# Patient Record
Sex: MALE | Race: WHITE | NOT HISPANIC OR LATINO | Employment: OTHER | URBAN - METROPOLITAN AREA
[De-identification: names, ages, dates, MRNs, and addresses within clinical notes are randomized per-mention and may not be internally consistent; named-entity substitution may affect disease eponyms.]

---

## 2019-06-21 ENCOUNTER — APPOINTMENT (OUTPATIENT)
Dept: RADIOLOGY | Facility: CLINIC | Age: 69
End: 2019-06-21
Payer: MEDICARE

## 2019-06-21 ENCOUNTER — OFFICE VISIT (OUTPATIENT)
Dept: URGENT CARE | Facility: CLINIC | Age: 69
End: 2019-06-21
Payer: MEDICARE

## 2019-06-21 ENCOUNTER — TELEPHONE (OUTPATIENT)
Dept: URGENT CARE | Facility: CLINIC | Age: 69
End: 2019-06-21

## 2019-06-21 VITALS
HEIGHT: 74 IN | TEMPERATURE: 97.8 F | BODY MASS INDEX: 21.17 KG/M2 | RESPIRATION RATE: 18 BRPM | SYSTOLIC BLOOD PRESSURE: 145 MMHG | DIASTOLIC BLOOD PRESSURE: 84 MMHG | WEIGHT: 165 LBS | OXYGEN SATURATION: 95 % | HEART RATE: 103 BPM

## 2019-06-21 DIAGNOSIS — M54.6 ACUTE RIGHT-SIDED THORACIC BACK PAIN: ICD-10-CM

## 2019-06-21 DIAGNOSIS — M54.6 ACUTE RIGHT-SIDED THORACIC BACK PAIN: Primary | ICD-10-CM

## 2019-06-21 PROCEDURE — 99203 OFFICE O/P NEW LOW 30 MIN: CPT | Performed by: PREVENTIVE MEDICINE

## 2019-06-21 PROCEDURE — G0463 HOSPITAL OUTPT CLINIC VISIT: HCPCS | Performed by: PREVENTIVE MEDICINE

## 2019-06-21 PROCEDURE — 71101 X-RAY EXAM UNILAT RIBS/CHEST: CPT

## 2019-09-04 ENCOUNTER — APPOINTMENT (EMERGENCY)
Dept: CT IMAGING | Facility: HOSPITAL | Age: 69
End: 2019-09-04
Payer: MEDICARE

## 2019-09-04 ENCOUNTER — HOSPITAL ENCOUNTER (EMERGENCY)
Facility: HOSPITAL | Age: 69
End: 2019-09-04
Attending: EMERGENCY MEDICINE | Admitting: EMERGENCY MEDICINE
Payer: MEDICARE

## 2019-09-04 ENCOUNTER — APPOINTMENT (EMERGENCY)
Dept: RADIOLOGY | Facility: HOSPITAL | Age: 69
End: 2019-09-04
Payer: MEDICARE

## 2019-09-04 ENCOUNTER — OFFICE VISIT (OUTPATIENT)
Dept: URGENT CARE | Facility: CLINIC | Age: 69
End: 2019-09-04
Payer: MEDICARE

## 2019-09-04 ENCOUNTER — HOSPITAL ENCOUNTER (INPATIENT)
Facility: HOSPITAL | Age: 69
LOS: 9 days | DRG: 025 | End: 2019-09-13
Attending: INTERNAL MEDICINE | Admitting: ANESTHESIOLOGY
Payer: MEDICARE

## 2019-09-04 VITALS
HEIGHT: 75 IN | SYSTOLIC BLOOD PRESSURE: 144 MMHG | BODY MASS INDEX: 20.39 KG/M2 | DIASTOLIC BLOOD PRESSURE: 88 MMHG | RESPIRATION RATE: 20 BRPM | HEART RATE: 95 BPM | WEIGHT: 164 LBS | TEMPERATURE: 98.6 F | OXYGEN SATURATION: 96 %

## 2019-09-04 VITALS
DIASTOLIC BLOOD PRESSURE: 98 MMHG | HEIGHT: 75 IN | TEMPERATURE: 98.5 F | HEART RATE: 81 BPM | OXYGEN SATURATION: 94 % | WEIGHT: 140 LBS | BODY MASS INDEX: 17.41 KG/M2 | RESPIRATION RATE: 22 BRPM | SYSTOLIC BLOOD PRESSURE: 189 MMHG

## 2019-09-04 DIAGNOSIS — R20.0 NUMBNESS: ICD-10-CM

## 2019-09-04 DIAGNOSIS — K59.00 CONSTIPATION, UNSPECIFIED CONSTIPATION TYPE: ICD-10-CM

## 2019-09-04 DIAGNOSIS — F10.10 ALCOHOL ABUSE: ICD-10-CM

## 2019-09-04 DIAGNOSIS — Z29.8 SEIZURE PROPHYLAXIS: ICD-10-CM

## 2019-09-04 DIAGNOSIS — I62.9 INTRACRANIAL HEMORRHAGE (HCC): Primary | ICD-10-CM

## 2019-09-04 DIAGNOSIS — D72.829 LEUKOCYTOSIS, UNSPECIFIED TYPE: ICD-10-CM

## 2019-09-04 DIAGNOSIS — C22.0 HEPATOCELLULAR CARCINOMA (HCC): ICD-10-CM

## 2019-09-04 DIAGNOSIS — R53.1 WEAKNESS: ICD-10-CM

## 2019-09-04 DIAGNOSIS — Z72.0 TOBACCO ABUSE: ICD-10-CM

## 2019-09-04 DIAGNOSIS — K21.9 GASTROESOPHAGEAL REFLUX DISEASE WITHOUT ESOPHAGITIS: ICD-10-CM

## 2019-09-04 DIAGNOSIS — G93.89 BRAIN MASS: ICD-10-CM

## 2019-09-04 DIAGNOSIS — J44.9 COPD (CHRONIC OBSTRUCTIVE PULMONARY DISEASE) (HCC): ICD-10-CM

## 2019-09-04 DIAGNOSIS — E87.1 HYPONATREMIA: ICD-10-CM

## 2019-09-04 DIAGNOSIS — F41.9 ANXIETY DISORDER: ICD-10-CM

## 2019-09-04 DIAGNOSIS — T38.0X5A STEROID-INDUCED HYPERGLYCEMIA: ICD-10-CM

## 2019-09-04 DIAGNOSIS — I61.9 CEREBRAL BRAIN HEMORRHAGE (HCC): Primary | ICD-10-CM

## 2019-09-04 DIAGNOSIS — K08.89 TOOTH PAIN: ICD-10-CM

## 2019-09-04 DIAGNOSIS — R73.9 STEROID-INDUCED HYPERGLYCEMIA: ICD-10-CM

## 2019-09-04 DIAGNOSIS — G93.6 CEREBRAL EDEMA (HCC): ICD-10-CM

## 2019-09-04 DIAGNOSIS — R29.898 RIGHT HAND WEAKNESS: Primary | ICD-10-CM

## 2019-09-04 DIAGNOSIS — I10 ESSENTIAL HYPERTENSION: ICD-10-CM

## 2019-09-04 PROBLEM — F17.210 DEPENDENCE ON NICOTINE FROM CIGARETTES: Status: ACTIVE | Noted: 2019-09-04

## 2019-09-04 LAB
ALBUMIN SERPL BCP-MCNC: 3.3 G/DL (ref 3.5–5)
ALP SERPL-CCNC: 262 U/L (ref 46–116)
ALT SERPL W P-5'-P-CCNC: 81 U/L (ref 12–78)
ANION GAP SERPL CALCULATED.3IONS-SCNC: 6 MMOL/L (ref 4–13)
APTT PPP: 28 SECONDS (ref 23–37)
AST SERPL W P-5'-P-CCNC: 70 U/L (ref 5–45)
BILIRUB SERPL-MCNC: 1.2 MG/DL (ref 0.2–1)
BUN SERPL-MCNC: 15 MG/DL (ref 5–25)
CALCIUM SERPL-MCNC: 9.3 MG/DL (ref 8.3–10.1)
CHLORIDE SERPL-SCNC: 99 MMOL/L (ref 100–108)
CO2 SERPL-SCNC: 31 MMOL/L (ref 21–32)
CREAT SERPL-MCNC: 1.29 MG/DL (ref 0.6–1.3)
ERYTHROCYTE [DISTWIDTH] IN BLOOD BY AUTOMATED COUNT: 14.9 % (ref 11.6–15.1)
GFR SERPL CREATININE-BSD FRML MDRD: 57 ML/MIN/1.73SQ M
GLUCOSE SERPL-MCNC: 114 MG/DL (ref 65–140)
HCT VFR BLD AUTO: 44.9 % (ref 36.5–49.3)
HGB BLD-MCNC: 15.2 G/DL (ref 12–17)
INR PPP: 1 (ref 0.84–1.19)
MCH RBC QN AUTO: 32.1 PG (ref 26.8–34.3)
MCHC RBC AUTO-ENTMCNC: 33.9 G/DL (ref 31.4–37.4)
MCV RBC AUTO: 95 FL (ref 82–98)
PLATELET # BLD AUTO: 285 THOUSANDS/UL (ref 149–390)
PMV BLD AUTO: 11 FL (ref 8.9–12.7)
POTASSIUM SERPL-SCNC: 4.4 MMOL/L (ref 3.5–5.3)
PROT SERPL-MCNC: 7.9 G/DL (ref 6.4–8.2)
PROTHROMBIN TIME: 12.9 SECONDS (ref 11.6–14.5)
RBC # BLD AUTO: 4.73 MILLION/UL (ref 3.88–5.62)
SODIUM SERPL-SCNC: 136 MMOL/L (ref 136–145)
WBC # BLD AUTO: 8.26 THOUSAND/UL (ref 4.31–10.16)

## 2019-09-04 PROCEDURE — 85610 PROTHROMBIN TIME: CPT | Performed by: EMERGENCY MEDICINE

## 2019-09-04 PROCEDURE — 80053 COMPREHEN METABOLIC PANEL: CPT | Performed by: EMERGENCY MEDICINE

## 2019-09-04 PROCEDURE — 85027 COMPLETE CBC AUTOMATED: CPT | Performed by: EMERGENCY MEDICINE

## 2019-09-04 PROCEDURE — 96374 THER/PROPH/DIAG INJ IV PUSH: CPT

## 2019-09-04 PROCEDURE — 71045 X-RAY EXAM CHEST 1 VIEW: CPT

## 2019-09-04 PROCEDURE — 1124F ACP DISCUSS-NO DSCNMKR DOCD: CPT | Performed by: INTERNAL MEDICINE

## 2019-09-04 PROCEDURE — 99285 EMERGENCY DEPT VISIT HI MDM: CPT | Performed by: EMERGENCY MEDICINE

## 2019-09-04 PROCEDURE — 71260 CT THORAX DX C+: CPT

## 2019-09-04 PROCEDURE — 96361 HYDRATE IV INFUSION ADD-ON: CPT

## 2019-09-04 PROCEDURE — 94640 AIRWAY INHALATION TREATMENT: CPT

## 2019-09-04 PROCEDURE — 36415 COLL VENOUS BLD VENIPUNCTURE: CPT | Performed by: EMERGENCY MEDICINE

## 2019-09-04 PROCEDURE — NC001 PR NO CHARGE: Performed by: INTERNAL MEDICINE

## 2019-09-04 PROCEDURE — 74177 CT ABD & PELVIS W/CONTRAST: CPT

## 2019-09-04 PROCEDURE — 85730 THROMBOPLASTIN TIME PARTIAL: CPT | Performed by: EMERGENCY MEDICINE

## 2019-09-04 PROCEDURE — 96375 TX/PRO/DX INJ NEW DRUG ADDON: CPT

## 2019-09-04 PROCEDURE — 70450 CT HEAD/BRAIN W/O DYE: CPT

## 2019-09-04 PROCEDURE — 94762 N-INVAS EAR/PLS OXIMTRY CONT: CPT

## 2019-09-04 PROCEDURE — 99285 EMERGENCY DEPT VISIT HI MDM: CPT

## 2019-09-04 PROCEDURE — 99223 1ST HOSP IP/OBS HIGH 75: CPT | Performed by: INTERNAL MEDICINE

## 2019-09-04 PROCEDURE — 93005 ELECTROCARDIOGRAM TRACING: CPT

## 2019-09-04 RX ORDER — NICOTINE 21 MG/24HR
21 PATCH, TRANSDERMAL 24 HOURS TRANSDERMAL DAILY
Status: DISCONTINUED | OUTPATIENT
Start: 2019-09-05 | End: 2019-09-13 | Stop reason: HOSPADM

## 2019-09-04 RX ORDER — DEXAMETHASONE SODIUM PHOSPHATE 10 MG/ML
10 INJECTION, SOLUTION INTRAMUSCULAR; INTRAVENOUS ONCE
Status: COMPLETED | OUTPATIENT
Start: 2019-09-04 | End: 2019-09-04

## 2019-09-04 RX ORDER — OXYCODONE HYDROCHLORIDE 5 MG/1
5 TABLET ORAL EVERY 6 HOURS PRN
Status: DISCONTINUED | OUTPATIENT
Start: 2019-09-04 | End: 2019-09-05

## 2019-09-04 RX ORDER — SODIUM CHLORIDE 9 MG/ML
75 INJECTION, SOLUTION INTRAVENOUS CONTINUOUS
Status: DISCONTINUED | OUTPATIENT
Start: 2019-09-04 | End: 2019-09-07

## 2019-09-04 RX ORDER — MORPHINE SULFATE 4 MG/ML
4 INJECTION, SOLUTION INTRAMUSCULAR; INTRAVENOUS ONCE
Status: COMPLETED | OUTPATIENT
Start: 2019-09-04 | End: 2019-09-04

## 2019-09-04 RX ORDER — NICOTINE 21 MG/24HR
21 PATCH, TRANSDERMAL 24 HOURS TRANSDERMAL ONCE
Status: DISCONTINUED | OUTPATIENT
Start: 2019-09-04 | End: 2019-09-04 | Stop reason: HOSPADM

## 2019-09-04 RX ORDER — ACETAMINOPHEN 325 MG/1
650 TABLET ORAL EVERY 4 HOURS PRN
Status: DISCONTINUED | OUTPATIENT
Start: 2019-09-04 | End: 2019-09-13

## 2019-09-04 RX ORDER — LORAZEPAM 2 MG/ML
1 INJECTION INTRAMUSCULAR ONCE
Status: COMPLETED | OUTPATIENT
Start: 2019-09-04 | End: 2019-09-04

## 2019-09-04 RX ORDER — LABETALOL 20 MG/4 ML (5 MG/ML) INTRAVENOUS SYRINGE
10 EVERY 4 HOURS PRN
Status: DISCONTINUED | OUTPATIENT
Start: 2019-09-04 | End: 2019-09-13 | Stop reason: HOSPADM

## 2019-09-04 RX ORDER — DEXAMETHASONE SODIUM PHOSPHATE 4 MG/ML
4 INJECTION, SOLUTION INTRA-ARTICULAR; INTRALESIONAL; INTRAMUSCULAR; INTRAVENOUS; SOFT TISSUE EVERY 12 HOURS SCHEDULED
Status: DISCONTINUED | OUTPATIENT
Start: 2019-09-04 | End: 2019-09-05

## 2019-09-04 RX ORDER — THIAMINE MONONITRATE (VIT B1) 100 MG
100 TABLET ORAL DAILY
Status: DISCONTINUED | OUTPATIENT
Start: 2019-09-05 | End: 2019-09-13 | Stop reason: HOSPADM

## 2019-09-04 RX ORDER — FOLIC ACID 1 MG/1
1 TABLET ORAL DAILY
Status: DISCONTINUED | OUTPATIENT
Start: 2019-09-05 | End: 2019-09-13 | Stop reason: HOSPADM

## 2019-09-04 RX ORDER — IPRATROPIUM BROMIDE AND ALBUTEROL SULFATE 2.5; .5 MG/3ML; MG/3ML
3 SOLUTION RESPIRATORY (INHALATION) ONCE
Status: COMPLETED | OUTPATIENT
Start: 2019-09-04 | End: 2019-09-04

## 2019-09-04 RX ADMIN — SODIUM CHLORIDE 1000 ML: 0.9 INJECTION, SOLUTION INTRAVENOUS at 12:32

## 2019-09-04 RX ADMIN — NICOTINE 21 MG: 21 PATCH, EXTENDED RELEASE TRANSDERMAL at 13:22

## 2019-09-04 RX ADMIN — DEXAMETHASONE SODIUM PHOSPHATE 10 MG: 10 INJECTION, SOLUTION INTRAMUSCULAR; INTRAVENOUS at 13:22

## 2019-09-04 RX ADMIN — IOHEXOL 100 ML: 350 INJECTION, SOLUTION INTRAVENOUS at 12:24

## 2019-09-04 RX ADMIN — LORAZEPAM 1 MG: 2 INJECTION INTRAMUSCULAR; INTRAVENOUS at 13:22

## 2019-09-04 RX ADMIN — IPRATROPIUM BROMIDE AND ALBUTEROL SULFATE 3 ML: 2.5; .5 SOLUTION RESPIRATORY (INHALATION) at 10:56

## 2019-09-04 RX ADMIN — DEXAMETHASONE SODIUM PHOSPHATE 4 MG: 4 INJECTION, SOLUTION INTRAMUSCULAR; INTRAVENOUS at 21:07

## 2019-09-04 RX ADMIN — OXYCODONE HYDROCHLORIDE 5 MG: 5 TABLET ORAL at 19:21

## 2019-09-04 RX ADMIN — SODIUM CHLORIDE 75 ML/HR: 0.9 INJECTION, SOLUTION INTRAVENOUS at 19:43

## 2019-09-04 RX ADMIN — MORPHINE SULFATE 4 MG: 4 INJECTION INTRAVENOUS at 12:33

## 2019-09-04 NOTE — ED NOTES
Patient provided with menu and portable phone to order himself lunch       Giselle Neal  09/04/19 6696

## 2019-09-04 NOTE — H&P
INTERNAL MEDICINE RESIDENCY ADMISSION H&P     Name: Prashanth Tripp  Age & Sex: 81 y o  male   MRN: [id number]  Unit/Bed#: [facility] 631-01   Encounter: [id number]  Primary Care Provider: No primary care provider on file  Code Status: Level 1 - Full Code  Admission Status: INPATIENT   Disposition: Patient requires Med/Surg with Telemetry    ASSESSMENT/PLAN     Principal Problem:    Cerebral brain hemorrhage (HCC)  Active Problems:    Weakness    Essential hypertension    Alcohol abuse    Dependence on nicotine from cigarettes      * Cerebral brain hemorrhage [facility])  Assessment & Plan  CT showed small focus of hemorrhage with associated mass  Main concern for metastasis secondary to untreated [address]  Unlikely to be primary tumor  Now presenting with focal weakness of his hand  - neuro checks q 4 hours  - Decadron 4 mg q 12 hours  - NPO at midnight  - PT/OT/speech consult  - consult to Neurosurgery  - Consult neurology  - consult PMR    Dependence on nicotine from cigarettes  Assessment & Plan  Patient endorsed smoking 2 packs a day for last 30-40 years  - nicotine patch 21mg    Alcohol abuse  Assessment & Plan  Patient endorsed drinking one 5th daily     - CIWA protocol  - thiamine  - folate    Essential hypertension  Assessment & Plan  Reports he is taking nifedipine as an outpatient but has not been on this for 3 months due to financial reasons  Was initially hypertensive on admission  BP now stable  - monitor  - labetalol p r n  Weakness  Assessment & Plan  Likely secondary to mass and intracranial bleed  See work up as above        VTE Pharmacologic Prophylaxis: Reason for no pharmacologic prophylaxis brain hemorrhage  VTE Mechanical Prophylaxis: sequential compression device    CHIEF COMPLAINT   R hand pain and weakness     HISTORY OF PRESENT ILLNESS     Patient is 81year old male with past medical history of liver cancer, COPD, and hypertension presented to the urgent care this morning with complaints of right hand weakness, numbness, and tremor for 3 months  Patient also has complains of loss of balance in last 2 months  No associated visual changes or LOC  He was transferred to [facility] evaluation  CT head showed a large area of vasogenic edema in the left parietal area with a small focus of hemorrhage and an associated mass like area 4 x 2 cm with mild mass effect  CT chest abdomen pelvis showed heterogeneous liver with hepatomegaly and multifocal heterogeneous liver lesions and L lower lung lobe nodule concerning for metastasis, bony metastatic disease in the ribs and manubrium  He was transferred to [place] for further work up and evaluation        Patient was diagnosed with [address]  in mid 2017 and was treated with chemotherapy for ~3-4 months and treatment was stopped because patient did not believe that the chemotherapy was providing any benefit  Patient could not recall chemotherapeutic drugs used in his treatment  Patient has also been diagnosed with hypertension that was treated with Nifedipine  Patient has been without his medications for 3 months due to financial restrictions  Patient was last seen by family doctor 4-5 months ago  Patient endorsed drinking a fifth of EtOH per day and smoking 2 packs of cigarettes per day for the last 30-40 years  Denied other drug use  In the ED he was hypertensive to 196/99  Labs were significant for elevated AST, ALT, Alk Phos, T Bili, and low albumin  REVIEW OF SYSTEMS     Review of Systems   Constitutional: Positive for unexpected weight change  Respiratory: Positive for cough  Negative for choking, shortness of breath, wheezing and stridor  Cardiovascular: Negative for chest pain and leg swelling  Gastrointestinal: Positive for abdominal pain and diarrhea  Negative for constipation, nausea and vomiting  Musculoskeletal: Positive for back pain and gait problem  Neurological: Positive for tremors and weakness  Negative for light-headedness, numbness and headaches  OBJECTIVE     Vitals:    19 1703 19 1758   BP: 128/91 120/72   BP Location: Left arm    Pulse: 86    Resp: 20    Temp: 98 7 °F (37 1 °C)    TempSrc: Oral    SpO2: 91%       Temperature:   Temp (24hrs), Av 6 °F (37 °C), Min:98 5 °F (36 9 °C), Max:98 7 °F (37 1 °C)    Temperature: 98 7 °F (37 1 °C)  Intake & Output:  I/O     None        Weights: There is no height or weight on file to calculate BMI  Weight (last 2 days)     None        Physical Exam   Constitutional: He is oriented to person, place, and time  No distress  HENT:   Head: Normocephalic and atraumatic  Eyes: Pupils are equal, round, and reactive to light  Scleral icterus is present  Cardiovascular: Normal rate, regular rhythm and normal heart sounds  Exam reveals no friction rub  No murmur heard  Pulmonary/Chest: Effort normal  No stridor  No respiratory distress  He has wheezes  He has no rales  Inspiratory wheezes throughout lung fields   Abdominal: Soft  Bowel sounds are normal  He exhibits no distension and no mass  There is no tenderness  There is no guarding  Musculoskeletal: Normal range of motion  He exhibits no edema or deformity  Neurological: He is alert and oriented to person, place, and time  4/5  strength R hand, 5/5 L,   Remainder of strength testing was all 5/5 bilaterally   Skin: Skin is warm and dry  He is not diaphoretic  Psychiatric: He has a normal mood and affect  His behavior is normal    Vitals reviewed      PAST MEDICAL HISTORY     Past Medical History:   Diagnosis Date    Cancer [facility])     liver    Hypertension      PAST SURGICAL HISTORY     Past Surgical History:   Procedure Laterality Date    APPENDECTOMY       SOCIAL & FAMILY HISTORY     Social History     Substance and Sexual Activity   Alcohol Use Yes    Comment: 1/5 bottle weekly      Substance and Sexual Activity   Alcohol Use Yes    Comment: 1/5 bottle weekly Substance and Sexual Activity   Drug Use Never     Social History     Tobacco Use   Smoking Status Current Every Day Smoker    Packs/day: 2 00   Smokeless Tobacco Current User    Types: Chew     Family History   Problem Relation Age of Onset    Cancer Mother     Cancer Father      LABORATORY DATA     Labs: I have personally reviewed pertinent reports  Results from last 7 days   Lab Units 09/04/19  1103   WBC Thousand/uL 8 26   HEMOGLOBIN g/dL 15 2   HEMATOCRIT % 44 9   PLATELETS Thousands/uL 285      Results from last 7 days   Lab Units 09/04/19  1103   POTASSIUM mmol/L 4 4   CHLORIDE mmol/L 99*   CO2 mmol/L 31   BUN mg/dL 15   CREATININE mg/dL 1 29   CALCIUM mg/dL 9 3   ALK PHOS U/L 262*   ALT U/L 81*   AST U/L 70*              Results from last 7 days   Lab Units 09/04/19  1103   INR  1 00   PTT seconds 28             Micro:  No results found for: BLOODCX, URINECX, WOUNDCULT, SPUTUMCULTUR  IMAGING & DIAGNOSTIC TESTS     Imaging: I have personally reviewed pertinent reports  X-ray Chest 1 View Portable    Result Date: 9/4/2019  Impression: Suggestion of osteolytic lesion in the posterior left 5th rib  Otherwise, no evidence of acute abnormality in the chest  Workstation performed: [id number]     Ct Head Without Contrast    Result Date: 9/4/2019  Impression: Large area of subcortical white matter hypodensity consistent with vasogenic edema in the left parietal region  Small focus of hemorrhage superiorly as seen on image 2/30 with an associated more hypodense masslike area measuring 3 9 x 2 2 cm  Mild mass  effect on the adjacent cortical sulci  No significant midline shift  MRI with contrast recommended to exclude underlying mass    I personally discussed this study with Daiana Holstein on 9/4/2019 at 11:36 AM  Workstation performed: [id number]     Ct Chest Abdomen Pelvis W Contrast    Result Date: 9/4/2019  Impression: Heterogenous liver with hepatomegaly and multifocal heterogenous liver lesions, may be due to multifocal [address]  or metastatic disease  Left lower lobe lung nodule measuring 7 6 mm, suspicious for metastatic disease 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 Mildly enlarged fidel hepatis lymphadenopathy The study was marked in EPIC for immediate notification  Workstation performed: [id number]     EKG, Pathology, and Other Studies: I have personally reviewed pertinent reports  ALLERGIES   No Known Allergies  MEDICATIONS PRIOR TO ARRIVAL     Prior to Admission medications    Not on File     MEDICATIONS ADMINISTERED IN LAST 24 HOURS     Medication Administration - last 24 hours from 09/03/2019 2015 to 09/04/2019 2015       Date/Time Order Dose Route Action Action by     09/04/2019 1943 sodium chloride 0 9 % infusion 75 mL/hr Intravenous [facility] PERLA Red     09/04/2019 1921 oxyCODONE (ROXICODONE) IR tablet 5 mg 5 mg Oral Given Nadiya Fraser RN        CURRENT MEDICATIONS       Current Facility-Administered Medications:  acetaminophen 650 mg Oral Q4H PRN Micheline Singh DO    dexamethasone 4 mg Intravenous Q12H [facility] Shekhar Miguel DO    [START ON 6/3/2369] folic acid 1 mg Oral Daily Cindy Felder MD    Labetalol HCl 10 mg Intravenous Q4H PRN Micheline Singh DO    [START ON 9/5/2019] multivitamin-minerals 1 tablet Oral Daily Cindy Felder MD    [START ON 9/5/2019] nicotine 21 mg Transdermal Daily Cindy Felder MD    oxyCODONE 5 mg Oral Q6H PRN Cindy Felder MD    sodium chloride 75 mL/hr Intravenous Continuous Micheline Singh DO Last Rate: 75 mL/hr (09/04/19 1943)   [START ON 9/5/2019] thiamine 100 mg Oral Daily Cindy Felder MD        sodium chloride 75 mL/hr Last Rate: 75 mL/hr (09/04/19 1943)       acetaminophen 650 mg Q4H PRN   Labetalol HCl 10 mg Q4H PRN   oxyCODONE 5 mg Q6H PRN       Admission Time  I spent 30 minutes admitting the patient    This involved direct patient contact where I performed a full history and physical, reviewing previous records, and reviewing laboratory and other diagnostic studies  Portions of the record may have been created with voice recognition software  Occasional wrong word or "sound a like" substitutions may have occurred due to the inherent limitations of voice recognition software    Read the chart carefully and recognize, using context, where substitutions have occurred     ==  Arlen Alvarez MD  [address]  Internal Medicine Residency PGY-1

## 2019-09-04 NOTE — ED PROVIDER NOTES
History  Chief Complaint   Patient presents with    Extremity Weakness     Patient states has been having problems for a couple months, and the pain/numbess keeps getting worse, sometimes drops a fork  States his "equalibrium is messed up" for a while too  Patient with hx of liver cancer unsure of stage and copd ran out of inhalers referred over from urgent care with concern for stroke  Patient has couple months of sharp right wrist and foot pains intermittent 20 minute duration with associated uncontrollable shaking and numbness in fingers and toes, not relieved with aleve  He has fallen couple times in last few months and has left more than right rib pain  He has chronic SOB, no new changes with that  Patient is a heavy smoker, tried quitting but not able to  None       Past Medical History:   Diagnosis Date    Cancer [facility])     liver    Hypertension        Past Surgical History:   Procedure Laterality Date    APPENDECTOMY         Family History   Problem Relation Age of Onset    Cancer Mother     Cancer Father      I have reviewed and agree with the history as documented  Social History     Tobacco Use    Smoking status: Current Every Day Smoker     Packs/day: 2 00    Smokeless tobacco: Current User     Types: Chew   Substance Use Topics    Alcohol use: Yes     Comment: 1/5 bottle weekly     Drug use: Never        Review of Systems   All other systems reviewed and are negative  Physical Exam  Physical Exam   Constitutional: He is oriented to person, place, and time  He appears well-developed and well-nourished  He appears distressed  HENT:   Mouth/Throat: Oropharynx is clear and moist    Eyes: Pupils are equal, round, and reactive to light  Conjunctivae and EOM are normal    Neck: Normal range of motion  Neck supple  No spinous process tenderness present     Right upper back soft tissue mobile mass   Cardiovascular: Normal rate, regular rhythm, normal heart sounds and intact distal pulses  Pulmonary/Chest: Effort normal  No respiratory distress  He has wheezes  Abdominal: Soft  Bowel sounds are normal  He exhibits no distension  There is no tenderness  Musculoskeletal:        Right wrist: He exhibits decreased range of motion and swelling  He exhibits no tenderness and no bony tenderness  Left foot: There is normal range of motion and no tenderness  Neurological: He is alert and oriented to person, place, and time  He displays atrophy and tremor  No cranial nerve deficit or sensory deficit  He exhibits abnormal muscle tone  Coordination abnormal  GCS eye subscore is 4  GCS verbal subscore is 5  GCS motor subscore is 6  Right upper extremity poor coordination, spastic tremor, weak  and weak shoulder extension   Skin: Skin is warm and dry  No rash noted  Psychiatric: He has a normal mood and affect  Nursing note and vitals reviewed        Vital Signs  ED Triage Vitals   Temperature Pulse Respirations Blood Pressure SpO2   09/04/19 1028 09/04/19 1028 09/04/19 1028 09/04/19 1028 09/04/19 1028   98 5 °F (36 9 °C) 89 22 162/98 95 %      Temp Source Heart Rate Source Patient Position - Orthostatic VS BP Location FiO2 (%)   09/04/19 1028 09/04/19 1315 09/04/19 1028 09/04/19 1028 --   Temporal Monitor Sitting Left arm       Pain Score       09/04/19 1028       7           Vitals:    09/04/19 1028 09/04/19 1030 09/04/19 1315 09/04/19 1500   BP: 162/98 162/98 (!) 196/99 (!) 189/98   Pulse: 89 89 76 81   Patient Position - Orthostatic VS: Sitting  Lying          Visual Acuity  Visual Acuity      Most Recent Value   L Pupil Size (mm)  3   R Pupil Size (mm)  3          ED Medications  Medications   ipratropium-albuterol (DUO-NEB) 0 5-2 5 mg/3 mL inhalation solution 3 mL (3 mL Nebulization Given 9/4/19 1056)   sodium chloride 0 9 % bolus 1,000 mL (0 mL Intravenous Stopped 9/4/19 1402)   morphine (PF) 4 mg/mL injection 4 mg (4 mg Intravenous Given 9/4/19 1233)   iohexol (OMNIPAQUE) 350 MG/ML injection (MULTI-DOSE) 100 mL (100 mL Intravenous Given 9/4/19 1224)   LORazepam (ATIVAN) 2 mg/mL injection 1 mg (1 mg Intravenous Given 9/4/19 1322)   dexamethasone (PF) (DECADRON) injection 10 mg (10 mg Intravenous Given 9/4/19 1322)       Diagnostic Studies  Results Reviewed     Procedure Component Value Units Date/Time    Comprehensive metabolic panel [[id number]]  (Abnormal) Collected:  09/04/19 1103    Lab Status:  Final result Specimen:  Blood from Arm, Left Updated:  09/04/19 1137     Sodium 136 mmol/L      Potassium 4 4 mmol/L      Chloride 99 mmol/L      CO2 31 mmol/L      ANION GAP 6 mmol/L      BUN 15 mg/dL      Creatinine 1 29 mg/dL      Glucose 114 mg/dL      Calcium 9 3 mg/dL      AST 70 U/L      ALT 81 U/L      Alkaline Phosphatase 262 U/L      Total Protein 7 9 g/dL      Albumin 3 3 g/dL      Total Bilirubin 1 20 mg/dL      eGFR 57 ml/min/1 73sq m     Narrative:       [place] guidelines for Chronic Kidney Disease (CKD):     Stage 1 with normal or high GFR (GFR > 90 mL/min/1 73 square meters)    Stage 2 Mild CKD (GFR = 60-89 mL/min/1 73 square meters)    Stage 3A Moderate CKD (GFR = 45-59 mL/min/1 73 square meters)    Stage 3B Moderate CKD (GFR = 30-44 mL/min/1 73 square meters)    Stage 4 Severe CKD (GFR = 15-29 mL/min/1 73 square meters)    Stage 5 End Stage CKD (GFR <15 mL/min/1 73 square meters)  Note: GFR calculation is accurate only with a steady state creatinine    Protime-INR [[id number]]  (Normal) Collected:  09/04/19 1103    Lab Status:  Final result Specimen:  Blood from Arm, Left Updated:  09/04/19 1128     Protime 12 9 seconds      INR 1 00    APTT [[id number]]  (Normal) Collected:  09/04/19 1103    Lab Status:  Final result Specimen:  Blood from Arm, Left Updated:  09/04/19 1128     PTT 28 seconds     CBC and Platelet [[id number]]  (Normal) Collected:  09/04/19 1103    Lab Status:  Final result Specimen:  Blood from Arm, Left Updated: 09/04/19 1117     WBC 8 26 Thousand/uL      RBC 4 73 Million/uL      Hemoglobin 15 2 g/dL      Hematocrit 44 9 %      MCV 95 fL      MCH 32 1 pg      MCHC 33 9 g/dL      RDW 14 9 %      Platelets 301 Thousands/uL      MPV 11 0 fL                  CT chest abdomen pelvis w contrast   Final Result by Renard Smith MD (09/04 1252)   Heterogenous liver with hepatomegaly and multifocal heterogenous liver lesions, may be due to multifocal [address]  or metastatic disease  Left lower lobe lung nodule measuring 7 6 mm, suspicious for metastatic disease   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   Mildly enlarged fidel hepatis lymphadenopathy      The study was marked in EPIC for immediate notification  Workstation performed: [id number]         CT head without contrast   Final Result by Alfonso Rodriguez MD (09/04 1145)      Large area of subcortical white matter hypodensity consistent with vasogenic edema in the left parietal region  Small focus of hemorrhage superiorly as seen on image 2/30 with an associated more hypodense masslike area measuring 3 9 x 2 2 cm  Mild mass    effect on the adjacent cortical sulci  No significant midline shift  MRI with contrast recommended to exclude underlying mass  I personally discussed this study with Sharla José Miguel on 9/4/2019 at 11:36 AM                         Workstation performed: [id number]         X-ray chest 1 view portable   Final Result by Reyna Valle MD (09/04 1622)      Suggestion of osteolytic lesion in the posterior left 5th rib        Otherwise, no evidence of acute abnormality in the chest             Workstation performed: [id number]                    Procedures  Procedures       ED Course  ED Course as of Sep 04 2022   Wed Sep 04, 2019   1305 Spoke with critical care Nicholas Macias - recommends decadron 10 then 4 mg and SLIM admission                                  MDM  Number of Diagnoses or Management Options  Intracranial hemorrhage [facility]): new and requires workup     Amount and/or Complexity of Data Reviewed  Clinical lab tests: ordered and reviewed  Tests in the radiology section of CPT®: ordered and reviewed  Discuss the patient with other providers: yes    Patient Progress  Patient progress: improved      Disposition  Final diagnoses:   Intracranial hemorrhage ([facility] ) - secondary to tumor     Time reflects when diagnosis was documented in both MDM as applicable and the Disposition within this note     Time User Action Codes Description Comment    9/4/2019  1:37 PM Jerricaconnie Estradamartín Add [I62 9] Intracranial hemorrhage ([facility] [address] [address] )     9/4/2019  1:37 PM Jerrica Mariner Modify [I62 9] Intracranial hemorrhage [facility]) secondary to tumor      ED Disposition     ED Disposition Condition Date/Time Comment    Transfer to Another Facility-In Network  Wed Sep 4, 2019  1:37 PM Naun Crowe  should be transferred out to **[facility]*          MD Documentation      Most Recent Value   Patient Condition  The patient has been stabilized such that within reasonable medical probability, no material deterioration of the patient condition or the condition of the unborn child(misty) is likely to result from the transfer   Benefits of Transfer  Specialized equipment and/or services available at the receiving facility (Include comment)________________________ [neurosurgery if needed]   Risks of Transfer  Potential for delay in receiving treatment, Potential deterioration of medical condition, Loss of IV, Increased discomfort during transfer, Possible worsening of condition or death during transfer   Accepting Physician  [address]  Name, [place], [place]    (Name & Tel number)  Mickey Chiu [phone]   Sending MD Medardo Plummer DO   Provider Certification  General risk, such as traffic hazards, adverse weather conditions, rough terrain or turbulence, possible failure of equipment (including vehicle or aircraft), or consequences of actions of persons outside the control of the transport personnel, Unanticipated needs of medical equipment and personnel during transport, Risk of worsening condition, The possibility of a transport vehicle being unavailable      RN Documentation      [facility] [address] [facility] Name, [place], [place]    (Name & Tel number)  Nadia Mendoza [phone] [facility]   Level of Care  Advanced life support      Follow-up Information    None         There are no discharge medications for this patient  No discharge procedures on file      ED Provider  Electronically Signed by           John Saavedra DO  09/04/19 2023

## 2019-09-04 NOTE — EMTALA/ACUTE CARE TRANSFER
[address]  [facility] 3  [address]  Dept: [address] TRANSFER CONSENT    NAME Nohemi Bates   1950                              MRN [id number]    I have been informed of my rights regarding examination, treatment, and transfer   by Dr Laurie Lundberg DO    Benefits: Specialized equipment and/or services available at the receiving facility (Include comment)________________________(neurosurgery if needed)    Risks: Potential for delay in receiving treatment, Potential deterioration of medical condition, Loss of IV, Increased discomfort during transfer, Possible worsening of condition or death during transfer      Consent for Transfer:  I acknowledge that my medical condition has been evaluated and explained to me by the emergency department physician or other qualified medical person and/or my attending physician, who has recommended that I be transferred to the service of  Accepting Physician: Eugenia Mar at [address], [address] : [place], [place]  The above potential benefits of such transfer, the potential risks associated with such transfer, and the probable risks of not being transferred have been explained to me, and I fully understand them  The doctor has explained that, in my case, the benefits of transfer outweigh the risks  I agree to be transferred  I authorize the performance of emergency medical procedures and treatments upon me in both transit and upon arrival at the receiving facility  Additionally, I authorize the release of any and all medical records to the receiving facility and request they be transported with me, if possible  I understand that the safest mode of transportation during a medical emergency is an ambulance and that the Hospital advocates the use of this mode of transport   Risks of traveling to the receiving facility by car, including absence of medical control, life sustaining equipment, such as oxygen, and medical personnel has been explained to me and I fully understand them  (STEFANY CORRECT BOX BELOW)  [  ]  I consent to the stated transfer and to be transported by ambulance/helicopter  [  ]  I consent to the stated transfer, but refuse transportation by ambulance and accept full responsibility for my transportation by car  I understand the risks of non-ambulance transfers and I exonerate the Hospital and its staff from any deterioration in my condition that results from this refusal     X___________________________________________    DATE  19  TIME________  Signature of patient or legally responsible individual signing on patient behalf           RELATIONSHIP TO PATIENT_________________________          Provider Certification    NAME Erlinda Cooper  [facility] 1950                              MRN [id number]    A medical screening exam was performed on the above named patient  Based on the examination:    Condition Necessitating Transfer The encounter diagnosis was Intracranial hemorrhage ([address] )      Patient Condition: The patient has been stabilized such that within reasonable medical probability, no material deterioration of the patient condition or the condition of the unborn child(misty) is likely to result from the transfer    Reason for Transfer:      Transfer Requirements: [address] [place] [address]   · Space available and qualified personnel available for treatment as acknowledged by Nadia Mendoza [phone]  · Agreed to accept transfer and to provide appropriate medical treatment as acknowledged by       [facility]  · Appropriate medical records of the examination and treatment of the patient are provided at the time of transfer   [address], [address] _______  · Transfer will be performed by qualified personnel from    and appropriate transfer equipment as required, including the use of necessary and appropriate life support measures  Provider Certification: I have examined the patient and explained the following risks and benefits of being transferred/refusing transfer to the patient/family:  General risk, such as traffic hazards, adverse weather conditions, rough terrain or turbulence, possible failure of equipment (including vehicle or aircraft), or consequences of actions of persons outside the control of the transport personnel, Unanticipated needs of medical equipment and personnel during transport, Risk of worsening condition, The possibility of a transport vehicle being unavailable      Based on these reasonable risks and benefits to the patient and/or the unborn child(misty), and based upon the information available at the time of the patients examination, I certify that the medical benefits reasonably to be expected from the provision of appropriate medical treatments at another medical facility outweigh the increasing risks, if any, to the individuals medical condition, and in the case of labor to the unborn child, from effecting the transfer      X____________________________________________ DATE 09/04/19        TIME_______      ORIGINAL - SEND TO MEDICAL RECORDS   COPY - SEND WITH PATIENT DURING TRANSFER

## 2019-09-04 NOTE — PROGRESS NOTES
Progress Note - Estefani Gallegos  81 y o  male MRN: [id number]    [facility] Encounter: [id number]      Assessment:  81year old male with past medical history of liver cancer and hypertension presenting with vasogenic brain edema, hemorrhage, and possible mass  Plan:  Decadron 4mg [id number]    Continuous sodium chloride 0 9% infusion, 75 ml/hr    Acetaminophen and labetalol PRN    NPO after midnight    Daily BMP and CBC, Check Hgb A1c, Lipid panel, magnesium and phosphorus tmw  Incentive spirometry  Neuro checks    OT/PT/Speech evals    Consult Neuro, Neurosurgery, PMR    Subjective:   Patient is 81year old male with past medical history of liver cancer and hypertension presented to the emergency department with complaints of right hand weakness, numbness, and tremor for 3 months  Patient is experiencing symptoms up to approximately 2 inches above wrist  Patient has not found any activity or treatment that alleviates or aggravates symptoms  Patient also states that he has always been somewhat fidgety  Patient also has complaints of loss of balance in last 2 months  No associated visual changes or LOC  Patient was diagnosed with liver cancer in mid 2017 and was treated with chemotherapy for ~3-4 months and treatment was stopped because patient did not believe that the chemotherapy was providing any benefit  Patient could not recall chemotherapeutic drugs used in his treatment  Patient has also been diagnosed with hypertension that was treated with Nifedipine  Patient also has a long history of back pain and muscle/joint pain due to his trade as a  for 18 years  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  Patient was treating MSK pains with hydrocodone for 8-9 years prescribed by family doctor: Dr Derick Cuevas  Patient was also prescribed Xanax for 10-15 years for wiryness and sleep help   Patient has been without hydrocodone, xanax, and nifedipine for 3 months due to financial restrictions  Patient was last seen by family doctor 4-5 months ago  Patient had appendectomy ~20-24 yo    Family history includes extensive cancer: Mother: cholangiocarcinoma  Father: Alzheimer  2 uncles: colon cancer  Sister: Kidney cancer    Patient has 3 children that were last spoken to over 30 years ago and are healthy from his understanding  Patient currently lives alone, last lived with girlfriend of 21 years until July of 2018 when she passed  Currently works at [employer] helping with [employer] and ends  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 take care of his girlfriend who's health turned for the worse  Patient smokes 2 packs/day for over 30 years  Began smoking at age 17  Patient drinks on average a 1/5th of whiskey or rum per week  Patient is currently not sexually active and has a normal diet of [employer] food and patient used to lift weights to stay healthy  In 7 months he dropped from 235 pounds to 160 pounds due to loss of appetite  Patient today continues to have complaints of SOB, cough, right hand numbness, weakness, and tremor, and gait instability  Objective:     Vitals: Blood pressure 120/72, pulse 86, temperature 98 7 °F (37 1 °C), temperature source Oral, resp  rate 20, SpO2 91 %  ,There is no height or weight on file to calculate BMI  No intake or output data in the 24 hours ending 09/04/19 1849    Physical Exam   Constitutional: He is oriented to person, place, and time  He appears well-developed  No distress  HENT:   Head: Normocephalic and atraumatic  Right Ear: External ear normal    Left Ear: External ear normal    Nose: Nose normal    Mouth/Throat: Oropharynx is clear and moist    Eyes: Pupils are equal, round, and reactive to light  Conjunctivae, EOM and lids are normal  Right eye exhibits no discharge  Left eye exhibits no discharge  No scleral icterus  Neck: Normal range of motion  No JVD present  No tracheal deviation present  Cardiovascular: Normal rate, regular rhythm, normal heart sounds and intact distal pulses  Exam reveals no gallop and no friction rub  No murmur heard  Pulmonary/Chest: No stridor  He has decreased breath sounds  He has no wheezes  He has no rales  He exhibits no tenderness  Abdominal: Soft  Bowel sounds are normal  He exhibits no distension  There is no tenderness  There is no guarding  Lymphadenopathy:     He has no cervical adenopathy  Neurological: He is alert and oriented to person, place, and time  He has normal reflexes  No sensory deficit  He exhibits normal muscle tone  Coordination abnormal    Patient finger to nose exam was abnormal, struggled to reach finger tip to finger tip  Right upper extremity decreased strength 3/5 @ shoulder abduction/adduction, elbow flexion/extension, finger abduction,  strength, and thumb abduction  Patient also appeared fidgety but he stated it was normal    Skin: Skin is warm and dry  Capillary refill takes less than 2 seconds  He is not diaphoretic  Psychiatric: Judgment and thought content normal  His mood appears anxious  He is agitated  He exhibits abnormal recent memory  Review of Systems   Constitutional: Positive for activity change, appetite change, fever and unexpected weight change  HENT: Positive for congestion, dental problem, hearing loss, nosebleeds and sore throat  Negative for ear pain and trouble swallowing  Eyes: Negative for pain and visual disturbance  Respiratory: Positive for apnea, cough, shortness of breath and wheezing  Negative for choking and chest tightness  Cardiovascular: Positive for palpitations and leg swelling  Negative for chest pain  Gastrointestinal: Positive for constipation, diarrhea and nausea  Negative for abdominal distention, abdominal pain, anal bleeding, blood in stool, rectal pain and vomiting     Endocrine: Positive for cold intolerance, heat intolerance and polyuria  Negative for polyphagia  Genitourinary: Positive for frequency, testicular pain and urgency  Negative for difficulty urinating, dysuria, flank pain, hematuria and penile pain  Musculoskeletal: Positive for arthralgias, back pain, myalgias, neck pain and neck stiffness  Skin: Negative for color change, pallor, rash and wound  Allergic/Immunologic: Negative for environmental allergies and food allergies  Neurological: Positive for dizziness, tremors, weakness and numbness  Negative for seizures, speech difficulty, light-headedness and headaches  Hematological: Bruises/bleeds easily  Psychiatric/Behavioral: Positive for agitation, decreased concentration and sleep disturbance  Negative for suicidal ideas  The patient is nervous/anxious  Invasive Devices     Peripheral Intravenous Line            Peripheral IV 09/04/19 Left Antecubital less than 1 day                Lab, Imaging and other studies: I have personally reviewed pertinent reports  Procedure: X-ray Chest 1 View Portable    Result Date: 9/4/2019  Narrative: CHEST INDICATION:   Stroke  COMPARISON:  June 21, 2019 EXAM PERFORMED/VIEWS:  XR CHEST PORTABLE FINDINGS: Cardiomediastinal silhouette appears unremarkable  The lungs are clear  No pneumothorax or pleural effusion  Deformity, possibly osteolytic lesion, in the posterior left 5th rib  Imaged bones otherwise unremarkable  Impression: Suggestion of osteolytic lesion in the posterior left 5th rib  Otherwise, no evidence of acute abnormality in the chest  Workstation performed: [id number]     Procedure: Ct Head Without Contrast    Result Date: 9/4/2019  Narrative: CT BRAIN - WITHOUT CONTRAST INDICATION:   Stroke  COMPARISON:  None  TECHNIQUE:  CT examination of the brain was performed  In addition to axial images, coronal 2D reformatted images were created and submitted for interpretation    Radiation dose length product (DLP) for this visit:  905 95 mGy-cm   This examination, like all CT scans performed in the [facility], was performed utilizing techniques to minimize radiation dose exposure, including the use of iterative  reconstruction and automated exposure control  IMAGE QUALITY:  Diagnostic  FINDINGS: PARENCHYMA:  Large area of subcortical white matter hypodensity consistent with vasogenic edema in the left parietal region  Small focus of hemorrhage superiorly as seen on image 2/30 with an associated more hypodense masslike area measuring 3 9 x 2 2 cm  Mild mass effect on the adjacent cortical sulci  No significant midline shift  VENTRICLES AND EXTRA-AXIAL SPACES:  Normal for the patient's age  VISUALIZED ORBITS AND PARANASAL SINUSES:  Unremarkable  CALVARIUM AND EXTRACRANIAL SOFT TISSUES:  Normal      Impression: Large area of subcortical white matter hypodensity consistent with vasogenic edema in the left parietal region  Small focus of hemorrhage superiorly as seen on image 2/30 with an associated more hypodense masslike area measuring 3 9 x 2 2 cm  Mild mass  effect on the adjacent cortical sulci  No significant midline shift  MRI with contrast recommended to exclude underlying mass  I personally discussed this study with Kwasi Burks on 9/4/2019 at 11:36 AM  Workstation performed: [id number]     Procedure: Ct Chest Abdomen Pelvis W Contrast    Result Date: 9/4/2019  Narrative: CT CHEST, ABDOMEN AND PELVIS WITH IV CONTRAST INDICATION:   Neoplasm: abdomen, metastatic, recurrence, suspected/known  COMPARISON:  None  TECHNIQUE: CT examination of the chest, abdomen and pelvis was performed  Axial, sagittal, and coronal 2D reformatted images were created from the source data and submitted for interpretation  Radiation dose length product (DLP) for this visit:  460 91 mGy-cm     This examination, like all CT scans performed in the [facility], was performed utilizing techniques to minimize radiation dose exposure, including the use of iterative  reconstruction and automated exposure control  IV Contrast:  100 mL of iohexol (OMNIPAQUE) Enteric Contrast: Enteric contrast was administered  FINDINGS: CHEST LUNGS:  There is a left lower lobe lung nodule, which measures 7 6 cm, suggest metastatic disease PLEURA:  Unremarkable  HEART/GREAT VESSELS:  The ascending aorta measures 4 3 cm Atherosclerotic changes are seen within the vasculature  MEDIASTINUM AND NINO:  Lower right paratracheal, left paratracheal and subcarinal lymph nodes do not meet the criteria for pathologic enlargement on the basis size CHEST WALL AND LOWER NECK:   A lucent lesion seen within the manubrium measuring about 15 mm An expansile lesion seen in the left 5th rib, measuring 1 2 x 2 3 cm Patient is known to have fractures of the right 10th and 11th ribs ABDOMEN LIVER/BILIARY TREE:  The liver is enlarged  There are multiple liver lesions  There is a right hepatic lesion which measures about 8 8 x 11 5 cm  There are additional large lesion with central cystic/necrotic area seen involving the segment 8 and segment 4  This lesion measures 8 3 x 2 22 cm Multiple liver lesions are seen within the left hepatic lobe some of which are hypervascular and others demonstrate low-attenuation  The largest lesion in the left hepatic lobe measures 2 3 x 2 8 cm in the segment 3  A segment 4 lesion seen measuring about 3 5 x 2 7 cm Mildly enlarged lucrecia hepatis lymphadenopathy Portal vein is patent  GALLBLADDER:  Cholelithiasis seen  SPLEEN:  Unremarkable  PANCREAS:  Body and tail of the pancreas appears unremarkable and fatty infiltration seen in the head of the pancreas in the neck of the pancreas ADRENAL GLANDS:  Unremarkable  KIDNEYS/URETERS:  A too small to characterize hypodensity seen in the right hepatic lobe, measuring about 6 mm probable cyst STOMACH AND BOWEL:  Unremarkable  APPENDIX:  No findings to suggest appendicitis   ABDOMINOPELVIC CAVITY:  Lucrecia hepatis lymph nodes are seen, measuring about 11 mm VESSELS:  Unremarkable for patient's age  PELVIS REPRODUCTIVE ORGANS:  Unremarkable for patient's age  URINARY BLADDER:  There is a thickening of the urinary bladder wall in the dome area  Diverticulation seen in the fundus of the urinary bladder ABDOMINAL WALL/INGUINAL REGIONS:  Unremarkable  OSSEOUS STRUCTURES:  Expansile lesion in the left 5th rib with associated subpleural mass  A lytic lesion seen within the manubrium, measuring about 17 mm  A lucent lesion seen in the proximal right femur is nonaggressive and benign appearing  Impression: Heterogenous liver with hepatomegaly and multifocal heterogenous liver lesions, may be due to multifocal [address]  or metastatic disease  Left lower lobe lung nodule measuring 7 6 mm, suspicious for metastatic disease 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 Mildly enlarged fidel hepatis lymphadenopathy The study was marked in EPIC for immediate notification   Workstation performed: [id number]

## 2019-09-04 NOTE — PROGRESS NOTES
[facility]        NAME: Becca Rain  is a 81 y o  male  : 1950    MRN: [id number]  DATE: 2019  TIME: 11:10 AM    Assessment and Plan   Right hand weakness [R29 898]  1  Right hand weakness  Transfer to other facility         Patient Instructions     Pt referred to ED for further evaluation  Follow up with PCP in 3-5 days  Proceed to  ER if symptoms worsen  Chief Complaint     Chief Complaint   Patient presents with    Hand Problem     Pt reports right hand & right foot numbness x2 months  History of Present Illness       Patient is a 82 y/o male with PMH significant for liver cancer (not treating) and HTN who presents with complaint of increasing pain, weakness, and paresthesias in the right hand x 2 months  Pt denies any known injury or trauma to the extremity  Pt states that he has been having difficulty with forming a  with the right hand  Pt describes the pain as intermittent, sharp, and a 7/10  Pt also reports numbness in the right foot and states that his equilibrium feels off  Pt reports occasional use of Aleve but denies trying other palliative measures  Pt reports smoking 2 packs/day  Pt reports intermittent left-sided chest pain from a fall several weeks ago as well as some chronic difficulty breathing  Pt reports stopping oxycodone 4 months ago as well as stopping chemotherapy  Pt is concerned about possibly having had a stroke  Review of Systems   Review of Systems   Constitutional: Negative for chills, fatigue and fever  HENT: Negative for congestion, ear pain, postnasal drip, rhinorrhea and sore throat  Respiratory: Positive for shortness of breath  Negative for cough and chest tightness  Cardiovascular: Positive for chest pain  Gastrointestinal: Negative for abdominal pain, blood in stool, diarrhea, nausea and vomiting  Musculoskeletal: Positive for arthralgias  Negative for myalgias, neck pain and neck stiffness     Skin: Negative for color change, rash and wound  Neurological: Positive for weakness and numbness  Negative for dizziness, light-headedness and headaches  All other systems reviewed and are negative  Current Medications     No current facility-administered medications for this visit  No current outpatient medications on file  Current Allergies     Allergies as of 09/04/2019    (No Known Allergies)            The following portions of the patient's history were reviewed and updated as appropriate: allergies, current medications, past family history, past medical history, past social history, past surgical history and problem list      Past Medical History:   Diagnosis Date    Cancer ([facility] [address] )     liver    Hypertension        Past Surgical History:   Procedure Laterality Date    APPENDECTOMY         Family History   Problem Relation Age of Onset    Cancer Mother     Cancer Father          Medications have been verified  Objective   /88   Pulse 95   Temp 98 6 °F (37 °C)   Resp 20   Ht 6' 3" (1 905 m)   Wt 74 4 kg (164 lb)   SpO2 96%   BMI 20 50 kg/m²        Physical Exam     Physical Exam   Constitutional: He is oriented to person, place, and time  He appears well-developed and well-nourished  No distress  HENT:   Head: Normocephalic and atraumatic  Eyes: Pupils are equal, round, and reactive to light  Conjunctivae and EOM are normal    Neck: Normal range of motion  Neck supple  Cardiovascular: Normal rate, regular rhythm and normal heart sounds  Pulmonary/Chest: Effort normal  No respiratory distress  He has no wheezes  Decreased breath sounds   Musculoskeletal: Normal range of motion  He exhibits tenderness  Right hand: No skin changes; FAROM; TTP over 2nd and 3rd MCP joints;  strength 3/5; sensation intact; cap refill <2   Lymphadenopathy:     He has no cervical adenopathy  Neurological: He is alert and oriented to person, place, and time   No cranial nerve deficit or sensory deficit  Skin: Skin is warm and dry  Capillary refill takes less than 2 seconds  No rash noted  He is not diaphoretic  Psychiatric: He has a normal mood and affect  His behavior is normal  Thought content normal    Nursing note and vitals reviewed

## 2019-09-05 ENCOUNTER — APPOINTMENT (INPATIENT)
Dept: RADIOLOGY | Facility: HOSPITAL | Age: 69
DRG: 025 | End: 2019-09-05
Payer: MEDICARE

## 2019-09-05 PROBLEM — C22.0 HEPATOCELLULAR CARCINOMA (HCC): Status: ACTIVE | Noted: 2019-09-05

## 2019-09-05 PROBLEM — G93.89 BRAIN MASS: Status: ACTIVE | Noted: 2019-09-04

## 2019-09-05 PROBLEM — J44.9 COPD (CHRONIC OBSTRUCTIVE PULMONARY DISEASE) (HCC): Status: ACTIVE | Noted: 2019-09-05

## 2019-09-05 PROBLEM — G93.6 CEREBRAL EDEMA (HCC): Status: ACTIVE | Noted: 2019-09-05

## 2019-09-05 LAB
ANION GAP SERPL CALCULATED.3IONS-SCNC: 4 MMOL/L (ref 4–13)
ATRIAL RATE: 79 BPM
BASOPHILS # BLD AUTO: 0.05 THOUSANDS/ΜL (ref 0–0.1)
BASOPHILS NFR BLD AUTO: 1 % (ref 0–1)
BUN SERPL-MCNC: 20 MG/DL (ref 5–25)
CALCIUM SERPL-MCNC: 9 MG/DL (ref 8.3–10.1)
CHLORIDE SERPL-SCNC: 103 MMOL/L (ref 100–108)
CHOLEST SERPL-MCNC: 163 MG/DL (ref 50–200)
CO2 SERPL-SCNC: 29 MMOL/L (ref 21–32)
CREAT SERPL-MCNC: 1.42 MG/DL (ref 0.6–1.3)
EOSINOPHIL # BLD AUTO: 0.04 THOUSAND/ΜL (ref 0–0.61)
EOSINOPHIL NFR BLD AUTO: 0 % (ref 0–6)
ERYTHROCYTE [DISTWIDTH] IN BLOOD BY AUTOMATED COUNT: 15 % (ref 11.6–15.1)
EST. AVERAGE GLUCOSE BLD GHB EST-MCNC: 123 MG/DL
GFR SERPL CREATININE-BSD FRML MDRD: 51 ML/MIN/1.73SQ M
GLUCOSE SERPL-MCNC: 165 MG/DL (ref 65–140)
HBA1C MFR BLD: 5.9 % (ref 4.2–6.3)
HCT VFR BLD AUTO: 42.7 % (ref 36.5–49.3)
HDLC SERPL-MCNC: 11 MG/DL (ref 40–60)
HGB BLD-MCNC: 13.9 G/DL (ref 12–17)
IMM GRANULOCYTES # BLD AUTO: 0.06 THOUSAND/UL (ref 0–0.2)
IMM GRANULOCYTES NFR BLD AUTO: 1 % (ref 0–2)
LDLC SERPL CALC-MCNC: 102 MG/DL (ref 0–100)
LYMPHOCYTES # BLD AUTO: 1.59 THOUSANDS/ΜL (ref 0.6–4.47)
LYMPHOCYTES NFR BLD AUTO: 17 % (ref 14–44)
MAGNESIUM SERPL-MCNC: 2.1 MG/DL (ref 1.6–2.6)
MCH RBC QN AUTO: 31.9 PG (ref 26.8–34.3)
MCHC RBC AUTO-ENTMCNC: 32.6 G/DL (ref 31.4–37.4)
MCV RBC AUTO: 98 FL (ref 82–98)
MONOCYTES # BLD AUTO: 0.92 THOUSAND/ΜL (ref 0.17–1.22)
MONOCYTES NFR BLD AUTO: 10 % (ref 4–12)
NEUTROPHILS # BLD AUTO: 6.47 THOUSANDS/ΜL (ref 1.85–7.62)
NEUTS SEG NFR BLD AUTO: 71 % (ref 43–75)
NRBC BLD AUTO-RTO: 0 /100 WBCS
P AXIS: 83 DEGREES
PHOSPHATE SERPL-MCNC: 3.3 MG/DL (ref 2.3–4.1)
PLATELET # BLD AUTO: 259 THOUSANDS/UL (ref 149–390)
PMV BLD AUTO: 11.2 FL (ref 8.9–12.7)
POTASSIUM SERPL-SCNC: 4.4 MMOL/L (ref 3.5–5.3)
PR INTERVAL: 134 MS
QRS AXIS: 53 DEGREES
QRSD INTERVAL: 90 MS
QT INTERVAL: 380 MS
QTC INTERVAL: 435 MS
RBC # BLD AUTO: 4.36 MILLION/UL (ref 3.88–5.62)
SODIUM SERPL-SCNC: 136 MMOL/L (ref 136–145)
T WAVE AXIS: 75 DEGREES
TRIGL SERPL-MCNC: 252 MG/DL
VENTRICULAR RATE: 79 BPM
WBC # BLD AUTO: 9.13 THOUSAND/UL (ref 4.31–10.16)

## 2019-09-05 PROCEDURE — A9585 GADOBUTROL INJECTION: HCPCS | Performed by: INTERNAL MEDICINE

## 2019-09-05 PROCEDURE — 99223 1ST HOSP IP/OBS HIGH 75: CPT | Performed by: NURSE PRACTITIONER

## 2019-09-05 PROCEDURE — 84100 ASSAY OF PHOSPHORUS: CPT | Performed by: INTERNAL MEDICINE

## 2019-09-05 PROCEDURE — 80048 BASIC METABOLIC PNL TOTAL CA: CPT | Performed by: INTERNAL MEDICINE

## 2019-09-05 PROCEDURE — 94668 MNPJ CHEST WALL SBSQ: CPT

## 2019-09-05 PROCEDURE — 80061 LIPID PANEL: CPT | Performed by: INTERNAL MEDICINE

## 2019-09-05 PROCEDURE — 99223 1ST HOSP IP/OBS HIGH 75: CPT | Performed by: NEUROLOGICAL SURGERY

## 2019-09-05 PROCEDURE — 94762 N-INVAS EAR/PLS OXIMTRY CONT: CPT

## 2019-09-05 PROCEDURE — 99223 1ST HOSP IP/OBS HIGH 75: CPT | Performed by: PSYCHIATRY & NEUROLOGY

## 2019-09-05 PROCEDURE — 99233 SBSQ HOSP IP/OBS HIGH 50: CPT | Performed by: INTERNAL MEDICINE

## 2019-09-05 PROCEDURE — 83036 HEMOGLOBIN GLYCOSYLATED A1C: CPT | Performed by: INTERNAL MEDICINE

## 2019-09-05 PROCEDURE — 70553 MRI BRAIN STEM W/O & W/DYE: CPT

## 2019-09-05 PROCEDURE — 94640 AIRWAY INHALATION TREATMENT: CPT

## 2019-09-05 PROCEDURE — 83735 ASSAY OF MAGNESIUM: CPT | Performed by: INTERNAL MEDICINE

## 2019-09-05 PROCEDURE — 93010 ELECTROCARDIOGRAM REPORT: CPT | Performed by: INTERNAL MEDICINE

## 2019-09-05 PROCEDURE — 85025 COMPLETE CBC W/AUTO DIFF WBC: CPT | Performed by: INTERNAL MEDICINE

## 2019-09-05 PROCEDURE — 99223 1ST HOSP IP/OBS HIGH 75: CPT | Performed by: INTERNAL MEDICINE

## 2019-09-05 RX ORDER — NIFEDIPINE 60 MG/1
60 TABLET, EXTENDED RELEASE ORAL DAILY
Status: DISCONTINUED | OUTPATIENT
Start: 2019-09-05 | End: 2019-09-11

## 2019-09-05 RX ORDER — DEXAMETHASONE SODIUM PHOSPHATE 4 MG/ML
4 INJECTION, SOLUTION INTRA-ARTICULAR; INTRALESIONAL; INTRAMUSCULAR; INTRAVENOUS; SOFT TISSUE EVERY 6 HOURS SCHEDULED
Status: DISCONTINUED | OUTPATIENT
Start: 2019-09-05 | End: 2019-09-10

## 2019-09-05 RX ORDER — OXYCODONE HYDROCHLORIDE 5 MG/1
5 TABLET ORAL EVERY 4 HOURS PRN
Status: DISCONTINUED | OUTPATIENT
Start: 2019-09-05 | End: 2019-09-13 | Stop reason: HOSPADM

## 2019-09-05 RX ORDER — IPRATROPIUM BROMIDE AND ALBUTEROL SULFATE 2.5; .5 MG/3ML; MG/3ML
3 SOLUTION RESPIRATORY (INHALATION) EVERY 4 HOURS PRN
Status: DISCONTINUED | OUTPATIENT
Start: 2019-09-05 | End: 2019-09-11

## 2019-09-05 RX ORDER — OXYCODONE HYDROCHLORIDE 10 MG/1
10 TABLET ORAL EVERY 6 HOURS PRN
Status: DISCONTINUED | OUTPATIENT
Start: 2019-09-05 | End: 2019-09-13 | Stop reason: HOSPADM

## 2019-09-05 RX ORDER — HYDROXYZINE HYDROCHLORIDE 25 MG/1
25 TABLET, FILM COATED ORAL EVERY 6 HOURS PRN
Status: DISCONTINUED | OUTPATIENT
Start: 2019-09-05 | End: 2019-09-13 | Stop reason: HOSPADM

## 2019-09-05 RX ORDER — FLUTICASONE FUROATE AND VILANTEROL 100; 25 UG/1; UG/1
1 POWDER RESPIRATORY (INHALATION) DAILY
Status: DISCONTINUED | OUTPATIENT
Start: 2019-09-05 | End: 2019-09-13 | Stop reason: HOSPADM

## 2019-09-05 RX ADMIN — DEXAMETHASONE SODIUM PHOSPHATE 4 MG: 4 INJECTION, SOLUTION INTRAMUSCULAR; INTRAVENOUS at 18:47

## 2019-09-05 RX ADMIN — OXYCODONE HYDROCHLORIDE 5 MG: 5 TABLET ORAL at 05:35

## 2019-09-05 RX ADMIN — IPRATROPIUM BROMIDE AND ALBUTEROL SULFATE 3 ML: 2.5; .5 SOLUTION RESPIRATORY (INHALATION) at 10:15

## 2019-09-05 RX ADMIN — NICOTINE 21 MG: 21 PATCH, EXTENDED RELEASE TRANSDERMAL at 15:55

## 2019-09-05 RX ADMIN — FLUTICASONE FUROATE AND VILANTEROL TRIFENATATE 1 PUFF: 100; 25 POWDER RESPIRATORY (INHALATION) at 11:24

## 2019-09-05 RX ADMIN — OXYCODONE HYDROCHLORIDE 10 MG: 10 TABLET ORAL at 13:17

## 2019-09-05 RX ADMIN — LABETALOL 20 MG/4 ML (5 MG/ML) INTRAVENOUS SYRINGE 10 MG: at 21:08

## 2019-09-05 RX ADMIN — Medication 1 TABLET: at 09:45

## 2019-09-05 RX ADMIN — DEXAMETHASONE SODIUM PHOSPHATE 4 MG: 4 INJECTION, SOLUTION INTRAMUSCULAR; INTRAVENOUS at 09:45

## 2019-09-05 RX ADMIN — OXYCODONE HYDROCHLORIDE 10 MG: 10 TABLET ORAL at 21:07

## 2019-09-05 RX ADMIN — FOLIC ACID 1 MG: 1 TABLET ORAL at 09:45

## 2019-09-05 RX ADMIN — GADOBUTROL 7 ML: 604.72 INJECTION INTRAVENOUS at 12:06

## 2019-09-05 RX ADMIN — THIAMINE HCL TAB 100 MG 100 MG: 100 TAB at 09:45

## 2019-09-05 RX ADMIN — HYDROXYZINE HYDROCHLORIDE 25 MG: 25 TABLET ORAL at 16:57

## 2019-09-05 RX ADMIN — NIFEDIPINE 60 MG: 60 TABLET, FILM COATED, EXTENDED RELEASE ORAL at 11:24

## 2019-09-05 RX ADMIN — SODIUM CHLORIDE 75 ML/HR: 0.9 INJECTION, SOLUTION INTRAVENOUS at 09:45

## 2019-09-05 RX ADMIN — NICOTINE 21 MG: 21 PATCH, EXTENDED RELEASE TRANSDERMAL at 06:33

## 2019-09-05 NOTE — MALNUTRITION/BMI
This medical record reflects one or more clinical indicators suggestive of malnutrition and/or morbid obesity  Malnutrition Findings:   Malnutrition type: Chronic illness(Chronic severe pro/vicky malnutrition r/t catabolic illness as evidenced by 15% unintentional wt loss since 6/25/19, consuming < 75% energy intake compared to est energy needs for > 1 month, Treated with diet, patient refused supplements)  Degree of Malnutrition: Other severe protein calorie malnutrition  Malnutrition Characteristics: Muscle loss, Inadequate energy, Weight loss    BMI Findings:  BMI Classifications: Underweight < 18 5     There is no height or weight on file to calculate BMI  See Nutrition note dated 9/5/19 for additional details  Completed nutrition assessment is viewable in the nutrition documentation

## 2019-09-05 NOTE — PHYSICAL THERAPY NOTE
Physical Therapy Cancellation Note  PT orders received and chart reviewed  PT attempted to see pt for evaluation, however pt off the floor at current time  PT will continue to follow      Chepe Eddy PT

## 2019-09-05 NOTE — PLAN OF CARE
Problem: Potential for Falls  Goal: Patient will remain free of falls  Description  INTERVENTIONS:  - Assess patient frequently for physical needs  -  Identify cognitive and physical deficits and behaviors that affect risk of falls  -  [place] fall precautions as indicated by assessment   - Educate patient/family on patient safety including physical limitations  - Instruct patient to call for assistance with activity based on assessment  - Modify environment to reduce risk of injury  - Consider OT/PT consult to assist with strengthening/mobility  Outcome: Progressing     Problem: Nutrition/Hydration-ADULT  Goal: Nutrient/Hydration intake appropriate for improving, restoring or maintaining nutritional needs  Description  Monitor and assess patient's nutrition/hydration status for malnutrition  Collaborate with interdisciplinary team and initiate plan and interventions as ordered  Monitor patient's weight and dietary intake as ordered or per policy  Utilize nutrition screening tool and intervene as necessary  Determine patient's food preferences and provide high-protein, high-caloric foods as appropriate       INTERVENTIONS:  - Monitor oral intake, urinary output, labs, and treatment plans  - Assess nutrition and hydration status and recommend course of action  - Evaluate amount of meals eaten  - Assist patient with eating if necessary   - Allow adequate time for meals  - Recommend/ encourage appropriate diets, oral nutritional supplements, and vitamin/mineral supplements  - Order, calculate, and assess calorie counts as needed  - Recommend, monitor, and adjust tube feedings and TPN/PPN based on assessed needs  - Assess need for intravenous fluids  - Provide specific nutrition/hydration education as appropriate  - Include patient/family/caregiver in decisions related to nutrition  Outcome: Progressing     Problem: PAIN - ADULT  Goal: Verbalizes/displays adequate comfort level or baseline comfort level  Description  Interventions:  - Encourage patient to monitor pain and request assistance  - Assess pain using appropriate pain scale  - Administer analgesics based on type and severity of pain and evaluate response  - Implement non-pharmacological measures as appropriate and evaluate response  - Consider cultural and social influences on pain and pain management  - Notify physician/advanced practitioner if interventions unsuccessful or patient reports new pain  Outcome: Progressing     Problem: INFECTION - ADULT  Goal: Absence or prevention of progression during hospitalization  Description  INTERVENTIONS:  - Assess and monitor for signs and symptoms of infection  - Monitor lab/diagnostic results  - Monitor all insertion sites, i e  indwelling lines, tubes, and drains  - Monitor endotracheal if appropriate and nasal secretions for changes in amount and color  - [place] appropriate cooling/warming therapies per order  - Administer medications as ordered  - Instruct and encourage patient and family to use good hand hygiene technique  - Identify and instruct in appropriate isolation precautions for identified infection/condition  Outcome: Progressing  Goal: Absence of fever/infection during neutropenic period  Description  INTERVENTIONS:  - Monitor WBC    Outcome: Progressing     Problem: SAFETY ADULT  Goal: Maintain or return to baseline ADL function  Description  INTERVENTIONS:  -  Assess patient's ability to carry out ADLs; assess patient's baseline for ADL function and identify physical deficits which impact ability to perform ADLs (bathing, care of mouth/teeth, toileting, grooming, dressing, etc )  - Assess/evaluate cause of self-care deficits   - Assess range of motion  - Assess patient's mobility; develop plan if impaired  - Assess patient's need for assistive devices and provide as appropriate  - Encourage maximum independence but intervene and supervise when necessary  - Involve family in performance of ADLs  - Assess for home care needs following discharge   - Consider OT consult to assist with ADL evaluation and planning for discharge  - Provide patient education as appropriate  Outcome: Progressing  Goal: Maintain or return mobility status to optimal level  Description  INTERVENTIONS:  - Assess patient's baseline mobility status (ambulation, transfers, stairs, etc )    - Identify cognitive and physical deficits and behaviors that affect mobility  - Identify mobility aids required to assist with transfers and/or ambulation (gait belt, sit-to-stand, lift, walker, cane, etc )  - [place] fall precautions as indicated by assessment  - Record patient progress and toleration of activity level on Mobility SBAR; progress patient to next Phase/Stage  - Instruct patient to call for assistance with activity based on assessment  - Consider rehabilitation consult to assist with strengthening/weightbearing, etc   Outcome: Progressing     Problem: DISCHARGE PLANNING  Goal: Discharge to home or other facility with appropriate resources  Description  INTERVENTIONS:  - Identify barriers to discharge w/patient and caregiver  - Arrange for needed discharge resources and transportation as appropriate  - Identify discharge learning needs (meds, wound care, etc )  - Arrange for interpretive services to assist at discharge as needed  - Refer to Case Management Department for coordinating discharge planning if the patient needs post-hospital services based on physician/advanced practitioner order or complex needs related to functional status, cognitive ability, or social support system  Outcome: Progressing     Problem: Knowledge Deficit  Goal: Patient/family/caregiver demonstrates understanding of disease process, treatment plan, medications, and discharge instructions  Description  Complete learning assessment and assess knowledge base    Interventions:  - Provide teaching at level of understanding  - Provide teaching via preferred learning methods  Outcome: Progressing     Problem: NEUROSENSORY - ADULT  Goal: Achieves stable or improved neurological status  Description  INTERVENTIONS  - Monitor and report changes in neurological status  - Monitor vital signs such as temperature, blood pressure, glucose, and any other labs ordered   - Initiate measures to prevent increased intracranial pressure  - Monitor for seizure activity and implement precautions if appropriate      Outcome: Progressing  Goal: Remains free of injury related to seizures activity  Description  INTERVENTIONS  - Maintain airway, patient safety  and administer oxygen as ordered  - Monitor patient for seizure activity, document and report duration and description of seizure to physician/advanced practitioner  - If seizure occurs,  ensure patient safety during seizure  - Reorient patient post seizure  - Seizure pads on all 4 side rails  - Instruct patient/family to notify RN of any seizure activity including if an aura is experienced  - Instruct patient/family to call for assistance with activity based on nursing assessment  - Administer anti-seizure medications if ordered    Outcome: Progressing  Goal: Achieves maximal functionality and self care  Description  INTERVENTIONS  - Monitor swallowing and airway patency with patient fatigue and changes in neurological status  - Encourage and assist patient to increase activity and self care     - Encourage visually impaired, hearing impaired and aphasic patients to use assistive/communication devices  Outcome: Progressing     Problem: RESPIRATORY - ADULT  Goal: Achieves optimal ventilation and oxygenation  Description  INTERVENTIONS:  - Assess for changes in respiratory status  - Assess for changes in mentation and behavior  - Position to facilitate oxygenation and minimize respiratory effort  - Oxygen administered by appropriate delivery if ordered  - Initiate smoking cessation education as indicated  - Encourage broncho-pulmonary hygiene including cough, deep breathe, Incentive Spirometry  - Assess the need for suctioning and aspirate as needed  - Assess and instruct to report SOB or any respiratory difficulty  - Respiratory Therapy support as indicated  Outcome: Progressing     Problem: METABOLIC, FLUID AND ELECTROLYTES - ADULT  Goal: Fluid balance maintained  Description  INTERVENTIONS:  - Monitor labs   - Monitor I/O and WT  - Instruct patient on fluid and nutrition as appropriate  - Assess for signs & symptoms of volume excess or deficit  Outcome: Progressing     Problem: SKIN/TISSUE INTEGRITY - ADULT  Goal: Skin integrity remains intact  Description  INTERVENTIONS  - Identify patients at risk for skin breakdown  - Assess and monitor skin integrity  - Assess and monitor nutrition and hydration status  - Monitor labs (i e  albumin)  - Assess for incontinence   - Turn and reposition patient  - Assist with mobility/ambulation  - Relieve pressure over bony prominences  - Avoid friction and shearing  - Provide appropriate hygiene as needed including keeping skin clean and dry  - Evaluate need for skin moisturizer/barrier cream  - Collaborate with interdisciplinary team (i e  Nutrition, Rehabilitation, etc )   - Patient/family teaching  Outcome: Progressing     Problem: MUSCULOSKELETAL - ADULT  Goal: Maintain or return mobility to safest level of function  Description  INTERVENTIONS:  - Assess patient's ability to carry out ADLs; assess patient's baseline for ADL function and identify physical deficits which impact ability to perform ADLs (bathing, care of mouth/teeth, toileting, grooming, dressing, etc )  - Assess/evaluate cause of self-care deficits   - Assess range of motion  - Assess patient's mobility  - Assess patient's need for assistive devices and provide as appropriate  - Encourage maximum independence but intervene and supervise when necessary  - Involve family in performance of ADLs  - Assess for home care needs following discharge - Consider OT consult to assist with ADL evaluation and planning for discharge  - Provide patient education as appropriate  Outcome: Progressing  Goal: Maintain proper alignment of affected body part  Description  INTERVENTIONS:  - Support, maintain and protect limb and body alignment  - Provide patient/ family with appropriate education  Outcome: Progressing

## 2019-09-05 NOTE — CONSULTS
PHYSICAL MEDICINE AND REHABILITATION CONSULT NOTE  Rivka Martinez  81 y o  male MRN: [id number]  [facility] Encounter: [id number]    Requested by (Physician/Service): Starla Sheth MD  Reason for Consultation:  Assessment of rehabilitation needs  Chief Complaint:  Right hand weakness    Assessment and Recommendations:  Rivka Martinez  is a 81 y o  male with a PMH of multi forcal hepatocellular carcinoma and hepatitis C found to have a large necrotic left fronto parietal brain mass  He will undergo resection early next week  PM&R consulted for rehabilitation recommendations  Brain mass  - resection next week  - neurosurgery following  - placed on decadron  - oncology will follow up with treatment plan after surgery and biopsy results    Hepatocellular carcinoma  - Oncology following    COPD  - oxygen PRN  - pulmonary hygiene  - respiratory protocol    ETOH abuse  - CIWA protocol    Impairments:  Impaired functional mobility and ability to perform ADL's    Recommendations:  - Continue PT/OT while inpatient  #Delirium/Sleep:  monitor  #Pain: denies headache or pain, has tylenol and oxy PRN ordered  #Bowel: continent  #Bladder: continent no chaney  #Skin/Pressure Injury Prevention: Turn Q2hr in bed, with weight shifts U30-24izx in wheelchair  #DVT Prophylaxis:  SCD  #GI Prophylaxis:  protonix    - The patient lives at the [facility] which is a motel  He rents a room there and there are 2 flights of stairs to his room  - The patient reports a brother and sister that he may be able to d/c home with for a short time after discharge  He has not spoken to them about that plan however  - The patient reports having given up after girlfriend passed away however now he want to get better and pursue treatment  - Disposition unclear at this point in time but inpatient rehab a possibility in the near future pending achievement of clinically stability, potential for functional progress      Thank you for allowing the PM&R service to participate in the care of this patient  We will continue to follow Garrett Dumont Jr 's progress with you  Please do not hesitate to call with questions or concerns    History of Present Illness:  Lakshmi Johnson  is a 81 y o  male with PMH of liver cancer, COPD, and HTN who presented to the [facility] on 9/4/2019 with a 3 month hx of right hand weakness, numbness and tremor  He initially presented to [address]  CT of the head showed a large area of vasogenic edema in the left parietal area with a small focus of hemorrhage and associated mass like area with mild mass effect  CT of the chest/abdomen/pelvis showed hepatomegaly, liver lesions and left lower lung lobe  MRI of the head showed a large centrally necrotic mass within the left frontoparietal region with vasogenic edema  It was recommended that he undergo resection of the mass which will take place early next week  Oncology was consulted and he will follow up with Dr Mariola Herrera in [facility] after surgery to discuss systemic and radiation therapy  The patient was seen in his room  He reports that he lived previously in [place] and moved to the area after his girlfriend of 20 years passed away  He states due to that he just gave up and did not continue with treatment  He now wants to pursue treatment  His main complaint is the decreased dexterity in his right hand  He denies all other complaints      Review of Systems: 10 point ROS negative except for what is noted in HPI    CURRENT GAP IN FUNCTION     Prior to Admission:     Functional Status: Patient was independent with mobility/ambulation, transfers, ADL's, IADL's     FUNCTIONAL STATUS:  Physical Therapy:  Pending evaluation    Occupational Therapy: Pending evaluation    Social History:    Social History     Socioeconomic History    Marital status: Legally      Spouse name: Not on file    Number of children: Not on file    Years of education: Not on file    Highest education level: Not on file   Occupational History    Not on file   Social Needs    Financial resource strain: Not on file    Food insecurity:     Worry: Not on file     Inability: Not on file    Transportation needs:     Medical: Not on file     Non-medical: Not on file   Tobacco Use    Smoking status: Current Every Day Smoker     Packs/day: 2 00    Smokeless tobacco: Current User     Types: Chew   Substance and Sexual Activity    Alcohol use: Yes     Comment: 1/5 bottle weekly     Drug use: Never    Sexual activity: Not on file   Lifestyle    Physical activity:     Days per week: Not on file     Minutes per session: Not on file    Stress: Not on file   Relationships    Social connections:     Talks on phone: Not on file     Gets together: Not on file     Attends Confucianist service: Not on file     Active member of club or organization: Not on file     Attends meetings of clubs or organizations: Not on file     Relationship status: Not on file    Intimate partner violence:     Fear of current or ex partner: Not on file     Emotionally abused: Not on file     Physically abused: Not on file     Forced sexual activity: Not on file   Other Topics Concern    Not on file   Social History Narrative    Not on file        Kaden Hanna  is not  and Lives with: lives alone  He lives in [facility] room called the [facility]  The living area: can live on one level  Equipment in home: none  There Greater than 10 steps to enter the home  Patient/family's goals: Family looking for alternative housing  The patient will not have 24 hour ARC Supervision/physical assistance: supervision/physical assistance available upon discharge      Family History:    Family History   Problem Relation Age of Onset   Lianna Simpson Cancer Mother     Cancer Father          MEDICATIONS:     Current Facility-Administered Medications:     acetaminophen (TYLENOL) tablet 650 mg, 650 mg, Oral, Q4H PRN, Karley Ruffin DO    dexamethasone (DECADRON) injection 4 mg, 4 mg, Intravenous, Q12H [facility], Karley Ruffin DO, 4 mg at 09/05/19 0945    fluticasone-vilanterol (BREO ELLIPTA) 100-25 mcg/inh inhaler 1 puff, 1 puff, Inhalation, Daily, Rosalie Andrade MD    folic acid (FOLVITE) tablet 1 mg, 1 mg, Oral, Daily, Pasquale Meyer MD, 1 mg at 09/05/19 0945    ipratropium-albuterol (DUO-NEB) 0 5-2 5 mg/3 mL inhalation solution 3 mL, 3 mL, Nebulization, Q4H PRN, Kati Au MD, 3 mL at 09/05/19 1015    Labetalol HCl (NORMODYNE) injection 10 mg, 10 mg, Intravenous, Q4H PRN, Karley Ruffin DO    multivitamin-minerals (CENTRUM) tablet 1 tablet, 1 tablet, Oral, Daily, Pasquale Meyer MD, 1 tablet at 09/05/19 0945    nicotine (NICODERM CQ) 21 mg/24 hr TD 24 hr patch 21 mg, 21 mg, Transdermal, Daily, Pasquale Meyer MD, 21 mg at 09/05/19 5319    NIFEdipine (PROCARDIA XL) 24 hr tablet 60 mg, 60 mg, Oral, Daily, Rosalie Andrade MD    oxyCODONE (ROXICODONE) immediate release tablet 10 mg, 10 mg, Oral, Q6H PRN, Rosalie Andrade MD    oxyCODONE (ROXICODONE) IR tablet 5 mg, 5 mg, Oral, Q4H PRN, Rosalie Andrade MD    pneumococcal 13-valent conjugate vaccine (PREVNAR-13) IM injection 0 5 mL, 0 5 mL, Intramuscular, Prior to discharge, Kati Au MD    sodium chloride 0 9 % infusion, 75 mL/hr, Intravenous, Continuous, Karley Ruffin DO, Last Rate: 75 mL/hr at 09/05/19 0945, 75 mL/hr at 09/05/19 0945    thiamine (VITAMIN B1) tablet 100 mg, 100 mg, Oral, Daily, Pasquale Meyer MD, 100 mg at 09/05/19 0962    Past Medical History:     Past Medical History:   Diagnosis Date    Cancer [facility])     liver    Hypertension         Past Surgical History:     Past Surgical History:   Procedure Laterality Date    APPENDECTOMY           Allergies:     No Known Allergies        Physical Exam:  /80   Pulse 71   Temp 98 4 °F (36 9 °C)   Resp 19   SpO2 95% Intake/Output Summary (Last 24 hours) at 9/5/2019 1026  Last data filed at 9/5/2019 0944  Gross per 24 hour   Intake 1051 25 ml   Output    Net 1051 25 ml       There is no height or weight on file to calculate BMI  Gen: No acute distress, cachectic  HEENT: Normocephalic/Atraumatic  Cardiovascular: Regular rate, rhythm  Heme/Extr: No edema/clubbing/cyanosis  Pulmonary: Non-labored breathing  : No chaney  GI: Soft, non-tender, non-distended  MSK: ROM is WFL in all extremities with the exception of slight right sided weakness, greatest at the hand  No effusions or deformities  Bulk is symmetric  Integumentary: Skin is warm, dry  No rashes or ulcers  Neuro: AAOx3, restless and unable to sit still  Right hand appears almost dystonic  Difficulty with fine and gross motor skills with the right hand  LABORATORY RESULTS:      Lab Results   Component Value Date    HGB 13 9 09/05/2019    HCT 42 7 09/05/2019    WBC 9 13 09/05/2019     Lab Results   Component Value Date    BUN 20 09/05/2019    K 4 4 09/05/2019     09/05/2019    CREATININE 1 42 (H) 09/05/2019     Lab Results   Component Value Date    PROTIME 12 9 09/04/2019    INR 1 00 09/04/2019        DIAGNOSTIC STUDIES: Reviewed  X-ray Chest 1 View Portable    Result Date: 9/4/2019  Impression: Suggestion of osteolytic lesion in the posterior left 5th rib  Otherwise, no evidence of acute abnormality in the chest  Workstation performed: [id number]     Ct Head Without Contrast    Result Date: 9/4/2019  Impression: Large area of subcortical white matter hypodensity consistent with vasogenic edema in the left parietal region  Small focus of hemorrhage superiorly as seen on image 2/30 with an associated more hypodense masslike area measuring 3 9 x 2 2 cm  Mild mass  effect on the adjacent cortical sulci  No significant midline shift  MRI with contrast recommended to exclude underlying mass    I personally discussed this study with Betty Turner on 9/4/2019 at 11:36 AM  Workstation performed: [id number]     Ct Chest Abdomen Pelvis W Contrast    Result Date: 9/4/2019  Impression: Heterogenous liver with hepatomegaly and multifocal heterogenous liver lesions, may be due to multifocal [address]  or metastatic disease  Left lower lobe lung nodule measuring 7 6 mm, suspicious for metastatic disease 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 Mildly enlarged fidel hepatis lymphadenopathy The study was marked in EPIC for immediate notification   Workstation performed: [id number]

## 2019-09-05 NOTE — OCCUPATIONAL THERAPY NOTE
Occupational Therapy         Patient Name: Holly Mascorro    Today's Date: 9/5/2019    OT orders received and chart reviewed - pt off floor for testing - will defer    Corinne Mercedes OT

## 2019-09-05 NOTE — PROGRESS NOTES
IM Residency Progress Note   Unit/Bed#: [facility] 631-01 Encounter: [id number]  SOD Team A      Lakshmi Johnson  81 y o  male [id number]    Hospital Stay Days: 1      Assessment/Plan: Mark Ybarra is a 82 yo male with PMHx of liver cancer, COPD, and HTN presenting with right hand weakness, numbness, and tremor for 3 months    #Right hand weakness/numbness/tremor   - CT Head 19 with vasogenic edema, small focus of hemorrhage, and mass-like area in L parietal lobe  Neurosurgery consulted   - Neurology consulted  #Hepatocellular Carcinoma   - CT chest, abdomen, pelvis 19 showed multiple liver lesions, L lung lower lobe nodule suspicious for metastasis, and metastases to ribs and manubrium  Heme onc consulted   - Patient is open to discussion about treatment options  #Increased SOB and cough, Hx of COPD   - OOB/ambulate  #Hx of alcohol abuse   - monitor per [facility] protocol  #Hx of smoking   - continue nicotine patch      Principal Problem:    Cerebral brain hemorrhage ([facility] [address] )  Active Problems:    Weakness    Essential hypertension    Alcohol abuse    Dependence on nicotine from cigarettes        Disposition:       Subjective:   Mark Ybarra is a 82 yo M who is in no acute distress today  His right hand symptoms are the same currently as they were on admission  He states that his balance problems from 2 to 3 days ago have resolved and that he was able to walk without a problem yesterday  He has had increased SOB and cough compared to baseline since admission to the hospital, which he attributes to lying in bed all day  He has had diarrhea off and on for a couple of weeks, usually once a day right after eating   He states that he feels agitated due to nicotine withdrawal, and that he has "always been hyper" in general  He denies any chest pain, palpitations, hallucinations, vision loss, acute hearing changes, dizziness, headache, nausea, vomiting, fever, chills, or sweats       Vitals: Temp (24hrs), Av 7 °F (37 1 °C), Min:98 4 °F (36 9 °C), Max:99 °F (37 2 °C)  Current: Temperature: 99 °F (37 2 °C)  Vitals:    09/04/19 2303 09/05/19 0701 09/05/19 0957 09/05/19 1130   BP: 163/79 161/80  155/81   BP Location:       Pulse:   71    Resp: 19      Temp:  98 4 °F (36 9 °C)  99 °F (37 2 °C)   TempSrc:       SpO2:   95%     There is no height or weight on file to calculate BMI  I/O last 24 hours: In: 1051 3 [I V :1051 3]  Out: -       Physical Exam:    General appearance: Alert and oriented  No acute distress  Not diaphoretic   Cardiovascular: Normal rate, regular rhythm  Distal pulses intact   Pulmonary: Wheezes present throughout b/l lungs  Wet cough   Abdomen: Soft, non tender, non distended  Bowel sounds present  Liver enlarged   Neurological:  strength 4/5 on R, 5/5 on L  Strength in arms and legs 5/5   Sensation intact but slightly diminished in R fingers compared to L   Musculoskeletal: No edema    Invasive Devices     Peripheral Intravenous Line            Peripheral IV 09/04/19 Left Antecubital 1 day                          Labs:   Recent Results (from the past 24 hour(s))   Hemoglobin A1c w/EAG Estimation    Collection Time: 09/05/19  5:36 AM   Result Value Ref Range    Hemoglobin A1C 5 9 4 2 - 6 3 %     mg/dl   Lipid Panel with Direct LDL reflex    Collection Time: 09/05/19  5:36 AM   Result Value Ref Range    Cholesterol 163 50 - 200 mg/dL    Triglycerides 252 (H) <=150 mg/dL    HDL, Direct 11 (L) 40 - 60 mg/dL    LDL Calculated 102 (H) 0 - 100 mg/dL   Basic metabolic panel    Collection Time: 09/05/19  5:36 AM   Result Value Ref Range    Sodium 136 136 - 145 mmol/L    Potassium 4 4 3 5 - 5 3 mmol/L    Chloride 103 100 - 108 mmol/L    CO2 29 21 - 32 mmol/L    ANION GAP 4 4 - 13 mmol/L    BUN 20 5 - 25 mg/dL    Creatinine 1 42 (H) 0 60 - 1 30 mg/dL    Glucose 165 (H) 65 - 140 mg/dL    Calcium 9 0 8 3 - 10 1 mg/dL    eGFR 51 ml/min/1 73sq m   Magnesium    Collection Time: 09/05/19  5:36 AM   Result Value Ref Range    Magnesium 2 1 1 6 - 2 6 mg/dL   Phosphorus    Collection Time: 09/05/19  5:36 AM   Result Value Ref Range    Phosphorus 3 3 2 3 - 4 1 mg/dL   CBC and differential    Collection Time: 09/05/19  5:36 AM   Result Value Ref Range    WBC 9 13 4 31 - 10 16 Thousand/uL    RBC 4 36 3 88 - 5 62 Million/uL    Hemoglobin 13 9 12 0 - 17 0 g/dL    Hematocrit 42 7 36 5 - 49 3 %    MCV 98 82 - 98 fL    MCH 31 9 26 8 - 34 3 pg    MCHC 32 6 31 4 - 37 4 g/dL    RDW 15 0 11 6 - 15 1 %    MPV 11 2 8 9 - 12 7 fL    Platelets 266 272 - 229 Thousands/uL    nRBC 0 /100 WBCs    Neutrophils Relative 71 43 - 75 %    Immat GRANS % 1 0 - 2 %    Lymphocytes Relative 17 14 - 44 %    Monocytes Relative 10 4 - 12 %    Eosinophils Relative 0 0 - 6 %    Basophils Relative 1 0 - 1 %    Neutrophils Absolute 6 47 1 85 - 7 62 Thousands/µL    Immature Grans Absolute 0 06 0 00 - 0 20 Thousand/uL    Lymphocytes Absolute 1 59 0 60 - 4 47 Thousands/µL    Monocytes Absolute 0 92 0 17 - 1 22 Thousand/µL    Eosinophils Absolute 0 04 0 00 - 0 61 Thousand/µL    Basophils Absolute 0 05 0 00 - 0 10 Thousands/µL       Radiology Results: I have personally reviewed pertinent films in PACS      Other Diagnostic Testing:   I have personally reviewed pertinent reports          Active Meds:   Current Facility-Administered Medications   Medication Dose Route Frequency    acetaminophen (TYLENOL) tablet 650 mg  650 mg Oral Q4H PRN    dexamethasone (DECADRON) injection 4 mg  4 mg Intravenous Q12H [facility]    fluticasone-vilanterol (BREO ELLIPTA) 100-25 mcg/inh inhaler 1 puff  1 puff Inhalation Daily    folic acid (FOLVITE) tablet 1 mg  1 mg Oral Daily    ipratropium-albuterol (DUO-NEB) 0 5-2 5 mg/3 mL inhalation solution 3 mL  3 mL Nebulization Q4H PRN    Labetalol HCl (NORMODYNE) injection 10 mg  10 mg Intravenous Q4H PRN    multivitamin-minerals (CENTRUM) tablet 1 tablet  1 tablet Oral Daily    nicotine (NICODERM CQ) 21 mg/24 hr TD 24 hr patch 21 mg  21 mg Transdermal Daily    NIFEdipine (PROCARDIA XL) 24 hr tablet 60 mg  60 mg Oral Daily    oxyCODONE (ROXICODONE) immediate release tablet 10 mg  10 mg Oral Q6H PRN    oxyCODONE (ROXICODONE) IR tablet 5 mg  5 mg Oral Q4H PRN    pneumococcal 13-valent conjugate vaccine (PREVNAR-13) IM injection 0 5 mL  0 5 mL Intramuscular Prior to discharge    sodium chloride 0 9 % infusion  75 mL/hr Intravenous Continuous    thiamine (VITAMIN B1) tablet 100 mg  100 mg Oral Daily         VTE Pharmacologic Prophylaxis: Sequential compression device (Venodyne)   VTE Mechanical Prophylaxis: sequential compression device    Jen Gallegos

## 2019-09-05 NOTE — ASSESSMENT & PLAN NOTE
CIWA - monitor for signs of withdrawal  Drink 5th of liquor daily, states last drink was 3 days ago and only 1 shot

## 2019-09-05 NOTE — CONSULTS
Consultation - Neurology   Reinaldo Nunez  81 y o  male MRN: [id number]  Unit/Bed#: [facility] 631-01 Encounter: [id number]    Assessment/Plan   61-year-old male with past medical history of liver cancer currently not being treated/COPD/hypertension who presented to [facility] on 09/04/2019 with complaints of increasing pain/weakness and paresthesias in the right hand for months as well as numbness in his right foot and being off balance  The patient was seen in the urgent care and transferred to [address] [place] [address] - at which time he had a CT of the head which showed alert area of subcortical white matter hypodensity consistent with vasogenic edema in the left parietal region, small focus of hemorrhage superiorly with associated more hypodense masslike area measuring 3 9 x 2 2 cm, mild mass effect  No reported seizure like activity    -  Neurosurgery consultation, heme oncology - consultation, IM following closely as well  -  With his hx of etoh withdrawal will need to be monitored closely for withdrawal symptoms on thiamine, this morning agitated and restless  CIWA protocol  -  The patient was started on Decadron further adjustment per neurosurgery team  -  MRI of the brain with and with out contrast   -  Therapies/PMR consultation  -  Frequent neurological checks  -  Please call neurology with any questions and concerns, reviewed case with neurosurgery team    History of Present Illness     Reason for Consult / Principal Problem:     HPI: Reinaldo Nunze  is a 81 y o male with past medical history of liver cancer currently not being treated, COPD,  and hypertension - who presented to an urgent care center on 09/04/2019 - with complaints of increasing pain/weakness and paresthesias in the right hand x2 months, there is reports the patient was having a difficult time formula  within the right hand, it was also reported the patient had numbness of his right foot and his equilibrium has been off    The patient was referred to the a ED, secondary to concern of stroke with the symptoms  Patient does have a history of heavy tobacco abuse as well  CT of the head was completed at [facility] which did show a large area of vasogenic edema in the left parietal area and a small focus of hemorrhage and associated mass like area 4 x 2 cm with mild mass effect, CT chest abdomen pelvis showed heterogeneous liver and hepatomegaly and multifocal heterogeneous liver lesions and left lower lung lobe nodule concerning for metastasis, bony metastatic disease in the ribs and manubrium  The patient was transferred to [facility] for further care and evaluation  Per record review the patient was diagnosed with liver cancer and mid 2017 - was treated with chemotherapy for 3-4 months - this was stopped secondary to patient not believe the chemotherapy was not providing any benefit  The patient has not been taking his hypertensive medications secondary to financial reasons  The patient continues to smoke and drink, he denies drug use  Neurology was asked to see in regards to findings on the CT of head  The patient was started on Decadron, as well as thiamine, internal medicine team is following closely  The patient reports that for the last few months he has been experiencing symptoms of right hand weakness and numbness, he is dropping things and he does not have the  strength he once had, this is getting worse  He also has been complaining of his right foot being and numb, as well as his disequilibrium being off  He reports he also has been short of breath  He endorses smoking and drinking, but his last drink being 2-3 days ago  Reports he does not go to a doctor - he typically just deals with things and is on when  He reports he is very anxious today and restless  Past medical hx, and past surgical hx reviewed , as below, social hx and family hx reviewed       Inpatient consult to Neurology  Consult performed by: Roderick Hale PA-C  Consult ordered by: Jared Chapman DO        Review of Systems   Constitutional: Positive for appetite change and fatigue  Eyes: Negative  Respiratory: Positive for cough and shortness of breath  Cardiovascular: Negative  Gastrointestinal: Negative  Musculoskeletal: Negative  Skin: Negative  Neurological: Positive for speech difficulty, weakness and numbness  Negative for dizziness, tremors, seizures, syncope, facial asymmetry, light-headedness and headaches  Hematological: Negative  Psychiatric/Behavioral: Positive for agitation and sleep disturbance  The patient is nervous/anxious  As Per HPI and above      Historical Information   Past Medical History:   Diagnosis Date    Cancer ([facility] [address] )     liver    Hypertension      Past Surgical History:   Procedure Laterality Date    APPENDECTOMY       Social History   Social History     Substance and Sexual Activity   Alcohol Use Yes    Comment: 1/5 bottle weekly      Social History     Substance and Sexual Activity   Drug Use Never     Social History     Tobacco Use   Smoking Status Current Every Day Smoker    Packs/day: 2 00   Smokeless Tobacco Current User    Types: Chew     Family History:   Family History   Problem Relation Age of Onset    Cancer Mother     Cancer Father      Review of previous medical records was completed -     Meds/Allergies   all current active meds have been reviewed, current meds:   Current Facility-Administered Medications   Medication Dose Route Frequency    acetaminophen (TYLENOL) tablet 650 mg  650 mg Oral Q4H PRN    dexamethasone (DECADRON) injection 4 mg  4 mg Intravenous Q12H [address]    fluticasone-vilanterol (BREO ELLIPTA) 100-25 mcg/inh inhaler 1 puff  1 puff Inhalation Daily    folic acid (FOLVITE) tablet 1 mg  1 mg Oral Daily    Labetalol HCl (NORMODYNE) injection 10 mg  10 mg Intravenous Q4H PRN    multivitamin-minerals (CENTRUM) tablet 1 tablet  1 tablet Oral Daily  nicotine (NICODERM CQ) 21 mg/24 hr TD 24 hr patch 21 mg  21 mg Transdermal Daily    NIFEdipine (PROCARDIA XL) 24 hr tablet 60 mg  60 mg Oral Daily    oxyCODONE (ROXICODONE) IR tablet 5 mg  5 mg Oral Q6H PRN    pneumococcal 13-valent conjugate vaccine (PREVNAR-13) IM injection 0 5 mL  0 5 mL Intramuscular Prior to discharge    sodium chloride 0 9 % infusion  75 mL/hr Intravenous Continuous    thiamine (VITAMIN B1) tablet 100 mg  100 mg Oral Daily    and PTA meds:   None     No Known Allergies    Objective   Vitals:Blood pressure 161/80, pulse 86, temperature 98 4 °F (36 9 °C), resp  rate 19, SpO2 94 %  ,There is no height or weight on file to calculate BMI  Intake/Output Summary (Last 24 hours) at 9/5/2019 0850  Last data filed at 9/5/2019 0535  Gross per 24 hour   Intake 226 25 ml   Output    Net 226 25 ml     Invasive Devices: Invasive Devices     Peripheral Intravenous Line            Peripheral IV 09/04/19 Left Antecubital less than 1 day              Physical Exam   Constitutional:   Ill-appearing/cachectic, malnourished, restless   HENT:   Head: Normocephalic and atraumatic  Nasal cannula in place   Eyes: Pupils are equal, round, and reactive to light  EOM are normal  Right eye exhibits no discharge  Left eye exhibits no discharge  Neck: Neck supple  No tracheal deviation present  Cardiovascular: Normal rate  No murmur heard  Pulmonary/Chest: Effort normal  No respiratory distress  He has wheezes  Dyspnea the conversation - however and no overt respiratory distress   Abdominal: There is no tenderness  Musculoskeletal: He exhibits no edema or deformity  Neurological: He has an abnormal Finger-Nose-Finger Test (There was past-pointing noted, no gross dysmetria)  He has a normal Heel to [employer]    Reflex Scores:       Tricep reflexes are 1+ on the right side and 1+ on the left side  Bicep reflexes are 1+ on the right side and 1+ on the left side         Brachioradialis reflexes are 1+ on the right side and 1+ on the left side  Patellar reflexes are 0 on the right side and 0 on the left side  Achilles reflexes are 0 on the right side and 0 on the left side  Vitals reviewed  Neurologic Exam     Mental Status   The patient is agitated and anxious - however he is able tell me name, year, and place, he is able tell me president, as well as current events  He is able to tell me why he is in hospital, as well as some hx  He is able to read and repeat  He is able to do simple calculations  He does get agitated very easily  He has some hesitancy in his speech but no amaris aphasia or paraphasic errors or dysarthria  He was able to follow one-step commands and cross-body commands, normal attention and concentration  Cranial Nerves   Cranial nerves II through XII intact  CN II   Visual fields full to confrontation  CN III, IV, VI   Pupils are equal, round, and reactive to light  Extraocular motions are normal      CN V   Facial sensation intact  CN VII   Facial expression full, symmetric  CN VIII   CN VIII normal      CN IX, X   CN IX normal    CN X normal      CN XI   CN XI normal      CN XII   CN XII normal    No gaze preference or cranial nerve palsy, conjugate     Motor Exam Positive drift on the right upper extremity    2- 3/5 right upper extremity  strength, biceps triceps 4/5  Left upper 5/5  Bilateral lower extremities 5/5    Rapid altered movements are decreased on the right upper extremity, fine motor control are decreased on the right upper extremity, there is a fixed deficit noted on the right upper extremity        Sensory Exam   Light touch normal    Right arm vibration: decreased from fingers  Left arm vibration: normal  Right leg vibration: normal  Left leg vibration: normal  Nonfocal to touch or temperature in the uppers and lowers, he does report decreased vibratory sense in the right upper extremity     Gait, Coordination, and Reflexes     Gait  Gait: (Not tested)    Coordination   Finger to nose coordination: abnormal (There was past-pointing noted, no gross dysmetria)  Heel to shin coordination: normal    Tremor   Resting tremor: absent  Intention tremor: absent    Reflexes   Right brachioradialis: 1+  Left brachioradialis: 1+  Right biceps: 1+  Left biceps: 1+  Right triceps: 1+  Left triceps: 1+  Right patellar: 0  Left patellar: 0  Right achilles: 0  Left achilles: 0  Right plantar: normal  Left plantar: normalNo evidence of seizure activity  He was very restless during examination  Lab Results:     Procedure: X-ray Chest 1 View Portable    Result Date: 9/4/2019  Narrative: CHEST INDICATION:   Stroke  COMPARISON:  June 21, 2019 EXAM PERFORMED/VIEWS:  XR CHEST PORTABLE FINDINGS: Cardiomediastinal silhouette appears unremarkable  The lungs are clear  No pneumothorax or pleural effusion  Deformity, possibly osteolytic lesion, in the posterior left 5th rib  Imaged bones otherwise unremarkable  Impression: Suggestion of osteolytic lesion in the posterior left 5th rib  Otherwise, no evidence of acute abnormality in the chest  Workstation performed: [id number]     Procedure: Ct Head Without Contrast    Result Date: 9/4/2019  Narrative: CT BRAIN - WITHOUT CONTRAST INDICATION:   Stroke  COMPARISON:  None  TECHNIQUE:  CT examination of the brain was performed  In addition to axial images, coronal 2D reformatted images were created and submitted for interpretation  Radiation dose length product (DLP) for this visit:  905 95 mGy-cm   This examination, like all CT scans performed in the [facility], was performed utilizing techniques to minimize radiation dose exposure, including the use of iterative  reconstruction and automated exposure control  IMAGE QUALITY:  Diagnostic  FINDINGS: PARENCHYMA:  Large area of subcortical white matter hypodensity consistent with vasogenic edema in the left parietal region  Small focus of hemorrhage superiorly as seen on image 2/30 with an associated more hypodense masslike area measuring 3 9 x 2 2 cm  Mild mass effect on the adjacent cortical sulci  No significant midline shift  VENTRICLES AND EXTRA-AXIAL SPACES:  Normal for the patient's age  VISUALIZED ORBITS AND PARANASAL SINUSES:  Unremarkable  CALVARIUM AND EXTRACRANIAL SOFT TISSUES:  Normal      Impression: Large area of subcortical white matter hypodensity consistent with vasogenic edema in the left parietal region  Small focus of hemorrhage superiorly as seen on image 2/30 with an associated more hypodense masslike area measuring 3 9 x 2 2 cm  Mild mass  effect on the adjacent cortical sulci  No significant midline shift  MRI with contrast recommended to exclude underlying mass  I personally discussed this study with Lazarus Gang on 9/4/2019 at 11:36 AM  Workstation performed: [id number]     Procedure: Ct Chest Abdomen Pelvis W Contrast    Result Date: 9/4/2019  Narrative: CT CHEST, ABDOMEN AND PELVIS WITH IV CONTRAST INDICATION:   Neoplasm: abdomen, metastatic, recurrence, suspected/known  COMPARISON:  None  TECHNIQUE: CT examination of the chest, abdomen and pelvis was performed  Axial, sagittal, and coronal 2D reformatted images were created from the source data and submitted for interpretation  Radiation dose length product (DLP) for this visit:  460 91 mGy-cm   This examination, like all CT scans performed in the [facility], was performed utilizing techniques to minimize radiation dose exposure, including the use of iterative  reconstruction and automated exposure control  IV Contrast:  100 mL of iohexol (OMNIPAQUE) Enteric Contrast: Enteric contrast was administered  FINDINGS: CHEST LUNGS:  There is a left lower lobe lung nodule, which measures 7 6 cm, suggest metastatic disease PLEURA:  Unremarkable   HEART/GREAT VESSELS:  The ascending aorta measures 4 3 cm Atherosclerotic changes are seen within the vasculature  MEDIASTINUM AND NINO:  Lower right paratracheal, left paratracheal and subcarinal lymph nodes do not meet the criteria for pathologic enlargement on the basis size CHEST WALL AND LOWER NECK:   A lucent lesion seen within the manubrium measuring about 15 mm An expansile lesion seen in the left 5th rib, measuring 1 2 x 2 3 cm Patient is known to have fractures of the right 10th and 11th ribs ABDOMEN LIVER/BILIARY TREE:  The liver is enlarged  There are multiple liver lesions  There is a right hepatic lesion which measures about 8 8 x 11 5 cm  There are additional large lesion with central cystic/necrotic area seen involving the segment 8 and segment 4  This lesion measures 8 3 x 2 22 cm Multiple liver lesions are seen within the left hepatic lobe some of which are hypervascular and others demonstrate low-attenuation  The largest lesion in the left hepatic lobe measures 2 3 x 2 8 cm in the segment 3  A segment 4 lesion seen measuring about 3 5 x 2 7 cm Mildly enlarged lucrecia hepatis lymphadenopathy Portal vein is patent  GALLBLADDER:  Cholelithiasis seen  SPLEEN:  Unremarkable  PANCREAS:  Body and tail of the pancreas appears unremarkable and fatty infiltration seen in the head of the pancreas in the neck of the pancreas ADRENAL GLANDS:  Unremarkable  KIDNEYS/URETERS:  A too small to characterize hypodensity seen in the right hepatic lobe, measuring about 6 mm probable cyst STOMACH AND BOWEL:  Unremarkable  APPENDIX:  No findings to suggest appendicitis  ABDOMINOPELVIC CAVITY:  Lucrecia hepatis lymph nodes are seen, measuring about 11 mm VESSELS:  Unremarkable for patient's age  PELVIS REPRODUCTIVE ORGANS:  Unremarkable for patient's age  URINARY BLADDER:  There is a thickening of the urinary bladder wall in the dome area  Diverticulation seen in the fundus of the urinary bladder ABDOMINAL WALL/INGUINAL REGIONS:  Unremarkable   OSSEOUS STRUCTURES:  Expansile lesion in the left 5th rib with associated subpleural mass  A lytic lesion seen within the manubrium, measuring about 17 mm  A lucent lesion seen in the proximal right femur is nonaggressive and benign appearing  Impression: Heterogenous liver with hepatomegaly and multifocal heterogenous liver lesions, may be due to multifocal [address]  or metastatic disease  Left lower lobe lung nodule measuring 7 6 mm, suspicious for metastatic disease 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 Mildly enlarged fidel hepatis lymphadenopathy The study was marked in EPIC for immediate notification   Workstation performed: [id number]     Recent Results (from the past 24 hour(s))   CBC and Platelet    Collection Time: 09/04/19 11:03 AM   Result Value Ref Range    WBC 8 26 4 31 - 10 16 Thousand/uL    RBC 4 73 3 88 - 5 62 Million/uL    Hemoglobin 15 2 12 0 - 17 0 g/dL    Hematocrit 44 9 36 5 - 49 3 %    MCV 95 82 - 98 fL    MCH 32 1 26 8 - 34 3 pg    MCHC 33 9 31 4 - 37 4 g/dL    RDW 14 9 11 6 - 15 1 %    Platelets 577 881 - 979 Thousands/uL    MPV 11 0 8 9 - 12 7 fL   Protime-INR    Collection Time: 09/04/19 11:03 AM   Result Value Ref Range    Protime 12 9 11 6 - 14 5 seconds    INR 1 00 0 84 - 1 19   APTT    Collection Time: 09/04/19 11:03 AM   Result Value Ref Range    PTT 28 23 - 37 seconds   Comprehensive metabolic panel    Collection Time: 09/04/19 11:03 AM   Result Value Ref Range    Sodium 136 136 - 145 mmol/L    Potassium 4 4 3 5 - 5 3 mmol/L    Chloride 99 (L) 100 - 108 mmol/L    CO2 31 21 - 32 mmol/L    ANION GAP 6 4 - 13 mmol/L    BUN 15 5 - 25 mg/dL    Creatinine 1 29 0 60 - 1 30 mg/dL    Glucose 114 65 - 140 mg/dL    Calcium 9 3 8 3 - 10 1 mg/dL    AST 70 (H) 5 - 45 U/L    ALT 81 (H) 12 - 78 U/L    Alkaline Phosphatase 262 (H) 46 - 116 U/L    Total Protein 7 9 6 4 - 8 2 g/dL    Albumin 3 3 (L) 3 5 - 5 0 g/dL    Total Bilirubin 1 20 (H) 0 20 - 1 00 mg/dL    eGFR 57 ml/min/1 73sq m   Hemoglobin A1c w/EAG Estimation    Collection Time: 09/05/19 5:36 AM   Result Value Ref Range    Hemoglobin A1C 5 9 4 2 - 6 3 %     mg/dl   Lipid Panel with Direct LDL reflex    Collection Time: 09/05/19  5:36 AM   Result Value Ref Range    Cholesterol 163 50 - 200 mg/dL    Triglycerides 252 (H) <=150 mg/dL    HDL, Direct 11 (L) 40 - 60 mg/dL    LDL Calculated 102 (H) 0 - 100 mg/dL   Basic metabolic panel    Collection Time: 09/05/19  5:36 AM   Result Value Ref Range    Sodium 136 136 - 145 mmol/L    Potassium 4 4 3 5 - 5 3 mmol/L    Chloride 103 100 - 108 mmol/L    CO2 29 21 - 32 mmol/L    ANION GAP 4 4 - 13 mmol/L    BUN 20 5 - 25 mg/dL    Creatinine 1 42 (H) 0 60 - 1 30 mg/dL    Glucose 165 (H) 65 - 140 mg/dL    Calcium 9 0 8 3 - 10 1 mg/dL    eGFR 51 ml/min/1 73sq m   Magnesium    Collection Time: 09/05/19  5:36 AM   Result Value Ref Range    Magnesium 2 1 1 6 - 2 6 mg/dL   Phosphorus    Collection Time: 09/05/19  5:36 AM   Result Value Ref Range    Phosphorus 3 3 2 3 - 4 1 mg/dL   CBC and differential    Collection Time: 09/05/19  5:36 AM   Result Value Ref Range    WBC 9 13 4 31 - 10 16 Thousand/uL    RBC 4 36 3 88 - 5 62 Million/uL    Hemoglobin 13 9 12 0 - 17 0 g/dL    Hematocrit 42 7 36 5 - 49 3 %    MCV 98 82 - 98 fL    MCH 31 9 26 8 - 34 3 pg    MCHC 32 6 31 4 - 37 4 g/dL    RDW 15 0 11 6 - 15 1 %    MPV 11 2 8 9 - 12 7 fL    Platelets 117 266 - 202 Thousands/uL    nRBC 0 /100 WBCs    Neutrophils Relative 71 43 - 75 %    Immat GRANS % 1 0 - 2 %    Lymphocytes Relative 17 14 - 44 %    Monocytes Relative 10 4 - 12 %    Eosinophils Relative 0 0 - 6 %    Basophils Relative 1 0 - 1 %    Neutrophils Absolute 6 47 1 85 - 7 62 Thousands/µL    Immature Grans Absolute 0 06 0 00 - 0 20 Thousand/uL    Lymphocytes Absolute 1 59 0 60 - 4 47 Thousands/µL    Monocytes Absolute 0 92 0 17 - 1 22 Thousand/µL    Eosinophils Absolute 0 04 0 00 - 0 61 Thousand/µL    Basophils Absolute 0 05 0 00 - 0 10 Thousands/µL     Imaging Studies: I have personally reviewed pertinent reports  EKG, Pathology, and Other Studies: I have personally reviewed pertinent reports      Code Status: Level 1 - Full Code

## 2019-09-05 NOTE — SPEECH THERAPY NOTE
Speech Language/Pathology  Pt is off the floor at testing  Remains npo w/ procedure pending  Please utilize the nsg screen upon his return as able  If needed will f/u w/ formal assessment

## 2019-09-05 NOTE — PLAN OF CARE
Problem: Potential for Falls  Goal: Patient will remain free of falls  Description  INTERVENTIONS:  - Assess patient frequently for physical needs  -  Identify cognitive and physical deficits and behaviors that affect risk of falls  -  [place] fall precautions as indicated by assessment   - Educate patient/family on patient safety including physical limitations  - Instruct patient to call for assistance with activity based on assessment  - Modify environment to reduce risk of injury  - Consider OT/PT consult to assist with strengthening/mobility  Outcome: Progressing     Problem: Nutrition/Hydration-ADULT  Goal: Nutrient/Hydration intake appropriate for improving, restoring or maintaining nutritional needs  Description  Monitor and assess patient's nutrition/hydration status for malnutrition  Collaborate with interdisciplinary team and initiate plan and interventions as ordered  Monitor patient's weight and dietary intake as ordered or per policy  Utilize nutrition screening tool and intervene as necessary  Determine patient's food preferences and provide high-protein, high-caloric foods as appropriate       INTERVENTIONS:  - Monitor oral intake, urinary output, labs, and treatment plans  - Assess nutrition and hydration status and recommend course of action  - Evaluate amount of meals eaten  - Assist patient with eating if necessary   - Allow adequate time for meals  - Recommend/ encourage appropriate diets, oral nutritional supplements, and vitamin/mineral supplements  - Order, calculate, and assess calorie counts as needed  - Recommend, monitor, and adjust tube feedings and TPN/PPN based on assessed needs  - Assess need for intravenous fluids  - Provide specific nutrition/hydration education as appropriate  - Include patient/family/caregiver in decisions related to nutrition  Outcome: Progressing     Problem: PAIN - ADULT  Goal: Verbalizes/displays adequate comfort level or baseline comfort level  Description  Interventions:  - Encourage patient to monitor pain and request assistance  - Assess pain using appropriate pain scale  - Administer analgesics based on type and severity of pain and evaluate response  - Implement non-pharmacological measures as appropriate and evaluate response  - Consider cultural and social influences on pain and pain management  - Notify physician/advanced practitioner if interventions unsuccessful or patient reports new pain  Outcome: Progressing     Problem: INFECTION - ADULT  Goal: Absence or prevention of progression during hospitalization  Description  INTERVENTIONS:  - Assess and monitor for signs and symptoms of infection  - Monitor lab/diagnostic results  - Monitor all insertion sites, i e  indwelling lines, tubes, and drains  - Monitor endotracheal if appropriate and nasal secretions for changes in amount and color  - Grover appropriate cooling/warming therapies per order  - Administer medications as ordered  - Instruct and encourage patient and family to use good hand hygiene technique  - Identify and instruct in appropriate isolation precautions for identified infection/condition  Outcome: Progressing  Goal: Absence of fever/infection during neutropenic period  Description  INTERVENTIONS:  - Monitor WBC    Outcome: Progressing     Problem: SAFETY ADULT  Goal: Maintain or return to baseline ADL function  Description  INTERVENTIONS:  -  Assess patient's ability to carry out ADLs; assess patient's baseline for ADL function and identify physical deficits which impact ability to perform ADLs (bathing, care of mouth/teeth, toileting, grooming, dressing, etc )  - Assess/evaluate cause of self-care deficits   - Assess range of motion  - Assess patient's mobility; develop plan if impaired  - Assess patient's need for assistive devices and provide as appropriate  - Encourage maximum independence but intervene and supervise when necessary  - Involve family in performance of ADLs  - Assess for home care needs following discharge   - Consider OT consult to assist with ADL evaluation and planning for discharge  - Provide patient education as appropriate  Outcome: Progressing  Goal: Maintain or return mobility status to optimal level  Description  INTERVENTIONS:  - Assess patient's baseline mobility status (ambulation, transfers, stairs, etc )    - Identify cognitive and physical deficits and behaviors that affect mobility  - Identify mobility aids required to assist with transfers and/or ambulation (gait belt, sit-to-stand, lift, walker, cane, etc )  - [place] fall precautions as indicated by assessment  - Record patient progress and toleration of activity level on Mobility SBAR; progress patient to next Phase/Stage  - Instruct patient to call for assistance with activity based on assessment  - Consider rehabilitation consult to assist with strengthening/weightbearing, etc   Outcome: Progressing     Problem: DISCHARGE PLANNING  Goal: Discharge to home or other facility with appropriate resources  Description  INTERVENTIONS:  - Identify barriers to discharge w/patient and caregiver  - Arrange for needed discharge resources and transportation as appropriate  - Identify discharge learning needs (meds, wound care, etc )  - Arrange for interpretive services to assist at discharge as needed  - Refer to Case Management Department for coordinating discharge planning if the patient needs post-hospital services based on physician/advanced practitioner order or complex needs related to functional status, cognitive ability, or social support system  Outcome: Progressing     Problem: Knowledge Deficit  Goal: Patient/family/caregiver demonstrates understanding of disease process, treatment plan, medications, and discharge instructions  Description  Complete learning assessment and assess knowledge base    Interventions:  - Provide teaching at level of understanding  - Provide teaching via preferred learning methods  Outcome: Progressing     Problem: NEUROSENSORY - ADULT  Goal: Achieves stable or improved neurological status  Description  INTERVENTIONS  - Monitor and report changes in neurological status  - Monitor vital signs such as temperature, blood pressure, glucose, and any other labs ordered   - Initiate measures to prevent increased intracranial pressure  - Monitor for seizure activity and implement precautions if appropriate      Outcome: Progressing  Goal: Remains free of injury related to seizures activity  Description  INTERVENTIONS  - Maintain airway, patient safety  and administer oxygen as ordered  - Monitor patient for seizure activity, document and report duration and description of seizure to physician/advanced practitioner  - If seizure occurs,  ensure patient safety during seizure  - Reorient patient post seizure  - Seizure pads on all 4 side rails  - Instruct patient/family to notify RN of any seizure activity including if an aura is experienced  - Instruct patient/family to call for assistance with activity based on nursing assessment  - Administer anti-seizure medications if ordered    Outcome: Progressing  Goal: Achieves maximal functionality and self care  Description  INTERVENTIONS  - Monitor swallowing and airway patency with patient fatigue and changes in neurological status  - Encourage and assist patient to increase activity and self care     - Encourage visually impaired, hearing impaired and aphasic patients to use assistive/communication devices  Outcome: Progressing     Problem: RESPIRATORY - ADULT  Goal: Achieves optimal ventilation and oxygenation  Description  INTERVENTIONS:  - Assess for changes in respiratory status  - Assess for changes in mentation and behavior  - Position to facilitate oxygenation and minimize respiratory effort  - Oxygen administered by appropriate delivery if ordered  - Initiate smoking cessation education as indicated  - Encourage broncho-pulmonary hygiene including cough, deep breathe, Incentive Spirometry  - Assess the need for suctioning and aspirate as needed  - Assess and instruct to report SOB or any respiratory difficulty  - Respiratory Therapy support as indicated  Outcome: Progressing     Problem: METABOLIC, FLUID AND ELECTROLYTES - ADULT  Goal: Fluid balance maintained  Description  INTERVENTIONS:  - Monitor labs   - Monitor I/O and WT  - Instruct patient on fluid and nutrition as appropriate  - Assess for signs & symptoms of volume excess or deficit  Outcome: Progressing     Problem: SKIN/TISSUE INTEGRITY - ADULT  Goal: Skin integrity remains intact  Description  INTERVENTIONS  - Identify patients at risk for skin breakdown  - Assess and monitor skin integrity  - Assess and monitor nutrition and hydration status  - Monitor labs (i e  albumin)  - Assess for incontinence   - Turn and reposition patient  - Assist with mobility/ambulation  - Relieve pressure over bony prominences  - Avoid friction and shearing  - Provide appropriate hygiene as needed including keeping skin clean and dry  - Evaluate need for skin moisturizer/barrier cream  - Collaborate with interdisciplinary team (i e  Nutrition, Rehabilitation, etc )   - Patient/family teaching  Outcome: Progressing     Problem: MUSCULOSKELETAL - ADULT  Goal: Maintain or return mobility to safest level of function  Description  INTERVENTIONS:  - Assess patient's ability to carry out ADLs; assess patient's baseline for ADL function and identify physical deficits which impact ability to perform ADLs (bathing, care of mouth/teeth, toileting, grooming, dressing, etc )  - Assess/evaluate cause of self-care deficits   - Assess range of motion  - Assess patient's mobility  - Assess patient's need for assistive devices and provide as appropriate  - Encourage maximum independence but intervene and supervise when necessary  - Involve family in performance of ADLs  - Assess for home care needs following discharge - Consider OT consult to assist with ADL evaluation and planning for discharge  - Provide patient education as appropriate  Outcome: Progressing  Goal: Maintain proper alignment of affected body part  Description  INTERVENTIONS:  - Support, maintain and protect limb and body alignment  - Provide patient/ family with appropriate education  Outcome: Progressing

## 2019-09-05 NOTE — ASSESSMENT & PLAN NOTE
Reports he is taking nifedipine as an outpatient but has not been on this for 3 months due to financial reasons  Most likely elevated secondary to steroids  · Nifedipine 60 mg 24 hour tablet p o   Daily  · Continue to monitor

## 2019-09-05 NOTE — ASSESSMENT & PLAN NOTE
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; differential diagnosis would include metastatic small cell neuroendocrine carcinoma versus poorly differentiated pleomorphic carcinoma, each of uncertain primary  Metastatic hepatocellular carcinoma was considered in the differential diagnosis but is not favored on morphologic grounds " Referred for 2nd opinion  Patient states right arm weakness and hand  strength is slightly improved after the surgery  · Neuro checks per Neurosurgery   · Decadron 4 mg q 8 hours with Decadron taper per Neurosurgery - patient has steroid-induced hyperglycemia discussed below  GI prophylaxis with Protonix 40 mg daily  · Postop MRI revealed no residual enhancing mass and small subdural hemorrhage bilaterally  · Patient has postop pain  Patient reports pain is better controlled this morning  Required a dose of fentanyl and oxycodone 10 mg last night  · PT/OT - recommend rehab; will follow up with case management  · PMR following appreciate continued recommendations  · Neurosurgery consult; appreciate continued recommendations  · Oncology consult for HCC/new primary - will await pathology results from surgery and follow up outpatient for management  · Radiation oncology consult - will follow up after surgery for radiation; spoke with Neurosurgery team yesterday, will be cleared for radiation after 2 weeks postop    Follow up outpatient

## 2019-09-05 NOTE — CONSULTS
Consult- Prashanth Tripp  1950, 81 y o  male MRN: [id number]    Unit/Bed#: [facility] 631-01 Encounter: [id number]    Primary Care Provider: No primary care provider on file  Date and time admitted to hospital: 9/4/2019  4:38 PM      Inpatient consult to Neurosurgery  Consult performed by: Leti Lora PA-C  Consult ordered by: Marbella Felder DO        Imaging personally reviewed with attending 9/5/19 at 7:30am  Patient examined at bedside 9/5/19 at 10:30am    * Cerebral brain hemorrhage [facility])  Assessment & Plan  Large necrotic left frontoparietal brain mass with surrounding vasogenic edema and mass effect    - Diagnosed with [address]  in 2017, completed some chemotherapy then stopped  Denies any follow up with oncology   - Presented to [facility] with right hand clumsiness and weakness, as well as problems with coordination/balance, transferred to [facility] after [facility] results    Imaging:  CTH, 9/4: Large area of subcortical white matter hypodensity consistent with vasogenic edema in the left parietal region  Small focus of hemorrhage superiorly as seen on image 2/30 with an associated more hypodense masslike area measuring 3 9 x 2 2 cm  CTCAP, 9/4: Heterogenous liver with hepatomegaly and multifocal heterogenous liver lesions, may be due to multifocal [address]  or metastatic disease  Left lower lobe lung nodule measuring 7 6 mm, suspicious for metastatic disease  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  Mildly enlarged fidel hepatis lymphadenopathy  MRI brain w/wo, 9/5: Large centrally necrotic mass within the left frontoparietal region measuring 2 9 x 4 3 x 3 4 cm with surrounding vasogenic edema  Plan:  Decadron 4q6  MRI reviewed with attending, recommend resection of brain mass  PT/OT  DVT ppx: SCDs, hold pharm ppx  Pain control per primary team  CIWA - monitor closely   Patient states his last drink was 1 shot 3 days ago, but patient unable to sit still during exam    Patient will require surgical intervention and will need medical clearance  Surgery likely next Tuesday  Neurosurgery will continue to follow    Cerebral edema [facility])  Assessment & Plan  2/2 brain mass  Decadron 4q6    History of Present Illness     HPI: Ryan Muller  is a 81y o  year old male with PMH including HCC, alcohol abuse, and HTN who presents as a transfer from [facility]  He started to develop right hand weakness and clumsiness approximately 3 months ago, and started feeling off balance while walking  He got tired of dealing with it and presented to the ED yesterday for evaluation  He has a history of [address] , diagnosed in 2017, and completed several months of chemotherapy before stopping  He felt like it wasn't working  He has had no follow-up since that time  He does not follow up with a PCP for HTN  He continues to drink and smoke  He denies headache, syncope, seizures, vision changes, speech deficits, focal neuro deficits other than right hand weakness, or falls  Review of Systems   Constitutional: Negative for chills, fatigue and fever  HENT: Negative  Eyes: Negative  Respiratory: Negative  Negative for chest tightness and shortness of breath  Cardiovascular: Negative  Negative for chest pain and palpitations  Gastrointestinal: Positive for diarrhea (occasional, not daily)  Negative for abdominal pain  Genitourinary: Negative  Musculoskeletal: Positive for gait problem  Negative for arthralgias, back pain, myalgias, neck pain and neck stiffness  Skin: Negative  Neurological: Positive for weakness  Negative for dizziness, seizures, speech difficulty, light-headedness and headaches  Hematological: Negative  Psychiatric/Behavioral: Negative          Historical Information   Past Medical History:   Diagnosis Date    Cancer [facility])     liver    Hypertension      Past Surgical History:   Procedure Laterality Date    APPENDECTOMY       Social History     Substance and Sexual Activity   Alcohol Use Yes    Comment: 1/5 bottle weekly      Social History     Substance and Sexual Activity   Drug Use Never     Social History     Tobacco Use   Smoking Status Current Every Day Smoker    Packs/day: 2 00   Smokeless Tobacco Current User    Types: Chew     Family History   Problem Relation Age of Onset    Cancer Mother     Cancer Father        Meds/Allergies   all current active meds have been reviewed, current meds:   Current Facility-Administered Medications   Medication Dose Route Frequency    acetaminophen (TYLENOL) tablet 650 mg  650 mg Oral Q4H PRN    dexamethasone (DECADRON) injection 4 mg  4 mg Intravenous Q12H [facility]    fluticasone-vilanterol (BREO ELLIPTA) 100-25 mcg/inh inhaler 1 puff  1 puff Inhalation Daily    folic acid (FOLVITE) tablet 1 mg  1 mg Oral Daily    ipratropium-albuterol (DUO-NEB) 0 5-2 5 mg/3 mL inhalation solution 3 mL  3 mL Nebulization Q4H PRN    Labetalol HCl (NORMODYNE) injection 10 mg  10 mg Intravenous Q4H PRN    multivitamin-minerals (CENTRUM) tablet 1 tablet  1 tablet Oral Daily    nicotine (NICODERM CQ) 21 mg/24 hr TD 24 hr patch 21 mg  21 mg Transdermal Daily    NIFEdipine (PROCARDIA XL) 24 hr tablet 60 mg  60 mg Oral Daily    oxyCODONE (ROXICODONE) immediate release tablet 10 mg  10 mg Oral Q6H PRN    oxyCODONE (ROXICODONE) IR tablet 5 mg  5 mg Oral Q4H PRN    pneumococcal 13-valent conjugate vaccine (PREVNAR-13) IM injection 0 5 mL  0 5 mL Intramuscular Prior to discharge    sodium chloride 0 9 % infusion  75 mL/hr Intravenous Continuous    thiamine (VITAMIN B1) tablet 100 mg  100 mg Oral Daily    and PTA meds:   None     No Known Allergies    Objective   I/O       09/03 0701 - 09/04 0700 09/04 0701 - 09/05 0700 09/05 0701 - 09/06 0700    P  O   0     I V  (mL/kg)  226 3 825 (13)    Total Intake(mL/kg)  226 3 825 (13)    Net  +226 3 +825           Unmeasured Urine Occurrence  3 x 1 x          Physical Exam   Constitutional: He appears well-developed and well-nourished     HENT: Head: Normocephalic and atraumatic  Eyes: Pupils are equal, round, and reactive to light  EOM are normal    Neck: Normal range of motion  Cardiovascular: Normal rate  Pulmonary/Chest: Effort normal  No respiratory distress  Abdominal: Soft  Musculoskeletal: Normal range of motion  Neurological: He is alert  He has an abnormal Finger-Nose-Finger Test (right side)  Reflex Scores:       Tricep reflexes are 2+ on the right side and 2+ on the left side  Bicep reflexes are 2+ on the right side and 2+ on the left side  Brachioradialis reflexes are 2+ on the right side and 2+ on the left side  Patellar reflexes are 2+ on the right side and 2+ on the left side  Achilles reflexes are 2+ on the right side and 2+ on the left side  Skin: Skin is warm and dry  Psychiatric: He has a normal mood and affect  His speech is normal  Judgment and thought content normal    Restless, can't lay still   Nursing note and vitals reviewed  Neurologic Exam     Mental Status   Oriented to person  Disoriented to place  (Couldn't name hospital)  Disoriented to time  (Thinks its February and 2017)  Follows 2 step commands  Attention: normal  Concentration: normal    Speech: speech is normal   Level of consciousness: alert  Knowledge: good  Unable to perform simple calculations (25+5+10 = 31)  Able to repeat  Normal comprehension  Cranial Nerves   Cranial nerves II through XII intact  CN III, IV, VI   Pupils are equal, round, and reactive to light  Extraocular motions are normal      Motor Exam   Right arm pronator drift: present (5-6 seconds to bed)  Left arm pronator drift: absent    Strength   Strength 5/5 except as noted     Right biceps: 4/5  Right wrist extension: 4/5    Sensory Exam   Light touch normal    Proprioception normal    Pinprick normal      Gait, Coordination, and Reflexes     Coordination   Finger to nose coordination: abnormal (right side)    Tremor   Resting tremor: absent  Action tremor: absent    Reflexes   Right brachioradialis: 2+  Left brachioradialis: 2+  Right biceps: 2+  Left biceps: 2+  Right triceps: 2+  Left triceps: 2+  Right patellar: 2+  Left patellar: 2+  Right achilles: 2+  Left achilles: 2+  Right : 2+  Left : 2+  Right Chu: absent  Left Chu: absent  Right ankle clonus: absent  Left ankle clonus: absent        Vitals:Blood pressure 155/81, pulse 71, temperature 99 °F (37 2 °C), resp  rate 19, SpO2 95 %  ,There is no height or weight on file to calculate BMI  Lab Results:   Results from last 7 days   Lab Units 09/05/19  0536 09/04/19  1103   WBC Thousand/uL 9 13 8 26   HEMOGLOBIN g/dL 13 9 15 2   HEMATOCRIT % 42 7 44 9   PLATELETS Thousands/uL 259 285   NEUTROS PCT % 71  --    MONOS PCT % 10  --      Results from last 7 days   Lab Units 09/05/19  0536 09/04/19  1103   POTASSIUM mmol/L 4 4 4 4   CHLORIDE mmol/L 103 99*   CO2 mmol/L 29 31   BUN mg/dL 20 15   CREATININE mg/dL 1 42* 1 29   CALCIUM mg/dL 9 0 9 3   ALK PHOS U/L  --  262*   ALT U/L  --  81*   AST U/L  --  70*     Results from last 7 days   Lab Units 09/05/19  0536   MAGNESIUM mg/dL 2 1     Results from last 7 days   Lab Units 09/05/19  0536   PHOSPHORUS mg/dL 3 3     Results from last 7 days   Lab Units 09/04/19  1103   INR  1 00   PTT seconds 28     No results found for: TROPONINT  ABG:No results found for: PHART, JKX8MFM, PO2ART, GFU8YKX, O8UYKKVO, BEART, SOURCE    Imaging Studies: I have personally reviewed pertinent reports  and I have personally reviewed pertinent films in PACS     X-ray Chest 1 View Portable  Result Date: 9/4/2019  Impression: Suggestion of osteolytic lesion in the posterior left 5th rib  Otherwise, no evidence of acute abnormality in the chest  Workstation performed: [id number]     Ct Head Without Contrast  Result Date: 9/4/2019  Impression: Large area of subcortical white matter hypodensity consistent with vasogenic edema in the left parietal region  Small focus of hemorrhage superiorly as seen on image 2/30 with an associated more hypodense masslike area measuring 3 9 x 2 2 cm  Mild mass  effect on the adjacent cortical sulci  No significant midline shift  MRI with contrast recommended to exclude underlying mass  I personally discussed this study with Maciej Yeager on 9/4/2019 at 11:36 AM  Workstation performed: [id number]     Mri Brain W Wo Contrast  Result Date: 9/5/2019  Impression: Large centrally necrotic mass within the left frontoparietal region with surrounding vasogenic edema  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 versus solitary metastasis  Workstation performed: [id number]     Ct Chest Abdomen Pelvis W Contrast  Result Date: 9/4/2019  Impression: Heterogenous liver with hepatomegaly and multifocal heterogenous liver lesions, may be due to multifocal [address]  or metastatic disease  Left lower lobe lung nodule measuring 7 6 mm, suspicious for metastatic disease 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 Mildly enlarged fidel hepatis lymphadenopathy The study was marked in EPIC for immediate notification  Workstation performed: [id number]       EKG, Pathology, and Other Studies: I have personally reviewed pertinent reports  and I have personally reviewed pertinent films in PACS    VTE Prophylaxis: Sequential compression device (Venodyne)  and RX contraindicated due to: possible surgery    Code Status: Level 1 - Full Code  Advance Directive and Living Will:      Power of :    POLST:      Counseling / Coordination of Care  I spent 45 minutes with the patient

## 2019-09-05 NOTE — ASSESSMENT & PLAN NOTE
Patient's history of hepatocellular carcinoma treated at [facility] in 2017 until January 2018  Patient received therapy with cisplatin, sorafenib, and TACE  CT chest abdomen pelvis with contrast revealed heterogenous liver with hepatomegaly and multifocal heterogenous liver lesions, may be due to multifocal [address]  or metastatic disease  Left lower lobe lung nodule measuring 7 6 mm, suspicious for metastatic disease  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  According to Care everywhere, patient was having partial response to therapy; however, stop therapy due to perceived lack of benefit and financial cost   Appears the patient has 2nd primary tumor as per preliminary pathology report  · Contributing to above  · AFP 4 0  · Oxycodone 5 mg and oxycodone 10 mg p r n   For pain  · Oncology consult - will follow up with patient after pathology results

## 2019-09-05 NOTE — UTILIZATION REVIEW
Initial Clinical Review  9/4 Transfer from [address] [place] [address] ED    Admission: Date/Time/Statement: Inpatient Admission Orders (From admission, onward)     Ordered        09/04/19 1707  Inpatient Admission  Once                   Orders Placed This Encounter   Procedures    Inpatient Admission     Standing Status:   Standing     Number of Occurrences:   1     Order Specific Question:   Admitting Physician     Answer:   Bora Cruz [[id number]]     Order Specific Question:   Level of Care     Answer:   Med Surg [[id number]]     Comments:   telemetry     Order Specific Question:   Estimated length of stay     Answer:   More than 2 Midnights     Order Specific Question:   Certification     Answer:   I certify that inpatient services are medically necessary for this patient for a duration of greater than two midnights  See H&P and MD Progress Notes for additional information about the patient's course of treatment  Assessment/Plan:   81year old male with past medical history of liver cancer, COPD, and hypertension presented to the urgent care this morning with complaints of right hand weakness, numbness, and tremor for 3 months  Patient also has complains of loss of balance in last 2 months  No associated visual changes or LOC  He was transferred to [address] [place] [address] for evaluation  CT head showed a large area of vasogenic edema in the left parietal area with a small focus of hemorrhage and an associated mass like area 4 x 2 cm with mild mass effect  CT chest abdomen pelvis showed heterogeneous liver with hepatomegaly and multifocal heterogeneous liver lesions and L lower lung lobe nodule concerning for metastasis, bony metastatic disease in the ribs and manubrium  He was transferred to [place] for further work up and evaluation   Patient was diagnosed with [address]  in mid 2017 and was treated with chemotherapy for ~3-4 months and treatment was stopped because patient did not believe that the chemotherapy was providing any benefit   Patient could not recall chemotherapeutic drugs used in his treatment  Patient has also been diagnosed with hypertension that was treated with Nifedipine  Patient has been without his medications for 3 months due to financial restrictions  Patient was last seen by family doctor 4-5 months ago      Patient endorsed drinking a fifth of EtOH per day and smoking 2 packs of cigarettes per day for the last 30-40 years  Denied other drug use  Cerebral brain hemorrhage [facility])  Assessment & Plan  CT showed small focus of hemorrhage with associated mass  Main concern for metastasis secondary to untreated [address]  Unlikely to be primary tumor  Now presenting with focal weakness of his hand  - neuro checks q 4 hours  - Decadron 4 mg q 12 hours  - NPO at midnight  - PT/OT/speech consult  - consult to Neurosurgery  - Consult neurology  - consult PMR     Dependence on nicotine from cigarettes  Assessment & Plan  Patient endorsed smoking 2 packs a day for last 30-40 years  - nicotine patch 21mg     Alcohol abuse  Assessment & Plan  Patient endorsed drinking one 5th daily     - CIWA protocol  - thiamine  - folate     Essential hypertension  Assessment & Plan  Reports he is taking nifedipine as an outpatient but has not been on this for 3 months due to financial reasons  Was initially hypertensive on admission  BP now stable  - monitor  - labetalol p r n      Weakness  Assessment & Plan  Likely secondary to mass and intracranial bleed  See work up as above       VTE Pharmacologic Prophylaxis: Reason for no pharmacologic prophylaxis brain hemorrhage  VTE Mechanical Prophylaxis: sequential compression device     9/5 Neurology cons; Neurosurgery consultation, heme oncology - consultation, IM following closely as well  -  With his hx of etoh withdrawal will need to be monitored closely for withdrawal symptoms on thiamine, this morning agitated and restless   CIWA protocol  -  The patient was started on Decadron further adjustment per neurosurgery team  -  MRI of the brain with and with out contrast   -  Therapies/PMR consultation  -  Frequent neurological checks     Vitals   Temperature Pulse Respirations Blood Pressure SpO2   09/04/19 1703 09/04/19 1703 09/04/19 1703 09/04/19 1703 09/04/19 1703   98 7 °F (37 1 °C) 86 20 128/91 91 %      Temp Source Heart Rate Source Patient Position - Orthostatic VS BP Location FiO2 (%)   09/04/19 1703 -- 09/04/19 1703 09/04/19 1703 --   Oral  Sitting Left arm       Pain Score       09/04/19 1921       6        Wt Readings from Last 1 Encounters:   09/05/19 63 5 kg (140 lb)     Additional Vital Signs:   09/05/19 11:30:16  99 °F (37 2 °C)      155/81  106        09/05/19 0957    71        95 %  Nasal cannula    09/05/19 0900              Nasal cannula    09/05/19 07:01:19  98 4 °F (36 9 °C)      161/80  107        09/04/19 2335              None (Room air)    09/04/19 23:03:55      19  163/79            Pertinent Labs/Diagnostic Test Results:   PCXR - Suggestion of osteolytic lesion in the posterior left 5th rib  CT Head - Large area of subcortical white matter hypodensity consistent with vasogenic edema in the left parietal region  Small focus of hemorrhage superiorly as seen on image 2/30 with an associated more hypodense masslike area measuring 3 9 x 2 2 cm  Mild mass effect on the adjacent cortical sulci  No significant midline shift  MRI with contrast recommended to exclude underlying mass  CT Chest/ Abd/ Pelvis - Heterogenous liver with hepatomegaly and multifocal heterogenous liver lesions, may be due to multifocal [address]  or metastatic disease  Left lower lobe lung nodule measuring 7 6 mm, suspicious for metastatic disease  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  Mildly enlarged fidel hepatis lymphadenopathy    9/5 MRI Brain - Large centrally necrotic mass within the left frontoparietal region with surrounding vasogenic edema  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 versus solitary metastasis          Results from last 7 days   Lab Units 09/05/19  0536 09/04/19  1103   WBC Thousand/uL 9 13 8 26   HEMOGLOBIN g/dL 13 9 15 2   HEMATOCRIT % 42 7 44 9   PLATELETS Thousands/uL 259 285   NEUTROS ABS Thousands/µL 6 47  --          Results from last 7 days   Lab Units 09/05/19  0536 09/04/19  1103   SODIUM mmol/L 136 136   POTASSIUM mmol/L 4 4 4 4   CHLORIDE mmol/L 103 99*   CO2 mmol/L 29 31   ANION GAP mmol/L 4 6   BUN mg/dL 20 15   CREATININE mg/dL 1 42* 1 29   EGFR ml/min/1 73sq m 51 57   CALCIUM mg/dL 9 0 9 3   MAGNESIUM mg/dL 2 1  --    PHOSPHORUS mg/dL 3 3  --      Results from last 7 days   Lab Units 09/04/19  1103   AST U/L 70*   ALT U/L 81*   ALK PHOS U/L 262*   TOTAL PROTEIN g/dL 7 9   ALBUMIN g/dL 3 3*   TOTAL BILIRUBIN mg/dL 1 20*         Results from last 7 days   Lab Units 09/05/19  0536 09/04/19  1103   GLUCOSE RANDOM mg/dL 165* 114         Results from last 7 days   Lab Units 09/05/19  0536   HEMOGLOBIN A1C % 5 9   EAG mg/dl 123     Results from last 7 days   Lab Units 09/04/19  1103   PROTIME seconds 12 9   INR  1 00   PTT seconds 28       Past Medical History:   Diagnosis Date    Cancer ([address] )     liver    Hypertension      Present on Admission:   Weakness   Cerebral brain hemorrhage (HCC)   Essential hypertension   Alcohol abuse   Dependence on nicotine from cigarettes      Admitting Diagnosis: Weakness [R53 1]     Age/Sex: 81 y o  male     Admission Orders:  NPO  CIWA assess  Neurological checks q4h  IP CONSULT TO PHYSICAL MEDICINE REHAB  IP CONSULT TO NEUROSURGERY  IP CONSULT TO NEUROLOGY  IP CONSULT TO ONCOLOGY    Current Facility-Administered Medications:  acetaminophen 650 mg Oral Q4H PRN    dexamethasone 4 mg Intravenous Q12H [facility]    fluticasone-vilanterol 1 puff Inhalation Daily    folic acid 1 mg Oral Daily    ipratropium-albuterol 3 mL Nebulization Q4H PRN    Labetalol HCl 10 mg Intravenous Q4H PRN    multivitamin-minerals 1 tablet Oral Daily    nicotine 21 mg Transdermal Daily    NIFEdipine 60 mg Oral Daily    oxyCODONE 10 mg Oral Q6H PRN    oxyCODONE 5 mg Oral Q4H PRN 9/5 x1   pneumococcal 13-valent conjugate vaccine 0 5 mL Intramuscular Prior to discharge    sodium chloride 75 mL/hr Intravenous Continuous Last Rate: 75 mL/hr (09/05/19 0945)   thiamine 100 mg Oral Daily      Network Utilization Review Department  Phone: [phone]; Fax [phone]  [email or web address]  ATTENTION: Please call with any questions or concerns to [phone]  and carefully listen to the prompts so that you are directed to the right person  Send all requests for admission clinical reviews, approved or denied determinations and any other requests to fax [phone]   All voicemails are confidential

## 2019-09-05 NOTE — ASSESSMENT & PLAN NOTE
Large necrotic left frontoparietal brain mass with surrounding vasogenic edema and mass effect    - Diagnosed with [address]  in 2017, completed some chemotherapy then stopped  Denies any follow up with oncology   - Presented to [facility] with right hand clumsiness and weakness, as well as problems with coordination/balance, transferred to [facility] after [facility] results    Imaging:  CTH, 9/4: Large area of subcortical white matter hypodensity consistent with vasogenic edema in the left parietal region  Small focus of hemorrhage superiorly as seen on image 2/30 with an associated more hypodense masslike area measuring 3 9 x 2 2 cm  CTCAP, 9/4: Heterogenous liver with hepatomegaly and multifocal heterogenous liver lesions, may be due to multifocal [address]  or metastatic disease  Left lower lobe lung nodule measuring 7 6 mm, suspicious for metastatic disease  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  Mildly enlarged fidel hepatis lymphadenopathy  MRI brain w/wo, 9/5: Large centrally necrotic mass within the left frontoparietal region measuring 2 9 x 4 3 x 3 4 cm with surrounding vasogenic edema  Plan:  Decadron 4q12  MRI reviewed with attending, recommendations to follow  PT/OT  DVT ppx: SCDs, hold pharm ppx  Pain control per primary team  CIWA - monitor closely  Patient states his last drink was 1 shot 3 days ago, but patient unable to sit still during exam    Patient may require surgical intervention and will need medical clearance  Patient may eat dinner tonight     NPO after MN    Neurosurgery will continue to follow

## 2019-09-05 NOTE — ASSESSMENT & PLAN NOTE
Patient has significant smoking history of 2 packs per day for 30-40 years  Patient is not up-to-date on immunizations and uses rescue inhaler p r n  Karen Rm Does not have maintenance inhaler  Suspect Decadron for above will help as well  Patient is at baseline respiratory status    · Breo Ellipta daily  · Pulmonary hygiene - incentive spirometer in room and patient instructed with use  · Pneumococcal vaccination on discharge

## 2019-09-05 NOTE — ASSESSMENT & PLAN NOTE
Large necrotic left frontoparietal brain mass with surrounding vasogenic edema and mass effect    - Diagnosed with [address]  in 2017, s/p TACE x2 and [facility]ib [facility] ([address])  Denies any follow up with oncology  States at that time his girlfriend of 21 years was dying of bile duct cancer and he took care of her, not treating his own illness  He is motivated to treat this aggressively  - Presented to [facility] with right hand clumsiness and weakness, as well as problems with coordination/balance, transferred to [facility] after [facility] results    Imaging:  CTH, 9/4: Large area of subcortical white matter hypodensity consistent with vasogenic edema in the left parietal region  Small focus of hemorrhage superiorly as seen on image 2/30 with an associated more hypodense masslike area measuring 3 9 x 2 2 cm  CTCAP, 9/4: Heterogenous liver with hepatomegaly and multifocal heterogenous liver lesions, may be due to multifocal [address]  or metastatic disease  Left lower lobe lung nodule measuring 7 6 mm, suspicious for metastatic disease  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  Mildly enlarged fidel hepatis lymphadenopathy  MRI brain w/wo, 9/5: Large centrally necrotic mass within the left frontoparietal region measuring 2 9 x 4 3 x 3 4 cm with surrounding vasogenic edema  Plan:  Decadron 4q6  PT/OT  DVT ppx: SCDs, hold pharm ppx  Pain control per primary team  CIWA - monitor closely  Patient states his last drink was 1 shot 3 days ago, but patient unable to sit still during exam    Patient will require surgical intervention and will need medical clearance  Surgery likely next Tuesday  Will require medical clearance  Anticipate blood loss during surgery as mass is likely metastatic HCC, will require blood products on hold      Neurosurgery will continue to follow

## 2019-09-05 NOTE — ASSESSMENT & PLAN NOTE
Patient endorsed drinking a 5th of whiskey alcohol multiple times per week  Patient states last drink was 3-4 days ago prior to admission    Patient denies any history of alcohol withdrawal or DT   · CIWA protocol discontinued  · Thiamine and folate supplementation

## 2019-09-05 NOTE — SOCIAL WORK
CM met with the patient at bedside to review the CM role and discuss possible dc needs  At time of interview pt is AAOx4, pt lives alone,  rents a room in [place]  Pt was IPTA  Pt has no DME  Pt has bathroom/bedroom on one floor  Pts "room" is on second floor with two flights of steps, no elevator  Pt drives and sts he had "friends" that can transport him if he is unable to drive  Pt denies any history of drug/etoh abuse, of note, chart reflects pt drinks approx 1/5 of whiskey/rum a week  Pt denies any mental illness or inpatient psych admissions  Pt denies any history of SNF/Rehab or VNA  Pt does not have a  POA/LW  Preferred Pharmacy: [facility]   Contact: Jaspreet Kapadia, brother, [phone]  PCP: none at this time  Pt sts he is from [place], [place] and moved here recently  Pt sts he had a PCP in [place], but "hasnt found one here yet "  Pt given Info Link card to assist in finding a new PCP    CM reviewed d/c planning process including the following: identifying help at home, patient preference for d/c planning needs, Discharge Lounge, [facility] Meds to Bed program, availability of treatment team to discuss questions or concerns patient and/or family may have regarding understanding medications and recognizing signs and symptoms once discharged  CM also encouraged patient to follow up with all recommended appointments after discharge  Patient advised of importance for patient and family to participate in managing patients medical well being

## 2019-09-05 NOTE — CONSULTS
Consultation - Medical Oncology   Katia Zarate  81 y o  male MRN: [id number]  [facility] Encounter: [id number]    Referring physician:Dr Lorelei Woods  Reason for Consult:  Liver cancer  HPI: Katia Zarate  is a 81y o  year old male   Patient's friend is in the room  I received information from the patient and from records in the [facility] from [facility]     In 2017 patient was diagnosed to have multi focal hepatocellular carcinoma and hepatitis C  He had 2 treatments with TACE and oral sorafenib  He said there was some initial response and then disease progressed and he discontinued sorafenib and did not go back to the doctor  He is here now because of weakness in his right hand and he has left fronto parietal brain lesion with surrounding edema  He states he will be going for brain surgery on Tuesday  No headache, seizures had no nausea and vomiting  Appetite and they have been fluctuating  ROS:  09/05/19 Reviewed 13 systems:  Presently no headaches, seizures, dizziness, diplopia, dysphagia, hoarseness, chest pain, palpitations, shortness of breath, cough, hemoptysis, abdominal pain, nausea, vomiting, change in bowel habits, melena, hematuria, fever, chills, bleeding, bone pains, skin rash,  arthritic symptoms,  tiredness ,  claudication and gait problem  No frequent infections  Not unusually sensitive to heat or cold  No swelling of the ankles  No swollen glands  Patient is anxious   Other symptoms are in HPI        Historical Information   Past Medical History:   Diagnosis Date    Cancer ([facility] [address] )     liver    Hypertension      Past Surgical History:   Procedure Laterality Date    APPENDECTOMY       Social History   Social History     Substance and Sexual Activity   Alcohol Use Yes    Comment: 1/5 bottle weekly      Social History     Substance and Sexual Activity   Drug Use Never     Social History     Tobacco Use   Smoking Status Current Every Day Smoker    Packs/day: 2 00   Smokeless Tobacco Current User    Types: Chew     Family History:   Family History   Problem Relation Age of Onset    Cancer Mother     Cancer Father          Current Facility-Administered Medications:     acetaminophen (TYLENOL) tablet 650 mg, 650 mg, Oral, Q4H PRN, Cindy Wagner DO    dexamethasone (DECADRON) injection 4 mg, 4 mg, Intravenous, Q6H [address], Marya Tamayo, CHANTELLE    fluticasone-vilanterol (BREO ELLIPTA) 100-25 mcg/inh inhaler 1 puff, 1 puff, Inhalation, Daily, Richmond Fontaine MD, 1 puff at 47/82/27 9231    folic acid (FOLVITE) tablet 1 mg, 1 mg, Oral, Daily, Milvia Garcia MD, 1 mg at 09/05/19 0945    ipratropium-albuterol (DUO-NEB) 0 5-2 5 mg/3 mL inhalation solution 3 mL, 3 mL, Nebulization, Q4H PRN, Lester Chiu MD, 3 mL at 09/05/19 1015    Labetalol HCl (NORMODYNE) injection 10 mg, 10 mg, Intravenous, Q4H PRN, Cindy Wagner DO    multivitamin-minerals (CENTRUM) tablet 1 tablet, 1 tablet, Oral, Daily, Milvia Garcia MD, 1 tablet at 09/05/19 0945    nicotine (NICODERM CQ) 21 mg/24 hr TD 24 hr patch 21 mg, 21 mg, Transdermal, Daily, Milvia Garcia MD, 21 mg at 09/05/19 9353    NIFEdipine (PROCARDIA XL) 24 hr tablet 60 mg, 60 mg, Oral, Daily, Richmond Fontaine MD, 60 mg at 09/05/19 1124    oxyCODONE (ROXICODONE) immediate release tablet 10 mg, 10 mg, Oral, Q6H PRN, Richmond Fontaine MD, 10 mg at 09/05/19 1317    oxyCODONE (ROXICODONE) IR tablet 5 mg, 5 mg, Oral, Q4H PRN, Richmond Fontaine MD    pneumococcal 13-valent conjugate vaccine (PREVNAR-13) IM injection 0 5 mL, 0 5 mL, Intramuscular, Prior to discharge, Lester Chiu MD    sodium chloride 0 9 % infusion, 75 mL/hr, Intravenous, Continuous, Cindy Wagner DO, Last Rate: 75 mL/hr at 09/05/19 0945, 75 mL/hr at 09/05/19 0945    thiamine (VITAMIN B1) tablet 100 mg, 100 mg, Oral, Daily, Milvia Garcia MD, 100 mg at 09/05/19 0945    No Known Allergies  @ ROS@  Physical Exam:  Vitals:    09/05/19 0701 09/05/19 0957 09/05/19 1130 09/05/19 1520   BP: 161/80  155/81 139/72   BP Location: Right arm  Right arm    Pulse: 72 71 71    Resp: 18 18 19   Temp: 98 4 °F (36 9 °C)  99 °F (37 2 °C) 98 3 °F (36 8 °C)   TempSrc: Oral  Oral    SpO2: 91% 95% 92%      Alert, oriented, not in distress, no icterus, no oral thrush, no palpable neck mass, clear lung fields, regular heart rate, abdomen  soft and non tender, no palpable abdominal mass, no ascites, no edema of ankles, no calf tenderness, weak left hand , no skin rash, no palpable lymphadenopathy, good arterial pulses, no clubbing  Patient is anxious  Performance status 1  Lab Results: I have reviewed all pertinent labs    LABS:  Results for orders placed or performed during the hospital encounter of 09/04/19   Hemoglobin A1c w/EAG Estimation   Result Value Ref Range    Hemoglobin A1C 5 9 4 2 - 6 3 %     mg/dl   Lipid Panel with Direct LDL reflex   Result Value Ref Range    Cholesterol 163 50 - 200 mg/dL    Triglycerides 252 (H) <=150 mg/dL    HDL, Direct 11 (L) 40 - 60 mg/dL    LDL Calculated 102 (H) 0 - 100 mg/dL   Basic metabolic panel   Result Value Ref Range    Sodium 136 136 - 145 mmol/L    Potassium 4 4 3 5 - 5 3 mmol/L    Chloride 103 100 - 108 mmol/L    CO2 29 21 - 32 mmol/L    ANION GAP 4 4 - 13 mmol/L    BUN 20 5 - 25 mg/dL    Creatinine 1 42 (H) 0 60 - 1 30 mg/dL    Glucose 165 (H) 65 - 140 mg/dL    Calcium 9 0 8 3 - 10 1 mg/dL    eGFR 51 ml/min/1 73sq m   Magnesium   Result Value Ref Range    Magnesium 2 1 1 6 - 2 6 mg/dL   Phosphorus   Result Value Ref Range    Phosphorus 3 3 2 3 - 4 1 mg/dL   CBC and differential   Result Value Ref Range    WBC 9 13 4 31 - 10 16 Thousand/uL    RBC 4 36 3 88 - 5 62 Million/uL    Hemoglobin 13 9 12 0 - 17 0 g/dL    Hematocrit 42 7 36 5 - 49 3 %    MCV 98 82 - 98 fL    MCH 31 9 26 8 - 34 3 pg    MCHC 32 6 31 4 - 37 4 g/dL    RDW 15 0 11 6 - 15 1 %    MPV 11 2 8 9 - 12 7 fL    Platelets 440 678 - 248 Thousands/uL    nRBC 0 /100 WBCs Neutrophils Relative 71 43 - 75 %    Immat GRANS % 1 0 - 2 %    Lymphocytes Relative 17 14 - 44 %    Monocytes Relative 10 4 - 12 %    Eosinophils Relative 0 0 - 6 %    Basophils Relative 1 0 - 1 %    Neutrophils Absolute 6 47 1 85 - 7 62 Thousands/µL    Immature Grans Absolute 0 06 0 00 - 0 20 Thousand/uL    Lymphocytes Absolute 1 59 0 60 - 4 47 Thousands/µL    Monocytes Absolute 0 92 0 17 - 1 22 Thousand/µL    Eosinophils Absolute 0 04 0 00 - 0 61 Thousand/µL    Basophils Absolute 0 05 0 00 - 0 10 Thousands/µL         Imaging Studies: I have personally reviewed pertinent reports  IMPRESSION:     Large centrally necrotic mass within the left frontoparietal region with surrounding vasogenic edema  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 versus solitary metastasis      Workstation performed: [id number]      Imaging     MRI brain w wo contrast (Order: [id number]) - 9/5/2019   IMPRESSION:  Heterogenous liver with hepatomegaly and multifocal heterogenous liver lesions, may be due to multifocal [address]  or metastatic disease  Left lower lobe lung nodule measuring 7 6 mm, suspicious for metastatic disease  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  Mildly enlarged fidel hepatis lymphadenopathy     The study was marked in EPIC for immediate notification             Workstation performed: [id number]      Imaging     CT chest abdomen pelvis w contrast (Order: [id number]) - 9/4/2019    Result History       Pathology, and Other Studies: I have personally reviewed pertinent reports  Assessment and Plan:  History of multi focal hepatocellular carcinoma status post TACE x 2 and sorafenib in 2017  Hepatitis-C  Solitary brain lesion in the left frontoparietal area with surrounding edema  Patient is being managed by Neurology and Neurosurgery  Patient states he will be going for surgery on Tuesday    Additional recommendations about systemic therapy and radiation can be made after surgical procedure and pathological diagnosis  He has options for metastatic disease from hepatocellular carcinoma and he has options for primary brain tumor  Patient states he lives near [facility]  He knows to make follow-up appointment with Dr Fina Jones in [facility]     All discussed in detail  Questions answered  Patient voiced understanding and agreement in the discussion  Counseling / Coordination of Care    Greater than 50% of total time was spent with the patient and / or family counseling and / or coordination of care

## 2019-09-05 NOTE — PROGRESS NOTES
INTERNAL MEDICINE RESIDENCY PROGRESS NOTE     Name: Estefani Gallegos  Age & Sex: 81 y o  male   MRN: [id number]  Unit/Bed#: [facility] 631-01   Encounter: [id number]  Team: SOD Team A    PATIENT INFORMATION     Name: Estefani Gallegos  Age & Sex: 81 y o  male   MRN: [id number]  Hospital Stay Days: 1    ASSESSMENT/PLAN     Principal Problem:    Brain mass with hemorrhage  Active Problems:    Weakness    Hepatocellular carcinoma (HCC)    Essential hypertension    COPD (chronic obstructive pulmonary disease) (HCC)    Alcohol abuse    Dependence on nicotine from cigarettes    Cerebral edema (HCC)      * Brain mass with hemorrhage  Assessment & Plan  Patient transferred from [address] [place] [address] for evaluation of mass with small focus of hemorrhage on CT head  MRI ordered by Neurosurgery revealed large centrally necrotic mass within the left frontoparietal region with surrounding vasogenic edema  Patient has history of hepatocellular carcinoma treated at [facility] with chemotherapy, immunotherapy, TACE  CT chest abdomen pelvis revealed heterogeneous liver with liver lesions, left lower lobe lung nodule, metastasis to manubrium and left 5th rib  · Neuro checks q 4 hours  · Decadron 4 mg q 12 hours  · Neurosurgery plans for surgery on Tuesday  · NPO at midnight on Monday night for surgery on Tuesday  · Will need medical clearance prior to surgery from medical team, per Neurosurgery  · PT/OT/speech consult  · Neurology and neurosurgery consult; appreciate recommendations  · Oncology consult for [address]     Weakness  Assessment & Plan  Likely secondary to mass and intracranial bleed  See work up as above  Hepatocellular carcinoma [facility])  Assessment & Plan  Patient's history of hepatocellular carcinoma treated at [facility] in 2017 until January 2018  Patient received therapy with cisplatin, sorafenib, and TACE    CT chest abdomen pelvis with contrast revealed heterogenous liver with hepatomegaly and multifocal heterogenous liver lesions, may be due to multifocal [email or web address]  or metastatic disease  Left lower lobe lung nodule measuring 7 6 mm, suspicious for metastatic disease  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  According to Care everywhere, patient was having partial response to therapy; however, stop therapy due to perceived lack of benefit and financial cost   · Contributing to above  · Oxycodone 5 mg and oxycodone 10 mg p r n  For pain  · Oncology consult for possible treatment interventions and prognosis    COPD (chronic obstructive pulmonary disease) ([facility] [address] )  Assessment & Plan  Patient has significant smoking history of 2 packs per day for 30-40 years  Patient is not up-to-date on immunizations and uses rescue inhaler p r n  Geetha Wilder Does not have maintenance inhaler  Patient is complaining of increased cough with increased sputum production  This morning had wheezing on exam   Suspect Decadron for above will help as well  · Respiratory protocol; DuoNeb q 4 hours p r n  · Started Breo Ellipta daily  · Pulmonary hygiene  · Pneumococcal vaccination      Essential hypertension  Assessment & Plan  Reports he is taking nifedipine as an outpatient but has not been on this for 3 months due to financial reasons  · Restarted home on nifedipine 60 mg 24 hour tablet p o  Daily    Dependence on nicotine from cigarettes  Assessment & Plan  Patient endorsed smoking 2 packs a day for last 30-40 years  · Nicotine patch 21mg daily    Alcohol abuse  Assessment & Plan  Patient endorsed drinking a 5th of whiskey alcohol multiple times per week  Patient states last drink was 3-4 days ago  Patient denies any history of alcohol withdrawal or DT   · CIWA protocol  · Thiamine and folate supplementation        Disposition:  Will need extended inpatient admission due to need for neurosurgery intervention on Tuesday  SUBJECTIVE     Patient seen and examined  No acute events overnight    Patient complaining of increased cough and increased sputum production  Patient also complains of shortness of breath  Discussed with patient disposition and further medical and surgical management this admission  Patient was agreeable  Patient had no additional acute complaints  OBJECTIVE     Vitals:    19 2303 19 0701 19 0957 19 1130   BP: 163/79 161/80  155/81   BP Location:       Pulse:   71    Resp: 19      Temp:  98 4 °F (36 9 °C)  99 °F (37 2 °C)   TempSrc:       SpO2:   95%       Temperature:   Temp (24hrs), Av 7 °F (37 1 °C), Min:98 4 °F (36 9 °C), Max:99 °F (37 2 °C)    Temperature: 99 °F (37 2 °C)  Intake & Output:  I/O       701 -  07 -  07 07 -  0700    P  O   0     I V  (mL/kg)  226 3 825 (13)    Total Intake(mL/kg)  226 3 825 (13)    Net  +226 3 +825           Unmeasured Urine Occurrence  3 x 1 x        Weights: There is no height or weight on file to calculate BMI  Weight (last 2 days)     None        Physical Exam   Constitutional: He is oriented to person, place, and time  No distress  Sitting in bed using accessory muscles for breathing   HENT:   Mouth/Throat: Oropharynx is clear and moist    Eyes: No scleral icterus  Cardiovascular: Normal rate and regular rhythm  Pulmonary/Chest:   Increased effort with signs of accessory muscle usage  Diffuse wheezing in bilateral lung fields  Abdominal: There is no tenderness  Tense abdomen  Hepatomegaly appreciated  Musculoskeletal: He exhibits no edema  Neurological: He is alert and oriented to person, place, and time  3+ hand  strength on right compared to left  4/5 strength right upper extremity; 5/5 strength in other extremities   Skin: Skin is warm and dry  No appreciable spider angiomas   Psychiatric:   Patient appeared anxious   Vitals reviewed  LABORATORY DATA     Labs: I have personally reviewed pertinent reports    Results from last 7 days   Lab Units 19  0536 19  1103   WBC Thousand/uL 9 13 8 26   HEMOGLOBIN g/dL 13 9 15 2   HEMATOCRIT % 42 7 44 9   PLATELETS Thousands/uL 259 285   NEUTROS PCT % 71  --    MONOS PCT % 10  --       Results from last 7 days   Lab Units 09/05/19  0536 09/04/19  1103   POTASSIUM mmol/L 4 4 4 4   CHLORIDE mmol/L 103 99*   CO2 mmol/L 29 31   BUN mg/dL 20 15   CREATININE mg/dL 1 42* 1 29   CALCIUM mg/dL 9 0 9 3   ALK PHOS U/L  --  262*   ALT U/L  --  81*   AST U/L  --  70*     Results from last 7 days   Lab Units 09/05/19  0536   MAGNESIUM mg/dL 2 1     Results from last 7 days   Lab Units 09/05/19  0536   PHOSPHORUS mg/dL 3 3      Results from last 7 days   Lab Units 09/04/19  1103   INR  1 00   PTT seconds 28               IMAGING & DIAGNOSTIC TESTING     Radiology Results: I have personally reviewed pertinent reports  X-ray Chest 1 View Portable    Result Date: 9/4/2019  Impression: Suggestion of osteolytic lesion in the posterior left 5th rib  Otherwise, no evidence of acute abnormality in the chest  Workstation performed: [id number]     Ct Head Without Contrast    Result Date: 9/4/2019  Impression: Large area of subcortical white matter hypodensity consistent with vasogenic edema in the left parietal region  Small focus of hemorrhage superiorly as seen on image 2/30 with an associated more hypodense masslike area measuring 3 9 x 2 2 cm  Mild mass  effect on the adjacent cortical sulci  No significant midline shift  MRI with contrast recommended to exclude underlying mass  I personally discussed this study with Kelly Burks on 9/4/2019 at 11:36 AM  Workstation performed: [id number]     Mri Brain W Wo Contrast    Result Date: 9/5/2019  Impression: Large centrally necrotic mass within the left frontoparietal region with surrounding vasogenic edema  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 versus solitary metastasis   Workstation performed: [id number]     Ct Chest Abdomen Pelvis W Contrast    Result Date: 9/4/2019  Impression: Heterogenous liver with hepatomegaly and multifocal heterogenous liver lesions, may be due to multifocal [address]  or metastatic disease  Left lower lobe lung nodule measuring 7 6 mm, suspicious for metastatic disease 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 Mildly enlarged fidel hepatis lymphadenopathy The study was marked in EPIC for immediate notification  Workstation performed: [id number]     Other Diagnostic Testing: I have personally reviewed pertinent reports  ACTIVE MEDICATIONS     Current Facility-Administered Medications   Medication Dose Route Frequency    acetaminophen (TYLENOL) tablet 650 mg  650 mg Oral Q4H PRN    dexamethasone (DECADRON) injection 4 mg  4 mg Intravenous Q6H [facility]    fluticasone-vilanterol (BREO ELLIPTA) 100-25 mcg/inh inhaler 1 puff  1 puff Inhalation Daily    folic acid (FOLVITE) tablet 1 mg  1 mg Oral Daily    ipratropium-albuterol (DUO-NEB) 0 5-2 5 mg/3 mL inhalation solution 3 mL  3 mL Nebulization Q4H PRN    Labetalol HCl (NORMODYNE) injection 10 mg  10 mg Intravenous Q4H PRN    multivitamin-minerals (CENTRUM) tablet 1 tablet  1 tablet Oral Daily    nicotine (NICODERM CQ) 21 mg/24 hr TD 24 hr patch 21 mg  21 mg Transdermal Daily    NIFEdipine (PROCARDIA XL) 24 hr tablet 60 mg  60 mg Oral Daily    oxyCODONE (ROXICODONE) immediate release tablet 10 mg  10 mg Oral Q6H PRN    oxyCODONE (ROXICODONE) IR tablet 5 mg  5 mg Oral Q4H PRN    pneumococcal 13-valent conjugate vaccine (PREVNAR-13) IM injection 0 5 mL  0 5 mL Intramuscular Prior to discharge    sodium chloride 0 9 % infusion  75 mL/hr Intravenous Continuous    thiamine (VITAMIN B1) tablet 100 mg  100 mg Oral Daily       VTE Pharmacologic Prophylaxis: Reason for no pharmacologic prophylaxis Cerebral hemorrhage  VTE Mechanical Prophylaxis: sequential compression device    Portions of the record may have been created with voice recognition software    Occasional wrong word or "sound a like" substitutions may have occurred due to the inherent limitations of voice recognition software    Read the chart carefully and recognize, using context, where substitutions have occurred   ==  Valentina Perera MD  [id number]  Internal Medicine Residency PGY-1

## 2019-09-06 PROBLEM — E46 MALNUTRITION RELATED TO CHRONIC DISEASE (HCC): Status: ACTIVE | Noted: 2019-09-06

## 2019-09-06 LAB
AFP-TM SERPL-MCNC: 4 NG/ML (ref 0.5–8)
ANION GAP SERPL CALCULATED.3IONS-SCNC: 9 MMOL/L (ref 4–13)
BUN SERPL-MCNC: 21 MG/DL (ref 5–25)
CALCIUM SERPL-MCNC: 8.8 MG/DL (ref 8.3–10.1)
CHLORIDE SERPL-SCNC: 103 MMOL/L (ref 100–108)
CO2 SERPL-SCNC: 26 MMOL/L (ref 21–32)
CREAT SERPL-MCNC: 1.07 MG/DL (ref 0.6–1.3)
ERYTHROCYTE [DISTWIDTH] IN BLOOD BY AUTOMATED COUNT: 15 % (ref 11.6–15.1)
GFR SERPL CREATININE-BSD FRML MDRD: 71 ML/MIN/1.73SQ M
GLUCOSE SERPL-MCNC: 221 MG/DL (ref 65–140)
GLUCOSE SERPL-MCNC: 231 MG/DL (ref 65–140)
GLUCOSE SERPL-MCNC: 253 MG/DL (ref 65–140)
GLUCOSE SERPL-MCNC: 359 MG/DL (ref 65–140)
HCT VFR BLD AUTO: 39.5 % (ref 36.5–49.3)
HGB BLD-MCNC: 13.2 G/DL (ref 12–17)
MCH RBC QN AUTO: 32.4 PG (ref 26.8–34.3)
MCHC RBC AUTO-ENTMCNC: 33.4 G/DL (ref 31.4–37.4)
MCV RBC AUTO: 97 FL (ref 82–98)
PLATELET # BLD AUTO: 239 THOUSANDS/UL (ref 149–390)
PMV BLD AUTO: 11.1 FL (ref 8.9–12.7)
POTASSIUM SERPL-SCNC: 4.3 MMOL/L (ref 3.5–5.3)
RBC # BLD AUTO: 4.07 MILLION/UL (ref 3.88–5.62)
SODIUM SERPL-SCNC: 138 MMOL/L (ref 136–145)
WBC # BLD AUTO: 8.55 THOUSAND/UL (ref 4.31–10.16)

## 2019-09-06 PROCEDURE — 99233 SBSQ HOSP IP/OBS HIGH 50: CPT | Performed by: INTERNAL MEDICINE

## 2019-09-06 PROCEDURE — 99232 SBSQ HOSP IP/OBS MODERATE 35: CPT | Performed by: PSYCHIATRY & NEUROLOGY

## 2019-09-06 PROCEDURE — 82948 REAGENT STRIP/BLOOD GLUCOSE: CPT

## 2019-09-06 PROCEDURE — 80048 BASIC METABOLIC PNL TOTAL CA: CPT | Performed by: STUDENT IN AN ORGANIZED HEALTH CARE EDUCATION/TRAINING PROGRAM

## 2019-09-06 PROCEDURE — NC001 PR NO CHARGE: Performed by: INTERNAL MEDICINE

## 2019-09-06 PROCEDURE — NC001 PR NO CHARGE: Performed by: NEUROLOGICAL SURGERY

## 2019-09-06 PROCEDURE — 94762 N-INVAS EAR/PLS OXIMTRY CONT: CPT

## 2019-09-06 PROCEDURE — 82105 ALPHA-FETOPROTEIN SERUM: CPT | Performed by: INTERNAL MEDICINE

## 2019-09-06 PROCEDURE — 85027 COMPLETE CBC AUTOMATED: CPT | Performed by: STUDENT IN AN ORGANIZED HEALTH CARE EDUCATION/TRAINING PROGRAM

## 2019-09-06 RX ORDER — PANTOPRAZOLE SODIUM 40 MG/1
40 TABLET, DELAYED RELEASE ORAL
Status: DISCONTINUED | OUTPATIENT
Start: 2019-09-06 | End: 2019-09-13 | Stop reason: HOSPADM

## 2019-09-06 RX ADMIN — DEXAMETHASONE SODIUM PHOSPHATE 4 MG: 4 INJECTION, SOLUTION INTRAMUSCULAR; INTRAVENOUS at 01:28

## 2019-09-06 RX ADMIN — THIAMINE HCL TAB 100 MG 100 MG: 100 TAB at 08:49

## 2019-09-06 RX ADMIN — OXYCODONE HYDROCHLORIDE 5 MG: 5 TABLET ORAL at 15:47

## 2019-09-06 RX ADMIN — NIFEDIPINE 60 MG: 60 TABLET, FILM COATED, EXTENDED RELEASE ORAL at 08:49

## 2019-09-06 RX ADMIN — HYDROXYZINE HYDROCHLORIDE 25 MG: 25 TABLET ORAL at 19:59

## 2019-09-06 RX ADMIN — Medication 1 TABLET: at 08:49

## 2019-09-06 RX ADMIN — FLUTICASONE FUROATE AND VILANTEROL TRIFENATATE 1 PUFF: 100; 25 POWDER RESPIRATORY (INHALATION) at 08:49

## 2019-09-06 RX ADMIN — DEXAMETHASONE SODIUM PHOSPHATE 4 MG: 4 INJECTION, SOLUTION INTRAMUSCULAR; INTRAVENOUS at 12:35

## 2019-09-06 RX ADMIN — INSULIN LISPRO 2 UNITS: 100 INJECTION, SOLUTION INTRAVENOUS; SUBCUTANEOUS at 17:30

## 2019-09-06 RX ADMIN — OXYCODONE HYDROCHLORIDE 10 MG: 10 TABLET ORAL at 06:27

## 2019-09-06 RX ADMIN — HYDROXYZINE HYDROCHLORIDE 25 MG: 25 TABLET ORAL at 08:52

## 2019-09-06 RX ADMIN — DEXAMETHASONE SODIUM PHOSPHATE 4 MG: 4 INJECTION, SOLUTION INTRAMUSCULAR; INTRAVENOUS at 17:30

## 2019-09-06 RX ADMIN — DEXAMETHASONE SODIUM PHOSPHATE 4 MG: 4 INJECTION, SOLUTION INTRAMUSCULAR; INTRAVENOUS at 23:45

## 2019-09-06 RX ADMIN — FOLIC ACID 1 MG: 1 TABLET ORAL at 08:49

## 2019-09-06 RX ADMIN — NICOTINE 21 MG: 21 PATCH, EXTENDED RELEASE TRANSDERMAL at 06:21

## 2019-09-06 RX ADMIN — PANTOPRAZOLE SODIUM 40 MG: 40 TABLET, DELAYED RELEASE ORAL at 12:35

## 2019-09-06 RX ADMIN — INSULIN LISPRO 4 UNITS: 100 INJECTION, SOLUTION INTRAVENOUS; SUBCUTANEOUS at 12:35

## 2019-09-06 RX ADMIN — DEXAMETHASONE SODIUM PHOSPHATE 4 MG: 4 INJECTION, SOLUTION INTRAMUSCULAR; INTRAVENOUS at 06:21

## 2019-09-06 NOTE — PROGRESS NOTES
ASSESSMENT/PLAN     1  Cerebral brain hemorrhage secondary to large chronic left frontoparietal brain mass with surrounding vasogenic edema and mass effect  28-year-old male past medical history of hepatocellular carcinoma due to hep C, alcohol abuse, hypertension, COPD transferred from [facility] for evaluation of mass with small focus of hemorrhage identified on CT  MRI demonstrated large centrally necrotic mass within left frontoparietal region  CT chest abdomen pelvis demonstrated heterogeneous liver with multiple liver lesions, of left lower lobe lung nodule, and lesions in the manubrium manubrium and left 5th rib likely representing metastatic disease of liver primary  -appreciate Neurosurgery recommendations  -Decadron 4 mg q 12 hours  -neuro checks q 4 hours  -patient likely going for neurosurgical procedure on Tuesday  -oncology recommendations appreciated  -please call Neurology with any questions or concerns    [id number] [facility]  81 y o  male sitting comfortably in bed  He has no new complaints at this time  Patient was wondering whether treatment for brain mass will be similar to the chemotherapy he takes for hepatocellular carcinoma  Patient was educated regarding his condition and future workup  REVIEW OF SYSTEMS   Review of Systems   Neurological: Positive for weakness and numbness  Negative for tremors and headaches  All other systems reviewed and are negative  OBJECTIVE     Vitals:    19 0438 19 0657 19 0700 19 0911   BP: 130/76  132/75    BP Location:   Right arm    Pulse: 88  94    Resp:  20 20    Temp:  98 3 °F (36 8 °C) 98 3 °F (36 8 °C)    TempSrc:   Oral    SpO2:   95% 94%      Temperature:   Temp (24hrs), Av 5 °F (36 9 °C), Min:98 3 °F (36 8 °C), Max:99 °F (37 2 °C)    Temperature: 98 3 °F (36 8 °C)  Intake & Output:  I/O       701 -  07 -  07 -  07    P  O  0 1770     I V  (mL/kg) 226 3 825 (13)     Total Intake(mL/kg) 226 3 2595 (40 9)     Net +226 3 +2595            Unmeasured Urine Occurrence 3 x 4 x     Unmeasured Stool Occurrence  1 x         Weights: There is no height or weight on file to calculate BMI  Weight (last 2 days)     None        Physical Exam   Constitutional: He is oriented to person, place, and time  He appears well-developed  He appears cachectic  HENT:   Head: Normocephalic and atraumatic  Eyes: Pupils are equal, round, and reactive to light  Conjunctivae and EOM are normal    Neck: Normal range of motion  Neck supple  Cardiovascular: Normal rate, regular rhythm and normal heart sounds  Pulmonary/Chest: Effort normal and breath sounds normal    Abdominal: Soft  Bowel sounds are normal    Genitourinary:   Genitourinary Comments: Deferred   Musculoskeletal: Normal range of motion  He exhibits no edema  Neurological: He is alert and oriented to person, place, and time  A sensory deficit is present  No cranial nerve deficit  He displays a negative Romberg sign  GCS eye subscore is 4  GCS verbal subscore is 5  GCS motor subscore is 6  Reflex Scores:       Tricep reflexes are 2+ on the right side and 2+ on the left side  Bicep reflexes are 2+ on the right side and 2+ on the left side  Brachioradialis reflexes are 2+ on the right side and 2+ on the left side  Patellar reflexes are 0 on the right side and 0 on the left side  Achilles reflexes are 0 on the right side and 0 on the left side  Continues to have purposeless movements  Motor:  5/5 strength bilaterally in both lower extremities  3/5 strength on the right upper extremity; 5/5 strength in left upper extremity    Sensory:  Nonfocal to light touch and temperature in bilateral upper and lower extremities however does endorse decreased vibratory sense in the right upper extremity especially distally  Skin: Skin is warm and dry  Nursing note and vitals reviewed      PAST MEDICAL HISTORY     Past Medical History:   Diagnosis Date    Cancer [facility])     liver    Hypertension      PAST SURGICAL HISTORY     Past Surgical History:   Procedure Laterality Date    APPENDECTOMY       SOCIAL & FAMILY HISTORY     Social History     Substance and Sexual Activity   Alcohol Use Yes    Comment: 1/5 bottle weekly      Social History     Substance and Sexual Activity   Drug Use Never     Social History     Tobacco Use   Smoking Status Current Every Day Smoker    Packs/day: 2 00   Smokeless Tobacco Current User    Types: Chew     Family History   Problem Relation Age of Onset    Cancer Mother     Cancer Father      LABORATORY DATA     Labs: I have personally reviewed pertinent reports  Results from last 7 days   Lab Units 09/06/19  0512 09/05/19  0536 09/04/19  1103   WBC Thousand/uL 8 55 9 13 8 26   HEMOGLOBIN g/dL 13 2 13 9 15 2   HEMATOCRIT % 39 5 42 7 44 9   PLATELETS Thousands/uL 239 259 285   NEUTROS PCT %  --  71  --    MONOS PCT %  --  10  --     Results from last 7 days   Lab Units 09/06/19  0512 09/05/19  0536 09/04/19  1103   POTASSIUM mmol/L 4 3 4 4 4 4   CHLORIDE mmol/L 103 103 99*   CO2 mmol/L 26 29 31   BUN mg/dL 21 20 15   CREATININE mg/dL 1 07 1 42* 1 29   CALCIUM mg/dL 8 8 9 0 9 3   ALK PHOS U/L  --   --  262*   ALT U/L  --   --  81*   AST U/L  --   --  70*     Results from last 7 days   Lab Units 09/05/19  0536   MAGNESIUM mg/dL 2 1     Results from last 7 days   Lab Units 09/05/19  0536   PHOSPHORUS mg/dL 3 3      Results from last 7 days   Lab Units 09/04/19  1103   INR  1 00   PTT seconds 28             Micro:  No results found for: Kathy Armor, WOUNDCULT, SPUTUMCULTUR  IMAGING & DIAGNOSTIC TESTS     Imaging: I have personally reviewed pertinent reports  X-ray Chest 1 View Portable    Result Date: 9/4/2019  Impression: Suggestion of osteolytic lesion in the posterior left 5th rib   Otherwise, no evidence of acute abnormality in the chest  Workstation performed: [id number]     Ct Head Without Contrast    Result Date: 9/4/2019  Impression: Large area of subcortical white matter hypodensity consistent with vasogenic edema in the left parietal region  Small focus of hemorrhage superiorly as seen on image 2/30 with an associated more hypodense masslike area measuring 3 9 x 2 2 cm  Mild mass  effect on the adjacent cortical sulci  No significant midline shift  MRI with contrast recommended to exclude underlying mass  I personally discussed this study with Patel Alvarez on 9/4/2019 at 11:36 AM  Workstation performed: [id number]     Mri Brain W Wo Contrast    Result Date: 9/5/2019  Impression: Large centrally necrotic mass within the left frontoparietal region with surrounding vasogenic edema  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 versus solitary metastasis  Workstation performed: [id number]     Ct Chest Abdomen Pelvis W Contrast    Result Date: 9/4/2019  Impression: Heterogenous liver with hepatomegaly and multifocal heterogenous liver lesions, may be due to multifocal [address]  or metastatic disease  Left lower lobe lung nodule measuring 7 6 mm, suspicious for metastatic disease 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 Mildly enlarged fidel hepatis lymphadenopathy The study was marked in EPIC for immediate notification  Workstation performed: [id number]     EKG, Pathology, and Other Studies: I have personally reviewed pertinent reports       ALLERGIES   No Known Allergies  MEDICATIONS PRIOR TO ARRIVAL     Prior to Admission medications    Not on File     MEDICATIONS ADMINISTERED IN LAST 24 HOURS     Medication Administration - last 24 hours from 09/05/2019 0935 to 09/06/2019 0935       Date/Time Order Dose Route Action Action by     09/05/2019 0945 sodium chloride 0 9 % infusion 75 mL/hr Intravenous [address] [place], RN     09/05/2019 0944 sodium chloride 0 9 % infusion 0 mL/hr Intravenous Stopped Boyd Miles RN     09/05/2019 2108 Labetalol HCl (NORMODYNE) injection 10 mg 10 mg Intravenous Given Gian Ge RN     09/05/2019 0945 dexamethasone (DECADRON) injection 4 mg 4 mg Intravenous Given Shirley García RN     09/06/2019 0849 thiamine (VITAMIN B1) tablet 100 mg 100 mg Oral Given Marily Quintero RN     09/05/2019 0945 thiamine (VITAMIN B1) tablet 100 mg 100 mg Oral Given Shirley García RN     10/13/3563 0286 folic acid (FOLVITE) tablet 1 mg 1 mg Oral Given Marily Quintero RN     91/12/4174 3428 folic acid (FOLVITE) tablet 1 mg 1 mg Oral Given Shirley García RN     09/06/2019 0849 multivitamin-minerals (CENTRUM) tablet 1 tablet 1 tablet Oral Given Marily Quintero RN     09/05/2019 0945 multivitamin-minerals (CENTRUM) tablet 1 tablet 1 tablet Oral Given Shirley García RN     09/06/2019 1300 nicotine (NICODERM CQ) 21 mg/24 hr TD 24 hr patch 21 mg 21 mg Transdermal Patch Removed La Nazario RN     09/06/2019 1923 nicotine (NICODERM CQ) 21 mg/24 hr TD 24 hr patch 21 mg 21 mg Transdermal Medication Applied Gian Ge RN     09/06/2019 8921 nicotine (NICODERM CQ) 21 mg/24 hr TD 24 hr patch 21 mg 21 mg Transdermal Patch Removed Gian Ge RN     09/05/2019 1555 nicotine (NICODERM CQ) 21 mg/24 hr TD 24 hr patch 21 mg 21 mg Transdermal Medication Applied La Nazario RN     09/05/2019 1300 nicotine (NICODERM CQ) 21 mg/24 hr TD 24 hr patch 21 mg 21 mg Transdermal Patch Removed La Nazario RN     09/06/2019 0849 NIFEdipine (PROCARDIA XL) 24 hr tablet 60 mg 60 mg Oral Given Marily Quintero RN     09/05/2019 1124 NIFEdipine (PROCARDIA XL) 24 hr tablet 60 mg 60 mg Oral Given La Nazario RN     09/06/2019 0849 fluticasone-vilanterol (BREO ELLIPTA) 100-25 mcg/inh inhaler 1 puff 1 puff Inhalation Given Marily Quintero RN     09/05/2019 1124 fluticasone-vilanterol (BREO ELLIPTA) 100-25 mcg/inh inhaler 1 puff 1 puff Inhalation Given La Nazario RN     09/05/2019 1015 ipratropium-albuterol (DUO-NEB) 0 5-2 5 mg/3 mL inhalation solution 3 mL 3 mL Nebulization Given Margarethardik Rebecca Boaz, RT     09/06/2019 4694 oxyCODONE (ROXICODONE) immediate release tablet 10 mg 10 mg Oral Given Mann Ashby RN     09/05/2019 2107 oxyCODONE (ROXICODONE) immediate release tablet 10 mg 10 mg Oral Given Mann Ashby RN     09/05/2019 1317 oxyCODONE (ROXICODONE) immediate release tablet 10 mg 10 mg Oral Given Sweetie Juares RN     09/05/2019 1206 gadobutrol injection (MULTI-DOSE) SOLN 7 mL 7 mL Intravenous Given Roxy Givens     09/06/2019 0621 dexamethasone (DECADRON) injection 4 mg 4 mg Intravenous Given Mann Ashby RN     09/06/2019 0128 dexamethasone (DECADRON) injection 4 mg 4 mg Intravenous Given Mann Ashby RN     09/05/2019 1847 dexamethasone (DECADRON) injection 4 mg 4 mg Intravenous Given Octavia Perez RN     09/06/2019 5640 hydrOXYzine HCL (ATARAX) tablet 25 mg 25 mg Oral Given Ching Sanchez RN     09/05/2019 1657 hydrOXYzine HCL (ATARAX) tablet 25 mg 25 mg Oral Given Octavia Perez RN        CURRENT MEDICATIONS     Current Facility-Administered Medications:  acetaminophen 650 mg Oral Q4H PRN Jaun Manuel Souza DO    dexamethasone 4 mg Intravenous Q6H [facility], [facility]    fluticasone-vilanterol 1 puff Inhalation Daily Leandra Sandoval MD    folic acid 1 mg Oral Daily Yecenia Moulton MD    hydrOXYzine HCL 25 mg Oral Q6H PRN Brando Thao DO    ipratropium-albuterol 3 mL Nebulization Q4H PRN Juana Garces MD    Labetalol HCl 10 mg Intravenous Q4H PRN Juan Manuel Souza DO    multivitamin-minerals 1 tablet Oral Daily Yecenia Moulton MD    nicotine 21 mg Transdermal Daily Yecenia Moulton MD    NIFEdipine 60 mg Oral Daily Leandra Sandoval MD    oxyCODONE 10 mg Oral Q6H PRN Leandra Sandoval MD    oxyCODONE 5 mg Oral Q4H PRN Leandra Sandoval MD    pneumococcal 13-valent conjugate vaccine 0 5 mL Intramuscular Prior to discharge Juana Garces MD    sodium chloride 75 mL/hr Intravenous Continuous Juan Manuel Souza DO Last Rate: 75 mL/hr (09/05/19 0945)   thiamine 100 mg Oral Daily Osiris Vera MD        sodium chloride 75 mL/hr Last Rate: 75 mL/hr (09/05/19 2888)       acetaminophen 650 mg Q4H PRN   hydrOXYzine HCL 25 mg Q6H PRN   ipratropium-albuterol 3 mL Q4H PRN   Labetalol HCl 10 mg Q4H PRN   oxyCODONE 10 mg Q6H PRN   oxyCODONE 5 mg Q4H PRN   pneumococcal 13-valent conjugate vaccine 0 5 mL Prior to discharge       Portions of the record may have been created with voice recognition software  Occasional wrong word or "sound a like" substitutions may have occurred due to the inherent limitations of voice recognition software    Read the chart carefully and recognize, using context, where substitutions have occurred     ==  Michelle Kendrick MD  [facility]  Internal Medicine Residency  PGY-1

## 2019-09-06 NOTE — PROGRESS NOTES
INTERNAL MEDICINE RESIDENCY PROGRESS NOTE     Name: Belem Bragg  Age & Sex: 81 y o  male   MRN: [id number]  Unit/Bed#: [facility] 631-01   Encounter: [id number]  Team: SOD Team A    PATIENT INFORMATION     Name: Belem Bragg  Age & Sex: 81 y o  male   MRN: [id number]  Hospital Stay Days: 2    ASSESSMENT/PLAN     Principal Problem:    Brain mass with hemorrhage  Active Problems:    Weakness    Hepatocellular carcinoma (HCC)    Essential hypertension    COPD (chronic obstructive pulmonary disease) (HCC)    Alcohol abuse    Dependence on nicotine from cigarettes    Malnutrition related to chronic disease      * Brain mass with hemorrhage  Assessment & Plan  Patient transferred from [address] [place] [address] for evaluation of mass with small focus of hemorrhage on CT head  MRI ordered by Neurosurgery revealed large centrally necrotic mass within the left frontoparietal region with surrounding vasogenic edema  Patient has history of hepatocellular carcinoma treated at [facility] with chemotherapy, immunotherapy, TACE  CT chest abdomen pelvis revealed heterogeneous liver with liver lesions, left lower lobe lung nodule, metastasis to manubrium and left 5th rib  · Neuro checks q 4 hours  · Decadron 4 mg q 12 hours  · Neurosurgery plans for surgery on Tuesday  · NPO at midnight on Monday night for surgery on Tuesday  · Will need medical clearance prior to surgery from medical team, per Neurosurgery  · PT/OT/speech consult  · Neurology and neurosurgery consult; appreciate recommendations  · [facility]  - will await pathology results from surgery and follow up outpatient for management    Weakness  Assessment & Plan  Likely secondary to mass and intracranial bleed  See work up as above  Hepatocellular carcinoma [facility])  Assessment & Plan  Patient's history of hepatocellular carcinoma treated at [facility] in 2017 until January 2018  Patient received therapy with cisplatin, sorafenib, and TACE    CT chest abdomen pelvis with contrast revealed heterogenous liver with hepatomegaly and multifocal heterogenous liver lesions, may be due to multifocal [facility]  or metastatic disease  Left lower lobe lung nodule measuring 7 6 mm, suspicious for metastatic disease  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  According to Care everywhere, patient was having partial response to therapy; however, stop therapy due to perceived lack of benefit and financial cost   · Contributing to above  · AFP 4 0  · Oxycodone 5 mg and oxycodone 10 mg p r n  For pain  · Oncology consult - will follow up with patient after pathology results    COPD (chronic obstructive pulmonary disease) ([facility] [address] )  Assessment & Plan  Patient has significant smoking history of 2 packs per day for 30-40 years  Patient is not up-to-date on immunizations and uses rescue inhaler p r n  Luis Angel Diaz Does not have maintenance inhaler  Patient is complaining of increased cough with increased sputum production  Wheezing improved compared to yesterday  Suspect Decadron for above will help as well  · Respiratory protocol; DuoNeb q 4 hours p r n  · Started Breo Ellipta daily  · Pulmonary hygiene - incentive spirometer in room and patient instructed with use  · Pneumococcal vaccination      Essential hypertension  Assessment & Plan  Reports he is taking nifedipine as an outpatient but has not been on this for 3 months due to financial reasons  · Restarted home on nifedipine 60 mg 24 hour tablet p o   Daily    Malnutrition related to chronic disease  Assessment & Plan  Malnutrition Findings:   Malnutrition type: Chronic illness(Chronic severe pro/vicky malnutrition r/t catabolic illness as evidenced by 15% unintentional wt loss since 6/25/19, consuming < 75% energy intake compared to est energy needs for > 1 month, Treated with diet, patient refused supplements)  Degree of Malnutrition: Other severe protein calorie malnutrition    BMI Findings:  BMI Classifications: Underweight < 18 5 There is no height or weight on file to calculate BMI  · Patient had significant weight loss over a couple months  · Nutrition recommendations as above    Dependence on nicotine from cigarettes  Assessment & Plan  Patient endorsed smoking 2 packs a day for last 30-40 years  · Nicotine patch 21mg daily    Alcohol abuse  Assessment & Plan  Patient endorsed drinking a 5th of whiskey alcohol multiple times per week  Patient states last drink was 3-4 days ago prior   Patient denies any history of alcohol withdrawal or DT   · CIWA protocol can stop tomorrow if continues to not meet CIWA parameters  · Thiamine and folate supplementation        Disposition: Continued inpatient stay until surgery next week  SUBJECTIVE     Patient seen and examined  No acute events overnight  Patient states he woke up with back pain described as ache/soreness  He took oxycodone 10 mg as ordered with relief of the pain  Patient states his shortness of breath and cough are improved compared to yesterday  His right hand weakness appears improved compared to yesterday  Patient's appetite is much improved  No nursing complaints today  OBJECTIVE     Vitals:    19 0011 19 0438 19 0657 19 0700   BP: 116/65 130/76  132/75   BP Location:    Right arm   Pulse: 88 88  94   Resp:   20 20   Temp: 98 7 °F (37 1 °C)  98 3 °F (36 8 °C) 98 3 °F (36 8 °C)   TempSrc: Oral   Oral   SpO2: 94%   95%      Temperature:   Temp (24hrs), Av 5 °F (36 9 °C), Min:98 3 °F (36 8 °C), Max:99 °F (37 2 °C)    Temperature: 98 3 °F (36 8 °C)  Intake & Output:  I/O       701 -  07 -  07 -  0700    P  O  0 1770     I V  (mL/kg) 226 3 825 (13)     Total Intake(mL/kg) 226 3 2595 (40 9)     Net +226 3 +2595            Unmeasured Urine Occurrence 3 x 4 x     Unmeasured Stool Occurrence  1 x         Weights: There is no height or weight on file to calculate BMI    Weight (last 2 days) None        Physical Exam   Constitutional: No distress  Sitting in chair about to eat breakfast    HENT:   Mouth/Throat: Oropharynx is clear and moist    Eyes: No scleral icterus  Cardiovascular: Normal rate and regular rhythm  Pulmonary/Chest:   No wheezing appreciated  Clear to ausculation bilaterally  Abdominal: Soft  There is no tenderness  Hepatomegaly  Musculoskeletal: He exhibits no edema  Neurological: He is alert  Hand  strength improved compared to yesterday 4/5 R compared to L  Rest of motor exam equal strength bilaterally  Skin: Skin is warm and dry  Psychiatric: He has a normal mood and affect  Vitals reviewed  LABORATORY DATA     Labs: I have personally reviewed pertinent reports  Results from last 7 days   Lab Units 09/06/19 0512 09/05/19  0536 09/04/19  1103   WBC Thousand/uL 8 55 9 13 8 26   HEMOGLOBIN g/dL 13 2 13 9 15 2   HEMATOCRIT % 39 5 42 7 44 9   PLATELETS Thousands/uL 239 259 285   NEUTROS PCT %  --  71  --    MONOS PCT %  --  10  --       Results from last 7 days   Lab Units 09/06/19 0512 09/05/19  0536 09/04/19  1103   POTASSIUM mmol/L 4 3 4 4 4 4   CHLORIDE mmol/L 103 103 99*   CO2 mmol/L 26 29 31   BUN mg/dL 21 20 15   CREATININE mg/dL 1 07 1 42* 1 29   CALCIUM mg/dL 8 8 9 0 9 3   ALK PHOS U/L  --   --  262*   ALT U/L  --   --  81*   AST U/L  --   --  70*     Results from last 7 days   Lab Units 09/05/19  0536   MAGNESIUM mg/dL 2 1     Results from last 7 days   Lab Units 09/05/19  0536   PHOSPHORUS mg/dL 3 3      Results from last 7 days   Lab Units 09/04/19  1103   INR  1 00   PTT seconds 28               IMAGING & DIAGNOSTIC TESTING     Radiology Results: I have personally reviewed pertinent reports  X-ray Chest 1 View Portable    Result Date: 9/4/2019  Impression: Suggestion of osteolytic lesion in the posterior left 5th rib   Otherwise, no evidence of acute abnormality in the chest  Workstation performed: [id number]     Ct Head Without Contrast    Result Date: 9/4/2019  Impression: Large area of subcortical white matter hypodensity consistent with vasogenic edema in the left parietal region  Small focus of hemorrhage superiorly as seen on image 2/30 with an associated more hypodense masslike area measuring 3 9 x 2 2 cm  Mild mass  effect on the adjacent cortical sulci  No significant midline shift  MRI with contrast recommended to exclude underlying mass  I personally discussed this study with Marsha Weller on 9/4/2019 at 11:36 AM  Workstation performed: [id number]     Mri Brain W Wo Contrast    Result Date: 9/5/2019  Impression: Large centrally necrotic mass within the left frontoparietal region with surrounding vasogenic edema  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 versus solitary metastasis  Workstation performed: [id number]     Ct Chest Abdomen Pelvis W Contrast    Result Date: 9/4/2019  Impression: Heterogenous liver with hepatomegaly and multifocal heterogenous liver lesions, may be due to multifocal [address]  or metastatic disease  Left lower lobe lung nodule measuring 7 6 mm, suspicious for metastatic disease 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 Mildly enlarged fidel hepatis lymphadenopathy The study was marked in EPIC for immediate notification  Workstation performed: [id number]     Other Diagnostic Testing: I have personally reviewed pertinent reports      ACTIVE MEDICATIONS     Current Facility-Administered Medications   Medication Dose Route Frequency    acetaminophen (TYLENOL) tablet 650 mg  650 mg Oral Q4H PRN    dexamethasone (DECADRON) injection 4 mg  4 mg Intravenous Q6H [facility]    fluticasone-vilanterol (BREO ELLIPTA) 100-25 mcg/inh inhaler 1 puff  1 puff Inhalation Daily    folic acid (FOLVITE) tablet 1 mg  1 mg Oral Daily    hydrOXYzine HCL (ATARAX) tablet 25 mg  25 mg Oral Q6H PRN    ipratropium-albuterol (DUO-NEB) 0 5-2 5 mg/3 mL inhalation solution 3 mL  3 mL Nebulization Q4H PRN    Labetalol HCl (NORMODYNE) injection 10 mg  10 mg Intravenous Q4H PRN    multivitamin-minerals (CENTRUM) tablet 1 tablet  1 tablet Oral Daily    nicotine (NICODERM CQ) 21 mg/24 hr TD 24 hr patch 21 mg  21 mg Transdermal Daily    NIFEdipine (PROCARDIA XL) 24 hr tablet 60 mg  60 mg Oral Daily    oxyCODONE (ROXICODONE) immediate release tablet 10 mg  10 mg Oral Q6H PRN    oxyCODONE (ROXICODONE) IR tablet 5 mg  5 mg Oral Q4H PRN    pneumococcal 13-valent conjugate vaccine (PREVNAR-13) IM injection 0 5 mL  0 5 mL Intramuscular Prior to discharge    sodium chloride 0 9 % infusion  75 mL/hr Intravenous Continuous    thiamine (VITAMIN B1) tablet 100 mg  100 mg Oral Daily       VTE Pharmacologic Prophylaxis: Reason for no pharmacologic prophylaxis brain mass with hemorrhage  VTE Mechanical Prophylaxis: sequential compression device    Portions of the record may have been created with voice recognition software  Occasional wrong word or "sound a like" substitutions may have occurred due to the inherent limitations of voice recognition software    Read the chart carefully and recognize, using context, where substitutions have occurred   ==  Lorenza Carnes MD  [facility]  Internal Medicine Residency PGY-1

## 2019-09-06 NOTE — SPEECH THERAPY NOTE
Speech Language/Pathology    Speech/Language Pathology Screen  Patient Name: Mila Escamilla  Today's Date: 9/6/2019     Problem List  Patient Active Problem List   Diagnosis    Weakness    Brain mass with hemorrhage    Essential hypertension    Alcohol abuse    Dependence on nicotine from cigarettes    Cerebral edema (HCC)    COPD (chronic obstructive pulmonary disease) ([facility] [address] )    Hepatocellular carcinoma (HCC)    Malnutrition related to chronic disease        Past Medical History  Past Medical History:   Diagnosis Date    [facility]     liver    Hypertension         Past Surgical History  Past Surgical History:   Procedure Laterality Date    APPENDECTOMY         Order received, chart reviewed, pt screened bedside  Speech is clear, able to tolerate all of his meals w/o difficulty  Scheduled for surgery Tuesday  Please re consult if needed at that time

## 2019-09-06 NOTE — OCCUPATIONAL THERAPY NOTE
OCCUPATIONAL THERAPY SCREEN:    ORDERS RECEIVED  CHART REVIEW COMPLETED  PER [facility]UROSX DURING PT ROUNDS, PT PLANNED FOR SX INTERVENTION TENTATIVELY ON Tuesday  WILL HOLD THERAPY AT THIS TIME  RECOMMEND CONT PARTICIPATION IN SELF-CARE/MOBILITY WITH NON-OT STAFF PRE-OP AS APPROPRIATE  PLEASE RECONSULT POST-OP WITH UPDATED ACTIVITY ORDERS       Chisé Diacik, MOT, OTR/L

## 2019-09-06 NOTE — ASSESSMENT & PLAN NOTE
Malnutrition Findings:   Malnutrition type: Chronic illness(Chronic severe pro/vicky malnutrition r/t catabolic illness as evidenced by 15% unintentional wt loss since 6/25/19, consuming < 75% energy intake compared to est energy needs for > 1 month, Treated with diet, patient refused supplements)  Degree of Malnutrition: Other severe protein calorie malnutrition    BMI Findings:  BMI Classifications: Underweight < 18 5     Body mass index is 18 93 kg/m²       · Patient had significant weight loss over a couple months  · Patient has good appetite recently  · Nutrition recommendations as above

## 2019-09-06 NOTE — PROGRESS NOTES
IM Residency Progress Note   Unit/Bed#: [facility] 631-01 Encounter: [id number]  SOD Team A      Radha Drake  81 y o  male [id number]    Hospital Stay Days: 2      Assessment/Plan:    Principal Problem:    Brain mass with hemorrhage  Active Problems:    Weakness    Essential hypertension    Alcohol abuse    Dependence on nicotine from cigarettes    COPD (chronic obstructive pulmonary disease) (HCC)    Hepatocellular carcinoma (HCC)    Malnutrition related to chronic disease    #Brain mass with hemorrhage  - MRI brain on 9/5 showed large centrally necrotic mass in left frontoparietal region with surrounding vasogenic edema, 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  - Neurosurgery plan for resection of brain mass likely on 9/10  Medical clearance needed  - Oncology holding on treatment plan until surgical resection and pathology are completed in order to choose best treatment option  - Continue Decadron 4 mg q12 hrs  - Continue Atarax PRN for anxiety  - Protonix added for ulcer prevention  - Humalog added for increased blood glucose likely 2/2 Decadron regimen    #Weakness  - Improved today compared to yesterday  Continue plan as above    #Essential Hypertension  - BP controlled with most recent reading at 123/77  - Continue home nifedipine and PRN labetalol    #Alcohol Abuse  - Patient with CIWA score of 0 for several days  Consider stopping CIWA monitoring considering length of time since last drink  - Continue supplementation of folic acid, thiamine, and centrum multivitamin    #Depence on nicotine from cigarettes  - Continue nicoderm patch 21 mg qd    #COPD  - SOB and cough improved since yesterday  - Continue respiratory protocol   Encourage incentive spirometry and OOB/ambulate  - Continue Breo Ellipta 100-25 mcg/inh  - Continue PRN Duo-Neb 0 5-2 5 mg/3 mL  - Pneumococcal 13-valent conjugate vaccine ordered to be given prior to discharge    #Hepatocellular Carcinoma  - Oncology consulted, plan as above  - Continue tylenol 650 mg PRN  - Continue oxycodone 5 and 10 mg PRN    #Malnutrition related to Chronic Disease  - Continue vitamin/nutrient supplementation as above  - Regular diet until  at midnight, then NPO for surgery      Disposition: Continue to stay in hospital for neurosurgery procedure on 9/10       Subjective:   Patient is sitting comfortably in his chair and eating breakfast  He states that he feels a lot better today  His cough and SOB have improved and he has been able to get up and walk around  He was given an incentive spirometer and taught how to use it  His hand tremor and weakness has improved as well  He says that the Atarax is helping with reducing his anxiety  He has more of an appetite now  He has no new symptoms or complaints       Vitals: Temp (24hrs), Av 5 °F (36 9 °C), Min:98 3 °F (36 8 °C), Max:99 °F (37 2 °C)  Current: Temperature: 98 3 °F (36 8 °C)  Vitals:    19 0011 19 0438 19 0657 19 0700   BP: 116/65 130/76  132/75   BP Location:    Right arm   Pulse: 88 88  94   Resp:   20 20   Temp: 98 7 °F (37 1 °C)  98 3 °F (36 8 °C) 98 3 °F (36 8 °C)   TempSrc: Oral   Oral   SpO2: 94%   95%    There is no height or weight on file to calculate BMI  I/O last 24 hours: In: 2243 [P O :1770; I V :825]  Out: -     ROS: Pertinent positives and negatives as listed above    Physical Exam:    General appearance: No acute distress   HEENT: PERRL  MMM   Cardiovascular: RRR  Peripheral pulses intact   Pulmonary: b/l wheezes heard on auscultation, improved from yesterday  No apparent increased effort of breathing   Abdomen: soft, non-distended, non-tender  Liver is enlarged   Musculoskeletal: no tenderness or edema of extremeties   Neurological:  strength 4/5 in right hand, but improved from yesterday   Strength 5/5 in left hand and both arms   Psychological: not currently anxious or agitated      Invasive Devices     Peripheral Intravenous Line Peripheral IV 09/05/19 Left;Upper Arm less than 1 day                          Labs:   Recent Results (from the past 24 hour(s))   CBC    Collection Time: 09/06/19  5:12 AM   Result Value Ref Range    WBC 8 55 4 31 - 10 16 Thousand/uL    RBC 4 07 3 88 - 5 62 Million/uL    Hemoglobin 13 2 12 0 - 17 0 g/dL    Hematocrit 39 5 36 5 - 49 3 %    MCV 97 82 - 98 fL    MCH 32 4 26 8 - 34 3 pg    MCHC 33 4 31 4 - 37 4 g/dL    RDW 15 0 11 6 - 15 1 %    Platelets 800 181 - 265 Thousands/uL    MPV 11 1 8 9 - 12 7 fL   Basic metabolic panel    Collection Time: 09/06/19  5:12 AM   Result Value Ref Range    Sodium 138 136 - 145 mmol/L    Potassium 4 3 3 5 - 5 3 mmol/L    Chloride 103 100 - 108 mmol/L    CO2 26 21 - 32 mmol/L    ANION GAP 9 4 - 13 mmol/L    BUN 21 5 - 25 mg/dL    Creatinine 1 07 0 60 - 1 30 mg/dL    Glucose 231 (H) 65 - 140 mg/dL    Calcium 8 8 8 3 - 10 1 mg/dL    eGFR 71 ml/min/1 73sq m   AFP tumor marker    Collection Time: 09/06/19  5:12 AM   Result Value Ref Range    AFP TUMOR MARKER 4 0 0 5 - 8 ng/mL       Radiology Results: I have personally reviewed pertinent films in PACS      Other Diagnostic Testing:   I have personally reviewed pertinent reports          Active Meds:   Current Facility-Administered Medications   Medication Dose Route Frequency    acetaminophen (TYLENOL) tablet 650 mg  650 mg Oral Q4H PRN    dexamethasone (DECADRON) injection 4 mg  4 mg Intravenous Q6H [address]    fluticasone-vilanterol (BREO ELLIPTA) 100-25 mcg/inh inhaler 1 puff  1 puff Inhalation Daily    folic acid (FOLVITE) tablet 1 mg  1 mg Oral Daily    hydrOXYzine HCL (ATARAX) tablet 25 mg  25 mg Oral Q6H PRN    ipratropium-albuterol (DUO-NEB) 0 5-2 5 mg/3 mL inhalation solution 3 mL  3 mL Nebulization Q4H PRN    Labetalol HCl (NORMODYNE) injection 10 mg  10 mg Intravenous Q4H PRN    multivitamin-minerals (CENTRUM) tablet 1 tablet  1 tablet Oral Daily    nicotine (NICODERM CQ) 21 mg/24 hr TD 24 hr patch 21 mg  21 mg Transdermal Daily    NIFEdipine (PROCARDIA XL) 24 hr tablet 60 mg  60 mg Oral Daily    oxyCODONE (ROXICODONE) immediate release tablet 10 mg  10 mg Oral Q6H PRN    oxyCODONE (ROXICODONE) IR tablet 5 mg  5 mg Oral Q4H PRN    pneumococcal 13-valent conjugate vaccine (PREVNAR-13) IM injection 0 5 mL  0 5 mL Intramuscular Prior to discharge    sodium chloride 0 9 % infusion  75 mL/hr Intravenous Continuous    thiamine (VITAMIN B1) tablet 100 mg  100 mg Oral Daily         VTE Pharmacologic Prophylaxis: Sequential compression device (Venodyne)   VTE Mechanical Prophylaxis: sequential compression device    Nicole Scot

## 2019-09-06 NOTE — PROGRESS NOTES
Progress Note - Lukas Garcia 1950, 81 y o  male MRN: [id number]    [facility] Encounter: [id number]    Primary Care Provider: No primary care provider on file  Date and time admitted to hospital: 9/4/2019  4:38 PM        * Brain mass with hemorrhage  Assessment & Plan  Large necrotic left frontoparietal brain mass with surrounding vasogenic edema and mass effect    - Diagnosed with [address]  in 2017, s/p TACE x2 and sorafenib at [address] ([address])  Denies any follow up with oncology  States at that time his girlfriend of 21 years was dying of bile duct cancer and he took care of her, not treating his own illness  He is motivated to treat this aggressively  - Presented to [facility] with right hand clumsiness and weakness, as well as problems with coordination/balance, transferred to [facility] after [facility] results    Imaging:  CTH, 9/4: Large area of subcortical white matter hypodensity consistent with vasogenic edema in the left parietal region  Small focus of hemorrhage superiorly as seen on image 2/30 with an associated more hypodense masslike area measuring 3 9 x 2 2 cm  CTCAP, 9/4: Heterogenous liver with hepatomegaly and multifocal heterogenous liver lesions, may be due to multifocal [address]  or metastatic disease  Left lower lobe lung nodule measuring 7 6 mm, suspicious for metastatic disease  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  Mildly enlarged fidel hepatis lymphadenopathy  MRI brain w/wo, 9/5: Large centrally necrotic mass within the left frontoparietal region measuring 2 9 x 4 3 x 3 4 cm with surrounding vasogenic edema  Plan:  Decadron 4q6  PT/OT  DVT ppx: SCDs, hold pharm ppx  Pain control per primary team  CIWA - monitor closely  Patient states his last drink was 1 shot 3 days ago, but patient unable to sit still during exam    Patient will require surgical intervention and will need medical clearance  Surgery likely next Tuesday   Will require medical clearance  Anticipate blood loss during surgery as mass is likely metastatic HCC, will require blood products on hold  Neurosurgery will continue to follow    Cerebral edema [facility])  Assessment & Plan  2/2 brain mass  Decadron 4q6    Alcohol abuse  Assessment & Plan  CIWA - monitor for signs of withdrawal  Drink 5th of liquor daily, states last drink was 3 days ago and only 1 shot    Dependence on nicotine from cigarettes  Assessment & Plan  2 packs/day for 30-40 years  Nicoderm patch                Subjective/Objective   Chief Complaint: none    Subjective: NAEO  Patient denies pain  He does admit to significant unintentional weight loss over past several months, >20lbs  Objective: Patient sitting in chair beside bed eating breakfast in [facility]  He has been seen by oncology and will follow up as outpatient  Surgery explained to patient, will be done Tuesday  Exam improved today, likely secondary to steroids  Invasive Devices     Peripheral Intravenous Line            Peripheral IV 09/05/19 Left;Upper Arm less than 1 day                Vitals: Blood pressure 132/75, pulse 94, temperature 98 3 °F (36 8 °C), temperature source Oral, resp  rate 20, SpO2 94 %  ,There is no height or weight on file to calculate BMI        General appearance: alert, appears stated age, cooperative and no distress  Head: Normocephalic, without obvious abnormality, atraumatic  Eyes: EOMI, PERRL  Neck: supple, symmetrical, trachea midline and NT  Back: no kyphosis present, no tenderness to percussion or palpation  Lungs: non labored breathing  Heart: regular heart rate  Neurologic:   Mental status: Alert, oriented x3, thought content appropriate, able to name/recall objects and perform simple calculations  Cranial nerves: grossly intact (Cranial nerves II-XII)  Sensory: normal to LT bilaterally  Motor: moving all extremities without focal weakness, 5/5 bilaterally  Reflexes: 2+ and symmetric, no hoffmans or clonus  Coordination: finger to nose normal bilaterally (right abnormal yesterday), right drift at 9 seconds (5-6 sec yesterday)      Lab Results:   I have personally reviewed pertinent results  Lab Results   Component Value Date    WBC 8 55 09/06/2019    HGB 13 2 09/06/2019    HCT 39 5 09/06/2019    MCV 97 09/06/2019     09/06/2019    MCH 32 4 09/06/2019    MCHC 33 4 09/06/2019    RDW 15 0 09/06/2019    MPV 11 1 09/06/2019    SODIUM 138 09/06/2019     09/06/2019    CO2 26 09/06/2019    BUN 21 09/06/2019    CREATININE 1 07 09/06/2019    CALCIUM 8 8 09/06/2019    EGFR 71 09/06/2019       Imaging Studies: I have personally reviewed pertinent reports  and I have personally reviewed pertinent films in PACS     X-ray Chest 1 View Portable  Result Date: 9/4/2019  Impression: Suggestion of osteolytic lesion in the posterior left 5th rib  Otherwise, no evidence of acute abnormality in the chest  Workstation performed: [id number]     Ct Head Without Contrast  Result Date: 9/4/2019  Impression: Large area of subcortical white matter hypodensity consistent with vasogenic edema in the left parietal region  Small focus of hemorrhage superiorly as seen on image 2/30 with an associated more hypodense masslike area measuring 3 9 x 2 2 cm  Mild mass  effect on the adjacent cortical sulci  No significant midline shift  MRI with contrast recommended to exclude underlying mass  I personally discussed this study with Geovani Asif on 9/4/2019 at 11:36 AM  Workstation performed: [id number]     Mri Brain W Wo Contrast  Result Date: 9/5/2019  Impression: Large centrally necrotic mass within the left frontoparietal region with surrounding vasogenic edema  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 versus solitary metastasis   Workstation performed: [id number]     Ct Chest Abdomen Pelvis W Contrast  Result Date: 9/4/2019  Impression: Heterogenous liver with hepatomegaly and multifocal heterogenous liver lesions, may be due to multifocal [address]  or metastatic disease  Left lower lobe lung nodule measuring 7 6 mm, suspicious for metastatic disease 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 Mildly enlarged fidel hepatis lymphadenopathy The study was marked in EPIC for immediate notification  Workstation performed: [id number]       EKG, Pathology, and Other Studies: I have personally reviewed pertinent reports     and I have personally reviewed pertinent films in PACS    VTE Pharmacologic Prophylaxis: Sequential compression device (Venodyne)  and RX contraindicated due to: upcoming surgery

## 2019-09-06 NOTE — CONSULTS
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  Hx and PE limited by:  No limitations  HPI: Miranda Spaulding  is a 81y o  year old male who presents with multifocal hepatocellular carcinoma in a background of hepatitis-C and cirrhosis diagnosed in 2017  Patient is status post TACE x2 and oral Sorafenib therapy while living in [facility]  He had some initial response to treatment and then progressed and discontinued Sorafenib therapy  He did not go back for follow-up  He has now moved from [place] to be with his family in [place], [place]  He has been admitted with right hand weakness/clumsiness and right lower extremity weakness  CT of the head and MRI of the brain revealed a large centrally necrotic mass in the left frontoparietal region measuring 2 9 x 4 3 x 3 4 cm with surrounding vasogenic edema  He has now been placed on Decadron and has some improvement in his right-sided weakness but still cannot use the right arm to hold anything and cannot feed himself with the right hand  He can also not sign his name or hardy  He is right handed  His CT scans of the chest, abdomen, and pelvis revealed heterogeneous liver with hepatomegaly and multifocal heterogeneous liver lesions consistent with multifocal hepatocellular carcinoma  There is also evidence of bony metastatic disease in the left 5th rib and manubrium with mildly enlarged fidel hepatis lymphadenopathy  He is seen today for consultation for radiation therapy to the brain  Consults    Review of Systems   Fourteen point review systems is negative except for as per history of the present illness      Historical Information   Previous Oncology History:  As per history of present illness  Past Medical History:   Diagnosis Date    Cancer [facility])     liver    Hypertension      Past Surgical History:   Procedure Laterality Date    APPENDECTOMY       Family History   Problem Relation Age of Onset    Cancer Mother     Cancer Father      Social History   Social History     Substance and Sexual Activity   Alcohol Use Yes    Comment: 1/5 bottle weekly      Social History     Substance and Sexual Activity   Drug Use Never     Social History     Tobacco Use   Smoking Status Current Every Day Smoker    Packs/day: 2 00   Smokeless Tobacco Current User    Types: Chew       Meds/Allergies   all current active meds have been reviewed    No Known Allergies    Objective     Intake/Output Summary (Last 24 hours) at 9/6/2019 1819  Last data filed at 9/6/2019 1300  Gross per 24 hour   Intake 2750 ml   Output    Net 2750 ml     Invasive Devices     Peripheral Intravenous Line            Peripheral IV 09/05/19 Left;Upper Arm less than 1 day              Physical Exam   Constitutional: He is oriented to person, place, and time  He appears well-developed and well-nourished  No distress  HENT:   Head: Normocephalic and atraumatic  Mouth/Throat: No oropharyngeal exudate  Eyes: Pupils are equal, round, and reactive to light  Conjunctivae and EOM are normal  No scleral icterus  Neck: Normal range of motion  Neck supple  No tracheal deviation present  No thyromegaly present  Cardiovascular: Normal rate, regular rhythm and normal heart sounds  Pulmonary/Chest: Effort normal and breath sounds normal  No respiratory distress  He has no wheezes  He has no rales  He exhibits no tenderness  Abdominal: Soft  Bowel sounds are normal  He exhibits no distension and no mass  There is no tenderness  Musculoskeletal: Normal range of motion  He exhibits no edema or tenderness  Lymphadenopathy:     He has no cervical adenopathy  Neurological: He is alert and oriented to person, place, and time   No cranial nerve deficit  Coordination normal    He has right upper and right lower extremity weakness  He cannot hold anything with decreased  with the right hand  Skin: Skin is warm and dry  No rash noted  He is not diaphoretic  No erythema  No pallor  Psychiatric: He has a normal mood and affect  His behavior is normal  Judgment and thought content normal    Nursing note and vitals reviewed  Lab Results: I have personally reviewed pertinent reports  Imaging Studies: I have personally reviewed pertinent reports  and I have personally reviewed pertinent films in PACS  EKG, Pathology, and Other Studies: I have personally reviewed pertinent reports  Assessment/Plan     Assessment and Plan:  Becca Rain  is a 81y o  year old male who presents with multifocal hepatocellular carcinoma in a background of hepatitis-C and cirrhosis diagnosed in 2017  Patient is status post TACE x2 and oral Sorafenib therapy while living in [facility]  He had some initial response to treatment and then progressed and discontinued Sorafenib therapy  He did not go back for follow-up  He has now moved from [place] to be with his family in [place], [place]  He has been admitted with right hand weakness/clumsiness and right lower extremity weakness  CT of the head and MRI of the brain revealed a large centrally necrotic mass in the left frontoparietal region measuring 2 9 x 4 3 x 3 4 cm with surrounding vasogenic edema  He has now been placed on Decadron and has some improvement in his right-sided weakness  He has been seen by Neurosurgery and plan is for him to have craniotomy and resection of his solitary brain metastasis on Monday or Tuesday of next week  Once he recovers from the surgery, he will require postoperative radiation therapy either to the whole brain over 2 weeks verses treatment locally to the resection cavity    We discussed the acute side effects and potential chronic complications of postoperative radiation therapy  He does agree to receive treatment once he heals from the surgery and will be re-evaluated at that time  He also understands that he will need further systemic treatment for his recurrent disease in the liver as well as bone metastasis and fidel hepatis lymphadenopathy  Code Status: Level 1 - Full Code  Advance Directive and Living Will:      Power of :    POLST:      Counseling / Coordination of Care  Total floor / unit time spent today 50 minutes  Greater than 50% of total time was spent with the patient and / or family counseling and / or coordination of care   A description of the counseling / coordination of care: See Above

## 2019-09-07 LAB
GLUCOSE SERPL-MCNC: 172 MG/DL (ref 65–140)
GLUCOSE SERPL-MCNC: 290 MG/DL (ref 65–140)
GLUCOSE SERPL-MCNC: 368 MG/DL (ref 65–140)
GLUCOSE SERPL-MCNC: 387 MG/DL (ref 65–140)

## 2019-09-07 PROCEDURE — 82948 REAGENT STRIP/BLOOD GLUCOSE: CPT

## 2019-09-07 PROCEDURE — NC001 PR NO CHARGE: Performed by: INTERNAL MEDICINE

## 2019-09-07 PROCEDURE — 99232 SBSQ HOSP IP/OBS MODERATE 35: CPT | Performed by: INTERNAL MEDICINE

## 2019-09-07 RX ADMIN — NIFEDIPINE 60 MG: 60 TABLET, FILM COATED, EXTENDED RELEASE ORAL at 08:25

## 2019-09-07 RX ADMIN — PANTOPRAZOLE SODIUM 40 MG: 40 TABLET, DELAYED RELEASE ORAL at 06:10

## 2019-09-07 RX ADMIN — DEXAMETHASONE SODIUM PHOSPHATE 4 MG: 4 INJECTION, SOLUTION INTRAMUSCULAR; INTRAVENOUS at 12:52

## 2019-09-07 RX ADMIN — HYDROXYZINE HYDROCHLORIDE 25 MG: 25 TABLET ORAL at 03:09

## 2019-09-07 RX ADMIN — INSULIN LISPRO 3 UNITS: 100 INJECTION, SOLUTION INTRAVENOUS; SUBCUTANEOUS at 12:52

## 2019-09-07 RX ADMIN — HYDROXYZINE HYDROCHLORIDE 25 MG: 25 TABLET ORAL at 17:50

## 2019-09-07 RX ADMIN — INSULIN LISPRO 1 UNITS: 100 INJECTION, SOLUTION INTRAVENOUS; SUBCUTANEOUS at 08:25

## 2019-09-07 RX ADMIN — INSULIN LISPRO 4 UNITS: 100 INJECTION, SOLUTION INTRAVENOUS; SUBCUTANEOUS at 17:45

## 2019-09-07 RX ADMIN — OXYCODONE HYDROCHLORIDE 10 MG: 10 TABLET ORAL at 12:51

## 2019-09-07 RX ADMIN — Medication 1 TABLET: at 08:24

## 2019-09-07 RX ADMIN — FOLIC ACID 1 MG: 1 TABLET ORAL at 08:24

## 2019-09-07 RX ADMIN — DEXAMETHASONE SODIUM PHOSPHATE 4 MG: 4 INJECTION, SOLUTION INTRAMUSCULAR; INTRAVENOUS at 23:08

## 2019-09-07 RX ADMIN — FLUTICASONE FUROATE AND VILANTEROL TRIFENATATE 1 PUFF: 100; 25 POWDER RESPIRATORY (INHALATION) at 08:25

## 2019-09-07 RX ADMIN — THIAMINE HCL TAB 100 MG 100 MG: 100 TAB at 08:24

## 2019-09-07 RX ADMIN — OXYCODONE HYDROCHLORIDE 10 MG: 10 TABLET ORAL at 03:09

## 2019-09-07 RX ADMIN — NICOTINE 21 MG: 21 PATCH, EXTENDED RELEASE TRANSDERMAL at 06:10

## 2019-09-07 RX ADMIN — DEXAMETHASONE SODIUM PHOSPHATE 4 MG: 4 INJECTION, SOLUTION INTRAMUSCULAR; INTRAVENOUS at 06:10

## 2019-09-07 RX ADMIN — DEXAMETHASONE SODIUM PHOSPHATE 4 MG: 4 INJECTION, SOLUTION INTRAMUSCULAR; INTRAVENOUS at 17:45

## 2019-09-07 RX ADMIN — OXYCODONE HYDROCHLORIDE 10 MG: 10 TABLET ORAL at 23:13

## 2019-09-07 NOTE — PROGRESS NOTES
INTERNAL MEDICINE RESIDENCY PROGRESS NOTE     Name: Prashanth Tripp  Age & Sex: 81 y o  male   MRN: [id number]  Unit/Bed#: [facility] 631-01   Encounter: [id number]  Team: SOD Team A    PATIENT INFORMATION     Name: Prashanth Tripp  Age & Sex: 81 y o  male   MRN: [id number]  Hospital Stay Days: 3    ASSESSMENT/PLAN     Principal Problem:    Brain mass with hemorrhage  Active Problems:    Weakness    Hepatocellular carcinoma (HCC)    Essential hypertension    COPD (chronic obstructive pulmonary disease) (HCC)    Alcohol abuse    Dependence on nicotine from cigarettes    Cerebral edema (HCC)    Malnutrition related to chronic disease      * Brain mass with hemorrhage  Assessment & Plan  Patient transferred from [address] [place] [address] for evaluation of mass with small focus of hemorrhage on CT head  MRI ordered by Neurosurgery revealed large centrally necrotic mass within the left frontoparietal region with surrounding vasogenic edema  Patient has history of hepatocellular carcinoma treated at [facility] with chemotherapy, immunotherapy, TACE  CT chest abdomen pelvis revealed heterogeneous liver with liver lesions, left lower lobe lung nodule, metastasis to manubrium and left 5th rib  · Neuro checks q 4 hours  · Decadron 4 mg q 12 hours - ISS and GI prophylaxis with Protonix 40 mg daily  · Neurosurgery plans for surgery on Tuesday  · NPO at midnight on Monday night for surgery on Tuesday  · Will need medical clearance prior to surgery from medical team, per Neurosurgery  Will obtain today  · PT/OT/speech consult  · Neurology and neurosurgery consult; appreciate recommendations  · Oncology consult for [facility]  - will await pathology results from surgery and follow up outpatient for management  · Radiation oncology consult - will follow up after surgery for radiation    Weakness  Assessment & Plan  Likely secondary to mass and intracranial bleed  See work up as above      [facility] ([facility] )  Assessment & Plan  Patient's history of hepatocellular carcinoma treated at [facility] in 2017 until January 2018  Patient received therapy with cisplatin, sorafenib, and TACE  CT chest abdomen pelvis with contrast revealed heterogenous liver with hepatomegaly and multifocal heterogenous liver lesions, may be due to multifocal [address]  or metastatic disease  Left lower lobe lung nodule measuring 7 6 mm, suspicious for metastatic disease  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  According to Care everywhere, patient was having partial response to therapy; however, stop therapy due to perceived lack of benefit and financial cost   · Contributing to above  · AFP 4 0  · Oxycodone 5 mg and oxycodone 10 mg p r n  For pain  · Oncology consult - will follow up with patient after pathology results    COPD (chronic obstructive pulmonary disease) ([address] )  Assessment & Plan  Patient has significant smoking history of 2 packs per day for 30-40 years  Patient is not up-to-date on immunizations and uses rescue inhaler p r n  Beatrice Simonadonay Does not have maintenance inhaler  Suspect Decadron for above will help as well  · Respiratory protocol; DuoNeb q 4 hours p r n  · Breo Ellipta daily  · Pulmonary hygiene - incentive spirometer in room and patient instructed with use  · Pneumococcal vaccination      Essential hypertension  Assessment & Plan  Reports he is taking nifedipine as an outpatient but has not been on this for 3 months due to financial reasons  24 hour blood pressure range 123/54 to 155/70  Most likely elevated secondary to steroids  · Restarted home on nifedipine 60 mg 24 hour tablet p o   Daily    Malnutrition related to chronic disease  Assessment & Plan  Malnutrition Findings:   Malnutrition type: Chronic illness(Chronic severe pro/vicky malnutrition r/t catabolic illness as evidenced by 15% unintentional wt loss since 6/25/19, consuming < 75% energy intake compared to est energy needs for > 1 month, Treated with diet, patient refused supplements)  Degree of Malnutrition: Other severe protein calorie malnutrition    BMI Findings:  BMI Classifications: Underweight < 18 5     There is no height or weight on file to calculate BMI  · Patient had significant weight loss over a couple months  · Patient has good appetite recently  · Nutrition recommendations as above    Dependence on nicotine from cigarettes  Assessment & Plan  Patient endorsed smoking 2 packs a day for last 30-40 years  · Nicotine patch 21mg daily    Alcohol abuse  Assessment & Plan  Patient endorsed drinking a 5th of whiskey alcohol multiple times per week  Patient states last drink was 3-4 days ago prior to admission  Patient denies any history of alcohol withdrawal or DT   · CIWA protocol - will DC today  · Thiamine and folate supplementation        Disposition:  Inpatient for surgery on Tuesday disposition pending afterwards     SUBJECTIVE     Patient seen and examined  No acute events overnight  Patient states that his cough and shortness of breath is improved  Patient states his right hand feels better but is still struggling with  strength  Patient states has had mild pain this morning  No other acute complaints  OBJECTIVE     Vitals:    19 1948 19 0014 19 0308 19 0727   BP: 124/77 123/54 155/70 150/70   BP Location:       Pulse: 73 58 69 77   Resp:  18  18   Temp: 98 3 °F (36 8 °C) 98 8 °F (37 1 °C) 97 9 °F (36 6 °C) 98 5 °F (36 9 °C)   TempSrc:  Oral     SpO2: 92% 94% 92% (!) 89%   Weight:   63 5 kg (140 lb)    Height:   6' 3" (1 905 m)       Temperature:   Temp (24hrs), Av 4 °F (36 9 °C), Min:97 9 °F (36 6 °C), Max:98 8 °F (37 1 °C)    Temperature: 98 5 °F (36 9 °C)  Intake & Output:  I/O       701 -  07 -  07 -  07    P  O  1770 1520     I V  (mL/kg) 825 (13)      Total Intake(mL/kg) 2595 (40 9) 1520 (23 9)     Net +2595 +1520            Unmeasured Urine Occurrence 4 x 1 x Unmeasured Stool Occurrence 1 x 1 x         Weights:   IBW: 84 5 kg    Body mass index is 17 5 kg/m²  Weight (last 2 days)     Date/Time   Weight    09/07/19 03:08:46   63 5 (140)            Physical Exam   Constitutional: No distress  Sitting in chair   HENT:   Head: Normocephalic and atraumatic  Mouth/Throat: Oropharynx is clear and moist    Eyes: Right eye exhibits no discharge  Left eye exhibits no discharge  Cardiovascular: Normal rate and regular rhythm  Pulmonary/Chest: Effort normal    Referred upper airway sounds heard on inspiration   Abdominal: Soft  There is no tenderness  Hepatomegaly   Musculoskeletal: He exhibits no edema  Neurological: He is alert  Right hand  strength decreased compared to left  Skin: Skin is warm and dry  Psychiatric: He has a normal mood and affect  Vitals reviewed  LABORATORY DATA     Labs: I have personally reviewed pertinent reports  Results from last 7 days   Lab Units 09/06/19  0512 09/05/19  0536 09/04/19  1103   WBC Thousand/uL 8 55 9 13 8 26   HEMOGLOBIN g/dL 13 2 13 9 15 2   HEMATOCRIT % 39 5 42 7 44 9   PLATELETS Thousands/uL 239 259 285   NEUTROS PCT %  --  71  --    MONOS PCT %  --  10  --       Results from last 7 days   Lab Units 09/06/19  0512 09/05/19  0536 09/04/19  1103   POTASSIUM mmol/L 4 3 4 4 4 4   CHLORIDE mmol/L 103 103 99*   CO2 mmol/L 26 29 31   BUN mg/dL 21 20 15   CREATININE mg/dL 1 07 1 42* 1 29   CALCIUM mg/dL 8 8 9 0 9 3   ALK PHOS U/L  --   --  262*   ALT U/L  --   --  81*   AST U/L  --   --  70*     Results from last 7 days   Lab Units 09/05/19  0536   MAGNESIUM mg/dL 2 1     Results from last 7 days   Lab Units 09/05/19  0536   PHOSPHORUS mg/dL 3 3      Results from last 7 days   Lab Units 09/04/19  1103   INR  1 00   PTT seconds 28               IMAGING & DIAGNOSTIC TESTING     Radiology Results: I have personally reviewed pertinent reports    X-ray Chest 1 View Portable    Result Date: 9/4/2019  Impression: Suggestion of osteolytic lesion in the posterior left 5th rib  Otherwise, no evidence of acute abnormality in the chest  Workstation performed: [id number]     Ct Head Without Contrast    Result Date: 9/4/2019  Impression: Large area of subcortical white matter hypodensity consistent with vasogenic edema in the left parietal region  Small focus of hemorrhage superiorly as seen on image 2/30 with an associated more hypodense masslike area measuring 3 9 x 2 2 cm  Mild mass  effect on the adjacent cortical sulci  No significant midline shift  MRI with contrast recommended to exclude underlying mass  I personally discussed this study with Kelly Burks on 9/4/2019 at 11:36 AM  Workstation performed: [id number]     Mri Brain W Wo Contrast    Result Date: 9/5/2019  Impression: Large centrally necrotic mass within the left frontoparietal region with surrounding vasogenic edema  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 versus solitary metastasis  Workstation performed: [id number]     Ct Chest Abdomen Pelvis W Contrast    Result Date: 9/4/2019  Impression: Heterogenous liver with hepatomegaly and multifocal heterogenous liver lesions, may be due to multifocal [address]  or metastatic disease  Left lower lobe lung nodule measuring 7 6 mm, suspicious for metastatic disease 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 Mildly enlarged fidel hepatis lymphadenopathy The study was marked in EPIC for immediate notification  Workstation performed: [id number]     Other Diagnostic Testing: I have personally reviewed pertinent reports      ACTIVE MEDICATIONS     Current Facility-Administered Medications   Medication Dose Route Frequency    acetaminophen (TYLENOL) tablet 650 mg  650 mg Oral Q4H PRN    dexamethasone (DECADRON) injection 4 mg  4 mg Intravenous Q6H [address]    fluticasone-vilanterol (BREO ELLIPTA) 100-25 mcg/inh inhaler 1 puff  1 puff Inhalation Daily    folic acid (FOLVITE) tablet 1 mg  1 mg Oral Daily    hydrOXYzine HCL (ATARAX) tablet 25 mg  25 mg Oral Q6H PRN    insulin lispro (HumaLOG) 100 units/mL subcutaneous injection 1-5 Units  1-5 Units Subcutaneous TID AC    ipratropium-albuterol (DUO-NEB) 0 5-2 5 mg/3 mL inhalation solution 3 mL  3 mL Nebulization Q4H PRN    Labetalol HCl (NORMODYNE) injection 10 mg  10 mg Intravenous Q4H PRN    multivitamin-minerals (CENTRUM) tablet 1 tablet  1 tablet Oral Daily    nicotine (NICODERM CQ) 21 mg/24 hr TD 24 hr patch 21 mg  21 mg Transdermal Daily    NIFEdipine (PROCARDIA XL) 24 hr tablet 60 mg  60 mg Oral Daily    oxyCODONE (ROXICODONE) immediate release tablet 10 mg  10 mg Oral Q6H PRN    oxyCODONE (ROXICODONE) IR tablet 5 mg  5 mg Oral Q4H PRN    pantoprazole (PROTONIX) EC tablet 40 mg  40 mg Oral Early Morning    pneumococcal 13-valent conjugate vaccine (PREVNAR-13) IM injection 0 5 mL  0 5 mL Intramuscular Prior to discharge    thiamine (VITAMIN B1) tablet 100 mg  100 mg Oral Daily       VTE Pharmacologic Prophylaxis: Reason for no pharmacologic prophylaxis Brain mass with hemorrhage  VTE Mechanical Prophylaxis: sequential compression device    Portions of the record may have been created with voice recognition software  Occasional wrong word or "sound a like" substitutions may have occurred due to the inherent limitations of voice recognition software    Read the chart carefully and recognize, using context, where substitutions have occurred   ==  Payton Sampson MD  [address] [facility]  Internal Medicine Residency PGY-1

## 2019-09-07 NOTE — PROGRESS NOTES
Cardiac risk assessment for tentative neurosurgery on 09/10/2019    Surgery:  Resection of brain mass    Active cardiac conditions: No    Surgery risk:  Intermediate risk    Functional capacity:  ?4 METs; patient states is able to climb flight of stairs without symptoms of chest pain or dyspnea    BENAVIDEZ: 1 2% risk of MI or cardiac arrest, intraoperatively or up to 30 days post-op    Revised Cardiac Risk Index: 0 points; Class I risk    EKG: Normal sinus rhythm    Patient is low risk for this intermediate surgery

## 2019-09-07 NOTE — RESPIRATORY THERAPY NOTE
RT Protocol Note  Holly [place]  81 y o  male MRN: [id number]  [facility] Encounter: [id number]    Assessment    Principal Problem:    Brain mass with hemorrhage  Active Problems:    Weakness    Essential hypertension    Alcohol abuse    Dependence on nicotine from cigarettes    Cerebral edema (HCC)    COPD (chronic obstructive pulmonary disease) (HCC)    Hepatocellular carcinoma (HCC)    Malnutrition related to chronic disease      Home Pulmonary Medications:     Past Medical History:   Diagnosis Date    Cancer ([facility] )     liver    Hypertension      Social History     Socioeconomic History    Marital status: Legally      Spouse name: Not on file    Number of children: Not on file    Years of education: Not on file    Highest education level: Not on file   Occupational History    Not on file   Social Needs    Financial resource strain: Not on file    Food insecurity:     Worry: Not on file     Inability: Not on file    Transportation needs:     Medical: Not on file     Non-medical: Not on file   Tobacco Use    Smoking status: Current Every Day Smoker     Packs/day: 2 00    Smokeless tobacco: Current User     Types: Chew   Substance and Sexual Activity    Alcohol use: Yes     Comment: 1/5 bottle weekly     Drug use: Never    Sexual activity: Not on file   Lifestyle    Physical activity:     Days per week: Not on file     Minutes per session: Not on file    Stress: Not on file   Relationships    Social connections:     Talks on phone: Not on file     Gets together: Not on file     Attends Jain service: Not on file     Active member of club or organization: Not on file     Attends meetings of clubs or organizations: Not on file     Relationship status: Not on file    Intimate partner violence:     Fear of current or ex partner: Not on file     Emotionally abused: Not on file     Physically abused: Not on file     Forced sexual activity: Not on file   Other Topics Concern    Not on file   Social History Narrative    Not on file       Subjective         Objective    Physical Exam:   Assessment Type: Assess only  General Appearance: Alert  Respiratory Pattern: Normal  Chest Assessment: Chest expansion symmetrical  Bilateral Breath Sounds: Diminished, Clear  Cough: Strong, Dry, Non-productive  O2 Device: RA    Vitals:  Blood pressure 150/70, pulse 77, temperature 98 5 °F (36 9 °C), resp  rate 18, height 6' 3" (1 905 m), weight 63 5 kg (140 lb), SpO2 (!) 89 %  Imaging and other studies: I have personally reviewed pertinent reports  O2 Device: RA     Plan    Respiratory Plan: Mild Distress pathway        Resp Comments: Pt refused flutter  BS clear and diminished  dry npc  Performed IS to 2500ml will DC flutter device

## 2019-09-08 LAB
ANION GAP SERPL CALCULATED.3IONS-SCNC: 5 MMOL/L (ref 4–13)
BUN SERPL-MCNC: 32 MG/DL (ref 5–25)
CALCIUM SERPL-MCNC: 8.9 MG/DL (ref 8.3–10.1)
CHLORIDE SERPL-SCNC: 96 MMOL/L (ref 100–108)
CO2 SERPL-SCNC: 28 MMOL/L (ref 21–32)
CREAT SERPL-MCNC: 1.35 MG/DL (ref 0.6–1.3)
GFR SERPL CREATININE-BSD FRML MDRD: 54 ML/MIN/1.73SQ M
GLUCOSE SERPL-MCNC: 231 MG/DL (ref 65–140)
GLUCOSE SERPL-MCNC: 312 MG/DL (ref 65–140)
GLUCOSE SERPL-MCNC: 313 MG/DL (ref 65–140)
GLUCOSE SERPL-MCNC: 353 MG/DL (ref 65–140)
GLUCOSE SERPL-MCNC: 382 MG/DL (ref 65–140)
POTASSIUM SERPL-SCNC: 4.5 MMOL/L (ref 3.5–5.3)
SODIUM SERPL-SCNC: 129 MMOL/L (ref 136–145)

## 2019-09-08 PROCEDURE — 99233 SBSQ HOSP IP/OBS HIGH 50: CPT | Performed by: INTERNAL MEDICINE

## 2019-09-08 PROCEDURE — 82948 REAGENT STRIP/BLOOD GLUCOSE: CPT

## 2019-09-08 PROCEDURE — 80048 BASIC METABOLIC PNL TOTAL CA: CPT | Performed by: INTERNAL MEDICINE

## 2019-09-08 RX ORDER — INSULIN GLARGINE 100 [IU]/ML
10 INJECTION, SOLUTION SUBCUTANEOUS
Status: DISCONTINUED | OUTPATIENT
Start: 2019-09-09 | End: 2019-09-08

## 2019-09-08 RX ORDER — INSULIN GLARGINE 100 [IU]/ML
10 INJECTION, SOLUTION SUBCUTANEOUS EVERY MORNING
Status: DISCONTINUED | OUTPATIENT
Start: 2019-09-09 | End: 2019-09-11

## 2019-09-08 RX ORDER — INSULIN GLARGINE 100 [IU]/ML
5 INJECTION, SOLUTION SUBCUTANEOUS EVERY MORNING
Status: DISCONTINUED | OUTPATIENT
Start: 2019-09-08 | End: 2019-09-08

## 2019-09-08 RX ADMIN — INSULIN GLARGINE 5 UNITS: 100 INJECTION, SOLUTION SUBCUTANEOUS at 09:21

## 2019-09-08 RX ADMIN — NIFEDIPINE 60 MG: 60 TABLET, FILM COATED, EXTENDED RELEASE ORAL at 09:21

## 2019-09-08 RX ADMIN — PANTOPRAZOLE SODIUM 40 MG: 40 TABLET, DELAYED RELEASE ORAL at 05:40

## 2019-09-08 RX ADMIN — DEXAMETHASONE SODIUM PHOSPHATE 4 MG: 4 INJECTION, SOLUTION INTRAMUSCULAR; INTRAVENOUS at 12:40

## 2019-09-08 RX ADMIN — INSULIN LISPRO 2 UNITS: 100 INJECTION, SOLUTION INTRAVENOUS; SUBCUTANEOUS at 09:22

## 2019-09-08 RX ADMIN — HYDROXYZINE HYDROCHLORIDE 25 MG: 25 TABLET ORAL at 20:16

## 2019-09-08 RX ADMIN — DEXAMETHASONE SODIUM PHOSPHATE 4 MG: 4 INJECTION, SOLUTION INTRAMUSCULAR; INTRAVENOUS at 23:08

## 2019-09-08 RX ADMIN — HYDROXYZINE HYDROCHLORIDE 25 MG: 25 TABLET ORAL at 03:43

## 2019-09-08 RX ADMIN — INSULIN LISPRO 5 UNITS: 100 INJECTION, SOLUTION INTRAVENOUS; SUBCUTANEOUS at 17:17

## 2019-09-08 RX ADMIN — OXYCODONE HYDROCHLORIDE 10 MG: 10 TABLET ORAL at 17:18

## 2019-09-08 RX ADMIN — FLUTICASONE FUROATE AND VILANTEROL TRIFENATATE 1 PUFF: 100; 25 POWDER RESPIRATORY (INHALATION) at 09:22

## 2019-09-08 RX ADMIN — THIAMINE HCL TAB 100 MG 100 MG: 100 TAB at 09:22

## 2019-09-08 RX ADMIN — INSULIN LISPRO 4 UNITS: 100 INJECTION, SOLUTION INTRAVENOUS; SUBCUTANEOUS at 17:16

## 2019-09-08 RX ADMIN — Medication 1 TABLET: at 09:22

## 2019-09-08 RX ADMIN — INSULIN LISPRO 5 UNITS: 100 INJECTION, SOLUTION INTRAVENOUS; SUBCUTANEOUS at 12:41

## 2019-09-08 RX ADMIN — NICOTINE 21 MG: 21 PATCH, EXTENDED RELEASE TRANSDERMAL at 05:41

## 2019-09-08 RX ADMIN — DEXAMETHASONE SODIUM PHOSPHATE 4 MG: 4 INJECTION, SOLUTION INTRAMUSCULAR; INTRAVENOUS at 05:40

## 2019-09-08 RX ADMIN — INSULIN LISPRO 3 UNITS: 100 INJECTION, SOLUTION INTRAVENOUS; SUBCUTANEOUS at 12:41

## 2019-09-08 RX ADMIN — DEXAMETHASONE SODIUM PHOSPHATE 4 MG: 4 INJECTION, SOLUTION INTRAMUSCULAR; INTRAVENOUS at 17:18

## 2019-09-08 RX ADMIN — INSULIN LISPRO 5 UNITS: 100 INJECTION, SOLUTION INTRAVENOUS; SUBCUTANEOUS at 09:22

## 2019-09-08 RX ADMIN — OXYCODONE HYDROCHLORIDE 10 MG: 10 TABLET ORAL at 05:40

## 2019-09-08 RX ADMIN — FOLIC ACID 1 MG: 1 TABLET ORAL at 09:21

## 2019-09-08 RX ADMIN — HYDROXYZINE HYDROCHLORIDE 25 MG: 25 TABLET ORAL at 12:45

## 2019-09-08 NOTE — PROGRESS NOTES
INTERNAL MEDICINE RESIDENCY PROGRESS NOTE     Name: Christy Patel  Age & Sex: 81 y o  male   MRN: [id number]  Unit/Bed#: [facility] 631-01   Encounter: [id number]  Team: SOD Team A    PATIENT INFORMATION     Name: Christy Patel  Age & Sex: 81 y o  male   MRN: [id number]  Hospital Stay Days: 4    ASSESSMENT/PLAN     Principal Problem:    Brain mass with hemorrhage  Active Problems:    Weakness    Essential hypertension    Alcohol abuse    Dependence on nicotine from cigarettes    Cerebral edema (HCC)    COPD (chronic obstructive pulmonary disease) (HCC)    Hepatocellular carcinoma (HCC)    Malnutrition related to chronic disease        * Brain mass with hemorrhage  Assessment & Plan  Patient transferred from [address] [place] [address] for evaluation of mass with small focus of hemorrhage on CT head  MRI ordered by Neurosurgery revealed large centrally necrotic mass within the left frontoparietal region with surrounding vasogenic edema  Patient has history of hepatocellular carcinoma treated at [address] [place] [address] with chemotherapy, immunotherapy, TACE  CT chest abdomen pelvis revealed heterogeneous liver with liver lesions, left lower lobe lung nodule, metastasis to manubrium and left 5th rib  · Will recheck labs on Monday and on day of surgery  · Neuro checks q 4 hours  · Decadron 4 mg q 12 hours - ISS and GI prophylaxis with Protonix 40 mg daily  · Neurosurgery plans for surgery on Tuesday  · NPO at midnight on Monday night for surgery on Tuesday  · Cardiac risk assessment for nurse surgical procedure obtained     · PT/OT/speech consult  · Neurology and neurosurgery consult; appreciate recommendations  · Oncology consult for [place] [address]  - will await pathology results from surgery and follow up outpatient for management  · Radiation oncology consult - will follow up after surgery for radiation        Hepatocellular carcinoma [facility])  Assessment & Plan  Patient's history of hepatocellular carcinoma treated at [address] [place] [address] in 2017 until January 2018  Patient received therapy with cisplatin, sorafenib, and TACE  CT chest abdomen pelvis with contrast revealed heterogenous liver with hepatomegaly and multifocal heterogenous liver lesions, may be due to multifocal [address]  or metastatic disease  Left lower lobe lung nodule measuring 7 6 mm, suspicious for metastatic disease  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  According to Care everywhere, patient was having partial response to therapy; however, stop therapy due to perceived lack of benefit and financial cost   · Contributing to above  · AFP 4 0  · Oxycodone 5 mg and oxycodone 10 mg p r n  For pain  · Oncology consult - will follow up with patient after pathology results    COPD (chronic obstructive pulmonary disease) ([address] )  Assessment & Plan  Patient has significant smoking history of 2 packs per day for 30-40 years  Patient is not up-to-date on immunizations and uses rescue inhaler p r n  Tao Finney Does not have maintenance inhaler  Suspect Decadron for above will help as well  · Respiratory protocol; DuoNeb q 4 hours p r n  · Breo Ellipta daily  · Pulmonary hygiene - incentive spirometer in room and patient instructed with use  · Pneumococcal vaccination      Dependence on nicotine from cigarettes  Assessment & Plan  Patient endorsed smoking 2 packs a day for last 30-40 years  · Nicotine patch 21mg daily    Alcohol abuse  Assessment & Plan  Patient endorsed drinking a 5th of whiskey alcohol multiple times per week  Patient states last drink was 3-4 days ago prior to admission  Patient denies any history of alcohol withdrawal or DT   · CIWA protocol - was discontinued yesterday, patient continues to have stable vitals and no signs of alcohol withdrawal    · Thiamine and folate supplementation    Essential hypertension  Assessment & Plan  Reports he is taking nifedipine as an outpatient but has not been on this for 3 months due to financial reasons  24 hour blood pressure range 123/54 to 155/70  Most likely elevated secondary to steroids  · Restarted home on nifedipine 60 mg 24 hour tablet p o  Daily    Weakness  Assessment & Plan  Likely secondary to mass and intracranial bleed  See work up as above  Malnutrition related to chronic disease  Assessment & Plan  Malnutrition Findings:   Malnutrition type: Chronic illness(Chronic severe pro/vicky malnutrition r/t catabolic illness as evidenced by 15% unintentional wt loss since 6/25/19, consuming < 75% energy intake compared to est energy needs for > 1 month, Treated with diet, patient refused supplements)  Degree of Malnutrition: Other severe protein calorie malnutrition    BMI Findings:  BMI Classifications: Underweight < 18 5     There is no height or weight on file to calculate BMI  · Patient had significant weight loss over a couple months  · Patient has good appetite recently  · Nutrition recommendations as above      Disposition:  Pending further inpatient stay for neurosurgical intervention on Tuesday August 10, 2019  Will continue to monitor the patient, she feels far neurological exam has been stable since admission, with improvement in right hand and right lower extremity weakness  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, patient will require 2 weeks of postoperative radiation therapy to the whole brain vs  local treatment  SUBJECTIVE     Patient seen and examined  No acute events overnight  Patient reports feeling well this morning  His his balance when walking has improved  His right lower extremity and right hand weakness has improved as well  He continues to have difficulty with fine motor movements in the right hand and moderate weakness  He denies vision changes, headache, dizziness, syncope, chest pain, shortness breath, abdominal pain, nausea, vomiting, dysuria, urgency, frequency       OBJECTIVE     Vitals:    09/07/19 1824 09/07/19 2221 09/08/19 0339 09/08/19 0741   BP: 157/81 (!) 155/107 131/84 127/81 Pulse: 69 78 80 69   Resp: 18 16 18 20   Temp: 98 3 °F (36 8 °C) 98 5 °F (36 9 °C) 98 °F (36 7 °C) 98 3 °F (36 8 °C)   TempSrc:       SpO2: 92% 94% 95% 92%   Weight:       Height:          Temperature:   Temp (24hrs), Av 5 °F (36 9 °C), Min:98 °F (36 7 °C), Max:99 2 °F (37 3 °C)    Temperature: 98 3 °F (36 8 °C)  Intake & Output:  I/O       701 -  07 -  07 -  0700    P  O  1520 480     I V  (mL/kg)       Total Intake(mL/kg) 1520 (23 9) 480 (7 6)     Net +1520 +480            Unmeasured Urine Occurrence 1 x 1 x     Unmeasured Stool Occurrence 1 x          Weights:   IBW: 84 5 kg    Body mass index is 17 5 kg/m²  Weight (last 2 days)     Date/Time   Weight    19 03:08:46   63 5 (140)            Physical Exam   Constitutional: He is oriented to person, place, and time  He appears well-developed and well-nourished  No distress  HENT:   Head: Normocephalic and atraumatic  Mouth/Throat: No oropharyngeal exudate  Eyes: Pupils are equal, round, and reactive to light  EOM are normal  No scleral icterus  Neck: Normal range of motion  Neck supple  No JVD present  No tracheal deviation present  No thyromegaly present  Cardiovascular: Exam reveals no gallop and no friction rub  No murmur heard  Pulmonary/Chest: No stridor  No respiratory distress  He has no wheezes  He has no rales  He exhibits no tenderness  Abdominal: He exhibits no distension and no mass  There is no tenderness  There is no rebound and no guarding  No hernia  Musculoskeletal: He exhibits no edema or deformity  Right hand with sensation intact, but noted to have weakness on  strength compared to the left  Patient having difficulty holding items in his right hand secondary to the weakness  Range of motion is intact  Lymphadenopathy:     He has no cervical adenopathy  Neurological: He is alert and oriented to person, place, and time   No cranial nerve deficit or sensory deficit  Finger to nose, coordination normal/ no dysmetria  -normal gait without ataxia   Skin: He is not diaphoretic  Nursing note and vitals reviewed  LABORATORY DATA     Labs: I have personally reviewed pertinent reports  Results from last 7 days   Lab Units 09/06/19  0512 09/05/19  0536 09/04/19  1103   WBC Thousand/uL 8 55 9 13 8 26   HEMOGLOBIN g/dL 13 2 13 9 15 2   HEMATOCRIT % 39 5 42 7 44 9   PLATELETS Thousands/uL 239 259 285   NEUTROS PCT %  --  71  --    MONOS PCT %  --  10  --       Results from last 7 days   Lab Units 09/08/19  0931 09/06/19  0512 09/05/19  0536 09/04/19  1103   POTASSIUM mmol/L 4 5 4 3 4 4 4 4   CHLORIDE mmol/L 96* 103 103 99*   CO2 mmol/L 28 26 29 31   BUN mg/dL 32* 21 20 15   CREATININE mg/dL 1 35* 1 07 1 42* 1 29   CALCIUM mg/dL 8 9 8 8 9 0 9 3   ALK PHOS U/L  --   --   --  262*   ALT U/L  --   --   --  81*   AST U/L  --   --   --  70*     Results from last 7 days   Lab Units 09/05/19  0536   MAGNESIUM mg/dL 2 1     Results from last 7 days   Lab Units 09/05/19  0536   PHOSPHORUS mg/dL 3 3      Results from last 7 days   Lab Units 09/04/19  1103   INR  1 00   PTT seconds 28               IMAGING & DIAGNOSTIC TESTING     Radiology Results: I have personally reviewed pertinent reports  X-ray Chest 1 View Portable    Result Date: 9/4/2019  Impression: Suggestion of osteolytic lesion in the posterior left 5th rib  Otherwise, no evidence of acute abnormality in the chest  Workstation performed: [id number]     Ct Head Without Contrast    Result Date: 9/4/2019  Impression: Large area of subcortical white matter hypodensity consistent with vasogenic edema in the left parietal region  Small focus of hemorrhage superiorly as seen on image 2/30 with an associated more hypodense masslike area measuring 3 9 x 2 2 cm  Mild mass  effect on the adjacent cortical sulci  No significant midline shift  MRI with contrast recommended to exclude underlying mass    I personally discussed this study with Marissa March on 9/4/2019 at 11:36 AM  Workstation performed: [id number]     Mri Brain W Wo Contrast    Result Date: 9/5/2019  Impression: Large centrally necrotic mass within the left frontoparietal region with surrounding vasogenic edema  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 versus solitary metastasis  Workstation performed: [id number]     Ct Chest Abdomen Pelvis W Contrast    Result Date: 9/4/2019  Impression: Heterogenous liver with hepatomegaly and multifocal heterogenous liver lesions, may be due to multifocal [address]  or metastatic disease  Left lower lobe lung nodule measuring 7 6 mm, suspicious for metastatic disease 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 Mildly enlarged fidel hepatis lymphadenopathy The study was marked in EPIC for immediate notification  Workstation performed: [id number]     Other Diagnostic Testing: I have personally reviewed pertinent reports      ACTIVE MEDICATIONS     Current Facility-Administered Medications   Medication Dose Route Frequency    acetaminophen (TYLENOL) tablet 650 mg  650 mg Oral Q4H PRN    dexamethasone (DECADRON) injection 4 mg  4 mg Intravenous Q6H [address]    fluticasone-vilanterol (BREO ELLIPTA) 100-25 mcg/inh inhaler 1 puff  1 puff Inhalation Daily    folic acid (FOLVITE) tablet 1 mg  1 mg Oral Daily    hydrOXYzine HCL (ATARAX) tablet 25 mg  25 mg Oral Q6H PRN    insulin glargine (LANTUS) subcutaneous injection 5 Units 0 05 mL  5 Units Subcutaneous QAM    insulin lispro (HumaLOG) 100 units/mL subcutaneous injection 1-5 Units  1-5 Units Subcutaneous TID AC    insulin lispro (HumaLOG) 100 units/mL subcutaneous injection 5 Units  5 Units Subcutaneous TID With Meals    ipratropium-albuterol (DUO-NEB) 0 5-2 5 mg/3 mL inhalation solution 3 mL  3 mL Nebulization Q4H PRN    Labetalol HCl (NORMODYNE) injection 10 mg  10 mg Intravenous Q4H PRN    multivitamin-minerals (CENTRUM) tablet 1 tablet  1 tablet Oral Daily    nicotine (NICODERM CQ) 21 mg/24 hr TD 24 hr patch 21 mg  21 mg Transdermal Daily    NIFEdipine (PROCARDIA XL) 24 hr tablet 60 mg  60 mg Oral Daily    oxyCODONE (ROXICODONE) immediate release tablet 10 mg  10 mg Oral Q6H PRN    oxyCODONE (ROXICODONE) IR tablet 5 mg  5 mg Oral Q4H PRN    pantoprazole (PROTONIX) EC tablet 40 mg  40 mg Oral Early Morning    pneumococcal 13-valent conjugate vaccine (PREVNAR-13) IM injection 0 5 mL  0 5 mL Intramuscular Prior to discharge    thiamine (VITAMIN B1) tablet 100 mg  100 mg Oral Daily       VTE Pharmacologic Prophylaxis: holding prophylaxis secondary to brain mets with hemmorrhage  VTE Mechanical Prophylaxis: sequential compression device    Portions of the record may have been created with voice recognition software  Occasional wrong word or "sound a like" substitutions may have occurred due to the inherent limitations of voice recognition software    Read the chart carefully and recognize, using context, where substitutions have occurred   ==  Lisa Rodriguez, [facility]  Internal Medicine Residency PGY-2

## 2019-09-08 NOTE — SOCIAL WORK
Cm met with patient to discuss the discharge recommendation of Home PT/OT  Patient is agreeable and is requesting [facility]  Referral sent

## 2019-09-09 ENCOUNTER — ANESTHESIA EVENT (INPATIENT)
Dept: PERIOP | Facility: HOSPITAL | Age: 69
DRG: 025 | End: 2019-09-09
Payer: MEDICARE

## 2019-09-09 PROBLEM — D72.829 LEUKOCYTOSIS: Status: ACTIVE | Noted: 2019-09-09

## 2019-09-09 LAB
ABO GROUP BLD: NORMAL
ANION GAP SERPL CALCULATED.3IONS-SCNC: 6 MMOL/L (ref 4–13)
BASOPHILS # BLD AUTO: 0.05 THOUSANDS/ΜL (ref 0–0.1)
BASOPHILS NFR BLD AUTO: 0 % (ref 0–1)
BILIRUB UR QL STRIP: ABNORMAL
BLD GP AB SCN SERPL QL: NEGATIVE
BUN SERPL-MCNC: 30 MG/DL (ref 5–25)
CALCIUM SERPL-MCNC: 9.4 MG/DL (ref 8.3–10.1)
CHLORIDE SERPL-SCNC: 97 MMOL/L (ref 100–108)
CLARITY UR: CLEAR
CO2 SERPL-SCNC: 28 MMOL/L (ref 21–32)
COLOR UR: ABNORMAL
CREAT SERPL-MCNC: 1.28 MG/DL (ref 0.6–1.3)
EOSINOPHIL # BLD AUTO: 0.02 THOUSAND/ΜL (ref 0–0.61)
EOSINOPHIL NFR BLD AUTO: 0 % (ref 0–6)
ERYTHROCYTE [DISTWIDTH] IN BLOOD BY AUTOMATED COUNT: 14.9 % (ref 11.6–15.1)
GFR SERPL CREATININE-BSD FRML MDRD: 58 ML/MIN/1.73SQ M
GLUCOSE SERPL-MCNC: 222 MG/DL (ref 65–140)
GLUCOSE SERPL-MCNC: 223 MG/DL (ref 65–140)
GLUCOSE SERPL-MCNC: 244 MG/DL (ref 65–140)
GLUCOSE SERPL-MCNC: 255 MG/DL (ref 65–140)
GLUCOSE SERPL-MCNC: 289 MG/DL (ref 65–140)
GLUCOSE UR STRIP-MCNC: ABNORMAL MG/DL
HCT VFR BLD AUTO: 46.2 % (ref 36.5–49.3)
HGB BLD-MCNC: 15.2 G/DL (ref 12–17)
HGB UR QL STRIP.AUTO: NEGATIVE
IMM GRANULOCYTES # BLD AUTO: 0.1 THOUSAND/UL (ref 0–0.2)
IMM GRANULOCYTES NFR BLD AUTO: 1 % (ref 0–2)
INR PPP: 0.96 (ref 0.84–1.19)
KETONES UR STRIP-MCNC: NEGATIVE MG/DL
LEUKOCYTE ESTERASE UR QL STRIP: NEGATIVE
LYMPHOCYTES # BLD AUTO: 1.96 THOUSANDS/ΜL (ref 0.6–4.47)
LYMPHOCYTES NFR BLD AUTO: 14 % (ref 14–44)
MCH RBC QN AUTO: 31.7 PG (ref 26.8–34.3)
MCHC RBC AUTO-ENTMCNC: 32.9 G/DL (ref 31.4–37.4)
MCV RBC AUTO: 96 FL (ref 82–98)
MONOCYTES # BLD AUTO: 1.01 THOUSAND/ΜL (ref 0.17–1.22)
MONOCYTES NFR BLD AUTO: 7 % (ref 4–12)
NEUTROPHILS # BLD AUTO: 10.85 THOUSANDS/ΜL (ref 1.85–7.62)
NEUTS SEG NFR BLD AUTO: 78 % (ref 43–75)
NITRITE UR QL STRIP: NEGATIVE
NRBC BLD AUTO-RTO: 0 /100 WBCS
PH UR STRIP.AUTO: 6 [PH]
PLATELET # BLD AUTO: 323 THOUSANDS/UL (ref 149–390)
PMV BLD AUTO: 11.6 FL (ref 8.9–12.7)
POTASSIUM SERPL-SCNC: 4.2 MMOL/L (ref 3.5–5.3)
PROT UR STRIP-MCNC: NEGATIVE MG/DL
PROTHROMBIN TIME: 12.4 SECONDS (ref 11.6–14.5)
RBC # BLD AUTO: 4.8 MILLION/UL (ref 3.88–5.62)
RH BLD: POSITIVE
SODIUM SERPL-SCNC: 131 MMOL/L (ref 136–145)
SP GR UR STRIP.AUTO: 1.02 (ref 1–1.03)
SPECIMEN EXPIRATION DATE: NORMAL
UROBILINOGEN UR QL STRIP.AUTO: 1 E.U./DL
WBC # BLD AUTO: 13.99 THOUSAND/UL (ref 4.31–10.16)

## 2019-09-09 PROCEDURE — 86901 BLOOD TYPING SEROLOGIC RH(D): CPT | Performed by: PHYSICIAN ASSISTANT

## 2019-09-09 PROCEDURE — 81003 URINALYSIS AUTO W/O SCOPE: CPT | Performed by: PHYSICIAN ASSISTANT

## 2019-09-09 PROCEDURE — NC001 PR NO CHARGE: Performed by: NEUROLOGICAL SURGERY

## 2019-09-09 PROCEDURE — 86920 COMPATIBILITY TEST SPIN: CPT

## 2019-09-09 PROCEDURE — 94760 N-INVAS EAR/PLS OXIMETRY 1: CPT

## 2019-09-09 PROCEDURE — NC001 PR NO CHARGE: Performed by: INTERNAL MEDICINE

## 2019-09-09 PROCEDURE — 99233 SBSQ HOSP IP/OBS HIGH 50: CPT | Performed by: INTERNAL MEDICINE

## 2019-09-09 PROCEDURE — 86850 RBC ANTIBODY SCREEN: CPT | Performed by: PHYSICIAN ASSISTANT

## 2019-09-09 PROCEDURE — 85025 COMPLETE CBC W/AUTO DIFF WBC: CPT | Performed by: STUDENT IN AN ORGANIZED HEALTH CARE EDUCATION/TRAINING PROGRAM

## 2019-09-09 PROCEDURE — 80048 BASIC METABOLIC PNL TOTAL CA: CPT | Performed by: STUDENT IN AN ORGANIZED HEALTH CARE EDUCATION/TRAINING PROGRAM

## 2019-09-09 PROCEDURE — 82948 REAGENT STRIP/BLOOD GLUCOSE: CPT

## 2019-09-09 PROCEDURE — 85610 PROTHROMBIN TIME: CPT | Performed by: STUDENT IN AN ORGANIZED HEALTH CARE EDUCATION/TRAINING PROGRAM

## 2019-09-09 PROCEDURE — 86900 BLOOD TYPING SEROLOGIC ABO: CPT | Performed by: PHYSICIAN ASSISTANT

## 2019-09-09 RX ORDER — SODIUM CHLORIDE 9 MG/ML
125 INJECTION, SOLUTION INTRAVENOUS CONTINUOUS
Status: DISCONTINUED | OUTPATIENT
Start: 2019-09-09 | End: 2019-09-10

## 2019-09-09 RX ORDER — SODIUM CHLORIDE 1000 MG
1 TABLET, SOLUBLE MISCELLANEOUS
Status: DISCONTINUED | OUTPATIENT
Start: 2019-09-09 | End: 2019-09-13 | Stop reason: HOSPADM

## 2019-09-09 RX ORDER — CHLORHEXIDINE GLUCONATE 0.12 MG/ML
15 RINSE ORAL ONCE
Status: COMPLETED | OUTPATIENT
Start: 2019-09-09 | End: 2019-09-10

## 2019-09-09 RX ADMIN — INSULIN GLARGINE 10 UNITS: 100 INJECTION, SOLUTION SUBCUTANEOUS at 08:40

## 2019-09-09 RX ADMIN — SODIUM CHLORIDE 1000 ML: 0.9 INJECTION, SOLUTION INTRAVENOUS at 14:47

## 2019-09-09 RX ADMIN — OXYCODONE HYDROCHLORIDE 5 MG: 5 TABLET ORAL at 15:53

## 2019-09-09 RX ADMIN — Medication 1 TABLET: at 08:40

## 2019-09-09 RX ADMIN — FOLIC ACID 1 MG: 1 TABLET ORAL at 08:40

## 2019-09-09 RX ADMIN — FLUTICASONE FUROATE AND VILANTEROL TRIFENATATE 1 PUFF: 100; 25 POWDER RESPIRATORY (INHALATION) at 08:40

## 2019-09-09 RX ADMIN — DEXAMETHASONE SODIUM PHOSPHATE 4 MG: 4 INJECTION, SOLUTION INTRAMUSCULAR; INTRAVENOUS at 05:02

## 2019-09-09 RX ADMIN — PANTOPRAZOLE SODIUM 40 MG: 40 TABLET, DELAYED RELEASE ORAL at 05:02

## 2019-09-09 RX ADMIN — INSULIN LISPRO 3 UNITS: 100 INJECTION, SOLUTION INTRAVENOUS; SUBCUTANEOUS at 12:47

## 2019-09-09 RX ADMIN — THIAMINE HCL TAB 100 MG 100 MG: 100 TAB at 08:40

## 2019-09-09 RX ADMIN — NIFEDIPINE 60 MG: 60 TABLET, FILM COATED, EXTENDED RELEASE ORAL at 08:40

## 2019-09-09 RX ADMIN — SODIUM CHLORIDE 125 ML/HR: 0.9 INJECTION, SOLUTION INTRAVENOUS at 23:25

## 2019-09-09 RX ADMIN — OXYCODONE HYDROCHLORIDE 10 MG: 10 TABLET ORAL at 10:24

## 2019-09-09 RX ADMIN — DEXAMETHASONE SODIUM PHOSPHATE 4 MG: 4 INJECTION, SOLUTION INTRAMUSCULAR; INTRAVENOUS at 23:26

## 2019-09-09 RX ADMIN — OXYCODONE HYDROCHLORIDE 10 MG: 10 TABLET ORAL at 03:03

## 2019-09-09 RX ADMIN — OXYCODONE HYDROCHLORIDE 5 MG: 5 TABLET ORAL at 23:38

## 2019-09-09 RX ADMIN — DEXAMETHASONE SODIUM PHOSPHATE 4 MG: 4 INJECTION, SOLUTION INTRAMUSCULAR; INTRAVENOUS at 17:31

## 2019-09-09 RX ADMIN — HYDROXYZINE HYDROCHLORIDE 25 MG: 25 TABLET ORAL at 03:03

## 2019-09-09 RX ADMIN — SODIUM CHLORIDE TAB 1 GM 1 G: 1 TAB at 14:44

## 2019-09-09 RX ADMIN — NICOTINE 21 MG: 21 PATCH, EXTENDED RELEASE TRANSDERMAL at 05:03

## 2019-09-09 RX ADMIN — DEXAMETHASONE SODIUM PHOSPHATE 4 MG: 4 INJECTION, SOLUTION INTRAMUSCULAR; INTRAVENOUS at 12:46

## 2019-09-09 RX ADMIN — SODIUM CHLORIDE TAB 1 GM 1 G: 1 TAB at 17:31

## 2019-09-09 RX ADMIN — HYDROXYZINE HYDROCHLORIDE 25 MG: 25 TABLET ORAL at 17:39

## 2019-09-09 RX ADMIN — INSULIN LISPRO 4 UNITS: 100 INJECTION, SOLUTION INTRAVENOUS; SUBCUTANEOUS at 08:41

## 2019-09-09 RX ADMIN — INSULIN LISPRO 2 UNITS: 100 INJECTION, SOLUTION INTRAVENOUS; SUBCUTANEOUS at 17:32

## 2019-09-09 NOTE — PROGRESS NOTES
INTERNAL MEDICINE RESIDENCY PROGRESS NOTE     Name: Katy Gilliam  Age & Sex: 81 y o  male   MRN: [id number]  [facility]   Encounter: [id number]  Team: SOD Team A    PATIENT INFORMATION     Name: Katy Gilliam  Age & Sex: 81 y o  male   MRN: [id number]  Hospital Stay Days: 5    ASSESSMENT/PLAN     Principal Problem:    Brain mass with hemorrhage  Active Problems:    Weakness    Hepatocellular carcinoma (HCC)    Leukocytosis    Essential hypertension    COPD (chronic obstructive pulmonary disease) (HCC)    Alcohol abuse    Dependence on nicotine from cigarettes    Cerebral edema (HCC)    Malnutrition related to chronic disease      * Brain mass with hemorrhage  Assessment & Plan  Patient transferred from [address] [place] [address] for evaluation of mass with small focus of hemorrhage on CT head  MRI ordered by Neurosurgery revealed large centrally necrotic mass within the left frontoparietal region with surrounding vasogenic edema  Patient has history of hepatocellular carcinoma treated at [facility] with chemotherapy, immunotherapy, TACE  CT chest abdomen pelvis revealed heterogeneous liver with liver lesions, left lower lobe lung nodule, metastasis to manubrium and left 5th rib  · Neuro checks q 4 hours  · Decadron 4 mg q 12 hours - added Lantus 10 units daily and Humalog 8 units t i d  With meals; monitor glucose today  GI prophylaxis with Protonix 40 mg daily  · Neurosurgery plans for surgery on Tuesday; will follow up with Neurosurgery today for definitive surgery date    · NPO at midnight on Monday night for surgery on Tuesday  · Medical clearance in chart  · PT/OT/speech consult after surgery  · Neurology and neurosurgery consult; appreciate recommendations  · Oncology consult for [facility]  - will await pathology results from surgery and follow up outpatient for management  · Radiation oncology consult - will follow up after surgery for radiation    Weakness  Assessment & Plan  Likely secondary to mass and intracranial bleed  See work up as above  Leukocytosis  Assessment & Plan  Patient has leukocytosis to 14 today on CBC  · Likely secondary to glucocorticoid use with normal temperature and nontoxic appearance  Hepatocellular carcinoma [facility])  Assessment & Plan  Patient's history of hepatocellular carcinoma treated at [facility] in 2017 until January 2018  Patient received therapy with cisplatin, sorafenib, and TACE  CT chest abdomen pelvis with contrast revealed heterogenous liver with hepatomegaly and multifocal heterogenous liver lesions, may be due to multifocal [address]  or metastatic disease  Left lower lobe lung nodule measuring 7 6 mm, suspicious for metastatic disease  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  According to Care everywhere, patient was having partial response to therapy; however, stop therapy due to perceived lack of benefit and financial cost   · Contributing to above  · AFP 4 0  · Oxycodone 5 mg and oxycodone 10 mg p r n  For pain  · Oncology consult - will follow up with patient after pathology results    COPD (chronic obstructive pulmonary disease) ([address] )  Assessment & Plan  Patient has significant smoking history of 2 packs per day for 30-40 years  Patient is not up-to-date on immunizations and uses rescue inhaler p r n  Brooke Brooke Does not have maintenance inhaler  Suspect Decadron for above will help as well  · Respiratory protocol; DuoNeb q 4 hours p r n  · Breo Ellipta daily  · Pulmonary hygiene - incentive spirometer in room and patient instructed with use  · Pneumococcal vaccination      Essential hypertension  Assessment & Plan  Reports he is taking nifedipine as an outpatient but has not been on this for 3 months due to financial reasons  24 hour blood pressure range 127/81 to 181/90  Most likely elevated secondary to steroids  · Restarted home on nifedipine 60 mg 24 hour tablet p o   Daily  · Will consider adding 2nd agent today    Malnutrition related to chronic disease  Assessment & Plan  Malnutrition Findings:   Malnutrition type: Chronic illness(Chronic severe pro/vicky malnutrition r/t catabolic illness as evidenced by 15% unintentional wt loss since 19, consuming < 75% energy intake compared to est energy needs for > 1 month, Treated with diet, patient refused supplements)  Degree of Malnutrition: Other severe protein calorie malnutrition    BMI Findings:  BMI Classifications: Underweight < 18 5     There is no height or weight on file to calculate BMI  · Patient had significant weight loss over a couple months  · Patient has good appetite recently  · Nutrition recommendations as above    Dependence on nicotine from cigarettes  Assessment & Plan  Patient endorsed smoking 2 packs a day for last 30-40 years  · Nicotine patch 21mg daily    Alcohol abuse  Assessment & Plan  Patient endorsed drinking a 5th of whiskey alcohol multiple times per week  Patient states last drink was 3-4 days ago prior to admission  Patient denies any history of alcohol withdrawal or DT   · CIWA protocol discontinued  · Thiamine and folate supplementation        Disposition:  Disposition pending after neurosurgery  SUBJECTIVE     Patient seen and examined  No acute events overnight  Patient states pain is relatively well controlled  Patient has no other complaints this time  Patient is wondering when neurosurgery will; will follow up with Neurosurgery today to clarify date  Patient's right hand weakness is improved but still has difficulty with fine motor  Spoke to patient's nurse who have no new concerns at this time      OBJECTIVE     Vitals:    19 0015 19 0306 19 0322 19 0806   BP: (!) 173/90 (!) 159/101 (!) 156/103 130/71   Pulse: 69 83 97 70   Resp: 20   18   Temp: 98 2 °F (36 8 °C)  97 9 °F (36 6 °C) 98 3 °F (36 8 °C)   TempSrc:       SpO2: 91% (!) 89% 92% 91%   Weight:       Height:          Temperature:   Temp (24hrs), Av 1 °F (36 7 °C), Min:97 9 °F (36 6 °C), Max:98 3 °F (36 8 °C)    Temperature: 98 3 °F (36 8 °C)  Intake & Output:  I/O       09/07 0701 - 09/08 0700 09/08 0701 - 09/09 0700 09/09 0701 - 09/10 0700    P  O  480 1020     Total Intake(mL/kg) 480 (7 6) 1020 (16 1)     Net +480 +1020            Unmeasured Urine Occurrence 1 x          Weights:   IBW: 84 5 kg    Body mass index is 17 5 kg/m²  Weight (last 2 days)     Date/Time   Weight    09/07/19 03:08:46   63 5 (140)            Physical Exam   Constitutional: No distress  Sitting in chair eating breakfast   HENT:   Mouth/Throat: Oropharynx is clear and moist    Eyes: Right eye exhibits no discharge  Left eye exhibits no discharge  Cardiovascular: Normal rate and regular rhythm  Pulmonary/Chest: Effort normal    Clear but diminished breath sounds   Abdominal: Soft  Hepatomegaly  Nontender   Musculoskeletal: He exhibits no edema  Neurological: He is alert  Hand  4/5 on the right side compared to left  No obvious neuro deficits  Skin: Skin is warm and dry  Psychiatric: He has a normal mood and affect  Vitals reviewed  LABORATORY DATA     Labs: I have personally reviewed pertinent reports  Results from last 7 days   Lab Units 09/09/19 0457 09/06/19 0512 09/05/19  0536   WBC Thousand/uL 13 99* 8 55 9 13   HEMOGLOBIN g/dL 15 2 13 2 13 9   HEMATOCRIT % 46 2 39 5 42 7   PLATELETS Thousands/uL 323 239 259   NEUTROS PCT % 78*  --  71   MONOS PCT % 7  --  10      Results from last 7 days   Lab Units 09/09/19 0457 09/08/19  0931 09/06/19  0512  09/04/19  1103   POTASSIUM mmol/L 4 2 4 5 4 3   < > 4 4   CHLORIDE mmol/L 97* 96* 103   < > 99*   CO2 mmol/L 28 28 26   < > 31   BUN mg/dL 30* 32* 21   < > 15   CREATININE mg/dL 1 28 1 35* 1 07   < > 1 29   CALCIUM mg/dL 9 4 8 9 8 8   < > 9 3   ALK PHOS U/L  --   --   --   --  262*   ALT U/L  --   --   --   --  81*   AST U/L  --   --   --   --  70*    < > = values in this interval not displayed       Results from last 7 days Lab Units 09/05/19  0536   MAGNESIUM mg/dL 2 1     Results from last 7 days   Lab Units 09/05/19  0536   PHOSPHORUS mg/dL 3 3      Results from last 7 days   Lab Units 09/09/19  0457 09/04/19  1103   INR  0 96 1 00   PTT seconds  --  28               IMAGING & DIAGNOSTIC TESTING     Radiology Results: I have personally reviewed pertinent reports  X-ray Chest 1 View Portable    Result Date: 9/4/2019  Impression: Suggestion of osteolytic lesion in the posterior left 5th rib  Otherwise, no evidence of acute abnormality in the chest  Workstation performed: [id number]     Ct Head Without Contrast    Result Date: 9/4/2019  Impression: Large area of subcortical white matter hypodensity consistent with vasogenic edema in the left parietal region  Small focus of hemorrhage superiorly as seen on image 2/30 with an associated more hypodense masslike area measuring 3 9 x 2 2 cm  Mild mass  effect on the adjacent cortical sulci  No significant midline shift  MRI with contrast recommended to exclude underlying mass  I personally discussed this study with Marianne Leigh on 9/4/2019 at 11:36 AM  Workstation performed: [id number]     Mri Brain W Wo Contrast    Result Date: 9/5/2019  Impression: Large centrally necrotic mass within the left frontoparietal region with surrounding vasogenic edema  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 versus solitary metastasis  Workstation performed: [id number]     Ct Chest Abdomen Pelvis W Contrast    Result Date: 9/4/2019  Impression: Heterogenous liver with hepatomegaly and multifocal heterogenous liver lesions, may be due to multifocal [address]  or metastatic disease  Left lower lobe lung nodule measuring 7 6 mm, suspicious for metastatic disease 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 Mildly enlarged fidel hepatis lymphadenopathy The study was marked in EPIC for immediate notification   Workstation performed: [id number]     Other Diagnostic Testing: I have personally reviewed pertinent reports  ACTIVE MEDICATIONS     Current Facility-Administered Medications   Medication Dose Route Frequency    acetaminophen (TYLENOL) tablet 650 mg  650 mg Oral Q4H PRN    dexamethasone (DECADRON) injection 4 mg  4 mg Intravenous Q6H [address]    fluticasone-vilanterol (BREO ELLIPTA) 100-25 mcg/inh inhaler 1 puff  1 puff Inhalation Daily    folic acid (FOLVITE) tablet 1 mg  1 mg Oral Daily    hydrOXYzine HCL (ATARAX) tablet 25 mg  25 mg Oral Q6H PRN    insulin glargine (LANTUS) subcutaneous injection 10 Units 0 1 mL  10 Units Subcutaneous QAM    insulin lispro (HumaLOG) 100 units/mL subcutaneous injection 1-5 Units  1-5 Units Subcutaneous TID AC    insulin lispro (HumaLOG) 100 units/mL subcutaneous injection 8 Units  8 Units Subcutaneous TID With Meals    ipratropium-albuterol (DUO-NEB) 0 5-2 5 mg/3 mL inhalation solution 3 mL  3 mL Nebulization Q4H PRN    Labetalol HCl (NORMODYNE) injection 10 mg  10 mg Intravenous Q4H PRN    multivitamin-minerals (CENTRUM) tablet 1 tablet  1 tablet Oral Daily    nicotine (NICODERM CQ) 21 mg/24 hr TD 24 hr patch 21 mg  21 mg Transdermal Daily    NIFEdipine (PROCARDIA XL) 24 hr tablet 60 mg  60 mg Oral Daily    oxyCODONE (ROXICODONE) immediate release tablet 10 mg  10 mg Oral Q6H PRN    oxyCODONE (ROXICODONE) IR tablet 5 mg  5 mg Oral Q4H PRN    pantoprazole (PROTONIX) EC tablet 40 mg  40 mg Oral Early Morning    pneumococcal 13-valent conjugate vaccine (PREVNAR-13) IM injection 0 5 mL  0 5 mL Intramuscular Prior to discharge    thiamine (VITAMIN B1) tablet 100 mg  100 mg Oral Daily       VTE Pharmacologic Prophylaxis: Reason for no pharmacologic prophylaxis Brain mass with hemorrhage  VTE Mechanical Prophylaxis: sequential compression device    Portions of the record may have been created with voice recognition software    Occasional wrong word or "sound a like" substitutions may have occurred due to the inherent limitations of voice recognition software    Read the chart carefully and recognize, using context, where substitutions have occurred   ==  Chung Singh MD  [address]  Internal Medicine Residency PGY-1

## 2019-09-09 NOTE — ASSESSMENT & PLAN NOTE
Large necrotic left frontoparietal brain mass with surrounding vasogenic edema and mass effect    - Diagnosed with [address]  in 2017, s/p TACE x2 and sorafenib at [address] ([address])  Denies any follow up with oncology  States at that time his girlfriend of 21 years was dying of bile duct cancer and he took care of her, not treating his own illness  He is motivated to treat this aggressively  - Presented to [facility] with right hand clumsiness and weakness, as well as problems with coordination/balance, transferred to \[facility]\"" after [address] results    Imaging:  CTH, 9/4: Large area of subcortical white matter hypodensity consistent with vasogenic edema in the left parietal region  Small focus of hemorrhage superiorly as seen on image 2/30 with an associated more hypodense masslike area measuring 3 9 x 2 2 cm  CTCAP, 9/4: Heterogenous liver with hepatomegaly and multifocal heterogenous liver lesions, may be due to multifocal [address]  or metastatic disease  Left lower lobe lung nodule measuring 7 6 mm, suspicious for metastatic disease  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  Mildly enlarged fidel hepatis lymphadenopathy  MRI brain w/wo, 9/5: Large centrally necrotic mass within the left frontoparietal region measuring 2 9 x 4 3 x 3 4 cm with surrounding vasogenic edema  Plan:  Decadron 4q6  PT/OT  DVT ppx: SCDs, hold pharm ppx  Pain control per primary team  CIWA - monitor closely  OR tomorrow for tumor resection   NPO after MN, type & screen, UA  4u PRBC and 4u Plt on hold    Neurosurgery will continue to follow

## 2019-09-09 NOTE — SOCIAL WORK
CM reviewed pt in care coordination rounds  Pt is not medically stable for discharge  Pt is pending additional surgical procedure for this admission  CM will follow for medical clearance and discharge plan  CM in to speak with pt at bedside regarding discharge plan and request physical address if d/c for home with Denis Bill    Pt address is     [address] [address]

## 2019-09-09 NOTE — ANESTHESIA PREPROCEDURE EVALUATION
Review of Systems/Medical History  Patient summary reviewed  Chart reviewed  No history of anesthetic complications     Cardiovascular  Exercise tolerance (METS): >4,  Hypertension ,    Pulmonary  Smoker Cumulative Pack Years: 28,        GI/Hepatic    Liver disease ([address]  s/p partial chemotherapy) ,             Endo/Other    Comment: Hyperglycemia 2/2 steroids    GYN       Hematology   Musculoskeletal       Neurology      Comment: Large necrotic left frontoparietal brain mass with surrounding vasogenic edema and mass effect    RUE weakness, numbness Psychology   Negative psychology ROS              Physical Exam    Airway    Mallampati score: II  TM Distance: >3 FB  Neck ROM: full     Dental       Cardiovascular  Rhythm: regular, Rate: normal,     Pulmonary  Breath sounds clear to auscultation,     Other Findings        Anesthesia Plan  ASA Score- 3     Anesthesia Type- general with ASA Monitors  Additional Monitors: arterial line  Airway Plan: ETT  Plan Factors-    Induction- intravenous  Postoperative Plan- Plan for postoperative opioid use  Informed Consent- Anesthetic plan and risks discussed with patient

## 2019-09-09 NOTE — RESPIRATORY THERAPY NOTE
resp care      09/09/19 3171   Respiratory Assessment   Assessment Type Assess only   General Appearance Alert; Awake   Respiratory Pattern Normal   Chest Assessment Chest expansion symmetrical   Bilateral Breath Sounds Clear;Diminished   Cough None   Resp Comments no indication for prn udn tx, pt appears comfortable, will continue to monitor   O2 Device  na

## 2019-09-09 NOTE — PROGRESS NOTES
Progress Note - Olga Elk Horn 1950, 81 y o  male MRN: [id number]    [facility] Encounter: [id number]    Primary Care Provider: No primary care provider on file  Date and time admitted to hospital: 9/4/2019  4:38 PM        * Brain mass with hemorrhage  Assessment & Plan  Large necrotic left frontoparietal brain mass with surrounding vasogenic edema and mass effect    - Diagnosed with [address]  in 2017, s/p TACE x2 and sorafenib at [address] ([address])  Denies any follow up with oncology  States at that time his girlfriend of 21 years was dying of bile duct cancer and he took care of her, not treating his own illness  He is motivated to treat this aggressively  - Presented to [facility] with right hand clumsiness and weakness, as well as problems with coordination/balance, transferred to [facility] after [facility] results    Imaging:  CTH, 9/4: Large area of subcortical white matter hypodensity consistent with vasogenic edema in the left parietal region  Small focus of hemorrhage superiorly as seen on image 2/30 with an associated more hypodense masslike area measuring 3 9 x 2 2 cm  CTCAP, 9/4: Heterogenous liver with hepatomegaly and multifocal heterogenous liver lesions, may be due to multifocal [address]  or metastatic disease  Left lower lobe lung nodule measuring 7 6 mm, suspicious for metastatic disease  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  Mildly enlarged fidel hepatis lymphadenopathy  MRI brain w/wo, 9/5: Large centrally necrotic mass within the left frontoparietal region measuring 2 9 x 4 3 x 3 4 cm with surrounding vasogenic edema  Plan:  Decadron 4q6  PT/OT  DVT ppx: SCDs, hold pharm ppx  Pain control per primary team  CIWA - monitor closely  OR tomorrow for tumor resection   NPO after MN, type & screen, UA  4u PRBC and 4u Plt on hold    Neurosurgery will continue to follow    Cerebral edema [facility])  Assessment & Plan  2/2 brain mass  Decadron 4q6    Alcohol abuse  Assessment & Plan  CIWA - monitor for signs of withdrawal      Dependence on nicotine from cigarettes  Assessment & Plan  2 packs/day for 30-40 years  Nicoderm patch        Subjective/Objective   Chief Complaint: none    Subjective: Patient with NAEO and no complaints    Objective: Patient sleeping in bed, sister at bedside  VSS  Invasive Devices     Peripheral Intravenous Line            Peripheral IV 09/09/19 Dorsal (posterior); Right Forearm less than 1 day                Vitals: Blood pressure 130/71, pulse 70, temperature 98 3 °F (36 8 °C), resp  rate 18, height 6' 3" (1 905 m), weight 63 5 kg (140 lb), SpO2 91 %  ,Body mass index is 17 5 kg/m²  General appearance: alert, appears stated age, cooperative and no distress  Head: Normocephalic, without obvious abnormality, atraumatic  Eyes: EOMI, PERRL  Neck: supple, symmetrical, trachea midline   Lungs: non labored breathing  Heart: regular heart rate  Neurologic:   Mental status: Alert, oriented x3, thought content appropriate  Able to perform simple calculations  Cranial nerves: grossly intact (Cranial nerves II-XII)  Sensory: normal to LT bilaterally  Motor: moving all extremities without focal weakness, 5/5 bilaterally  Reflexes: 2+ and symmetric, no clonus or hoffmans  Coordination: finger to nose abnormal right side, no drift bilaterally      Lab Results:   I have personally reviewed pertinent results  Lab Results   Component Value Date    WBC 13 99 (H) 09/09/2019    HGB 15 2 09/09/2019    HCT 46 2 09/09/2019    MCV 96 09/09/2019     09/09/2019    MCH 31 7 09/09/2019    MCHC 32 9 09/09/2019    RDW 14 9 09/09/2019    MPV 11 6 09/09/2019    NRBC 0 09/09/2019    SODIUM 131 (L) 09/09/2019    CL 97 (L) 09/09/2019    CO2 28 09/09/2019    BUN 30 (H) 09/09/2019    CREATININE 1 28 09/09/2019    CALCIUM 9 4 09/09/2019    EGFR 58 09/09/2019    INR 0 96 09/09/2019       Imaging Studies: I have personally reviewed pertinent reports     and I have personally reviewed pertinent films in PACS     X-ray Chest 1 View Portable  Result Date: 9/4/2019  Impression: Suggestion of osteolytic lesion in the posterior left 5th rib  Otherwise, no evidence of acute abnormality in the chest  Workstation performed: [id number]     Ct Head Without Contrast  Result Date: 9/4/2019  Impression: Large area of subcortical white matter hypodensity consistent with vasogenic edema in the left parietal region  Small focus of hemorrhage superiorly as seen on image 2/30 with an associated more hypodense masslike area measuring 3 9 x 2 2 cm  Mild mass  effect on the adjacent cortical sulci  No significant midline shift  MRI with contrast recommended to exclude underlying mass  I personally discussed this study with José Miguel Jensen on 9/4/2019 at 11:36 AM  Workstation performed: [id number]     Mri Brain W Wo Contrast  Result Date: 9/5/2019  Impression: Large centrally necrotic mass within the left frontoparietal region with surrounding vasogenic edema  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 versus solitary metastasis  Workstation performed: [id number]     Ct Chest Abdomen Pelvis W Contrast  Result Date: 9/4/2019  Impression: Heterogenous liver with hepatomegaly and multifocal heterogenous liver lesions, may be due to multifocal [address]  or metastatic disease  Left lower lobe lung nodule measuring 7 6 mm, suspicious for metastatic disease 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 Mildly enlarged fidel hepatis lymphadenopathy The study was marked in EPIC for immediate notification  Workstation performed: [id number]       EKG, Pathology, and Other Studies: I have personally reviewed pertinent reports     and I have personally reviewed pertinent films in PACS    VTE Pharmacologic Prophylaxis: Sequential compression device (Venodyne)  and RX contraindicated due to: hemorrhagic mass and upcoming surgery

## 2019-09-09 NOTE — ASSESSMENT & PLAN NOTE
Patient has leukocytosis to 11 8 decreased from 16 5 on CBC  · Likely secondary to glucocorticoid use with normal temperature and nontoxic appearance    · Continue monitor CBC daily

## 2019-09-09 NOTE — PROGRESS NOTES
IM Residency Progress Note   Unit/Bed#: [facility] 631-01 Encounter: [id number]  SOD Team A      Mila Escamilla  81 y o  male [id number]    Hospital Stay Days: 5      Assessment/Plan: Freya Rose is a 82 yo M with past medical history of hepatocellular carcinoma, hepatitis c, COPD, and essential hypertension now with L frontoparietal brain mass with surrounding vasogenic edema on imaging    Principal Problem:    Brain mass with hemorrhage  Active Problems:    Weakness    Essential hypertension    Alcohol abuse    Dependence on nicotine from cigarettes    Cerebral edema (HCC)    COPD (chronic obstructive pulmonary disease) (HCC)    Hepatocellular carcinoma ([facility] [address] )    Malnutrition related to chronic disease    Leukocytosis    #Brain mass with hemorrhage  - Neurosurgery plan for resection of brain mass tomorrow 9/10  -NPO at midnight for procedure  - Oncology holding on treatment plan until surgical resection and pathology are completed in order to choose best treatment option  - Continue Decadron 4 mg  - Continue Atarax PRN for anxiety    #Weakness  - Increased heaviness of right hand yesterday and today compared to the previous week  Continue plan as above    #Essential Hypertension  - BP elevated to 181/90 in the last 24 hrs  Now controlled at 130/71  - Continue home nifedipine and PRN labetalol    #Alcohol Abuse  - Patient with CIWA score of 0 for several days  - Continue supplementation of folic acid, thiamine, and centrum multivitamin    #Dependence on nicotine from cigarettes  - Continue nicoderm patch 21 mg qd  - Add nicorette gum 2 mg PRN  Patient educated on how to use the gum    #COPD  - SOB and cough improved  - Continue respiratory protocol   Encourage incentive spirometry and OOB/ambulate  - Continue Breo Ellipta 100-25 mcg/inh  - Continue PRN Duo-Neb 0 5-2 5 mg/3 mL  - Pneumococcal 13-valent conjugate vaccine ordered to be given prior to discharge    #Hepatocellular Carcinoma  - Oncology consulted, plan as above  - Continue tylenol 650 mg PRN  - Continue oxycodone 5 and 10 mg PRN    #Malnutrition related to Chronic Disease  - Continue vitamin/nutrient supplementation as above  - Regular diet until , then NPO at midnight for surgery on 9/10          Subjective:   Patient is sitting in his chair and feeling good today  He has had no worsening of his SOB or cough  His oxygen saturation did decrease to 87% earlier today, however he felt no increased work of breathing compared to baseline and the saturation is now 91%  His right hand weakness is not better or worse, however he does state that there is increased heaviness of the hand since yesterday such that his hand sometimes drops in his food by accident  He continues to have anxiety though he does feel that the Atarax is helping  He feels that his nicoderm patch sometimes helps with withdrawal symptoms and sometimes does not  Nicorette gum PRN has been added to his meds, and we have discussed that the gum is to be pocketed in the mouth and not chewed  He no longer has any diarrhea  He is tolerating his meals well with no nausea or vomiting  He denies any fevers, chills, sweats, chest pain, palpitations, or other complaints  Patient's sister was present for the interview  Patient has requested that the sister act as medical power of   Patient and sister were informed that per case management, power of  cannot be established by the hospital and that they would need to contact a  for those proceedings         Vitals: Temp (24hrs), Av 1 °F (36 7 °C), Min:97 9 °F (36 6 °C), Max:98 3 °F (36 8 °C)  Current: Temperature: 98 3 °F (36 8 °C)  Vitals:    19 0015 19 0306 19 0322 19 0806   BP: (!) 173/90 (!) 159/101 (!) 156/103 130/71   Pulse: 69 83 97 70   Resp: 20   18   Temp: 98 2 °F (36 8 °C)  97 9 °F (36 6 °C) 98 3 °F (36 8 °C)   TempSrc:       SpO2: 91% (!) 89% 92% 91%   Weight:       Height:        Body mass index is 17 5 kg/m²  I/O last 24 hours: In: 1020 [P O :1020]  Out: -     Review of Systems   Constitution: Positive for weight loss  Negative for chills, decreased appetite, diaphoresis and fever  Cardiovascular: Negative for chest pain, irregular heartbeat, leg swelling and palpitations  Respiratory: Positive for cough and shortness of breath  Gastrointestinal: Negative for abdominal pain, anorexia, diarrhea, nausea and vomiting  Neurological: Positive for focal weakness and numbness  Psychiatric/Behavioral: The patient is nervous/anxious  Physical Exam: Physical Exam   Constitutional: He is oriented to person, place, and time  No distress  HENT:   Head: Normocephalic and atraumatic  Cardiovascular: Normal rate, regular rhythm and normal heart sounds  Pulmonary/Chest: Effort normal  He has wheezes  Wheezes present bilaterally throughout lung fields   Abdominal: Soft  He exhibits no distension  There is no tenderness  Liver is enlarged and palpable   Musculoskeletal: He exhibits no edema, tenderness or deformity  Neurological: He is alert and oriented to person, place, and time  Coordination normal    Skin: He is not diaphoretic  Psychiatric: His mood appears anxious  Invasive Devices     Peripheral Intravenous Line            Peripheral IV 09/09/19 Dorsal (posterior); Right Forearm less than 1 day                          Labs:   Recent Results (from the past 24 hour(s))   Fingerstick Glucose (POCT)    Collection Time: 09/08/19  4:58 PM   Result Value Ref Range    POC Glucose 353 (H) 65 - 140 mg/dl   Fingerstick Glucose (POCT)    Collection Time: 09/08/19  8:47 PM   Result Value Ref Range    POC Glucose 382 (H) 65 - 140 mg/dl   CBC and differential    Collection Time: 09/09/19  4:57 AM   Result Value Ref Range    WBC 13 99 (H) 4 31 - 10 16 Thousand/uL    RBC 4 80 3 88 - 5 62 Million/uL    Hemoglobin 15 2 12 0 - 17 0 g/dL    Hematocrit 46 2 36 5 - 49 3 %    MCV 96 82 - 98 fL    MCH 31 7 26 8 - 34 3 pg    MCHC 32 9 31 4 - 37 4 g/dL    RDW 14 9 11 6 - 15 1 %    MPV 11 6 8 9 - 12 7 fL    Platelets 589 305 - 706 Thousands/uL    nRBC 0 /100 WBCs    Neutrophils Relative 78 (H) 43 - 75 %    Immat GRANS % 1 0 - 2 %    Lymphocytes Relative 14 14 - 44 %    Monocytes Relative 7 4 - 12 %    Eosinophils Relative 0 0 - 6 %    Basophils Relative 0 0 - 1 %    Neutrophils Absolute 10 85 (H) 1 85 - 7 62 Thousands/µL    Immature Grans Absolute 0 10 0 00 - 0 20 Thousand/uL    Lymphocytes Absolute 1 96 0 60 - 4 47 Thousands/µL    Monocytes Absolute 1 01 0 17 - 1 22 Thousand/µL    Eosinophils Absolute 0 02 0 00 - 0 61 Thousand/µL    Basophils Absolute 0 05 0 00 - 0 10 Thousands/µL   Basic metabolic panel    Collection Time: 09/09/19  4:57 AM   Result Value Ref Range    Sodium 131 (L) 136 - 145 mmol/L    Potassium 4 2 3 5 - 5 3 mmol/L    Chloride 97 (L) 100 - 108 mmol/L    CO2 28 21 - 32 mmol/L    ANION GAP 6 4 - 13 mmol/L    BUN 30 (H) 5 - 25 mg/dL    Creatinine 1 28 0 60 - 1 30 mg/dL    Glucose 244 (H) 65 - 140 mg/dL    Calcium 9 4 8 3 - 10 1 mg/dL    eGFR 58 ml/min/1 73sq m   Protime-INR    Collection Time: 09/09/19  4:57 AM   Result Value Ref Range    Protime 12 4 11 6 - 14 5 seconds    INR 0 96 0 84 - 1 19   Fingerstick Glucose (POCT)    Collection Time: 09/09/19 12:39 PM   Result Value Ref Range    POC Glucose 289 (H) 65 - 140 mg/dl       Radiology Results: I have personally reviewed pertinent films in PACS      Other Diagnostic Testing:   I have personally reviewed pertinent reports          Active Meds:   Current Facility-Administered Medications   Medication Dose Route Frequency    acetaminophen (TYLENOL) tablet 650 mg  650 mg Oral Q4H PRN    chlorhexidine (PERIDEX) 0 12 % oral rinse 15 mL  15 mL Swish & Spit Once    dexamethasone (DECADRON) injection 4 mg  4 mg Intravenous Q6H [address]    fluticasone-vilanterol (BREO ELLIPTA) 100-25 mcg/inh inhaler 1 puff  1 puff Inhalation Daily    folic acid ([place]) tablet 1 mg  1 mg Oral Daily    hydrOXYzine HCL (ATARAX) tablet 25 mg  25 mg Oral Q6H PRN    insulin glargine (LANTUS) subcutaneous injection 10 Units 0 1 mL  10 Units Subcutaneous QAM    insulin lispro (HumaLOG) 100 units/mL subcutaneous injection 1-5 Units  1-5 Units Subcutaneous TID AC    insulin lispro (HumaLOG) 100 units/mL subcutaneous injection 8 Units  8 Units Subcutaneous TID With Meals    ipratropium-albuterol (DUO-NEB) 0 5-2 5 mg/3 mL inhalation solution 3 mL  3 mL Nebulization Q4H PRN    Labetalol HCl (NORMODYNE) injection 10 mg  10 mg Intravenous Q4H PRN    multivitamin-minerals (CENTRUM) tablet 1 tablet  1 tablet Oral Daily    nicotine (NICODERM CQ) 21 mg/24 hr TD 24 hr patch 21 mg  21 mg Transdermal Daily    nicotine polacrilex (NICORETTE) gum 2 mg  2 mg Oral Q1H PRN    NIFEdipine (PROCARDIA XL) 24 hr tablet 60 mg  60 mg Oral Daily    oxyCODONE (ROXICODONE) immediate release tablet 10 mg  10 mg Oral Q6H PRN    oxyCODONE (ROXICODONE) IR tablet 5 mg  5 mg Oral Q4H PRN    pantoprazole (PROTONIX) EC tablet 40 mg  40 mg Oral Early Morning    pneumococcal 13-valent conjugate vaccine (PREVNAR-13) IM injection 0 5 mL  0 5 mL Intramuscular Prior to discharge    sodium chloride 0 9 % bolus 1,000 mL  1,000 mL Intravenous Once    sodium chloride tablet 1 g  1 g Oral TID With Meals    thiamine (VITAMIN B1) tablet 100 mg  100 mg Oral Daily         VTE Pharmacologic Prophylaxis: Sequential compression device (Venodyne)   VTE Mechanical Prophylaxis: sequential compression device    [facility]

## 2019-09-10 ENCOUNTER — ANESTHESIA (INPATIENT)
Dept: PERIOP | Facility: HOSPITAL | Age: 69
DRG: 025 | End: 2019-09-10
Payer: MEDICARE

## 2019-09-10 ENCOUNTER — APPOINTMENT (INPATIENT)
Dept: RADIOLOGY | Facility: HOSPITAL | Age: 69
DRG: 025 | End: 2019-09-10
Payer: MEDICARE

## 2019-09-10 PROBLEM — D38.1: Status: ACTIVE | Noted: 2019-09-10

## 2019-09-10 PROBLEM — R53.1 WEAKNESS: Status: RESOLVED | Noted: 2019-09-04 | Resolved: 2019-09-10

## 2019-09-10 PROBLEM — F17.210 DEPENDENCE ON NICOTINE FROM CIGARETTES: Status: RESOLVED | Noted: 2019-09-04 | Resolved: 2019-09-10

## 2019-09-10 PROBLEM — R74.01 TRANSAMINITIS: Status: ACTIVE | Noted: 2019-09-10

## 2019-09-10 PROBLEM — N17.9 AKI (ACUTE KIDNEY INJURY) (HCC): Status: ACTIVE | Noted: 2019-09-10

## 2019-09-10 LAB
ALBUMIN SERPL BCP-MCNC: 3.1 G/DL (ref 3.5–5)
ALP SERPL-CCNC: 220 U/L (ref 46–116)
ALT SERPL W P-5'-P-CCNC: 81 U/L (ref 12–78)
ANION GAP SERPL CALCULATED.3IONS-SCNC: 5 MMOL/L (ref 4–13)
APTT PPP: 26 SECONDS (ref 23–37)
AST SERPL W P-5'-P-CCNC: 33 U/L (ref 5–45)
BASOPHILS # BLD AUTO: 0.04 THOUSANDS/ΜL (ref 0–0.1)
BASOPHILS NFR BLD AUTO: 0 % (ref 0–1)
BILIRUB SERPL-MCNC: 0.83 MG/DL (ref 0.2–1)
BUN SERPL-MCNC: 30 MG/DL (ref 5–25)
CALCIUM SERPL-MCNC: 9.4 MG/DL (ref 8.3–10.1)
CHLORIDE SERPL-SCNC: 100 MMOL/L (ref 100–108)
CO2 SERPL-SCNC: 30 MMOL/L (ref 21–32)
CREAT SERPL-MCNC: 1.34 MG/DL (ref 0.6–1.3)
EOSINOPHIL # BLD AUTO: 0.03 THOUSAND/ΜL (ref 0–0.61)
EOSINOPHIL NFR BLD AUTO: 0 % (ref 0–6)
ERYTHROCYTE [DISTWIDTH] IN BLOOD BY AUTOMATED COUNT: 14.7 % (ref 11.6–15.1)
GFR SERPL CREATININE-BSD FRML MDRD: 54 ML/MIN/1.73SQ M
GLUCOSE SERPL-MCNC: 234 MG/DL (ref 65–140)
GLUCOSE SERPL-MCNC: 241 MG/DL (ref 65–140)
GLUCOSE SERPL-MCNC: 272 MG/DL (ref 65–140)
HCT VFR BLD AUTO: 42.4 % (ref 36.5–49.3)
HGB BLD-MCNC: 14.3 G/DL (ref 12–17)
IMM GRANULOCYTES # BLD AUTO: 0.11 THOUSAND/UL (ref 0–0.2)
IMM GRANULOCYTES NFR BLD AUTO: 1 % (ref 0–2)
INR PPP: 0.99 (ref 0.84–1.19)
LYMPHOCYTES # BLD AUTO: 1.48 THOUSANDS/ΜL (ref 0.6–4.47)
LYMPHOCYTES NFR BLD AUTO: 9 % (ref 14–44)
MCH RBC QN AUTO: 32.6 PG (ref 26.8–34.3)
MCHC RBC AUTO-ENTMCNC: 33.7 G/DL (ref 31.4–37.4)
MCV RBC AUTO: 97 FL (ref 82–98)
MONOCYTES # BLD AUTO: 0.81 THOUSAND/ΜL (ref 0.17–1.22)
MONOCYTES NFR BLD AUTO: 5 % (ref 4–12)
NEUTROPHILS # BLD AUTO: 13.44 THOUSANDS/ΜL (ref 1.85–7.62)
NEUTS SEG NFR BLD AUTO: 85 % (ref 43–75)
NRBC BLD AUTO-RTO: 0 /100 WBCS
PLATELET # BLD AUTO: 296 THOUSANDS/UL (ref 149–390)
PLATELET # BLD AUTO: 314 THOUSANDS/UL (ref 149–390)
PMV BLD AUTO: 10.9 FL (ref 8.9–12.7)
PMV BLD AUTO: 11.3 FL (ref 8.9–12.7)
POTASSIUM SERPL-SCNC: 5 MMOL/L (ref 3.5–5.3)
PROT SERPL-MCNC: 7.4 G/DL (ref 6.4–8.2)
PROTHROMBIN TIME: 12.7 SECONDS (ref 11.6–14.5)
RBC # BLD AUTO: 4.38 MILLION/UL (ref 3.88–5.62)
SODIUM SERPL-SCNC: 135 MMOL/L (ref 136–145)
WBC # BLD AUTO: 15.91 THOUSAND/UL (ref 4.31–10.16)

## 2019-09-10 PROCEDURE — 88333 PATH CONSLTJ SURG CYTO XM 1: CPT | Performed by: PATHOLOGY

## 2019-09-10 PROCEDURE — 85049 AUTOMATED PLATELET COUNT: CPT | Performed by: PHYSICIAN ASSISTANT

## 2019-09-10 PROCEDURE — 88331 PATH CONSLTJ SURG 1 BLK 1SPC: CPT | Performed by: PATHOLOGY

## 2019-09-10 PROCEDURE — 88307 TISSUE EXAM BY PATHOLOGIST: CPT | Performed by: PATHOLOGY

## 2019-09-10 PROCEDURE — 82330 ASSAY OF CALCIUM: CPT

## 2019-09-10 PROCEDURE — C1713 ANCHOR/SCREW BN/BN,TIS/BN: HCPCS | Performed by: NEUROLOGICAL SURGERY

## 2019-09-10 PROCEDURE — 85610 PROTHROMBIN TIME: CPT | Performed by: PHYSICIAN ASSISTANT

## 2019-09-10 PROCEDURE — 88342 IMHCHEM/IMCYTCHM 1ST ANTB: CPT

## 2019-09-10 PROCEDURE — 80053 COMPREHEN METABOLIC PANEL: CPT | Performed by: PHYSICIAN ASSISTANT

## 2019-09-10 PROCEDURE — 85025 COMPLETE CBC W/AUTO DIFF WBC: CPT | Performed by: PHYSICIAN ASSISTANT

## 2019-09-10 PROCEDURE — 85014 HEMATOCRIT: CPT

## 2019-09-10 PROCEDURE — 61781 SCAN PROC CRANIAL INTRA: CPT | Performed by: NEUROLOGICAL SURGERY

## 2019-09-10 PROCEDURE — 82947 ASSAY GLUCOSE BLOOD QUANT: CPT

## 2019-09-10 PROCEDURE — 88341 IMHCHEM/IMCYTCHM EA ADD ANTB: CPT

## 2019-09-10 PROCEDURE — 61510 CRNEC TREPH EXC BRN TUM STTL: CPT | Performed by: NEUROLOGICAL SURGERY

## 2019-09-10 PROCEDURE — 81287 MGMT GENE PRMTR MTHYLTN ALYS: CPT

## 2019-09-10 PROCEDURE — 82803 BLOOD GASES ANY COMBINATION: CPT

## 2019-09-10 PROCEDURE — 81445 SO NEO GSAP 5-50DNA/DNA&RNA: CPT

## 2019-09-10 PROCEDURE — 70553 MRI BRAIN STEM W/O & W/DYE: CPT

## 2019-09-10 PROCEDURE — 99291 CRITICAL CARE FIRST HOUR: CPT | Performed by: ANESTHESIOLOGY

## 2019-09-10 PROCEDURE — 00B70ZZ EXCISION OF CEREBRAL HEMISPHERE, OPEN APPROACH: ICD-10-PCS | Performed by: NEUROLOGICAL SURGERY

## 2019-09-10 PROCEDURE — 03HY32Z INSERTION OF MONITORING DEVICE INTO UPPER ARTERY, PERCUTANEOUS APPROACH: ICD-10-PCS | Performed by: INTERNAL MEDICINE

## 2019-09-10 PROCEDURE — 94760 N-INVAS EAR/PLS OXIMETRY 1: CPT

## 2019-09-10 PROCEDURE — 84295 ASSAY OF SERUM SODIUM: CPT

## 2019-09-10 PROCEDURE — 85730 THROMBOPLASTIN TIME PARTIAL: CPT | Performed by: PHYSICIAN ASSISTANT

## 2019-09-10 PROCEDURE — 84132 ASSAY OF SERUM POTASSIUM: CPT

## 2019-09-10 PROCEDURE — 82948 REAGENT STRIP/BLOOD GLUCOSE: CPT

## 2019-09-10 PROCEDURE — A9585 GADOBUTROL INJECTION: HCPCS | Performed by: NEUROLOGICAL SURGERY

## 2019-09-10 DEVICE — IMPLANTABLE DEVICE
Type: IMPLANTABLE DEVICE | Site: CRANIAL | Status: FUNCTIONAL
Brand: THINFLAP SYSTEM

## 2019-09-10 RX ORDER — DOCUSATE SODIUM 100 MG/1
100 CAPSULE, LIQUID FILLED ORAL 2 TIMES DAILY
Status: DISCONTINUED | OUTPATIENT
Start: 2019-09-10 | End: 2019-09-13 | Stop reason: HOSPADM

## 2019-09-10 RX ORDER — ONDANSETRON 2 MG/ML
INJECTION INTRAMUSCULAR; INTRAVENOUS AS NEEDED
Status: DISCONTINUED | OUTPATIENT
Start: 2019-09-10 | End: 2019-09-10 | Stop reason: SURG

## 2019-09-10 RX ORDER — DEXAMETHASONE SODIUM PHOSPHATE 10 MG/ML
INJECTION, SOLUTION INTRAMUSCULAR; INTRAVENOUS AS NEEDED
Status: DISCONTINUED | OUTPATIENT
Start: 2019-09-10 | End: 2019-09-10 | Stop reason: SURG

## 2019-09-10 RX ORDER — CEFAZOLIN SODIUM 1 G/3ML
INJECTION, POWDER, FOR SOLUTION INTRAMUSCULAR; INTRAVENOUS AS NEEDED
Status: DISCONTINUED | OUTPATIENT
Start: 2019-09-10 | End: 2019-09-10 | Stop reason: SURG

## 2019-09-10 RX ORDER — PROPOFOL 10 MG/ML
INJECTION, EMULSION INTRAVENOUS AS NEEDED
Status: DISCONTINUED | OUTPATIENT
Start: 2019-09-10 | End: 2019-09-10 | Stop reason: SURG

## 2019-09-10 RX ORDER — ACETAMINOPHEN 160 MG
TABLET,DISINTEGRATING ORAL AS NEEDED
Status: DISCONTINUED | OUTPATIENT
Start: 2019-09-10 | End: 2019-09-10 | Stop reason: HOSPADM

## 2019-09-10 RX ORDER — DEXAMETHASONE SODIUM PHOSPHATE 4 MG/ML
2 INJECTION, SOLUTION INTRA-ARTICULAR; INTRALESIONAL; INTRAMUSCULAR; INTRAVENOUS; SOFT TISSUE EVERY 8 HOURS
Status: DISCONTINUED | OUTPATIENT
Start: 2019-09-18 | End: 2019-09-13 | Stop reason: HOSPADM

## 2019-09-10 RX ORDER — ONDANSETRON 2 MG/ML
4 INJECTION INTRAMUSCULAR; INTRAVENOUS EVERY 6 HOURS PRN
Status: DISCONTINUED | OUTPATIENT
Start: 2019-09-10 | End: 2019-09-13 | Stop reason: HOSPADM

## 2019-09-10 RX ORDER — SODIUM CHLORIDE 9 MG/ML
INJECTION, SOLUTION INTRAVENOUS CONTINUOUS PRN
Status: DISCONTINUED | OUTPATIENT
Start: 2019-09-10 | End: 2019-09-10 | Stop reason: SURG

## 2019-09-10 RX ORDER — CEFAZOLIN SODIUM 2 G/50ML
2000 SOLUTION INTRAVENOUS EVERY 8 HOURS
Status: COMPLETED | OUTPATIENT
Start: 2019-09-10 | End: 2019-09-11

## 2019-09-10 RX ORDER — MAGNESIUM HYDROXIDE/ALUMINUM HYDROXICE/SIMETHICONE 120; 1200; 1200 MG/30ML; MG/30ML; MG/30ML
30 SUSPENSION ORAL EVERY 6 HOURS PRN
Status: DISCONTINUED | OUTPATIENT
Start: 2019-09-10 | End: 2019-09-13 | Stop reason: HOSPADM

## 2019-09-10 RX ORDER — FENTANYL CITRATE/PF 50 MCG/ML
25 SYRINGE (ML) INJECTION
Status: COMPLETED | OUTPATIENT
Start: 2019-09-10 | End: 2019-09-10

## 2019-09-10 RX ORDER — HYDRALAZINE HYDROCHLORIDE 20 MG/ML
5 INJECTION INTRAMUSCULAR; INTRAVENOUS ONCE
Status: COMPLETED | OUTPATIENT
Start: 2019-09-11 | End: 2019-09-11

## 2019-09-10 RX ORDER — SENNOSIDES 8.6 MG
1 TABLET ORAL DAILY
Status: DISCONTINUED | OUTPATIENT
Start: 2019-09-11 | End: 2019-09-13 | Stop reason: HOSPADM

## 2019-09-10 RX ORDER — HYDROMORPHONE HCL/PF 1 MG/ML
0.2 SYRINGE (ML) INJECTION
Status: COMPLETED | OUTPATIENT
Start: 2019-09-10 | End: 2019-09-10

## 2019-09-10 RX ORDER — DEXAMETHASONE SODIUM PHOSPHATE 4 MG/ML
4 INJECTION, SOLUTION INTRA-ARTICULAR; INTRALESIONAL; INTRAMUSCULAR; INTRAVENOUS; SOFT TISSUE EVERY 8 HOURS
Status: DISCONTINUED | OUTPATIENT
Start: 2019-09-12 | End: 2019-09-13 | Stop reason: HOSPADM

## 2019-09-10 RX ORDER — FENTANYL CITRATE 50 UG/ML
50 INJECTION, SOLUTION INTRAMUSCULAR; INTRAVENOUS EVERY 2 HOUR PRN
Status: DISCONTINUED | OUTPATIENT
Start: 2019-09-10 | End: 2019-09-13

## 2019-09-10 RX ORDER — LIDOCAINE HYDROCHLORIDE 10 MG/ML
INJECTION, SOLUTION INFILTRATION; PERINEURAL AS NEEDED
Status: DISCONTINUED | OUTPATIENT
Start: 2019-09-10 | End: 2019-09-10 | Stop reason: SURG

## 2019-09-10 RX ORDER — DEXAMETHASONE SODIUM PHOSPHATE 4 MG/ML
4 INJECTION, SOLUTION INTRA-ARTICULAR; INTRALESIONAL; INTRAMUSCULAR; INTRAVENOUS; SOFT TISSUE EVERY 6 HOURS SCHEDULED
Status: COMPLETED | OUTPATIENT
Start: 2019-09-10 | End: 2019-09-12

## 2019-09-10 RX ORDER — ROCURONIUM BROMIDE 10 MG/ML
INJECTION, SOLUTION INTRAVENOUS AS NEEDED
Status: DISCONTINUED | OUTPATIENT
Start: 2019-09-10 | End: 2019-09-10 | Stop reason: SURG

## 2019-09-10 RX ORDER — GLYCOPYRROLATE 0.2 MG/ML
INJECTION INTRAMUSCULAR; INTRAVENOUS AS NEEDED
Status: DISCONTINUED | OUTPATIENT
Start: 2019-09-10 | End: 2019-09-10 | Stop reason: SURG

## 2019-09-10 RX ORDER — ONDANSETRON 2 MG/ML
4 INJECTION INTRAMUSCULAR; INTRAVENOUS ONCE AS NEEDED
Status: DISCONTINUED | OUTPATIENT
Start: 2019-09-10 | End: 2019-09-10 | Stop reason: HOSPADM

## 2019-09-10 RX ORDER — SODIUM CHLORIDE, SODIUM LACTATE, POTASSIUM CHLORIDE, CALCIUM CHLORIDE 600; 310; 30; 20 MG/100ML; MG/100ML; MG/100ML; MG/100ML
100 INJECTION, SOLUTION INTRAVENOUS CONTINUOUS
Status: DISCONTINUED | OUTPATIENT
Start: 2019-09-10 | End: 2019-09-11

## 2019-09-10 RX ORDER — LIDOCAINE HYDROCHLORIDE AND EPINEPHRINE 10; 10 MG/ML; UG/ML
INJECTION, SOLUTION INFILTRATION; PERINEURAL AS NEEDED
Status: DISCONTINUED | OUTPATIENT
Start: 2019-09-10 | End: 2019-09-10 | Stop reason: HOSPADM

## 2019-09-10 RX ORDER — DEXAMETHASONE SODIUM PHOSPHATE 4 MG/ML
2 INJECTION, SOLUTION INTRA-ARTICULAR; INTRALESIONAL; INTRAMUSCULAR; INTRAVENOUS; SOFT TISSUE EVERY 6 HOURS SCHEDULED
Status: DISCONTINUED | OUTPATIENT
Start: 2019-09-15 | End: 2019-09-13 | Stop reason: HOSPADM

## 2019-09-10 RX ORDER — FUROSEMIDE 10 MG/ML
INJECTION INTRAMUSCULAR; INTRAVENOUS AS NEEDED
Status: DISCONTINUED | OUTPATIENT
Start: 2019-09-10 | End: 2019-09-10 | Stop reason: SURG

## 2019-09-10 RX ORDER — MANNITOL 250 MG/ML
INJECTION, SOLUTION INTRAVENOUS AS NEEDED
Status: DISCONTINUED | OUTPATIENT
Start: 2019-09-10 | End: 2019-09-10 | Stop reason: SURG

## 2019-09-10 RX ORDER — ACETAMINOPHEN 325 MG/1
975 TABLET ORAL ONCE AS NEEDED
Status: DISCONTINUED | OUTPATIENT
Start: 2019-09-10 | End: 2019-09-10 | Stop reason: HOSPADM

## 2019-09-10 RX ORDER — DEXAMETHASONE SODIUM PHOSPHATE 4 MG/ML
2 INJECTION, SOLUTION INTRA-ARTICULAR; INTRALESIONAL; INTRAMUSCULAR; INTRAVENOUS; SOFT TISSUE EVERY 24 HOURS
Status: DISCONTINUED | OUTPATIENT
Start: 2019-09-24 | End: 2019-09-13 | Stop reason: HOSPADM

## 2019-09-10 RX ORDER — LEVETIRACETAM 500 MG/5ML
INJECTION, SOLUTION, CONCENTRATE INTRAVENOUS AS NEEDED
Status: DISCONTINUED | OUTPATIENT
Start: 2019-09-10 | End: 2019-09-10 | Stop reason: SURG

## 2019-09-10 RX ORDER — PROPOFOL 10 MG/ML
INJECTION, EMULSION INTRAVENOUS CONTINUOUS PRN
Status: DISCONTINUED | OUTPATIENT
Start: 2019-09-10 | End: 2019-09-10 | Stop reason: SURG

## 2019-09-10 RX ORDER — NEOSTIGMINE METHYLSULFATE 1 MG/ML
INJECTION INTRAVENOUS AS NEEDED
Status: DISCONTINUED | OUTPATIENT
Start: 2019-09-10 | End: 2019-09-10 | Stop reason: SURG

## 2019-09-10 RX ORDER — LEVETIRACETAM 500 MG/1
500 TABLET ORAL EVERY 12 HOURS SCHEDULED
Status: DISCONTINUED | OUTPATIENT
Start: 2019-09-10 | End: 2019-09-13 | Stop reason: HOSPADM

## 2019-09-10 RX ORDER — DEXAMETHASONE SODIUM PHOSPHATE 4 MG/ML
2 INJECTION, SOLUTION INTRA-ARTICULAR; INTRALESIONAL; INTRAMUSCULAR; INTRAVENOUS; SOFT TISSUE EVERY 12 HOURS SCHEDULED
Status: DISCONTINUED | OUTPATIENT
Start: 2019-09-21 | End: 2019-09-13 | Stop reason: HOSPADM

## 2019-09-10 RX ORDER — FENTANYL CITRATE 50 UG/ML
INJECTION, SOLUTION INTRAMUSCULAR; INTRAVENOUS AS NEEDED
Status: DISCONTINUED | OUTPATIENT
Start: 2019-09-10 | End: 2019-09-10 | Stop reason: SURG

## 2019-09-10 RX ORDER — FENTANYL CITRATE/PF 50 MCG/ML
25 SYRINGE (ML) INJECTION
Status: DISCONTINUED | OUTPATIENT
Start: 2019-09-10 | End: 2019-09-10 | Stop reason: HOSPADM

## 2019-09-10 RX ORDER — HYDROMORPHONE HCL/PF 1 MG/ML
SYRINGE (ML) INJECTION AS NEEDED
Status: DISCONTINUED | OUTPATIENT
Start: 2019-09-10 | End: 2019-09-10 | Stop reason: SURG

## 2019-09-10 RX ORDER — CEFAZOLIN SODIUM 1 G/50ML
1000 SOLUTION INTRAVENOUS ONCE
Status: COMPLETED | OUTPATIENT
Start: 2019-09-10 | End: 2019-09-10

## 2019-09-10 RX ADMIN — CEFAZOLIN 1000 MG: 1 INJECTION, POWDER, FOR SOLUTION INTRAVENOUS at 09:00

## 2019-09-10 RX ADMIN — DEXAMETHASONE SODIUM PHOSPHATE 4 MG: 4 INJECTION, SOLUTION INTRAMUSCULAR; INTRAVENOUS at 05:20

## 2019-09-10 RX ADMIN — FUROSEMIDE 10 MG: 10 INJECTION, SOLUTION INTRAMUSCULAR; INTRAVENOUS at 08:38

## 2019-09-10 RX ADMIN — DEXAMETHASONE SODIUM PHOSPHATE 4 MG: 4 INJECTION, SOLUTION INTRAMUSCULAR; INTRAVENOUS at 15:24

## 2019-09-10 RX ADMIN — HYDROMORPHONE HYDROCHLORIDE 0.2 MG: 1 INJECTION, SOLUTION INTRAMUSCULAR; INTRAVENOUS; SUBCUTANEOUS at 14:13

## 2019-09-10 RX ADMIN — SODIUM CHLORIDE, SODIUM LACTATE, POTASSIUM CHLORIDE, AND CALCIUM CHLORIDE 100 ML/HR: .6; .31; .03; .02 INJECTION, SOLUTION INTRAVENOUS at 17:13

## 2019-09-10 RX ADMIN — PROPOFOL 150 MG: 10 INJECTION, EMULSION INTRAVENOUS at 07:42

## 2019-09-10 RX ADMIN — CHLORHEXIDINE GLUCONATE 0.12% ORAL RINSE 15 ML: 1.2 LIQUID ORAL at 05:22

## 2019-09-10 RX ADMIN — CEFAZOLIN SODIUM 1000 MG: 1 SOLUTION INTRAVENOUS at 08:23

## 2019-09-10 RX ADMIN — DOCUSATE SODIUM 100 MG: 100 CAPSULE, LIQUID FILLED ORAL at 17:53

## 2019-09-10 RX ADMIN — DEXAMETHASONE SODIUM PHOSPHATE 4 MG: 4 INJECTION, SOLUTION INTRAMUSCULAR; INTRAVENOUS at 21:42

## 2019-09-10 RX ADMIN — GADOBUTROL 7 ML: 604.72 INJECTION INTRAVENOUS at 23:05

## 2019-09-10 RX ADMIN — LEVETIRACETAM 500 MG: 500 TABLET, FILM COATED ORAL at 21:32

## 2019-09-10 RX ADMIN — FENTANYL CITRATE 50 MCG: 50 INJECTION, SOLUTION INTRAMUSCULAR; INTRAVENOUS at 07:42

## 2019-09-10 RX ADMIN — FENTANYL CITRATE 25 MCG: 50 INJECTION INTRAMUSCULAR; INTRAVENOUS at 11:40

## 2019-09-10 RX ADMIN — NICOTINE 21 MG: 21 PATCH, EXTENDED RELEASE TRANSDERMAL at 17:20

## 2019-09-10 RX ADMIN — HYDROMORPHONE HYDROCHLORIDE 0.2 MG: 1 INJECTION, SOLUTION INTRAMUSCULAR; INTRAVENOUS; SUBCUTANEOUS at 12:37

## 2019-09-10 RX ADMIN — HYDROMORPHONE HYDROCHLORIDE 0.2 MG: 1 INJECTION, SOLUTION INTRAMUSCULAR; INTRAVENOUS; SUBCUTANEOUS at 13:57

## 2019-09-10 RX ADMIN — CEFAZOLIN SODIUM 2000 MG: 2 SOLUTION INTRAVENOUS at 17:38

## 2019-09-10 RX ADMIN — FENTANYL CITRATE 50 MCG: 50 INJECTION INTRAMUSCULAR; INTRAVENOUS at 21:25

## 2019-09-10 RX ADMIN — ONDANSETRON 4 MG: 2 INJECTION INTRAMUSCULAR; INTRAVENOUS at 10:36

## 2019-09-10 RX ADMIN — GLYCOPYRROLATE 0.2 MG: 0.2 INJECTION, SOLUTION INTRAMUSCULAR; INTRAVENOUS at 10:48

## 2019-09-10 RX ADMIN — ROCURONIUM BROMIDE 50 MG: 10 INJECTION INTRAVENOUS at 07:43

## 2019-09-10 RX ADMIN — LIDOCAINE HYDROCHLORIDE 50 MG: 10 INJECTION, SOLUTION INFILTRATION; PERINEURAL at 07:42

## 2019-09-10 RX ADMIN — OXYCODONE HYDROCHLORIDE 10 MG: 10 TABLET ORAL at 17:04

## 2019-09-10 RX ADMIN — FENTANYL CITRATE 50 MCG: 50 INJECTION, SOLUTION INTRAMUSCULAR; INTRAVENOUS at 08:01

## 2019-09-10 RX ADMIN — HYDROMORPHONE HYDROCHLORIDE 0.2 MG: 1 INJECTION, SOLUTION INTRAMUSCULAR; INTRAVENOUS; SUBCUTANEOUS at 12:13

## 2019-09-10 RX ADMIN — HYDROMORPHONE HYDROCHLORIDE 0.2 MG: 1 INJECTION, SOLUTION INTRAMUSCULAR; INTRAVENOUS; SUBCUTANEOUS at 13:12

## 2019-09-10 RX ADMIN — FENTANYL CITRATE 25 MCG: 50 INJECTION INTRAMUSCULAR; INTRAVENOUS at 11:32

## 2019-09-10 RX ADMIN — FENTANYL CITRATE 50 MCG: 50 INJECTION INTRAMUSCULAR; INTRAVENOUS at 19:03

## 2019-09-10 RX ADMIN — MANNITOL 32 G: 12.5 INJECTION, SOLUTION INTRAVENOUS at 08:38

## 2019-09-10 RX ADMIN — FENTANYL CITRATE 25 MCG: 50 INJECTION INTRAMUSCULAR; INTRAVENOUS at 11:50

## 2019-09-10 RX ADMIN — HYDROMORPHONE HYDROCHLORIDE 0.25 MG: 1 INJECTION, SOLUTION INTRAMUSCULAR; INTRAVENOUS; SUBCUTANEOUS at 10:47

## 2019-09-10 RX ADMIN — PROPOFOL 150 MCG/KG/MIN: 10 INJECTION, EMULSION INTRAVENOUS at 07:46

## 2019-09-10 RX ADMIN — HYDROMORPHONE HYDROCHLORIDE 0.25 MG: 1 INJECTION, SOLUTION INTRAMUSCULAR; INTRAVENOUS; SUBCUTANEOUS at 10:43

## 2019-09-10 RX ADMIN — NEOSTIGMINE METHYLSULFATE 2.5 MG: 1 INJECTION, SOLUTION INTRAVENOUS at 10:48

## 2019-09-10 RX ADMIN — ACETAMINOPHEN 650 MG: 325 TABLET ORAL at 15:02

## 2019-09-10 RX ADMIN — FENTANYL CITRATE 25 MCG: 50 INJECTION INTRAMUSCULAR; INTRAVENOUS at 11:22

## 2019-09-10 RX ADMIN — INSULIN LISPRO 3 UNITS: 100 INJECTION, SOLUTION INTRAVENOUS; SUBCUTANEOUS at 15:04

## 2019-09-10 RX ADMIN — HYDROXYZINE HYDROCHLORIDE 25 MG: 25 TABLET ORAL at 22:26

## 2019-09-10 RX ADMIN — INSULIN HUMAN 5 UNITS: 100 INJECTION, SOLUTION PARENTERAL at 09:40

## 2019-09-10 RX ADMIN — PROPOFOL 50 MG: 10 INJECTION, EMULSION INTRAVENOUS at 08:01

## 2019-09-10 RX ADMIN — PHENYLEPHRINE HYDROCHLORIDE 50 MCG: 10 INJECTION INTRAVENOUS at 09:11

## 2019-09-10 RX ADMIN — PROPOFOL 50 MG: 10 INJECTION, EMULSION INTRAVENOUS at 07:44

## 2019-09-10 RX ADMIN — REMIFENTANIL HYDROCHLORIDE 0.05 MCG/KG/MIN: 1 INJECTION, POWDER, LYOPHILIZED, FOR SOLUTION INTRAVENOUS at 07:46

## 2019-09-10 RX ADMIN — DEXAMETHASONE SODIUM PHOSPHATE 10 MG: 10 INJECTION, SOLUTION INTRAMUSCULAR; INTRAVENOUS at 08:27

## 2019-09-10 RX ADMIN — HYDROXYZINE HYDROCHLORIDE 25 MG: 25 TABLET ORAL at 06:33

## 2019-09-10 RX ADMIN — LABETALOL 20 MG/4 ML (5 MG/ML) INTRAVENOUS SYRINGE 10 MG: at 21:25

## 2019-09-10 RX ADMIN — SODIUM CHLORIDE: 0.9 INJECTION, SOLUTION INTRAVENOUS at 07:49

## 2019-09-10 RX ADMIN — PHENYLEPHRINE HYDROCHLORIDE 40 MCG/MIN: 10 INJECTION INTRAVENOUS at 08:27

## 2019-09-10 RX ADMIN — LABETALOL 20 MG/4 ML (5 MG/ML) INTRAVENOUS SYRINGE 10 MG: at 17:21

## 2019-09-10 RX ADMIN — LEVETIRACETAM 1000 MG: 100 INJECTION, SOLUTION INTRAVENOUS at 08:31

## 2019-09-10 NOTE — PLAN OF CARE
Problem: Potential for Falls  Goal: Patient will remain free of falls  Description  INTERVENTIONS:  - Assess patient frequently for physical needs  -  Identify cognitive and physical deficits and behaviors that affect risk of falls  -  [place] fall precautions as indicated by assessment   - Educate patient/family on patient safety including physical limitations  - Instruct patient to call for assistance with activity based on assessment  - Modify environment to reduce risk of injury  - Consider OT/PT consult to assist with strengthening/mobility  Outcome: Progressing     Problem: Nutrition/Hydration-ADULT  Goal: Nutrient/Hydration intake appropriate for improving, restoring or maintaining nutritional needs  Description  Monitor and assess patient's nutrition/hydration status for malnutrition  Collaborate with interdisciplinary team and initiate plan and interventions as ordered  Monitor patient's weight and dietary intake as ordered or per policy  Utilize nutrition screening tool and intervene as necessary  Determine patient's food preferences and provide high-protein, high-caloric foods as appropriate       INTERVENTIONS:  - Monitor oral intake, urinary output, labs, and treatment plans  - Assess nutrition and hydration status and recommend course of action  - Evaluate amount of meals eaten  - Assist patient with eating if necessary   - Allow adequate time for meals  - Recommend/ encourage appropriate diets, oral nutritional supplements, and vitamin/mineral supplements  - Order, calculate, and assess calorie counts as needed  - Recommend, monitor, and adjust tube feedings and TPN/PPN based on assessed needs  - Assess need for intravenous fluids  - Provide specific nutrition/hydration education as appropriate  - Include patient/family/caregiver in decisions related to nutrition  Outcome: Progressing     Problem: PAIN - ADULT  Goal: Verbalizes/displays adequate comfort level or baseline comfort level  Description  Interventions:  - Encourage patient to monitor pain and request assistance  - Assess pain using appropriate pain scale  - Administer analgesics based on type and severity of pain and evaluate response  - Implement non-pharmacological measures as appropriate and evaluate response  - Consider cultural and social influences on pain and pain management  - Notify physician/advanced practitioner if interventions unsuccessful or patient reports new pain  Outcome: Progressing     Problem: INFECTION - ADULT  Goal: Absence or prevention of progression during hospitalization  Description  INTERVENTIONS:  - Assess and monitor for signs and symptoms of infection  - Monitor lab/diagnostic results  - Monitor all insertion sites, i e  indwelling lines, tubes, and drains  - Monitor endotracheal if appropriate and nasal secretions for changes in amount and color  - [place] appropriate cooling/warming therapies per order  - Administer medications as ordered  - Instruct and encourage patient and family to use good hand hygiene technique  - Identify and instruct in appropriate isolation precautions for identified infection/condition  Outcome: Progressing  Goal: Absence of fever/infection during neutropenic period  Description  INTERVENTIONS:  - Monitor WBC    Outcome: Progressing     Problem: SAFETY ADULT  Goal: Maintain or return to baseline ADL function  Description  INTERVENTIONS:  -  Assess patient's ability to carry out ADLs; assess patient's baseline for ADL function and identify physical deficits which impact ability to perform ADLs (bathing, care of mouth/teeth, toileting, grooming, dressing, etc )  - Assess/evaluate cause of self-care deficits   - Assess range of motion  - Assess patient's mobility; develop plan if impaired  - Assess patient's need for assistive devices and provide as appropriate  - Encourage maximum independence but intervene and supervise when necessary  - Involve family in performance of ADLs  - Assess for home care needs following discharge   - Consider OT consult to assist with ADL evaluation and planning for discharge  - Provide patient education as appropriate  Outcome: Progressing  Goal: Maintain or return mobility status to optimal level  Description  INTERVENTIONS:  - Assess patient's baseline mobility status (ambulation, transfers, stairs, etc )    - Identify cognitive and physical deficits and behaviors that affect mobility  - Identify mobility aids required to assist with transfers and/or ambulation (gait belt, sit-to-stand, lift, walker, cane, etc )  - [place] fall precautions as indicated by assessment  - Record patient progress and toleration of activity level on Mobility SBAR; progress patient to next Phase/Stage  - Instruct patient to call for assistance with activity based on assessment  - Consider rehabilitation consult to assist with strengthening/weightbearing, etc   Outcome: Progressing     Problem: DISCHARGE PLANNING  Goal: Discharge to home or other facility with appropriate resources  Description  INTERVENTIONS:  - Identify barriers to discharge w/patient and caregiver  - Arrange for needed discharge resources and transportation as appropriate  - Identify discharge learning needs (meds, wound care, etc )  - Arrange for interpretive services to assist at discharge as needed  - Refer to Case Management Department for coordinating discharge planning if the patient needs post-hospital services based on physician/advanced practitioner order or complex needs related to functional status, cognitive ability, or social support system  Outcome: Progressing     Problem: Knowledge Deficit  Goal: Patient/family/caregiver demonstrates understanding of disease process, treatment plan, medications, and discharge instructions  Description  Complete learning assessment and assess knowledge base    Interventions:  - Provide teaching at level of understanding  - Provide teaching via preferred learning methods  Outcome: Progressing     Problem: NEUROSENSORY - ADULT  Goal: Achieves stable or improved neurological status  Description  INTERVENTIONS  - Monitor and report changes in neurological status  - Monitor vital signs such as temperature, blood pressure, glucose, and any other labs ordered   - Initiate measures to prevent increased intracranial pressure  - Monitor for seizure activity and implement precautions if appropriate      Outcome: Progressing  Goal: Remains free of injury related to seizures activity  Description  INTERVENTIONS  - Maintain airway, patient safety  and administer oxygen as ordered  - Monitor patient for seizure activity, document and report duration and description of seizure to physician/advanced practitioner  - If seizure occurs,  ensure patient safety during seizure  - Reorient patient post seizure  - Seizure pads on all 4 side rails  - Instruct patient/family to notify RN of any seizure activity including if an aura is experienced  - Instruct patient/family to call for assistance with activity based on nursing assessment  - Administer anti-seizure medications if ordered    Outcome: Progressing  Goal: Achieves maximal functionality and self care  Description  INTERVENTIONS  - Monitor swallowing and airway patency with patient fatigue and changes in neurological status  - Encourage and assist patient to increase activity and self care     - Encourage visually impaired, hearing impaired and aphasic patients to use assistive/communication devices  Outcome: Progressing     Problem: RESPIRATORY - ADULT  Goal: Achieves optimal ventilation and oxygenation  Description  INTERVENTIONS:  - Assess for changes in respiratory status  - Assess for changes in mentation and behavior  - Position to facilitate oxygenation and minimize respiratory effort  - Oxygen administered by appropriate delivery if ordered  - Initiate smoking cessation education as indicated  - Encourage broncho-pulmonary hygiene including cough, deep breathe, Incentive Spirometry  - Assess the need for suctioning and aspirate as needed  - Assess and instruct to report SOB or any respiratory difficulty  - Respiratory Therapy support as indicated  Outcome: Progressing     Problem: METABOLIC, FLUID AND ELECTROLYTES - ADULT  Goal: Fluid balance maintained  Description  INTERVENTIONS:  - Monitor labs   - Monitor I/O and WT  - Instruct patient on fluid and nutrition as appropriate  - Assess for signs & symptoms of volume excess or deficit  Outcome: Progressing     Problem: SKIN/TISSUE INTEGRITY - ADULT  Goal: Skin integrity remains intact  Description  INTERVENTIONS  - Identify patients at risk for skin breakdown  - Assess and monitor skin integrity  - Assess and monitor nutrition and hydration status  - Monitor labs (i e  albumin)  - Assess for incontinence   - Turn and reposition patient  - Assist with mobility/ambulation  - Relieve pressure over bony prominences  - Avoid friction and shearing  - Provide appropriate hygiene as needed including keeping skin clean and dry  - Evaluate need for skin moisturizer/barrier cream  - Collaborate with interdisciplinary team (i e  Nutrition, Rehabilitation, etc )   - Patient/family teaching  Outcome: Progressing     Problem: MUSCULOSKELETAL - ADULT  Goal: Maintain or return mobility to safest level of function  Description  INTERVENTIONS:  - Assess patient's ability to carry out ADLs; assess patient's baseline for ADL function and identify physical deficits which impact ability to perform ADLs (bathing, care of mouth/teeth, toileting, grooming, dressing, etc )  - Assess/evaluate cause of self-care deficits   - Assess range of motion  - Assess patient's mobility  - Assess patient's need for assistive devices and provide as appropriate  - Encourage maximum independence but intervene and supervise when necessary  - Involve family in performance of ADLs  - Assess for home care needs following discharge - Consider OT consult to assist with ADL evaluation and planning for discharge  - Provide patient education as appropriate  Outcome: Progressing  Goal: Maintain proper alignment of affected body part  Description  INTERVENTIONS:  - Support, maintain and protect limb and body alignment  - Provide patient/ family with appropriate education  Outcome: Progressing     Problem: Prexisting or High Potential for Compromised Skin Integrity  Goal: Skin integrity is maintained or improved  Description  INTERVENTIONS:  - Identify patients at risk for skin breakdown  - Assess and monitor skin integrity  - Assess and monitor nutrition and hydration status  - Monitor labs   - Assess for incontinence   - Turn and reposition patient  - Assist with mobility/ambulation  - Relieve pressure over bony prominences  - Avoid friction and shearing  - Provide appropriate hygiene as needed including keeping skin clean and dry  - Evaluate need for skin moisturizer/barrier cream  - Collaborate with interdisciplinary team   - Patient/family teaching  - Consider wound care consult   Outcome: Progressing

## 2019-09-10 NOTE — PERIOPERATIVE NURSING NOTE
Arrived PACU supine in bed HOB 30 deg  Resps unlabored  VSS  Left temporoparietal drsg intact  Reynolds pin sites intact open to air  Alert and oriented to person place and time  Moves all extremities  Able to show teeth, stick out tongue midline, puff out cheeks, shrug shoulders, and squeeze eyes shut  Slight right facial droop noted to smile  Rates pain 10/10, medicating

## 2019-09-10 NOTE — PERIOPERATIVE NURSING NOTE
Waiting for ICU room to become available  Family in to see patient  Reassessed WNL, no further facial droop noted to smile  Pain continues but is less  Tolerating ice chips

## 2019-09-10 NOTE — ANESTHESIA PROCEDURE NOTES
Arterial Line Insertion  Performed by: Dulce Bhatia MD  Authorized by: Dulce Bhatia MD   Consent: Verbal consent obtained  Written consent obtained  Risks and benefits: risks, benefits and alternatives were discussed  Consent given by: patient  Patient understanding: patient states understanding of the procedure being performed  Patient consent: the patient's understanding of the procedure matches consent given  Procedure consent: procedure consent matches procedure scheduled  Relevant documents: relevant documents present and verified  Test results: test results available and properly labeled  Patient identity confirmed: arm band, provided demographic data and hospital-assigned identification number  Time out: Immediately prior to procedure a "time out" was called to verify the correct patient, procedure, equipment, support staff and site/side marked as required    Indications: multiple ABGs and hemodynamic monitoring  Orientation:  Left  Location: radial artery  Sedation:  Patient sedated: yes ([place])    Procedure Details:  Needle gauge: 20  Number of attempts: 1    Post-procedure:  Post-procedure: dressing applied  Waveform: good waveform and waveform confirmed  Patient tolerance: Patient tolerated the procedure well with no immediate complications

## 2019-09-10 NOTE — OP NOTE
OPERATIVE REPORT  PATIENT NAME: Belem Bragg  :  1950  MRN: [id number]  Pt Location: [facility] OR ROOM 17    SURGERY DATE: 9/10/2019    Surgeon(s) and Role:     * Chaim Romero MD - Primary     * Duane Velez PA-C - Assisting    Preop Diagnosis:  Brain mass [G93 9]    Post-Op Diagnosis Codes:     * Brain mass [G93 9]    Procedure(s) (LRB):  Image guided left frontal craniotomy for resection of tumor (Left)    Specimen(s):  ID Type Source Tests Collected by Time Destination   1 : Left frontal mass Tissue Brain TISSUE EXAM Chaim Romero MD 9/10/2019 8630    2 : Left frontal mass Tissue Brain TISSUE EXAM Chaim Romero MD 9/10/2019 1011        Estimated Blood Loss:   Minimal    Drains:  Urethral Catheter Latex 16 Fr  (Active)   Collection Container Standard drainage bag 9/10/2019  7:51 AM   Securement Method Securing device (Describe) 9/10/2019 10:53 AM   Number of days: 0       Anesthesia Type:   General    Operative Indications:  Brain mass [G93 9]  This is a 17-year-old gentleman with history of [address]  who presented with several weeks of right hand incoordination and weakness  Ultimately he presented to the emergency room and imaging revealed a left frontal parietal mass with significant vasogenic edema and mass effect  He was also found to have other systemic lesions concerning for metastatic disease  Due to an isolated intracranial mass with edema and compression we discussed the risks and benefits associated with resection including bleeding, infection, stroke, paralysis, temporary permanent numbness tingling or weakness, and death  He elected to proceed  Operative Findings:  Large exophytic mass  Frozen consistent with non glial primary  Complications:   None    Procedure and Technique:  After obtaining written informed consent patient was brought to the operating room  General endotracheal anesthesia was induced  Arterial line and Mena catheter placed by Anesthesia    Next the patient was placed in [place] head womack and flipped in a right lateral decubitus position with his left side up  An axillary roll was placed and all his pressure points were padded  He was secured to the bed  He then received perioperative Ancef, mannitol, Lasix, Decadron and Keppra  Neuro navigation was then registered and confirmed to be accurate  Image guidance from the [employer] was used throughout the duration of the case  Images were loaded into the system and used for preoperative planning as well as intraoperative guidance  It was critically important through the duration of the case for navigation and avoidance of critical structures  A lazy S incision was then planned and his head was prepped and draped in the usual sterile fashion  A surgical time-out was performed  10 cc of 1% lidocaine with 100,000 epinephrine was infiltrated along the incision  Next 10 blade was used to make an incision taking care to protect the underlying temporalis fascia  Radha clips were then placed along the skin edges  Self-retaining retractor was then used to hold this open  The temporalis fascia was then opened sharply with monopolar cautery and the temporalis was reflected anterior and posteriorly  Using neuro navigation and craniotomy was planned and a Midas Parth drill bit was used to create a single bur hole  A B1 craniotome was then used to turn a craniotomy  The bone flap was then raised saved on the back table in a bacitracin-soaked sponge  Several tack-up stitches were placed along the periphery of the craniotomy  The dura was opened with a 15 blade in a cruciate fashion  The dura was then reflected  The tumor was immediately apparent and visible as a presented itself and exophytic fashion  Portion of the tumor was taken and sent as further specimen  I then continued to work circumferentially around the mass  He was found to be quite adherent to the libertad  This was carefully dissected    Care was taken to protect as much as underlying cortex possible due to location and proximity to primary motor and sensory cortex  Once the tumor was fully dissected and removed hemostasis was achieved using Surgicel and hydrogen peroxide cotton balls  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  After completion of the resection and achievement of hemostasis the wound was copiously irrigated antibiotic irrigation  The dura was closed using running 4 0 Nurolon  Central tack-up was placed  Gel foam was placed over this  A titanium plating system was used to return the bone flap and secured in place  A central tack-up stitch was tied  The wound was again copiously irrigated with antibiotic irrigation  The temporalis fascia was closed using interrupted 2 0 Vicryl  The galea was closed using interrupted 2 0 Vicryl  The skin was closed staples  There are 2 uninterrupted and correct surgical counts  Surgical sign-out was performed  The patient was then woken from general anesthesia and found to be in his baseline neurologic condition  There was no qualified resident aid in this case  As such, due to its complex nature Santosh Aguilar, was present and assisted for the duration of the case including exposure, resction, and closure       I was present for the entire procedure    Patient Disposition:  PACU     SIGNATURE: Abdulaziz Collazo MD  DATE: September 10, 2019  TIME: 11:14 AM

## 2019-09-10 NOTE — PROGRESS NOTES
Pt medicated for anxiety as ordered  OR made aware  Jessie removed from pt  Ready for transport to or

## 2019-09-10 NOTE — PERIOPERATIVE NURSING NOTE
Resps unlabored  VSS  Assessments intact and stable  Left temporoparietal drsg intact small amt drainage noted unchanged from the OR  Right hand grasp weaker than left, has difficulty opening hand  Moves BLE easily  Tolerated regular diet  Continues to c/o headache  Transported to ICU 1 with RN

## 2019-09-10 NOTE — PROGRESS NOTES
Acceptance Note - Critical Care   Gray Thomas  81 y o  male MRN: [id number]  Unit/Bed#: ICU 01 Encounter: [id number]    Attending Physician: Kermit Silva MD      ______________________________________________________________________  Assessment and Plan:   Principal Problem:    Brain mass with hemorrhage  Active Problems:    Weakness    Essential hypertension    Alcohol abuse    Dependence on nicotine from cigarettes    Cerebral edema (HCC)    COPD (chronic obstructive pulmonary disease) ([facility] )    Hepatocellular carcinoma ([facility] )    Malnutrition related to chronic disease    Leukocytosis  Resolved Problems:    * No resolved hospital problems   *        Neuro:   POD 0 image guided left frontal craniotomy for resection of left tumor, ETOH abuse  · Continue ancef x 24 hours  · Dexamethasone with slow wean  · keppra 500 mg x 7 days  · Tylenol/fentanyl/oxy PRN for pain  · Na tabs 1 gram TID  · Neuro exams q1h  · SBP < 160  · Thiamine/folate  · Monitor for alcohol withdraw - per patient, last drink was 3 weeks ago    CV:   HTN  · Patient has history of HTN and was taking nifedipine but has not taken it for 3 months - restart nifedipine 24 hr tablet 60 mg daily    · Currently SBP < 160 - labetolol PRN   · Monitor showing NSR - continue to trend telemetry     Pulm:   COPD, nicotine abuse, left lower lung lobe mets?, bony rib mets  · Start Breo daily  · Keep O2 sats > 88%  · Encourage IS use  · Nicotine patch   · CT chest - shows possible left lower lung lobe mets and bony rib mets - primary source liver, patient 2 PPD smoker for 40 years     GI:   Primary Liver cancer, transaminitis  · Last chemo/radiation was 2017  · CT abdomen shows heterogeneous liver and hepatomegaly and multifocal lesions   · Patient has abdominal pain - tylenol and fentanyl PRN  · Regular diet   · Trend liver indices and coags  · Avoid heptatoxic medications    :   JANEEN  · In the setting of dehydration vs other  · On admission creat 1 29, unsure of baseline, patient denies any kidney issues prior   · Continue LR @ 100 ml/hr   · Trend renal indices  · Avoid nephrotoxic agents     F/E/N:   · Continue LR @ 100 ml/hr  · Monitor and replete electrolytes as needed    ID: leukocytosis  · Reactive s/p craniotomy  · No S/S of infection  · Continue ancef post op q24 hours  · Monitor fevers, currently afebrile  · Trend WBC    Heme:   No acute issues  · Monitor for S/S of bleeding/bruising  · Transfuse as needed for Hbg < 7    Endo:   · Patient denies history of DM however blood sugars have been elevated during admission  · Hbg A1C 5 9  · Keep glucose between 140-180  · Currently on ISF coverage   · lantus 10 units every am  · Lispro 8 units TID with meals      Msk/Skin:   Post op dressing  · Change as ordered q48 hours and PRN   · Activity as ordered  · PT/OT    Disposition: continue ICU care overnight     Code Status: Level 1 - Full Code    Counseling / Coordination of Care  Total Critical Care time spent 30 minutes excluding procedures, teaching and family updates  ______________________________________________________________________    Chief Complaint: "none, I feel good"      HPI: Mr Paul Diaz is a 80 yo male with a PMH COPD, HTN, liver cancer, ETOH abuse and 2 PPD smoker  He states he "partied my whole life"  He admitted to heavy alcohol use for 30 years  He states his last drink was three weeks ago  He is currently a 2 PPD smoker and has been for 30 years as well  He denies any illicit drug use in the past or present  In 2017, he was diagnosed with liver cancer and completed "a few rounds" of chemo and radiation before he stopped the treatment on his own since he felt it wasn't doing anything  Since then he has intermittently seen a doctor and has been on and off of blood pressure medications with his last usage being nifedipine 3 months ago    He uses oxycodone and xanax PRN for abdominal pain but it was unclear where he got these from since he doesn't frequent a doctor  He states he had no issues other than the abdominal pain up until the last three months when she started with hand tremors  Two months ago he started loosing his balance in addition to the tremors and finally on 9/4/19 he presented to the [place] [address] urgent care in [facility] for not only the tremors and loss of balance but he had developed right hand weakness and numbness  He denies any visual changes or LOC  At that time, it was decided to transfer him to [facility] where he had a CT head that showed large vasogenic edema in the left parietal area with a 4x2 cm mass mostly likely metastasis  At that time he was transferred to [facility] to internal medicine for further workup on 9/4/19  A CT abdomen confirmed heterogeneous liver and hepatomegaly and multifocal lesions as well as a CT chest that showed left lower lung lobe mets and bony rib mets  Patient presented to the OR on 9/10/19 with neurosurgery and arrived to the ICU at 1700 s/p image guided left frontal craniotomy for resection of left tumor  On exam, he is AAO x 4 with no neuro deficits and 5/5 strengths  He denies any incisional pain but does c/o pain in his abdomen which is not new for him  Hemodynamics are stable and patient requires no pressors  Review of Systems   Constitutional: Negative for fatigue, fever and unexpected weight change  HENT: Negative  Eyes: Negative  Respiratory: Positive for cough  Negative for shortness of breath  Cardiovascular: Negative for chest pain, palpitations and leg swelling  Gastrointestinal: Positive for abdominal pain  Negative for diarrhea, nausea and vomiting  Endocrine: Negative  Genitourinary: Negative  Musculoskeletal: Negative  Skin: Negative  Allergic/Immunologic: Negative  Neurological: Negative for seizures, weakness and headaches  Hematological: Negative  Psychiatric/Behavioral: Negative  ______________________________________________________________________    Physical Exam:   Physical Exam   Constitutional: He is oriented to person, place, and time  underweight   HENT:   Head: Normocephalic and atraumatic  Right Ear: External ear normal    Left Ear: External ear normal    Nose: Nose normal    Mouth/Throat: Oropharynx is clear and moist    Left craniotomy incision    Eyes: Pupils are equal, round, and reactive to light  Conjunctivae and EOM are normal    Neck: Normal range of motion  Neck supple  Cardiovascular: Normal rate, regular rhythm, normal heart sounds and intact distal pulses  Pulmonary/Chest:   B/L breath sounds diminished   + cough    Abdominal: Soft  Bowel sounds are normal  There is tenderness  Genitourinary:   Genitourinary Comments: Mena    Musculoskeletal: Normal range of motion  Neurological: He is alert and oriented to person, place, and time  5/5 strengths  GCS 15   Skin: Skin is warm and dry  Capillary refill takes less than 2 seconds  Psychiatric: He has a normal mood and affect  His behavior is normal        ______________________________________________________________________  Vitals:    09/10/19 1515 09/10/19 1600 09/10/19 1654 09/10/19 1700   BP:    152/70   BP Location:    Left arm   Pulse: 74 60  74   Resp: (!) 26 19  22   Temp:  98 5 °F (36 9 °C) (!) 97 1 °F (36 2 °C)    TempSrc:   Oral    SpO2: 95% 96%  96%   Weight:   75 4 kg (166 lb 3 6 oz)    Height:   6' 3" (1 905 m)        Temperature:   Temp (24hrs), Av 1 °F (36 7 °C), Min:96 7 °F (35 9 °C), Max:99 °F (37 2 °C)    Current Temperature: (!) 97 1 °F (36 2 °C)  Weights:   IBW: 84 5 kg    Body mass index is 20 78 kg/m²    Weight (last 2 days)     Date/Time   Weight    09/10/19 1654   75 4 (166 23)            Hemodynamic Monitoring:  N/A     Non-Invasive/Invasive Ventilation Settings:  Respiratory    Lab Data (Last 4 hours)    None         O2/Vent Data (Last 4 hours)    None              No results found for: PHART, WBC7EBQ, PO2ART, CZD9YND, M1OREXXD, BEART, SOURCE  SpO2: SpO2: 96 %  Intake and Outputs:  I/O       09/08 0701 - 09/09 0700 09/09 0701 - 09/10 0700 09/10 0701 - 09/11 0700    P  O  1020 510     I V  (mL/kg)  747 9 (11 8) 1500 (19 9)    IV Piggyback  1000     Total Intake(mL/kg) 1020 (16 1) 2257 9 (35 6) 1500 (19 9)    Urine (mL/kg/hr)  200 (0 1) 2300 (3)    Blood   300    Total Output  200 2600    Net +1020 +2057 9 -1100           Unmeasured Urine Occurrence  3 x           Nutrition:        Diet Orders   (From admission, onward)             Start     Ordered    09/10/19 1655  Diet Regular; Regular House  Diet effective now     Question Answer Comment   Diet Type Regular    Regular Regular House    RD to adjust diet per protocol?  Yes        09/10/19 1654                Labs:   Results from last 7 days   Lab Units 09/10/19  1158 09/10/19  0050 09/09/19  0457 09/06/19  0512 09/05/19  0536 09/04/19  1103   WBC Thousand/uL  --  15 91* 13 99* 8 55 9 13 8 26   HEMOGLOBIN g/dL  --  14 3 15 2 13 2 13 9 15 2   HEMATOCRIT %  --  42 4 46 2 39 5 42 7 44 9   PLATELETS Thousands/uL 314 296 323 239 259 285   NEUTROS PCT %  --  85* 78*  --  71  --    MONOS PCT %  --  5 7  --  10  --      Results from last 7 days   Lab Units 09/10/19  0051 09/09/19  0457 09/08/19  0931 09/06/19  0512 09/05/19  0536 09/04/19  1103   SODIUM mmol/L 135* 131* 129* 138 136 136   POTASSIUM mmol/L 5 0 4 2 4 5 4 3 4 4 4 4   CHLORIDE mmol/L 100 97* 96* 103 103 99*   CO2 mmol/L 30 28 28 26 29 31   ANION GAP mmol/L 5 6 5 9 4 6   BUN mg/dL 30* 30* 32* 21 20 15   CREATININE mg/dL 1 34* 1 28 1 35* 1 07 1 42* 1 29   CALCIUM mg/dL 9 4 9 4 8 9 8 8 9 0 9 3   ALT U/L 81*  --   --   --   --  81*   AST U/L 33  --   --   --   --  70*   ALK PHOS U/L 220*  --   --   --   --  262*   ALBUMIN g/dL 3 1*  --   --   --   --  3 3*   TOTAL BILIRUBIN mg/dL 0 83  --   --   --   --  1 20*     Results from last 7 days   Lab Units 09/05/19  0536   MAGNESIUM mg/dL 2 1   PHOSPHORUS mg/dL 3 3      Results from last 7 days   Lab Units 09/10/19  0051 09/09/19  0457 09/04/19  1103   INR  0 99 0 96 1 00   PTT seconds 26  --  28             ABG:No results found for: PHART, RND7UUI, PO2ART, SME1GBY, P2JKVEOU, BEART, SOURCE  VBG:        No results found for: [facility]   Imaging: X-ray Chest 1 View Portable    Result Date: 9/4/2019  Impression: Suggestion of osteolytic lesion in the posterior left 5th rib  Otherwise, no evidence of acute abnormality in the chest  Workstation performed: [id number]     Ct Head Without Contrast    Result Date: 9/4/2019  Impression: Large area of subcortical white matter hypodensity consistent with vasogenic edema in the left parietal region  Small focus of hemorrhage superiorly as seen on image 2/30 with an associated more hypodense masslike area measuring 3 9 x 2 2 cm  Mild mass  effect on the adjacent cortical sulci  No significant midline shift  MRI with contrast recommended to exclude underlying mass  I personally discussed this study with Henny Kong on 9/4/2019 at 11:36 AM  Workstation performed: [id number]     Mri Brain W Wo Contrast    Result Date: 9/5/2019  Impression: Large centrally necrotic mass within the left frontoparietal region with surrounding vasogenic edema  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 versus solitary metastasis  Workstation performed: [id number]     Ct Chest Abdomen Pelvis W Contrast    Result Date: 9/4/2019  Impression: Heterogenous liver with hepatomegaly and multifocal heterogenous liver lesions, may be due to multifocal [address]  or metastatic disease  Left lower lobe lung nodule measuring 7 6 mm, suspicious for metastatic disease 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 Mildly enlarged fidel hepatis lymphadenopathy The study was marked in EPIC for immediate notification  Workstation performed: [id number]  I have personally reviewed pertinent reports      EKG: monitor showing NSR   Micro:       Allergies: No Known Allergies  Medications:   Scheduled Meds:  Current Facility-Administered Medications:  acetaminophen 650 mg Oral Q4H PRN Toshia Brush, PA-C    aluminum-magnesium hydroxide-simethicone 30 mL Oral Q6H PRN Toshia Brush, PA-C    cefazolin 2,000 mg Intravenous Q8H Toshia Brush, PA-C    dexamethasone 4 mg Intravenous Q6H [address] Toshia [place], PA-C    Followed by        Morris Morse ON 9/12/2019] dexamethasone 4 mg Intravenous Q8H Toshia Brush, PA-C    Followed by        Morris Morse ON 9/15/2019] dexamethasone 2 mg Intravenous Q6H [address] Harpreet Cleopatra Milks, PA-C    Followed by        Morris Morse ON 9/18/2019] dexamethasone 2 mg Intravenous Q8H Toshia Brush, PA-C    Followed by        Morris Morse ON 9/21/2019] dexamethasone 2 mg Intravenous Q12H [address] Harpreet Cleopatra Milks, PA-C    Followed by        Morris Morse ON 9/24/2019] dexamethasone 2 mg Intravenous Q24H Toshia Brush, PA-C    docusate sodium 100 mg Oral BID Toshia Brush, PA-C    [START ON 9/11/2019] enoxaparin 30 mg Subcutaneous Daily Toshia Brush, PA-C    fluticasone-vilanterol 1 puff Inhalation Daily Toshia Brush, PA-C    folic acid 1 mg Oral Daily Toshia Brush, PA-C    hydrOXYzine HCL 25 mg Oral Q6H PRN Toshia Brush, PA-C    insulin glargine 10 Units Subcutaneous QAM Toshia Brush, PA-C    insulin lispro 1-5 Units Subcutaneous TID AC Harpreet Cleopatra Milks, PA-C    insulin lispro 8 Units Subcutaneous TID With Meals Toshia Brush, PA-C    ipratropium-albuterol 3 mL Nebulization Q4H PRN Toshia [place], PA-C    Labetalol HCl 10 mg Intravenous Q4H PRN Toshia Brush, PA-C    lactated ringers 100 mL/hr Intravenous Continuous Toshia Brush, PA-C Last Rate: 100 mL/hr (09/10/19 1533)   levETIRAcetam 500 mg Oral Q12H [address] Toshia Brush, PA-C    multivitamin-minerals 1 tablet Oral Daily Toshia Brush, PA-C    nicotine 21 mg Transdermal Daily Toshia Brush, PA-C    nicotine polacrilex 2 mg Oral Q1H PRN Toshia Brush, PA-C    NIFEdipine 60 mg Oral Daily Toshia Brush, PA-C    ondansetron 4 mg Intravenous Q6H PRN Toshia Brush, PA-C    oxyCODONE 10 mg Oral Q6H PRN Toshia Brush, PA-C    oxyCODONE 5 mg Oral Q4H PRN Toshia Brush, PA-C    pantoprazole 40 mg Oral Early Morning Toshia Brush, PA-C    pneumococcal 13-valent conjugate vaccine 0 5 mL Intramuscular Prior to discharge Toshia Brush, PA-C    [START ON 9/11/2019] senna 1 tablet Oral Daily Toshia Brush, PA-C    sodium chloride 1 g Oral TID With Meals Toshia [place], PA-C    thiamine 100 mg Oral Daily Toshia Brush, PA-C      Continuous Infusions:  lactated ringers 100 mL/hr Last Rate: 100 mL/hr (09/10/19 1713)     PRN Meds:    acetaminophen 650 mg Q4H PRN   aluminum-magnesium hydroxide-simethicone 30 mL Q6H PRN   hydrOXYzine HCL 25 mg Q6H PRN   ipratropium-albuterol 3 mL Q4H PRN   Labetalol HCl 10 mg Q4H PRN   nicotine polacrilex 2 mg Q1H PRN   ondansetron 4 mg Q6H PRN   oxyCODONE 10 mg Q6H PRN   oxyCODONE 5 mg Q4H PRN   pneumococcal 13-valent conjugate vaccine 0 5 mL Prior to discharge     VTE Pharmacologic Prophylaxis: Enoxaparin (Lovenox)  VTE Mechanical Prophylaxis: sequential compression device  Invasive lines and devices: Invasive Devices     Peripheral Intravenous Line            Peripheral IV 09/09/19 Dorsal (posterior); Right Forearm 1 day    Peripheral IV 09/10/19 Distal;Left;Ventral (anterior) Wrist less than 1 day          Arterial Line            Arterial Line 09/10/19 Left Radial less than 1 day          Drain            Urethral Catheter Latex 16 Fr  less than 1 day                     Portions of the record may have been created with voice recognition software  Occasional wrong word or "sound a like" substitutions may have occurred due to the inherent limitations of voice recognition software  Read the chart carefully and recognize, using context, where substitutions have occurred      Oz Payton Shankar Balbuena

## 2019-09-11 ENCOUNTER — APPOINTMENT (INPATIENT)
Dept: RADIOLOGY | Facility: HOSPITAL | Age: 69
DRG: 025 | End: 2019-09-11
Payer: MEDICARE

## 2019-09-11 PROBLEM — K08.89 TOOTH PAIN: Status: ACTIVE | Noted: 2019-09-04

## 2019-09-11 LAB
ABO GROUP BLD BPU: NORMAL
ABO GROUP BLD BPU: NORMAL
ANION GAP SERPL CALCULATED.3IONS-SCNC: 8 MMOL/L (ref 4–13)
ANION GAP SERPL CALCULATED.3IONS-SCNC: 9 MMOL/L (ref 4–13)
APTT PPP: 27 SECONDS (ref 23–37)
BASE EXCESS BLDA CALC-SCNC: 0 MMOL/L (ref -2–3)
BPU ID: NORMAL
BPU ID: NORMAL
BUN SERPL-MCNC: 24 MG/DL (ref 5–25)
BUN SERPL-MCNC: 30 MG/DL (ref 5–25)
CA-I BLD-SCNC: 1.17 MMOL/L (ref 1.12–1.32)
CALCIUM SERPL-MCNC: 8.7 MG/DL (ref 8.3–10.1)
CALCIUM SERPL-MCNC: 8.8 MG/DL (ref 8.3–10.1)
CHLORIDE SERPL-SCNC: 100 MMOL/L (ref 100–108)
CHLORIDE SERPL-SCNC: 94 MMOL/L (ref 100–108)
CO2 SERPL-SCNC: 23 MMOL/L (ref 21–32)
CO2 SERPL-SCNC: 25 MMOL/L (ref 21–32)
CREAT SERPL-MCNC: 1.29 MG/DL (ref 0.6–1.3)
CREAT SERPL-MCNC: 1.36 MG/DL (ref 0.6–1.3)
CROSSMATCH: NORMAL
CROSSMATCH: NORMAL
ERYTHROCYTE [DISTWIDTH] IN BLOOD BY AUTOMATED COUNT: 14.4 % (ref 11.6–15.1)
GFR SERPL CREATININE-BSD FRML MDRD: 53 ML/MIN/1.73SQ M
GFR SERPL CREATININE-BSD FRML MDRD: 57 ML/MIN/1.73SQ M
GLUCOSE SERPL-MCNC: 222 MG/DL (ref 65–140)
GLUCOSE SERPL-MCNC: 324 MG/DL (ref 65–140)
GLUCOSE SERPL-MCNC: 339 MG/DL (ref 65–140)
GLUCOSE SERPL-MCNC: 346 MG/DL (ref 65–140)
GLUCOSE SERPL-MCNC: 369 MG/DL (ref 65–140)
GLUCOSE SERPL-MCNC: 392 MG/DL (ref 65–140)
HCO3 BLDA-SCNC: 26.5 MMOL/L (ref 22–28)
HCT VFR BLD AUTO: 39.7 % (ref 36.5–49.3)
HCT VFR BLD CALC: 41 % (ref 36.5–49.3)
HGB BLD-MCNC: 13.5 G/DL (ref 12–17)
HGB BLDA-MCNC: 13.9 G/DL (ref 12–17)
INR PPP: 0.98 (ref 0.84–1.19)
MCH RBC QN AUTO: 32 PG (ref 26.8–34.3)
MCHC RBC AUTO-ENTMCNC: 34 G/DL (ref 31.4–37.4)
MCV RBC AUTO: 94 FL (ref 82–98)
PCO2 BLD: 28 MMOL/L (ref 21–32)
PCO2 BLD: 51.9 MM HG (ref 36–44)
PH BLD: 7.32 [PH] (ref 7.35–7.45)
PLATELET # BLD AUTO: 273 THOUSANDS/UL (ref 149–390)
PMV BLD AUTO: 10.7 FL (ref 8.9–12.7)
PO2 BLD: 113 MM HG (ref 75–129)
POTASSIUM BLD-SCNC: 4.5 MMOL/L (ref 3.5–5.3)
POTASSIUM SERPL-SCNC: 4.3 MMOL/L (ref 3.5–5.3)
POTASSIUM SERPL-SCNC: 4.3 MMOL/L (ref 3.5–5.3)
PROTHROMBIN TIME: 12.6 SECONDS (ref 11.6–14.5)
RBC # BLD AUTO: 4.22 MILLION/UL (ref 3.88–5.62)
SAO2 % BLD FROM PO2: 98 % (ref 95–98)
SODIUM BLD-SCNC: 134 MMOL/L (ref 136–145)
SODIUM SERPL-SCNC: 126 MMOL/L (ref 136–145)
SODIUM SERPL-SCNC: 133 MMOL/L (ref 136–145)
SPECIMEN SOURCE: ABNORMAL
UNIT DISPENSE STATUS: NORMAL
UNIT DISPENSE STATUS: NORMAL
UNIT PRODUCT CODE: NORMAL
UNIT PRODUCT CODE: NORMAL
UNIT RH: NORMAL
UNIT RH: NORMAL
WBC # BLD AUTO: 19.74 THOUSAND/UL (ref 4.31–10.16)

## 2019-09-11 PROCEDURE — 82948 REAGENT STRIP/BLOOD GLUCOSE: CPT

## 2019-09-11 PROCEDURE — 99024 POSTOP FOLLOW-UP VISIT: CPT | Performed by: NEUROLOGICAL SURGERY

## 2019-09-11 PROCEDURE — 85610 PROTHROMBIN TIME: CPT | Performed by: PHYSICIAN ASSISTANT

## 2019-09-11 PROCEDURE — NC001 PR NO CHARGE: Performed by: INTERNAL MEDICINE

## 2019-09-11 PROCEDURE — 70355 PANORAMIC X-RAY OF JAWS: CPT

## 2019-09-11 PROCEDURE — G8987 SELF CARE CURRENT STATUS: HCPCS

## 2019-09-11 PROCEDURE — 94760 N-INVAS EAR/PLS OXIMETRY 1: CPT

## 2019-09-11 PROCEDURE — 80048 BASIC METABOLIC PNL TOTAL CA: CPT | Performed by: INTERNAL MEDICINE

## 2019-09-11 PROCEDURE — 99232 SBSQ HOSP IP/OBS MODERATE 35: CPT | Performed by: PHYSICAL MEDICINE & REHABILITATION

## 2019-09-11 PROCEDURE — 97167 OT EVAL HIGH COMPLEX 60 MIN: CPT

## 2019-09-11 PROCEDURE — G8988 SELF CARE GOAL STATUS: HCPCS

## 2019-09-11 PROCEDURE — 99233 SBSQ HOSP IP/OBS HIGH 50: CPT | Performed by: ANESTHESIOLOGY

## 2019-09-11 PROCEDURE — 85730 THROMBOPLASTIN TIME PARTIAL: CPT | Performed by: PHYSICIAN ASSISTANT

## 2019-09-11 PROCEDURE — 80048 BASIC METABOLIC PNL TOTAL CA: CPT | Performed by: PHYSICIAN ASSISTANT

## 2019-09-11 PROCEDURE — 85027 COMPLETE CBC AUTOMATED: CPT | Performed by: PHYSICIAN ASSISTANT

## 2019-09-11 RX ORDER — INSULIN GLARGINE 100 [IU]/ML
25 INJECTION, SOLUTION SUBCUTANEOUS EVERY MORNING
Status: DISCONTINUED | OUTPATIENT
Start: 2019-09-12 | End: 2019-09-13 | Stop reason: HOSPADM

## 2019-09-11 RX ORDER — NIFEDIPINE 60 MG/1
60 TABLET, EXTENDED RELEASE ORAL DAILY
Status: DISCONTINUED | OUTPATIENT
Start: 2019-09-11 | End: 2019-09-13 | Stop reason: HOSPADM

## 2019-09-11 RX ORDER — INSULIN GLARGINE 100 [IU]/ML
13 INJECTION, SOLUTION SUBCUTANEOUS EVERY MORNING
Status: DISCONTINUED | OUTPATIENT
Start: 2019-09-11 | End: 2019-09-11

## 2019-09-11 RX ADMIN — INSULIN LISPRO 5 UNITS: 100 INJECTION, SOLUTION INTRAVENOUS; SUBCUTANEOUS at 11:16

## 2019-09-11 RX ADMIN — FENTANYL CITRATE 50 MCG: 50 INJECTION INTRAMUSCULAR; INTRAVENOUS at 16:57

## 2019-09-11 RX ADMIN — FENTANYL CITRATE 50 MCG: 50 INJECTION INTRAMUSCULAR; INTRAVENOUS at 11:14

## 2019-09-11 RX ADMIN — DEXAMETHASONE SODIUM PHOSPHATE 4 MG: 4 INJECTION, SOLUTION INTRAMUSCULAR; INTRAVENOUS at 23:05

## 2019-09-11 RX ADMIN — HYDROXYZINE HYDROCHLORIDE 25 MG: 25 TABLET ORAL at 23:04

## 2019-09-11 RX ADMIN — SODIUM CHLORIDE TAB 1 GM 1 G: 1 TAB at 13:26

## 2019-09-11 RX ADMIN — DEXAMETHASONE SODIUM PHOSPHATE 4 MG: 4 INJECTION, SOLUTION INTRAMUSCULAR; INTRAVENOUS at 00:25

## 2019-09-11 RX ADMIN — DEXAMETHASONE SODIUM PHOSPHATE 4 MG: 4 INJECTION, SOLUTION INTRAMUSCULAR; INTRAVENOUS at 17:00

## 2019-09-11 RX ADMIN — HYDROXYZINE HYDROCHLORIDE 25 MG: 25 TABLET ORAL at 10:41

## 2019-09-11 RX ADMIN — NIFEDIPINE 60 MG: 60 TABLET, FILM COATED, EXTENDED RELEASE ORAL at 08:26

## 2019-09-11 RX ADMIN — FOLIC ACID 1 MG: 1 TABLET ORAL at 08:19

## 2019-09-11 RX ADMIN — OXYCODONE HYDROCHLORIDE 5 MG: 5 TABLET ORAL at 09:58

## 2019-09-11 RX ADMIN — OXYCODONE HYDROCHLORIDE 10 MG: 10 TABLET ORAL at 20:05

## 2019-09-11 RX ADMIN — SODIUM CHLORIDE, SODIUM LACTATE, POTASSIUM CHLORIDE, AND CALCIUM CHLORIDE 100 ML/HR: .6; .31; .03; .02 INJECTION, SOLUTION INTRAVENOUS at 03:43

## 2019-09-11 RX ADMIN — ACETAMINOPHEN 650 MG: 325 TABLET ORAL at 08:19

## 2019-09-11 RX ADMIN — FLUTICASONE FUROATE AND VILANTEROL TRIFENATATE 1 PUFF: 100; 25 POWDER RESPIRATORY (INHALATION) at 08:26

## 2019-09-11 RX ADMIN — DOCUSATE SODIUM 100 MG: 100 CAPSULE, LIQUID FILLED ORAL at 08:19

## 2019-09-11 RX ADMIN — INSULIN LISPRO 4 UNITS: 100 INJECTION, SOLUTION INTRAVENOUS; SUBCUTANEOUS at 16:59

## 2019-09-11 RX ADMIN — DEXAMETHASONE SODIUM PHOSPHATE 4 MG: 4 INJECTION, SOLUTION INTRAMUSCULAR; INTRAVENOUS at 11:09

## 2019-09-11 RX ADMIN — SENNOSIDES 8.6 MG: 8.6 TABLET, FILM COATED ORAL at 08:19

## 2019-09-11 RX ADMIN — PANTOPRAZOLE SODIUM 40 MG: 40 TABLET, DELAYED RELEASE ORAL at 05:35

## 2019-09-11 RX ADMIN — SODIUM CHLORIDE TAB 1 GM 1 G: 1 TAB at 17:00

## 2019-09-11 RX ADMIN — INSULIN GLARGINE 13 UNITS: 100 INJECTION, SOLUTION SUBCUTANEOUS at 08:44

## 2019-09-11 RX ADMIN — LEVETIRACETAM 500 MG: 500 TABLET, FILM COATED ORAL at 20:05

## 2019-09-11 RX ADMIN — LEVETIRACETAM 500 MG: 500 TABLET, FILM COATED ORAL at 08:44

## 2019-09-11 RX ADMIN — ENOXAPARIN SODIUM 30 MG: 30 INJECTION SUBCUTANEOUS at 08:20

## 2019-09-11 RX ADMIN — INSULIN LISPRO 3 UNITS: 100 INJECTION, SOLUTION INTRAVENOUS; SUBCUTANEOUS at 08:30

## 2019-09-11 RX ADMIN — OXYCODONE HYDROCHLORIDE 10 MG: 10 TABLET ORAL at 05:49

## 2019-09-11 RX ADMIN — LABETALOL 20 MG/4 ML (5 MG/ML) INTRAVENOUS SYRINGE 10 MG: at 03:15

## 2019-09-11 RX ADMIN — SODIUM CHLORIDE TAB 1 GM 1 G: 1 TAB at 08:20

## 2019-09-11 RX ADMIN — HYDRALAZINE HYDROCHLORIDE 5 MG: 20 INJECTION INTRAMUSCULAR; INTRAVENOUS at 00:17

## 2019-09-11 RX ADMIN — OXYCODONE HYDROCHLORIDE 10 MG: 10 TABLET ORAL at 13:33

## 2019-09-11 RX ADMIN — NICOTINE 21 MG: 21 PATCH, EXTENDED RELEASE TRANSDERMAL at 05:35

## 2019-09-11 RX ADMIN — DEXAMETHASONE SODIUM PHOSPHATE 4 MG: 4 INJECTION, SOLUTION INTRAMUSCULAR; INTRAVENOUS at 05:35

## 2019-09-11 RX ADMIN — CEFAZOLIN SODIUM 2000 MG: 2 SOLUTION INTRAVENOUS at 00:25

## 2019-09-11 RX ADMIN — Medication 1 TABLET: at 08:19

## 2019-09-11 RX ADMIN — FENTANYL CITRATE 50 MCG: 50 INJECTION INTRAMUSCULAR; INTRAVENOUS at 07:42

## 2019-09-11 RX ADMIN — THIAMINE HCL TAB 100 MG 100 MG: 100 TAB at 08:19

## 2019-09-11 NOTE — ASSESSMENT & PLAN NOTE
POD#2 Left craniotomy for tumor resection    Imaging:  · CTH, 9/4: Large area of subcortical white matter hypodensity consistent with vasogenic edema in the left parietal region  Small focus of hemorrhage superiorly as seen on image 2/30 with an associated more hypodense masslike area measuring 3 9 x 2 2 cm  · CTCAP, 9/4: Heterogenous liver with hepatomegaly and multifocal heterogenous liver lesions, may be due to multifocal [address]  or metastatic disease  Left lower lobe lung nodule measuring 7 6 mm, suspicious for metastatic disease  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  Mildly enlarged fidel hepatis lymphadenopathy  · MRI brain w/wo, 9/5: Large centrally necrotic mass within the left frontoparietal region measuring 2 9 x 4 3 x 3 4 cm with surrounding vasogenic edema  · MRI brain w/wo, 9/10: Post op changes from left frontal craniotomy  No residual mass identified  Plan:  · Decadron 4q8 today, continue wean as ordered every 72 hours  Can be transitioned to oral for discharge  · Keppra x7 days  · PT/OT  · DVT ppx: SCDs, lovenox  · Pain control per primary team  · CIWA - monitor closely  · Patient continuing to make progress with RUE strength  · OMFS consulted for tooth pain and recommending extractions  Would not recommend this be completed at this time  Patient is not cleared for surgery from a neurosurgical standpoint as patient is POD 2 from brain tumor resection  Patient will be cleared after he is 2 weeks post-op  · Patient hyponatremic this morning  Possible SAIDH  Management per primary team    · Neurosurgery will continue to follow at this time  Call with questions or concerns  If stable tomorrow patient will potentially be cleared for dc from neurosurgical perspective

## 2019-09-11 NOTE — QUICK NOTE
Diabetes Management Service    Please note that this patient was not physically seen or evaluated, but the chart has been reviewed and recommendations aimed at improving glucose control are as listed below  Based on my review of the patient's chart, recent glucose results and current insulin therapy, I recommend the following changes (unless contraindicated by physical findings noted by the primary team):     · 72-year-old male with no prior history who is status post brain biopsy-his sugars have been ranging between 200-300  He is currently on dexamethasone resulting in steroid induced hyperglycemia  ·  For now I would suggest increasing Lantus to 25 units in the morning  Increase Humalog to 15 units before meals    Monitor blood sugars before meals and bedtime adjust accordingly      Primary Diagnosis:  Brain mass    Lab Studies:  Results from last 7 days   Lab Units 09/11/19  1102 09/11/19  0814 09/10/19  1453 09/10/19  0701 09/09/19  2107 09/09/19  1718 09/09/19  1239 09/09/19  0804 09/08/19  2047 09/08/19  1658 09/08/19  1148 09/08/19  0740   POC GLUCOSE mg/dl 392* 324* 272* 234* 222* 255* 289* 223* 382* 353* 313* 231*     Lab Results   Component Value Date    HGBA1C 5 9 09/05/2019       Current Medications:    Current Facility-Administered Medications:  acetaminophen 650 mg Oral Q4H PRN Laure Eddy MD   aluminum-magnesium hydroxide-simethicone 30 mL Oral Q6H PRN Laure Eddy MD   dexamethasone 4 mg Intravenous Q6H 250 Gal Quinonez MD   Followed by       Beto Barrios ON 9/12/2019] dexamethasone 4 mg Intravenous Giuseppe Suarez MD   Followed by       Beto Barrios ON 9/15/2019] dexamethasone 2 mg Intravenous Q6H 2501 Gal Quinonez MD   Followed by       Beto Barrios ON 9/18/2019] dexamethasone 2 mg Intravenous Giuseppe Suarez MD   Followed by       Beto Barrios ON 9/21/2019] dexamethasone 2 mg Intravenous Q12H [address], MD   Followed by       Beto Barrios ON 9/24/2019] dexamethasone 2 mg Intravenous Q24H Laure Eddy MD docusate sodium 100 mg Oral BID Flavio Rodas MD   enoxaparin 30 mg Subcutaneous Daily Flavio Rodas MD   fentanyl citrate (PF) 50 mcg Intravenous Q2H PRN Flavio Rodas MD   fluticasone-vilanterol 1 puff Inhalation Daily Flavio Rodas MD   folic acid 1 mg Oral Daily Flavio Rodas MD   hydrOXYzine HCL 25 mg Oral Q6H PRN Flavio Rodas MD   insulin glargine 13 Units Subcutaneous QAM Flavio Rodas MD   insulin lispro 1-5 Units Subcutaneous TID Ann-Marie Holland MD   insulin lispro 10 Units Subcutaneous TID With Meals Marcial Chino DO   Labetalol HCl 10 mg Intravenous Q4H PRN Flavio Rodas MD   levETIRAcetam 500 mg Oral Q12H Milena Burroughs MD   multivitamin-minerals 1 tablet Oral Daily Flavio Rodas MD   nicotine 21 mg Transdermal Daily Flavio Rodas MD   nicotine polacrilex 2 mg Oral Q1H PRN Flavio Rodas MD   NIFEdipine 60 mg Oral Daily Flavio Rodas MD   ondansetron 4 mg Intravenous Q6H PRN Flavio Rodas MD   oxyCODONE 10 mg Oral Q6H PRN Flavio Rodas MD   oxyCODONE 5 mg Oral Q4H PRN Flavio Rodas MD   pantoprazole 40 mg Oral Early Morning Flavio Rodas MD   pneumococcal 13-valent conjugate vaccine 0 5 mL Intramuscular Prior to discharge Flavio Rodas MD   senna 1 tablet Oral Daily Flavio Rodas MD   sodium chloride 1 g Oral TID With Davi Karimi MD   thiamine 100 mg Oral Daily MD Theodore Mayen MD

## 2019-09-11 NOTE — PROGRESS NOTES
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, uncertain whether glial  Final pathology pending  Imaging:  CTH, 9/4: Large area of subcortical white matter hypodensity consistent with vasogenic edema in the left parietal region  Small focus of hemorrhage superiorly as seen on image 2/30 with an associated more hypodense masslike area measuring 3 9 x 2 2 cm  CTCAP, 9/4: Heterogenous liver with hepatomegaly and multifocal heterogenous liver lesions, may be due to multifocal [address]  or metastatic disease  Left lower lobe lung nodule measuring 7 6 mm, suspicious for metastatic disease  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  Mildly enlarged fidel hepatis lymphadenopathy  MRI brain w/wo, 9/5: Large centrally necrotic mass within the left frontoparietal region measuring 2 9 x 4 3 x 3 4 cm with surrounding vasogenic edema  MRI brain w/wo, 9/10: Post op changes from left frontal craniotomy  No residual mass identified  Plan:  Decadron 4q6, wean every 72 hours  Keppra x7 days  PT/OT  DVT ppx: SCDs, lovenox  Pain control per primary team  CIWA - monitor closely  D/w CCM, transfer primary team to [facility]  [id number] [address] to transfer to floor later today  Dental consult for left tooth pain    Neurosurgery will continue to follow      Subjective/Objective   Chief Complaint: "My left tooth hurts"    Subjective: Patient c/o headache and left tooth pain which shoots up into his head, new today  States he has always had bad teeth but never this pain  Otherwise, no dizziness, vision changes, speech deficits, syncope, seizures, fever, chills, nausea, vomiting  +flatus   No new focal neuro deficits  Objective: Patient laying in bed, visibly uncomfortable  Left frontal incision covered with gauze, +strikethough  No drain  HTN overnight  Intake/Output       09/11/19 0701 - 09/12/19 0700      2211-9319 1326-9031 Total       Intake    P O   240  -- 240    I V   311 7  -- 311 7    Total Intake 551 7 -- 551 7       Output    Urine  350  -- 350    Output (mL) (Urethral Catheter Latex 16 Fr ) 350 -- 350    Total Output 350 -- 350       Net I/O     201 7 -- 201 7          Invasive Devices     Peripheral Intravenous Line            Peripheral IV 09/09/19 Dorsal (posterior); Right Forearm 2 days    Peripheral IV 09/10/19 Distal;Left;Ventral (anterior) Wrist 1 day          Arterial Line            Arterial Line 09/10/19 Left Radial 1 day          Drain            Urethral Catheter Latex 16 Fr  1 day                Vitals: Blood pressure 156/83, pulse 72, temperature 98 9 °F (37 2 °C), temperature source Oral, resp  rate 20, height 6' 3" (1 905 m), weight 79 1 kg (174 lb 6 1 oz), SpO2 91 %  ,Body mass index is 21 8 kg/m²  General appearance: alert, appears stated age, cooperative  Visibly in pain, cannot sit still  Head: Normocephalic, left frontal incision covered with dressing +strikethrough  Eyes: EOMI, PERRL  Neck: supple, symmetrical, trachea midline   Lungs: non labored breathing  Heart: regular heart rate  Neurologic:   Mental status: Alert, oriented x3, thought content appropriate  Cranial nerves: grossly intact (Cranial nerves II-XII)  Sensory: normal to LT bilaterally  Motor: moving all extremities, 5/5 bilaterally except trace 4+/5 RUE  Reflexes: 2+ and symmetric  1 beat clonus right foot  No hoffmans  Coordination: finger to nose abnormal right side, no drift bilaterally      Lab Results:   I have personally reviewed pertinent results      Lab Results   Component Value Date    WBC 19 74 (H) 09/11/2019    HGB 13 5 09/11/2019    HCT 39 7 09/11/2019    MCV 94 09/11/2019     09/11/2019    MCH 32 0 09/11/2019    MCHC 34 0 09/11/2019    RDW 14 4 09/11/2019    MPV 10 7 09/11/2019    SODIUM 126 (L) 09/11/2019    CL 94 (L) 09/11/2019    CO2 23 09/11/2019    BUN 24 09/11/2019    CREATININE 1 36 (H) 09/11/2019    CALCIUM 8 7 09/11/2019    EGFR 53 09/11/2019    INR 0 98 09/11/2019       Imaging Studies: I have personally reviewed pertinent reports  and I have personally reviewed pertinent films in PACS     X-ray Chest 1 View Portable  Result Date: 9/4/2019  Impression: Suggestion of osteolytic lesion in the posterior left 5th rib  Otherwise, no evidence of acute abnormality in the chest  Workstation performed: [id number]     Ct Head Without Contrast  Result Date: 9/4/2019  Impression: Large area of subcortical white matter hypodensity consistent with vasogenic edema in the left parietal region  Small focus of hemorrhage superiorly as seen on image 2/30 with an associated more hypodense masslike area measuring 3 9 x 2 2 cm  Mild mass  effect on the adjacent cortical sulci  No significant midline shift  MRI with contrast recommended to exclude underlying mass  I personally discussed this study with Roderick Bautista on 9/4/2019 at 11:36 AM  Workstation performed: [id number]     Mri Brain W Wo Contrast  Result Date: 9/11/2019  Impression: Status post left parietal craniotomy and resection of enhancing mass left frontoparietal junction  No residual enhancing mass identified  Continued follow-up recommended  Residual vasogenic edema is noted resulting in mass effect on the posterior horn  left lateral ventricle without midline shift  Small subdural hemorrhage bilaterally measuring 2 mm in thickness  No additional enhancing mass identified  Workstation performed: [id number]     Mri Brain W Wo Contrast  Result Date: 9/5/2019  Impression: Large centrally necrotic mass within the left frontoparietal region with surrounding vasogenic edema    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 versus solitary metastasis  Workstation performed: [id number]     Ct Chest Abdomen Pelvis W Contrast  Result Date: 9/4/2019  Impression: Heterogenous liver with hepatomegaly and multifocal heterogenous liver lesions, may be due to multifocal [address]  or metastatic disease  Left lower lobe lung nodule measuring 7 6 mm, suspicious for metastatic disease 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 Mildly enlarged fidel hepatis lymphadenopathy The study was marked in EPIC for immediate notification  Workstation performed: [id number]       EKG, Pathology, and Other Studies: I have personally reviewed pertinent reports     and I have personally reviewed pertinent films in PACS    VTE Pharmacologic Prophylaxis: Sequential compression device (Venodyne)  and Enoxaparin (Lovenox)

## 2019-09-11 NOTE — PROGRESS NOTES
Progress Note - ICU Transfer to [facility]  81 y o  male MRN: [id number]  [facility]   Unit/Bed#: ICU 01 Encounter: [id number]    Code Status: Level 1 - Full Code    Reason for ICU admission: Status post left craniotomy, resection of tumor    Active problems: Principal Problem:    Brain mass with hemorrhage  Active Problems:    Essential hypertension    Alcohol abuse    Cerebral edema (HCC)    COPD (chronic obstructive pulmonary disease) ([facility] )    Hepatocellular carcinoma ([facility] )    Malnutrition related to chronic disease    Leukocytosis    Transaminitis    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    JANEEN (acute kidney injury) [facility])      Consultants:   · Neurosurgery    History of Present Illness/Summary of clinical course: Per progress note by Mayank Garcia: "Mr John Arenas is a 80 yo male with a PMH COPD, HTN, liver cancer, ETOH abuse and 2 PPD smoker  He states he "partied my whole life"  He admitted to heavy alcohol use for 30 years  He states his last drink was three weeks ago  He is currently a 2 PPD smoker and has been for 30 years as well  He denies any illicit drug use in the past or present  In 2017, he was diagnosed with liver cancer and completed "a few rounds" of chemo and radiation before he stopped the treatment on his own since he felt it wasn't doing anything  Since then he has intermittently seen a doctor and has been on and off of blood pressure medications with his last usage being nifedipine 3 months ago  He uses oxycodone and xanax PRN for abdominal pain but it was unclear where he got these from since he doesn't frequent a doctor  He states he had no issues other than the abdominal pain up until the last three months when she started with hand tremors    Two months ago he started loosing his balance in addition to the tremors and finally on 9/4/19 he presented to the [place] [address] urgent care in [facility] for not only the tremors and loss of balance but he had developed right hand weakness and numbness  He denies any visual changes or LOC  At that time, it was decided to transfer him to [facility] where he had a CT head that showed large vasogenic edema in the left parietal area with a 4x2 cm mass mostly likely metastasis  At that time he was transferred to [facility] to internal medicine for further workup on 9/4/19  A CT abdomen confirmed heterogeneous liver and hepatomegaly and multifocal lesions as well as a CT chest that showed left lower lung lobe mets and bony rib mets  Patient presented to the OR on 9/10/19 with neurosurgery and arrived to the ICU at 1700 s/p image guided left frontal craniotomy for resection of left tumor  On exam, he is AAO x 4 with no neuro deficits and 5/5 strengths  He denies any incisional pain but does c/o pain in his abdomen which is not new for him  Hemodynamics are stable and patient requires no pressors   "    The patient has been mildly hyponatremic; today his sodium corrects to 130 for hyperglycemia  He is notably hyperglycemic with most recent blood sugar of 393  This is likely related to steroid use and patient will likely require continued escalation of insulin therapy  Patient was hypertensive following surgery and required labetalol multiple times  Of note, he had not received his nifedipine on the day of surgery  He complains of head pain at this time, which he clarifies for the neurosurgery team as tooth pain  His vital signs have been stable with the exception of hypertension  He is moving all extremities, and per discussion with neurosurgery he is stable to transfer to the floors  Please refer to today's progress note for further clinical details  Recent or scheduled procedures:   9/10 Left craniotomy  9/10 MRI Brain: Status post left parietal craniotomy and resection of enhancing mass left frontoparietal junction    No residual enhancing mass identified  Continued follow-up recommended  Residual vasogenic edema is noted resulting in mass effect on the posterior horn   left lateral ventricle without midline shift        Small subdural hemorrhage bilaterally measuring 2 mm in thickness      No additional enhancing mass identified        Outstanding/pending diagnostics: None       Mobilization Plan: Ambulates with PT/OT    Nutrition Plan: Diabetic diet    Home medications ordered: Nifedipine     Specific Diagnosis Plan:    1  Brain mass -status post resection  Maintain systolic <418  Decadron 4q6, wean every 72 hours per neurosurgery  Ok to start lovenox per neurosurgery  Keppra x 7 days    2  Liver cancer  Outpatient followup to resume treatment  Check CMP tomorrow    3  Leukocytosis  Likely reactive due to steroid use  Monitor CBC    4  Hyperglycemia  Likely reactive to steroid use/recent surgery  Refractory to insulin treatment - will increase to 13 U long acting, 10U TID, SSI    5  JANEEN  Creatinine relatively stable around 1 3  This may be patient's baseline  Continue to monitor    Spoke with Dr Quinn Shone regarding transfer  Patient accepted to his/her service ([facility])  Please call the medical critical care attending with any questions  Portions of the record may have been created with voice recognition software  Occasional wrong word or "sound a like" substitutions may have occurred due to the inherent limitations of voice recognition software  Read the chart carefully and recognize, using context, where substitutions have occurred      Leopold Coma, DO

## 2019-09-11 NOTE — PROGRESS NOTES
Patient Name: Stephan Cartwright  YOB: 1950    Medical Record No : [id number]     Admit/Registration Date: 9/4/2019  4:38 PM  Date of Consult: 09/11/19      Oral and Maxillofacial Surgery Consult Note    Assessment:  81 y o  male with PMH of multi forcal hepatocellular carcinoma and hepatitis C found to have a large necrotic left fronto parietal brain mass  Now s/p left craniotomy and resection of tumor  OMFS consulted for left sided tooth pain  Patient endorses mild tooth pain associated with retained root #12  Patient with multiple carious teeth and signs of chronic infection  No evidence of acute infection  Plan/Recs:  Can be scheduled for extractions during this hospital visit or as outpatient  No acute surgical indications  Will discuss timing with attending     -----------------------------------------      Pre-admission records reviewed  PMH/PSH/Meds/Allergies Reviewed      Past Medical History:   Diagnosis Date    Cancer [facility])     liver    Dependence on nicotine from cigarettes 9/4/2019    Hypertension      Past Surgical History:   Procedure Laterality Date    APPENDECTOMY      CRANIOTOMY Left 9/10/2019    Procedure: Image guided left frontal craniotomy for resection of tumor;  Surgeon: Elissa Jacobs MD;  Location: [facility];  Service: Neurosurgery       No Known Allergies    Social History     Socioeconomic History    Marital status: Legally      Spouse name: Not on file    Number of children: Not on file    Years of education: Not on file    Highest education level: Not on file   Occupational History    Not on file   Social Needs    Financial resource strain: Not on file    Food insecurity:     Worry: Not on file     Inability: Not on file    Transportation needs:     Medical: Not on file     Non-medical: Not on file   Tobacco Use    Smoking status: Current Every Day Smoker     Packs/day: 2 00    Smokeless tobacco: Current User     Types: Chew   Substance and Sexual Activity    Alcohol use: Yes     Comment: 1/5 bottle weekly     Drug use: Never    Sexual activity: Not on file   Lifestyle    Physical activity:     Days per week: Not on file     Minutes per session: Not on file    Stress: Not on file   Relationships    Social connections:     Talks on phone: Not on file     Gets together: Not on file     Attends Yazidi service: Not on file     Active member of club or organization: Not on file     Attends meetings of clubs or organizations: Not on file     Relationship status: Not on file    Intimate partner violence:     Fear of current or ex partner: Not on file     Emotionally abused: Not on file     Physically abused: Not on file     Forced sexual activity: Not on file   Other Topics Concern    Not on file   Social History Narrative    Not on file       Scheduled Medications    Current Facility-Administered Medications:  acetaminophen 650 mg Oral Q4H PRN Mamadou Cordoba MD   aluminum-magnesium hydroxide-simethicone 30 mL Oral Q6H PRN Mamadou Cordoba MD   dexamethasone 4 mg Intravenous Q6H Poppy Fletcher MD   Followed by       Emigdio Ling ON 9/12/2019] dexamethasone 4 mg Intravenous Sona Gibbs MD   Followed by       Emigdio Ling ON 9/15/2019] dexamethasone 2 mg Intravenous Q6H Poppy Fletcher MD   Followed by       Emigdio Ling ON 9/18/2019] dexamethasone 2 mg Intravenous Sona Gibbs MD   Followed by       Emigdio Ling ON 9/21/2019] dexamethasone 2 mg Intravenous Q12H Poppy Fletcher MD   Followed by       Emigdio Ling ON 9/24/2019] dexamethasone 2 mg Intravenous Q24H Mamadou Cordoba MD   docusate sodium 100 mg Oral BID Mamadou Cordoba MD   enoxaparin 30 mg Subcutaneous Daily Mamadou Cordoba MD   fentanyl citrate (PF) 50 mcg Intravenous Q2H PRN Mamadou Cordoba MD   fluticasone-vilanterol 1 puff Inhalation Daily Mamadou Cordoba MD   folic acid 1 mg Oral Daily Mamadou Cordoba MD   hydrOXYzine HCL 25 mg Oral Q6H PRN Mamadou Cordoba MD   insulin glargine 13 Units Subcutaneous QAM Jillyn Peru, MD   insulin lispro 1-5 Units Subcutaneous TID AC Kye Dexter MD   insulin lispro 10 Units Subcutaneous TID With Meals Sae Morgan DO   Labetalol HCl 10 mg Intravenous Q4H PRN Kye Dexter MD   levETIRAcetam 500 mg Oral Q12H Nava Trejo MD   multivitamin-minerals 1 tablet Oral Daily Kye Dexter MD   nicotine 21 mg Transdermal Daily Kye Dexter MD   nicotine polacrilex 2 mg Oral Q1H PRN Kye Dexter MD   NIFEdipine 60 mg Oral Daily Kye Dexter MD   ondansetron 4 mg Intravenous Q6H PRN Kye Dexter MD   oxyCODONE 10 mg Oral Q6H PRN Kye Dexter MD   oxyCODONE 5 mg Oral Q4H PRN Kye Dexter MD   pantoprazole 40 mg Oral Early Morning Kye Dexter MD   pneumococcal 13-valent conjugate vaccine 0 5 mL Intramuscular Prior to discharge Kye Dexter MD   senna 1 tablet Oral Daily Kye Dexter MD   sodium chloride 1 g Oral TID With Meals Kye Dexter MD   thiamine 100 mg Oral Daily Kye Dexter MD       PRN Medications    acetaminophen    aluminum-magnesium hydroxide-simethicone    fentanyl citrate (PF)    hydrOXYzine HCL    Labetalol HCl    nicotine polacrilex    ondansetron    oxyCODONE    oxyCODONE    pneumococcal 13-valent conjugate vaccine    Medication Infusions         Vitals:    Temp:  [97 1 °F (36 2 °C)-98 9 °F (37 2 °C)] 97 5 °F (36 4 °C)  HR:  [60-94] 94  Resp:  [18-31] 20  BP: (130-183)/(65-90) 131/72  Arterial Line BP: (102-210)/(50-92) 102/92  Wt Readings from Last 1 Encounters:   09/11/19 79 1 kg (174 lb 6 1 oz)     Ht Readings from Last 1 Encounters:   09/10/19 6' 3" (1 905 m)     Body mass index is 21 8 kg/m²    Respiratory    Lab Data (Last 4 hours)    None         O2/Vent Data (Last 4 hours)    None              Patient Lines/Drains/Airways Status    Active Airway     None              I/O:  Current Diet Order:        Diet Orders   (From admission, onward)             Start     Ordered    09/10/19 5419  Diet Regular; Regular House  Diet effective now     Question Answer Comment   Diet Type Regular    Regular Regular House    RD to adjust diet per protocol? Yes        09/10/19 1654                 Intake/Output Summary (Last 24 hours) at 9/11/2019 1337  Last data filed at 9/11/2019 1145  Gross per 24 hour   Intake 2466 67 ml   Output 3675 ml   Net -1208 33 ml       Labs:  Results from last 7 days   Lab Units 09/11/19  0536 09/10/19  1158 09/10/19  0050 09/09/19  0457   WBC Thousand/uL 19 74*  --  15 91* 13 99*   HEMOGLOBIN g/dL 13 5  --  14 3 15 2   HEMATOCRIT % 39 7  --  42 4 46 2   PLATELETS Thousands/uL 273 314 296 323     Results from last 7 days   Lab Units 09/11/19  0906 09/11/19  0046 09/10/19  0051   POTASSIUM mmol/L 4 3 4 3 5 0   CHLORIDE mmol/L 94* 100 100   CO2 mmol/L 23 25 30   BUN mg/dL 24 30* 30*   CREATININE mg/dL 1 36* 1 29 1 34*   CALCIUM mg/dL 8 7 8 8 9 4     Results from last 7 days   Lab Units 09/11/19  0536 09/10/19  0051 09/09/19  0457   INR  0 98 0 99 0 96   PTT seconds 27 26  --          Pain Management Panel     There is no flowsheet data to display  Panorex: Multiple carious teeth, generalized periodontitis, PAP associated with #2 and 31  No intrabony pathology      Physical Exam:   General: Integment: skin warm and dry, patient is WD/WN, Voice quality, in NAD  Neuro Exam: AAO x3,    ODentalalveolar Exam: permanent dentition, multiple carious teeth  Generalized gingivitis  No signs of acute infection including abscess, fistula or purulent drainage  Tooth #12, retained root tip submerged underneath gingiva  Severely painful to palpation  FOM is soft no tender or elevated  No vestibular swelling     Lymph/Neck Exam: Neck is soft, trachea is midline, no gross cervical lymphadenopathy bilaterally        Brashear Grandchild

## 2019-09-11 NOTE — OCCUPATIONAL THERAPY NOTE
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    JANEEN (acute kidney injury) [facility])    Past Medical History  Past Medical History:   Diagnosis Date    Cancer ([facility] [address] )     liver    Dependence on nicotine from cigarettes 9/4/2019    Hypertension      Past Surgical History  Past Surgical History:   Procedure Laterality Date    APPENDECTOMY               09/11/19 0933   Note Type   Note type Eval/Treat   Restrictions/Precautions   Weight Bearing Precautions Per Order No   Other Precautions Cognitive; Impulsive; Chair Alarm; Bed Alarm;Telemetry;Multiple lines; Fall Risk;Pain;Seizure  (A-line)   Pain Assessment   Pain Assessment 0-10   Pain Score 7   Pain Type Acute pain;Surgical pain   Pain Location Head   Pain Descriptors Aching;Discomfort   Pain Frequency Constant/continuous   Pain Onset Ongoing   Clinical Progression Not changed   Patient's Stated Pain Goal No pain   Hospital Pain Intervention(s) Repositioned; Ambulation/increased activity; Emotional support   Response to Interventions tolerated   Home Living   Type of Home Other (Comment)  (rooming house)   Home Layout One level;Bed/bath upstairs; Other (Comment)  (2nd floor room; 2 full flights to enter)   Bathroom Shower/Tub Tub/shower unit   [address] Other (Comment)  (denies any)   Home Equipment Other (Comment)  (denies any)   Additional Comments Pt reports living in [facility] (mot/rooming house) w/ 2nd floor room, 2 full flights to enter; shares a bathroom w/ 16 other people; bathroom is located up or down a flight of stairs   Prior Function   Level of [place] Independent with ADLs and functional mobility   Lives With Alone   Receives Help From Family   ADL Assistance Independent   IADLs Independent   Falls in the last 6 months 0   Vocational Retired   Comments Pt reports being I w/ ADLS, IADLS, transfers and functional mobility PTA   Lifestyle   Autonomy I ADLS, IADLS, transfers and functional mobility PTA   Reciprocal Relationships Pt lives alone; reports brother and sister are nearby but he doesn't like to bug them   Service to Others Pt is retired   Intrinsic Gratification Pt enjoys relaxing; TV   Psychosocial   Psychosocial (WDL) WDL   ADL   Eating Assistance 5  Supervision/Setup   Grooming Assistance 5  Supervision/Setup   [address] 5  Supervision/Setup   LB [address] 4  Minimal Assistance   UB Dressing Assistance 5  Supervision/Setup    [address] 4  Minimal Assistance   LB Dressing Deficit Don/doff L sock; Don/doff R sock   Toileting Assistance  4  Minimal Assistance   Functional Assistance 4  Minimal Assistance   Functional Deficit Steadying; Impulsive;Verbal cueing;Supervision/safety; Increased time to complete   Bed Mobility   Supine to Sit 4  Minimal assistance   Additional items Assist x 1;HOB elevated; Increased time required;Verbal cues   Sit to Supine Unable to assess   Additional Comments Pt went from supine to sit w/ MIn A x1 for UB support and +VC for safety; HOB elevated for assist   Transfers   Sit to Stand 4  Minimal assistance   Additional items Assist x 1; Increased time required;Verbal cues   Stand to Sit 4  Minimal assistance   Additional items Assist x 1; Increased time required;Verbal cues   Additional Comments Pt performed sit-stand from EOB w/ Min A x1, mild force production into standing and +VC for safety     Functional Mobility   Functional Mobility 4  Minimal assistance   Additional Comments Pt took few small steps in room w/ Min A x1, HHA used, +VC for safety 2/2 impulsivity; SBA 2nd for line management   Additional items Hand hold assistance   Balance   Static Sitting Fair   Dynamic Sitting Fair -   Static Standing Fair -   Dynamic Standing Poor +   Ambulatory Poor +   Activity Tolerance   Activity Tolerance Patient tolerated treatment well   Medical Staff Made Aware PCA   Nurse Made Aware yes, Cyril   RUE Assessment   RUE Assessment WFL  (impaired coordination and [address]; dysmetric)   LUE Assessment   LUE Assessment WFL   Hand Function   Gross Motor Coordination Functional   Fine Motor Coordination Impaired  (RUE impaired fine motor skills)   Sensation   Light Touch Partial deficits in the RUE  (numbness in R hand)   Sharp/Dull Not tested   Proprioception   Proprioception No apparent deficits   Vision-Basic Assessment   Current Vision Wears glasses only for reading   Patient Visual Report Past pointing/reaching   Vision - Complex Assessment   Ocular Range of Motion WFL   Head Position WDL   Perception   Inattention/Neglect Appears intact   Cognition   Overall Cognitive Status Impaired   Arousal/Participation Responsive; Cooperative   Attention Attends with cues to redirect   Orientation Level Oriented X4   Memory Decreased recall of precautions   Following Commands Follows one step commands without difficulty   Comments Pt is pleasant and cooperative; has decreased safety awareness and understanding of deficits; requires cues for safety 2/2 impulsivity; needs occasional re-direction to task; recommend continued cogntitve evaluation to assist w/ safe D/C planning   Assessment   Limitation Decreased ADL status; Decreased UE strength;Decreased UE ROM; Decreased Safe judgement during ADL;Decreased cognition;Decreased sensation;Decreased endurance;Visual deficit; Decreased self-care trans;Decreased fine motor control;Decreased high-level ADLs   Prognosis Fair   Assessment Pt is a 82 y/o male seen for OT eval s/p adm to [facility] w/ complaints of R hand weakness, numbness and tremor for 3 months  Pt is dx'd w/ brain mass w/ hemorrhage and CT chest abdomen pelvis showed heterogeneous liver with hepatomegaly and multifocal heterogeneous liver lesions and L lower lung lobe nodule concerning for metastasis, bony metastatic disease in the ribs and manubrium  Pt is s/p the following procedure "Image guided left frontal craniotomy for resection of tumor (Left)" performed on 9/10/19  Pt  has a past medical history of Cancer ([address] ), Dependence on nicotine from cigarettes, and Hypertension  Pt with active OT orders and up with assistance  orders  Pt lives alone in 2nd floor rooming home w/ 2 full flights to enter; must go up or down a flight of steps to get to bathrooom  Pt was I w/  ADLS and IADLS, drove, & required no use of DME PTA  Pt is currently demonstrating the following occupational deficits: S UB ADLS, Min A LB ADLS, Min A transfers and functional mobility w/ HHA and +VC for safety 2/2 impulsivity  These deficits that are impacting pt's baseline areas of occupation are a result of the following impairments: pain, endurance, activity tolerance, functional mobility, forward functional reach, balance, trunk control, functional standing tolerance, unsupportive home environment, decreased I w/ ADLS/IADLS, strength, ROM, visual deficits, sensation deficits, cognitive impairments, decreased safety awareness, decreased insight into deficits, impaired fine motor skills and coordination deficits  The following Occupational Performance Areas to address include: eating, grooming, bathing/shower, toilet hygiene, dressing, socialization, health maintenance, functional mobility, community mobility, clothing management, cleaning, meal prep, money management and household maintenance  Pt scored overall 35/100 on the Barthel Index  Based on the aforementioned OT evaluation, functional performance deficits, and assessments, pt has been identified as a high complexity evaluation   Recommend STR upon D/C, pending progress  Recommend further cognitive evaluation to assist w/ safe D/C planning  Pt to continue to benefit from acute immediate OT services to address the following goals 3-5x/week to  w/in 7-10 days:    Goals   Patient Goals to improve fine motor skills in R hand   LTG Time Frame 7-10   Long Term Goal #1 see below listed goals   Plan   Treatment Interventions ADL retraining;Visual perceptual retraining;Functional transfer training;UE strengthening/ROM; Endurance training;Cognitive reorientation;Patient/family training;Equipment evaluation/education; Fine motor coordination activities; Compensatory technique education;Continued evaluation; Energy conservation; Activityengagement   Goal Expiration Date 19   OT Frequency 3-5x/wk   Recommendation   OT Discharge Recommendation Short Term Rehab  (pending progress & ongoing cog eval)   OT - OK to Discharge Yes  (when medically stable)   Barthel Index   Feeding 5   Bathing 0   Grooming Score 0   Dressing Score 5   Bladder Score 0   Bowels Score 10   Toilet Use Score 5   Transfers (Bed/Chair) Score 10   Mobility (Level Surface) Score 0   Stairs Score 0   Barthel Index Score 35   Modified [place] Scale   Modified [place]a Scale 4     GOALS    1) Pt will improve activity tolerance to G for min 30 min txment sessions for increase engagement in functional tasks  2) Pt will complete UB/LB dressing/self care w/ mod I using adaptive device and DME as needed  3) Pt will complete bathing w/ Mod I w/ use of AE and DME as needed  4) Pt will complete toileting w/ mod I w/ G hygiene/thoroughness using DME as needed  5) Pt will improve functional transfers to Mod I on/off all surfaces using DME as needed w/ G balance/safety   6) Pt will improve functional mobility during ADL/IADL/leisure tasks to Mod I using DME as needed w/ G balance/safety   7) Pt will participate in simulated IADL management task to increase independence to Mod I w/ G safety and endurance  8) Pt will be attentive 100% of the time during ongoing cognitive assessment w/ G participation to assist w/ safe d/c planning/recommendations  9) Pt will demonstrate G carryover of pt/caregiver education and training as appropriate w/o cues w/ good tolerance to increase safety during functional tasks  10) Pt will demonstrate 100% carryover of energy conservation techniques t/o functional I/ADL/leisure tasks w/o cues s/p skilled education to increase endurance during functional tasks     Bertram Rivero MS, OTR/L

## 2019-09-11 NOTE — PROGRESS NOTES
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    JANEEN (acute kidney injury) ([facility] )  Resolved Problems:    Weakness    Dependence on nicotine from cigarettes        Neuro:   POD 1 s/p image guided left frontal craniotomy for resection of left tumor, ETOH abuse  · Continue ancef x 24 hours  · Dexamethasone with slow wean  · keppra 500 mg x 7 days  · Tylenol, oxy 5/10, fentanyl (breakthrough) PRN for pain  · Neuro exams q1h  · SBP < 160  · Thiamine/folate  · Monitor for alcohol withdraw - per patient, last drink was 3 weeks ago     CV:   HTN  · Patient has history of HTN and was taking nifedipine but has not taken it for 3 months - restart nifedipine 24 hr tablet 60 mg daily    · Blood pressures elevated overnight but pt did not receive nifedipine, will monitor today with usual dose  · Labetalol PRN for systolic >649  · Monitor showing NSR - continue telemetry      Pulm:   COPD, nicotine abuse, left lower lung lobe mets?, bony rib mets  · Start Breo daily  · Keep O2 sats > 88%  · Encourage IS use  · Nicotine patch   · CT chest - shows possible left lower lung lobe mets and bony rib mets - primary source liver, patient 2 PPD smoker for 40 years      GI:   Primary Liver cancer, transaminitis  · Last chemo/radiation was 2017  · CT abdomen shows heterogeneous liver and hepatomegaly and multifocal lesions   · Patient has abdominal pain - tylenol and fentanyl PRN  · Regular diet   · Trend liver indices and coags     :    JANEEN  · In the setting of dehydration vs other  · On admission creat 1 29, unsure of baseline, patient denies any kidney issues prior   · Continue LR @ 100 ml/hr   · Trend renal indices - now back to 1 29  · Avoid nephrotoxic agents      F/E/N:   · Continue LR @ 100 ml/hr  · Salt tabs 1g TID  · Monitor and replete electrolytes as needed     ID: leukocytosis  · Reactive s/p craniotomy and steroid  · No S/S of infection  · Continue ancef post op for 24 hours as ordered  · Monitor fevers, currently afebrile     Heme:   No acute issues  · Monitor for S/S of bleeding/bruising  · Transfuse as needed for Hbg < 7     Endo:   · Patient denies history of DM however blood sugars have been elevated during admission  · Hbg A1C 5 9  · Keep glucose between 140-180  · lantus 10 units every am - will increase to 13  · Lispro 8 units TID with meals   · SSI in place                Msk/Skin:   Post op dressing  · Change as ordered q48 hours and PRN   · Activity as ordered  · PT/OT     Disposition: per neurosurgery     _____________________________________________________________________  ______________________________________________________________________    Chief Complaint: My head hurts    24 Hour Events: Patient seen and examined  He is post op day 1 from resection of tumor via left craniotomy  He currently complains of headache  He denies any other complaints  Review of Systems   Constitutional: Negative for chills and fatigue  Respiratory: Negative for cough and shortness of breath  Cardiovascular: Negative for chest pain  Gastrointestinal: Negative for abdominal pain  Neurological: Positive for headaches  Negative for dizziness and light-headedness       ______________________________________________________________________  Vitals:    09/11/19 0300 09/11/19 0400 09/11/19 0538 09/11/19 0600   BP: (!) 174/81 (!) 173/83  164/80   BP Location:       Pulse: 72 70  80   Resp: 18 20  (!) 24   Temp:       TempSrc:       SpO2: 93% 93%  92% Weight:   79 1 kg (174 lb 6 1 oz)    Height:           Temperature:   Temp (24hrs), Av 2 °F (36 8 °C), Min:96 7 °F (35 9 °C), Max:99 °F (37 2 °C)    Current Temperature: 98 9 °F (37 2 °C)  Weights:   IBW: 84 5 kg    Body mass index is 21 8 kg/m²  Weight (last 2 days)     Date/Time   Weight    19 0538   79 1 (174 38)    09/10/19 1654   75 4 (166 23)                Physical Exam   Constitutional: He is oriented to person, place, and time  He appears well-developed and well-nourished  No distress  HENT:   Head: Normocephalic and atraumatic  Surgical wound over left parietal region with dressing in place  There is blood present on dressing   Eyes: Conjunctivae and EOM are normal    Cardiovascular: Normal rate, regular rhythm and normal heart sounds  No murmur heard  Pulmonary/Chest: Effort normal and breath sounds normal  He has no wheezes  He has no rales  Abdominal: Soft  Bowel sounds are normal  There is no tenderness  Musculoskeletal: He exhibits no edema or deformity  Neurological: He is alert and oriented to person, place, and time  Moving all extremities  Cranial nerves intact  Skin: Skin is warm and dry  Psychiatric: His behavior is normal    Irritable          ______________________________________________________________________  Hemodynamic Monitoring:  N/A     Non-Invasive/Invasive Ventilation Settings:  Respiratory    Lab Data (Last 4 hours)    None         O2/Vent Data (Last 4 hours)    None              No results found for: PHART, QYA3IWJ, PO2ART, ETN9MCW, O1XVBKVI, BEART, SOURCE  SpO2: SpO2: 92 %  Intake and Outputs:  I/O       701 - 09/10 0700 09/10 0701 -  0700    P  O  510 400    I V  (mL/kg) 747 9 (11 8) 2725 (34 5)    IV Piggyback 1000 50    Total Intake(mL/kg) 2257 9 (35 6) 3175 (40 1)    Urine (mL/kg/hr) 200 (0 1) 3975 (2 1)    Blood  300    Total Output 200 4275    Net +7 9 -1100          Unmeasured Urine Occurrence 3 x           Nutrition: Diet Orders   (From admission, onward)             Start     Ordered    09/10/19 1655  Diet Regular; Regular House  Diet effective now     Question Answer Comment   Diet Type Regular    Regular Regular House    RD to adjust diet per protocol? Yes        09/10/19 1654              Labs:   Results from last 7 days   Lab Units 09/11/19  0536 09/10/19  1158 09/10/19  0050 09/09/19  0457 09/06/19  0512 09/05/19  0536 09/04/19  1103   WBC Thousand/uL 19 74*  --  15 91* 13 99* 8 55 9 13 8 26   HEMOGLOBIN g/dL 13 5  --  14 3 15 2 13 2 13 9 15 2   HEMATOCRIT % 39 7  --  42 4 46 2 39 5 42 7 44 9   PLATELETS Thousands/uL 273 314 296 323 239 259 285   NEUTROS PCT %  --   --  85* 78*  --  71  --    MONOS PCT %  --   --  5 7  --  10  --      Results from last 7 days   Lab Units 09/11/19  0046 09/10/19  0051 09/09/19  0457 09/08/19  0931 09/06/19  0512 09/05/19  0536 09/04/19  1103   SODIUM mmol/L 133* 135* 131* 129* 138 136 136   POTASSIUM mmol/L 4 3 5 0 4 2 4 5 4 3 4 4 4 4   CHLORIDE mmol/L 100 100 97* 96* 103 103 99*   CO2 mmol/L 25 30 28 28 26 29 31   ANION GAP mmol/L 8 5 6 5 9 4 6   BUN mg/dL 30* 30* 30* 32* 21 20 15   CREATININE mg/dL 1 29 1 34* 1 28 1 35* 1 07 1 42* 1 29   CALCIUM mg/dL 8 8 9 4 9 4 8 9 8 8 9 0 9 3   ALT U/L  --  81*  --   --   --   --  81*   AST U/L  --  33  --   --   --   --  70*   ALK PHOS U/L  --  220*  --   --   --   --  262*   ALBUMIN g/dL  --  3 1*  --   --   --   --  3 3*   TOTAL BILIRUBIN mg/dL  --  0 83  --   --   --   --  1 20*     Results from last 7 days   Lab Units 09/05/19  0536   MAGNESIUM mg/dL 2 1   PHOSPHORUS mg/dL 3 3      Results from last 7 days   Lab Units 09/11/19  0536 09/10/19  0051 09/09/19  0457 09/04/19  1103   INR  0 98 0 99 0 96 1 00   PTT seconds 27 26  --  28             ABG:No results found for: PHART, XGA6SEO, PO2ART, NSN2TXV, I5TROSRJ, BEART, SOURCE  VBG:        No results found for: [facility]   Imaging: MRI overnight, result pending   I have personally reviewed pertinent reports  EKG: None new  Micro:       Allergies: No Known Allergies  Medications:   Scheduled Meds:  Current Facility-Administered Medications:  acetaminophen 650 mg Oral Q4H PRN [facility] DAYANA García-STEPHANIE    aluminum-magnesium hydroxide-simethicone 30 mL Oral Q6H PRN [facility]IE    dexamethasone 4 mg Intravenous Q6H [facility] CHANTELLE García    Followed by        Beto Barrios ON 9/12/2019] dexamethasone 4 mg Intravenous Q8H [facility] DAYANA García-STEPHANIE    Followed by        Beto Barrios ON 9/15/2019] dexamethasone 2 mg Intravenous Q6H [facility] Harpreet Desi Woodson PA-C    Followed by        Beto Barrios ON 9/18/2019] dexamethasone 2 mg Intravenous Q8H [facility] CHANTELLE García    Followed by        Beto Barrios ON 9/21/2019] dexamethasone 2 mg Intravenous Q12H [facility] Harpreet Desi Woodson PA-C    Followed by        Beto Barrios ON 9/24/2019] dexamethasone 2 mg Intravenous Q24H [facility] CHANTELLE García    docusate sodium 100 mg Oral BID [facility]    enoxaparin 30 mg Subcutaneous Daily [facility]    fentanyl citrate (PF) 50 mcg Intravenous Q2H PRN Tana Mckinley MD    fluticasone-vilanterol 1 puff Inhalation Daily [facility]    folic acid 1 mg Oral Daily [facility] PA-STEPHANIE    hydrOXYzine HCL 25 mg Oral Q6H PRN [facility]PH[facility]IE    insulin glargine 10 Units Subcutaneous QAM [facility]    insulin lispro 1-5 Units Subcutaneous TID AC Harpreet Woodson PA-C    insulin lispro 8 Units Subcutaneous TID With Meals [facility] DAYANA García-C    ipratropium-albuterol 3 mL Nebulization Q4H PRN [facility]    Labetalol HCl 10 mg Intravenous Q4H PRN [facility] PA-[facility]    lactated ringers 100 mL/hr Intravenous Continuous [facility] CHANTELLE García Last Rate: 100 mL/hr (09/11/19 0343)   levETIRAcetam 500 mg Oral Q12H [facility] Micaela aGrcía PA-C    multivitamin-minerals 1 tablet Oral Daily Micaela García PA-C    nicotine 21 mg Transdermal Daily Micaela García PA-C    nicotine polacrilex 2 mg Oral Q1H PRN Woody López PA-C    NIFEdipine 90 mg Oral Daily Theodor Belts, DO    ondansetron 4 mg Intravenous Q6H PRN DAYANA Lazo-STEPHANIE    oxyCODONE 10 mg Oral Q6H PRN Woody López, PA-STEPHANIE    oxyCODONE 5 mg Oral Q4H PRN Woody López, PA-STEPHANIE    pantoprazole 40 mg Oral Early Morning DAYANA Lazo-C    pneumococcal 13-valent conjugate vaccine 0 5 mL Intramuscular Prior to discharge Woody López PA-C    senna 1 tablet Oral Daily Woody López PA-C    sodium chloride 1 g Oral TID With Meals Woody López PA-C    thiamine 100 mg Oral Daily Woody López PA-C      Continuous Infusions:  lactated ringers 100 mL/hr Last Rate: 100 mL/hr (09/11/19 0343)     PRN Meds:    acetaminophen 650 mg Q4H PRN   aluminum-magnesium hydroxide-simethicone 30 mL Q6H PRN   fentanyl citrate (PF) 50 mcg Q2H PRN   hydrOXYzine HCL 25 mg Q6H PRN   ipratropium-albuterol 3 mL Q4H PRN   Labetalol HCl 10 mg Q4H PRN   nicotine polacrilex 2 mg Q1H PRN   ondansetron 4 mg Q6H PRN   oxyCODONE 10 mg Q6H PRN   oxyCODONE 5 mg Q4H PRN   pneumococcal 13-valent conjugate vaccine 0 5 mL Prior to discharge     VTE Pharmacologic Prophylaxis: Enoxaparin (Lovenox)  VTE Mechanical Prophylaxis: sequential compression device  Invasive lines and devices: Invasive Devices     Peripheral Intravenous Line            Peripheral IV 09/09/19 Dorsal (posterior); Right Forearm 2 days    Peripheral IV 09/10/19 Distal;Left;Ventral (anterior) Wrist 1 day          Arterial Line            Arterial Line 09/10/19 Left Radial less than 1 day          Drain            Urethral Catheter Latex 16 Fr  less than 1 day                   Code Status: Level 1 - Full Code    Portions of the record may have been created with voice recognition software  Occasional wrong word or "sound a like" substitutions may have occurred due to the inherent limitations of voice recognition software    Read the chart carefully and recognize, using context, where substitutions have occurred      Lyssa Bhatia DO  Internal Medicine   PGY-2  9/11/2019 6:23 AM

## 2019-09-11 NOTE — PROGRESS NOTES
PM&R Consult Follow Up Note  Jerod Mathis  81 y o  male MRN: [id number]  Unit/Bed#: ICU 01 Encounter: [id number]     Assessment and Recommendations:  Jerod Mathis  is a 81 y o  male with a PMH of multi forcal hepatocellular carcinoma and hepatitis C found to have a large necrotic left fronto parietal brain mass  He underwent resection on 9/10/2019  PM&R continues to follow for rehabilitation recommendations       Brain mass  - resection on 9/10/2019  - neurosurgery following  - decadron being weaned  - post op MRI stable  - keppra x 7 days  - pathology pending     Hepatocellular carcinoma  - pt to follow up with oncology outpt in [facility]     COPD  - oxygen PRN  - pulmonary hygiene  - respiratory protocol     ETOH abuse  - CIWA protocol     Impairments:  Impaired functional mobility and ability to perform ADL's     Recommendations:  - Continue PT/OT while inpatient  #Delirium/Sleep:  monitor  #Pain: medications per critical care team  #Bowel: continent, senokot daily  #Bladder: continent no chaney  #Skin/Pressure Injury Prevention: Turn Q2hr in bed, with weight shifts [id number] in wheelchair  #DVT Prophylaxis:  SCD  #GI Prophylaxis:  protonix     - The patient lives at the [facility] which is a motel  He rents a room there and there are 2 flights of stairs to his room      - The patient continues to report that he will likely be able to d/c with his brother or sister however he has yet to discuss with them        - Disposition unclear at this point in time but inpatient rehab a possibility vs home with family support in the near future pending achievement of clinically stability, potential for functional progress  - If patient does d/c to inpatient rehabilitation he would like a facility closer to [facility] area  Thank you for allowing the PM&R service to participate in the care of Mr Jerod Mathis    We will continue to follow he progress with you   Please do not hesitate to call with questions or concerns  Interval History: The patient underwent resection of brain mass 9/10/2019      Subjective: The patient is reporting left sided headache and left toothache      Objective: The patient was up OOB to the chair  He is restless  Physical Therapy: need re-evaluation post surgery     Occupational Therapy:       09/11/19 0985   Note Type   Note type Eval/Treat   Restrictions/Precautions   Weight Bearing Precautions Per Order No   Other Precautions Cognitive; Impulsive; Chair Alarm; Bed Alarm;Telemetry;Multiple lines; Fall Risk;Pain;Seizure  (A-line)   Pain Assessment   Pain Assessment 0-10   Pain Score 7   Pain Type Acute pain;Surgical pain   Pain Location Head   Pain Descriptors Aching;Discomfort   Pain Frequency Constant/continuous   Pain Onset Ongoing   Clinical Progression Not changed   Patient's Stated Pain Goal No pain   Hospital Pain Intervention(s) Repositioned; Ambulation/increased activity; Emotional support   Response to Interventions tolerated   Home Living   Type of Home Other (Comment)  (rooming house)   Home Layout One level;Bed/bath upstairs; Other (Comment)  (2nd floor room; 2 full flights to enter)   Bathroom Shower/Tub Tub/shower unit   [address] Other (Comment)  (denies any)   Home Equipment Other (Comment)  (denies any)   Additional Comments Pt reports living in [employer] (motel/rooming house) w/ 2nd floor room, 2 full flights to enter; shares a bathroom w/ 16 other people; bathroom is located up or down a flight of stairs   Prior Function   Level of [place] with ADLs and functional mobility   Lives With [facility] in the last 6 months 0   Vocational Retired   Comments Pt reports being I w/ ADLS, IADLS, transfers and functional mobility PTA   Lifestyle   Autonomy I ADLS, IADLS, transfers and functional mobility PTA   Reciprocal Relationships Pt lives alone; reports brother and sister are nearby but he doesn't like to bug them   Service to Others Pt is retired   Intrinsic Gratification Pt enjoys relaxing; TV   Psychosocial   Psychosocial (WDL) [address]  5  [address] [facility] 5  Supervision/Setup   [address] 5  Supervision/Setup   LB [address] 4  Minimal Assistance   [address] 5  Supervision/Setup    [facility] [address] 4  Minimal Assistance   LB Dressing Deficit Don/doff L sock; Don/doff R sock   Toileting Assistance  4  Minimal Assistance   Functional Assistance 4  Minimal Assistance   Functional Deficit Steadying; Impulsive;Verbal cueing;Supervision/safety; Increased time to complete   Bed Mobility   Supine to Sit 4  Minimal assistance   Additional items Assist x 1;HOB elevated; Increased time required;Verbal cues   Sit to Supine Unable to assess   Additional Comments Pt went from supine to sit w/ MIn A x1 for UB support and +VC for safety; HOB elevated for assist   Transfers   Sit to Stand 4  Minimal assistance   Additional items Assist x 1; Increased time required;Verbal cues   Stand to Sit 4  Minimal assistance   Additional items Assist x 1; Increased time required;Verbal cues   Additional Comments Pt performed sit-stand from EOB w/ Min A x1, mild force production into standing and +VC for safety     Functional Mobility   Functional Mobility 4  Minimal assistance   Additional Comments Pt took few small steps in room w/ Min A x1, HHA used, +VC for safety 2/2 impulsivity; SBA 2nd for line management   Additional items Hand hold assistance   Balance   Static Sitting Fair   Dynamic Sitting Fair -   Static Standing Fair -   Dynamic Standing Poor +   Ambulatory Poor +   Activity Tolerance   Activity Tolerance Patient tolerated treatment well   Medical Staff Made Aware PCA   Nurse Made Aware yes, Cyril CARPENTER Assessment   RUE Assessment WFL  (impaired coordination and [address]; dysmetric)   PRADEEP Assessment   LUE Assessment WFL   Hand Function   Gross Motor Coordination Functional   Fine Motor Coordination Impaired  (RUE impaired fine motor skills)   Sensation   Light Touch Partial deficits in the RUE  (numbness in R hand)   Sharp/Dull Not tested   Proprioception   Proprioception No apparent deficits   Vision-Basic Assessment   Current Vision Wears glasses only for reading   Patient Visual Report Past pointing/reaching   Vision - Complex Assessment   Ocular Range of Motion WFL   Head Position WDL   Perception   Inattention/Neglect Appears intact   Cognition   Overall Cognitive Status Impaired   Arousal/Participation Responsive; Cooperative   Attention Attends with cues to redirect   Orientation Level Oriented X4   Memory Decreased recall of precautions   Following Commands Follows one step commands without difficulty   Comments Pt is pleasant and cooperative; has decreased safety awareness and understanding of deficits; requires cues for safety 2/2 impulsivity; needs occasional re-direction to task; recommend continued cogntitve evaluation to assist w/ safe D/C planning   Assessment   Limitation Decreased ADL status; Decreased UE strength;Decreased UE ROM; Decreased Safe judgement during ADL;Decreased cognition;Decreased sensation;Decreased endurance;Visual deficit; Decreased self-care trans;Decreased fine motor control;Decreased high-level ADLs   Prognosis Fair   Assessment Pt is a 82 y/o male seen for OT eval s/p adm to [facility] w/ complaints of R hand weakness, numbness and tremor for 3 months  Pt is dx'd w/ brain mass w/ hemorrhage and CT chest abdomen pelvis showed heterogeneous liver with hepatomegaly and multifocal heterogeneous liver lesions and L lower lung lobe nodule concerning for metastasis, bony metastatic disease in the ribs and manubrium  Pt is s/p the following procedure "Image guided left frontal craniotomy for resection of tumor (Left)" performed on 9/10/19   Pt  has a past medical history of Cancer ([address] ), Dependence on nicotine from cigarettes, and Hypertension  Pt with active OT orders and up with assistance  orders  Pt lives alone in 2nd floor rooming home w/ 2 full flights to enter; must go up or down a flight of steps to get to bathrooom  Pt was I w/  ADLS and IADLS, drove, & required no use of DME PTA  Pt is currently demonstrating the following occupational deficits: S UB ADLS, Min A LB ADLS, Min A transfers and functional mobility w/ HHA and +VC for safety 2/2 impulsivity  These deficits that are impacting pt's baseline areas of occupation are a result of the following impairments: pain, endurance, activity tolerance, functional mobility, forward functional reach, balance, trunk control, functional standing tolerance, unsupportive home environment, decreased I w/ ADLS/IADLS, strength, ROM, visual deficits, sensation deficits, cognitive impairments, decreased safety awareness, decreased insight into deficits, impaired fine motor skills and coordination deficits  The following Occupational Performance Areas to address include: eating, grooming, bathing/shower, toilet hygiene, dressing, socialization, health maintenance, functional mobility, community mobility, clothing management, cleaning, meal prep, money management and household maintenance  Pt scored overall 35/100 on the Barthel Index  Based on the aforementioned OT evaluation, functional performance deficits, and assessments, pt has been identified as a high complexity evaluation  Recommend STR upon D/C, pending progress  Recommend further cognitive evaluation to assist w/ safe D/C planning   Pt to continue to benefit from acute immediate OT services to address the following goals 3-5x/week to  w/in 7-10 days:    Goals   Patient Goals to improve fine motor skills in R hand   LTG Time Frame 7-10   Long Term Goal #1 see below listed goals   Plan   Treatment Interventions ADL retraining;Visual perceptual retraining;Functional transfer training;UE strengthening/ROM; Endurance training;Cognitive reorientation;Patient/family training;Equipment evaluation/education; Fine motor coordination activities; Compensatory technique education;Continued evaluation; Energy conservation; Activityengagement   Goal Expiration Date 09/21/19   OT Frequency 3-5x/wk   Recommendation   OT Discharge Recommendation Short Term Rehab  (pending progress & ongoing cog eval)   OT - OK to Discharge Yes  (when medically stable)   Barthel Index   Feeding 5   Bathing 0   Grooming Score 0   Dressing Score 5   Bladder Score 0   Bowels Score 10   Toilet Use Score 5   Transfers (Bed/Chair) Score 10   Mobility (Level Surface) Score 0   Stairs Score 0   Barthel Index Score 35   Modified [place] Scale   Modified [place] Scale 4      GOALS     1) Pt will improve activity tolerance to G for min 30 min txment sessions for increase engagement in functional tasks  2) Pt will complete UB/LB dressing/self care w/ mod I using adaptive device and DME as needed  3) Pt will complete bathing w/ Mod I w/ use of AE and DME as needed  4) Pt will complete toileting w/ mod I w/ G hygiene/thoroughness using DME as needed  5) Pt will improve functional transfers to Mod I on/off all surfaces using DME as needed w/ G balance/safety   6) Pt will improve functional mobility during ADL/IADL/leisure tasks to Mod I using DME as needed w/ G balance/safety   7) Pt will participate in simulated IADL management task to increase independence to Mod I w/ G safety and endurance  8) Pt will be attentive 100% of the time during ongoing cognitive assessment w/ G participation to assist w/ safe d/c planning/recommendations  9) Pt will demonstrate G carryover of pt/caregiver education and training as appropriate w/o cues w/ good tolerance to increase safety during functional tasks  10) Pt will demonstrate 100% carryover of energy conservation techniques t/o functional I/ADL/leisure tasks w/o cues s/p skilled education to increase endurance during functional tasks    Vital Signs:      Temp:  [97 1 °F (36 2 °C)-98 9 °F (37 2 °C)] 98 9 °F (37 2 °C)  HR:  [58-92] 90  Resp:  [18-31] 22  BP: (130-183)/(65-90) 130/65  Arterial Line BP: (102-210)/(50-92) 102/92   Intake/Output Summary (Last 24 hours) at 9/11/2019 1209  Last data filed at 9/11/2019 1145  Gross per 24 hour   Intake 2466 67 ml   Output 3375 ml   Net -908  33 ml        Laboratory:      Lab Results   Component Value Date    HGB 13 5 09/11/2019    HCT 39 7 09/11/2019    WBC 19 74 (H) 09/11/2019     Lab Results   Component Value Date    BUN 24 09/11/2019    K 4 3 09/11/2019    CL 94 (L) 09/11/2019    CREATININE 1 36 (H) 09/11/2019     Lab Results   Component Value Date    PROTIME 12 6 09/11/2019    INR 0 98 09/11/2019          General: alert, no apparent distress, cooperative and restless  Head: Normal, normocephalic, atraumatic  Eye: Normal external eye, conjunctiva, lids   Ears: Normal external ears  Neck / Thyroid: Supple, no masses, nodes, nodules or enlargement    Pulmonary: unlabored  Cardiovascular: normal rate and regular rhythm  Abdomen: soft, nontender, nondistended, no masses or organomegaly  Skin/Extremity: no rashes, no erythema, no peripheral edema  Incision:  Dressing to left scalp C/D/I  Neurologic: RUE ataxia  Psych: Appropriate affect, alert and oriented to person, place and time   Musculoskeletal - Strength:   Right  Left  Site  Right  Left  Site    5 5  S Ab: Shoulder Abductors  5  5  HF: Hip Flexors    5 5  EF: Elbow Flexors  5 5 KF: Knee Flexors    5  5  EE: Elbow Extensors  5  5  KE: Knee Extensors    5  5  WE: Wrist Extensors  5  5  DR: Dorsi Flexors    5  5  FF: Finger Flexors  5  5  PF: Plantar Flexors    5  5  HI: Hand Intrinsics  5  5  EHL: Extensor Hallucis Longus

## 2019-09-11 NOTE — PLAN OF CARE
Problem: OCCUPATIONAL THERAPY ADULT  Goal: Performs self-care activities at highest level of function for planned discharge setting  See evaluation for individualized goals  Description  Treatment Interventions: ADL retraining, Visual perceptual retraining, Functional transfer training, UE strengthening/ROM, Endurance training, Cognitive reorientation, Patient/family training, Equipment evaluation/education, Fine motor coordination activities, Compensatory technique education, Continued evaluation, Energy conservation, Activityengagement          See flowsheet documentation for full assessment, interventions and recommendations  Note:   Limitation: Decreased ADL status, Decreased UE strength, Decreased UE ROM, Decreased Safe judgement during ADL, Decreased cognition, Decreased sensation, Decreased endurance, Visual deficit, Decreased self-care trans, Decreased fine motor control, Decreased high-level ADLs  Prognosis: Fair  Assessment: Pt is a 82 y/o male seen for OT eval s/p adm to B w/ complaints of R hand weakness, numbness and tremor for 3 months  Pt is dx'd w/ brain mass w/ hemorrhage and CT chest abdomen pelvis showed heterogeneous liver with hepatomegaly and multifocal heterogeneous liver lesions and L lower lung lobe nodule concerning for metastasis, bony metastatic disease in the ribs and manubrium  Pt is s/p the following procedure "Image guided left frontal craniotomy for resection of tumor (Left)" performed on 9/10/19  Pt  has a past medical history of Cancer ([facility] [address] ), Dependence on nicotine from cigarettes, and Hypertension  Pt with active OT orders and up with assistance  orders  Pt lives alone in 2nd floor rooming home w/ 2 full flights to enter; must go up or down a flight of steps to get to bathrooom  Pt was I w/  ADLS and IADLS, drove, & required no use of DME PTA   Pt is currently demonstrating the following occupational deficits: S UB ADLS, Min A LB ADLS, Min A transfers and functional mobility w/ HHA and +VC for safety 2/2 impulsivity  These deficits that are impacting pt's baseline areas of occupation are a result of the following impairments: pain, endurance, activity tolerance, functional mobility, forward functional reach, balance, trunk control, functional standing tolerance, unsupportive home environment, decreased I w/ ADLS/IADLS, strength, ROM, visual deficits, sensation deficits, cognitive impairments, decreased safety awareness, decreased insight into deficits, impaired fine motor skills and coordination deficits  The following Occupational Performance Areas to address include: eating, grooming, bathing/shower, toilet hygiene, dressing, socialization, health maintenance, functional mobility, community mobility, clothing management, cleaning, meal prep, money management and household maintenance  Pt scored overall 35/100 on the Barthel Index  Based on the aforementioned OT evaluation, functional performance deficits, and assessments, pt has been identified as a high complexity evaluation  Recommend STR upon D/C, pending progress  Recommend further cognitive evaluation to assist w/ safe D/C planning   Pt to continue to benefit from acute immediate OT services to address the following goals 3-5x/week to  w/in 7-10 days:      OT Discharge Recommendation: Short Term Rehab(pending progress & ongoing cog eval)  OT - OK to Discharge: Yes(when medically stable)     Curry Latham MS, OTR/L

## 2019-09-11 NOTE — ASSESSMENT & PLAN NOTE
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, uncertain whether glial  Final pathology pending  Imaging:  CTH, 9/4: Large area of subcortical white matter hypodensity consistent with vasogenic edema in the left parietal region  Small focus of hemorrhage superiorly as seen on image 2/30 with an associated more hypodense masslike area measuring 3 9 x 2 2 cm  CTCAP, 9/4: Heterogenous liver with hepatomegaly and multifocal heterogenous liver lesions, may be due to multifocal [address]  or metastatic disease  Left lower lobe lung nodule measuring 7 6 mm, suspicious for metastatic disease  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  Mildly enlarged fidel hepatis lymphadenopathy  MRI brain w/wo, 9/5: Large centrally necrotic mass within the left frontoparietal region measuring 2 9 x 4 3 x 3 4 cm with surrounding vasogenic edema  MRI brain w/wo, 9/10: Post op changes from left frontal craniotomy  No residual mass identified  Plan:  Decadron 4q6, wean every 72 hours  Keppra x7 days  PT/OT  DVT ppx: SCDs, SQH this afternoon  Pain control per primary team  CIWA - monitor closely     D/w CCM, transfer primary team to [facility]  Heather Heard to transfer to floor later today  Dental consult for left tooth pain    Neurosurgery will continue to follow

## 2019-09-11 NOTE — PROGRESS NOTES
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    JANEEN (acute kidney injury) ([facility] )      VTE Pharmacologic Prophylaxis: Reason for no pharmacologic prophylaxis Status post biopsy  VTE Mechanical Prophylaxis: sequential compression device    Disposition: Patient requires Level 2 Step Down     Hospital Course: Per  Note of Allison Francisco DO 09/11/19 "History of Present Illness/Summary of clinical course: Per progress note by Ashley Morales: "Mr Fer Hou is a 80 yo male with a PMH COPD, HTN, liver cancer, ETOH abuse and 2 PPD smoker  [facility] states he "partied my whole life"   He admitted to heavy alcohol use for 30 years  [facility] states his last drink was three weeks ago  [facility] is currently a 2 PPD smoker and has been for 30 years as well  [facility] denies any illicit drug use in the past or present   In 2017, he was diagnosed with liver cancer and completed "a few rounds" of chemo and radiation before he stopped the treatment on his own since he felt it wasn't doing anything  Eloise Chamber then he has intermittently seen a doctor and has been on and off of blood pressure medications with his last usage being nifedipine 3 months ago  [facility] uses oxycodone and xanax PRN for abdominal pain but it was unclear where he got these from since he doesn't frequent a doctor  [facility] states he had no issues other than the abdominal pain up until the last three months when she started with hand tremors   Two months ago he started loosing his balance in addition to the tremors and finally on 9/4/19 he presented to the [facility] urgent care in [facility] for not only the tremors and loss of balance but he had developed right hand weakness and numbness   He denies any visual changes or LOC   At that time, it was decided to transfer him to [facility] where he had a CT head that showed large vasogenic edema in the left parietal area with a 4x2 cm mass mostly likely metastasis   At that time he was transferred to [facility] to internal medicine for further workup on 9/4/19   A CT abdomen confirmed heterogeneous liver and hepatomegaly and multifocal lesions as well as a CT chest that showed left lower lung lobe mets and bony rib mets   Patient presented to the OR on 9/10/19 with neurosurgery and arrived to the ICU at 1700 s/p image guided left frontal craniotomy for resection of left tumor   On exam, he is AAO x 4 with no neuro deficits and 5/5 strengths   He denies any incisional pain but does c/o pain in his abdomen which is not new for him   Hemodynamics are stable and patient requires no pressors   "     The patient has been mildly hyponatremic; today his sodium corrects to 130 for hyperglycemia       Patient was hypertensive following surgery and required labetalol multiple times  Of note, he had not received his nifedipine on the day of surgery  He complains of head pain at this time, which he clarifies for the neurosurgery team as tooth pain  His vital signs have been stable with the exception of hypertension  He is moving all extremities, and per discussion with neurosurgery he is stable to transfer to the floors  "    Subjective:  Patient seen and examined at bedside this a m  Denies any changes in vision, new changes in sensation, or new changes in motor ability  States that his right arm is still weak but is unchanged from prior    He states that his last drink was approximately 6 days ago, which would be the day of his arrival   He states that he normally drinks about a 5th a week or so  He does have associated left tooth pain with a headache on left side  Which is being evaluated by a dental consult  He denies any other associated signs or symptoms  Objective:   Vitals:    09/11/19 0400 09/11/19 0538 09/11/19 0600 09/11/19 0700   BP: (!) 173/83  164/80 156/83   BP Location:       Pulse: 70  80 72   Resp: 20  (!) 24 20   Temp:       TempSrc:       SpO2: 93%  92% 91%   Weight:  79 1 kg (174 lb 6 1 oz)     Height:         I/O last 24 hours: In: 3726 7 [P O :640; I V :3036 7; IV Piggyback:50]  Out: 2898 [Urine:4325; Blood:300]    Physical Exam   Constitutional: He appears well-developed and well-nourished  HENT:   Head: Normocephalic  Eyes: Pupils are equal, round, and reactive to light  EOM are normal    Neck: Normal range of motion  Cardiovascular: Normal rate and regular rhythm  Pulmonary/Chest: Effort normal    Abdominal: Soft  Musculoskeletal: Normal range of motion  Neurological:   4/5 right upper extremity, 5 5 all other extremities, GCS 15, sensation intact in all 4 extremities  Skin: Skin is warm  Psychiatric:   Patient does appear anxious on exam no overt tremors noted  His slight diaphoresis noted on his chest as well           Assessment/Plan:    1  Brain mass with hemorrhage - patient is postop day 1 status post biopsy of his brain mass  Original pathology notable for non glial origin  Awaiting final pathology  · Continue Ancef for 24 hours  · Patient is on dexamethasone wean  · Keppra 500 mg x7 days  · Continue neuro checks  · Goal systolic blood pressure less than 160  2  Weakness  ·  Secondary to 1 , patient has 4/5 right upper extremity strength  3  Hepatocellular carcinoma with associated hepatitis C  · Patient is status post chemotherapy 2017 and then stopped  His multiple likely metastasis including bony rib Mets, left lower lung lobe Mets as well as likely brain Mets    · Has been evaluated by Hematology and Oncology will need eventual chemotherapy regimen started  4  COPD  · Started on Breo daily  · Incentive spirometry  5  Hypertension  · Continue labetalol p r n  For systolic above 006  · Restarted on nifedipine 60 mg daily  6  Alcohol abuse  ·  Patient states to me that his last drink was really 6 days ago which be about the time he came into the hospital   He does appear diaphoretic on exam, does not have a visible tremor but appears quite anxious does not endorse any visual or auditory hallucinations does not endorse any nausea or vomiting did not appear agitated was given a CIWA of 5   · Will hold off on placing patient on CIWA protocol due to inability to monitor neuro exam appropriately on benzodiazepines, will monitor for need of benzo however patient is now 6 days out in the hospital so likelihood of series withdrawal was decreased  · Continue thiamine folate  7  Tobacco abuse  ·  Continue nicotine patch  8  Anxiety  ·  Continue Atarax  9  Hyperglycemia - does not have a diagnosis of type 2 diabetes, likely secondary to steroid use  ·  On a basal bolus regimen of correctional scale insulin Humalog 10 units t i d  Glargine 13 units    10  Hyponatremia - corrected sodium with glucose today is 132  · Monitor for signs of cerebral salt wasting, given that patient's creatinine is possibly hypovolemic hyponatremia versus salt wasting, especially in the setting of hypochloremia, other considerations are that the patient has been on lactated Ringer's for the last 48 hours some maybe possibly iatrogenic  · Continue salt tablets 1 g t i d   · If continues to downtrend which consider initiating Florinef as well as giving IV bolus of normal saline      Dejah Infante, DO

## 2019-09-12 PROBLEM — E87.1 HYPONATREMIA: Status: ACTIVE | Noted: 2019-09-12

## 2019-09-12 PROBLEM — N17.9 AKI (ACUTE KIDNEY INJURY) (HCC): Status: RESOLVED | Noted: 2019-09-10 | Resolved: 2019-09-12

## 2019-09-12 PROBLEM — R73.9 STEROID-INDUCED HYPERGLYCEMIA: Status: ACTIVE | Noted: 2019-09-12

## 2019-09-12 PROBLEM — T38.0X5A STEROID-INDUCED HYPERGLYCEMIA: Status: ACTIVE | Noted: 2019-09-12

## 2019-09-12 LAB
ALBUMIN SERPL BCP-MCNC: 2.7 G/DL (ref 3.5–5)
ALP SERPL-CCNC: 235 U/L (ref 46–116)
ALT SERPL W P-5'-P-CCNC: 89 U/L (ref 12–78)
ANION GAP SERPL CALCULATED.3IONS-SCNC: 5 MMOL/L (ref 4–13)
AST SERPL W P-5'-P-CCNC: 52 U/L (ref 5–45)
BILIRUB SERPL-MCNC: 1.79 MG/DL (ref 0.2–1)
BUN SERPL-MCNC: 26 MG/DL (ref 5–25)
CALCIUM SERPL-MCNC: 9.4 MG/DL (ref 8.3–10.1)
CHLORIDE SERPL-SCNC: 93 MMOL/L (ref 100–108)
CO2 SERPL-SCNC: 27 MMOL/L (ref 21–32)
CREAT SERPL-MCNC: 1.05 MG/DL (ref 0.6–1.3)
ERYTHROCYTE [DISTWIDTH] IN BLOOD BY AUTOMATED COUNT: 14.3 % (ref 11.6–15.1)
GFR SERPL CREATININE-BSD FRML MDRD: 73 ML/MIN/1.73SQ M
GLUCOSE SERPL-MCNC: 232 MG/DL (ref 65–140)
GLUCOSE SERPL-MCNC: 255 MG/DL (ref 65–140)
GLUCOSE SERPL-MCNC: 272 MG/DL (ref 65–140)
GLUCOSE SERPL-MCNC: 276 MG/DL (ref 65–140)
GLUCOSE SERPL-MCNC: 278 MG/DL (ref 65–140)
HCT VFR BLD AUTO: 41.6 % (ref 36.5–49.3)
HGB BLD-MCNC: 14.2 G/DL (ref 12–17)
MCH RBC QN AUTO: 32.1 PG (ref 26.8–34.3)
MCHC RBC AUTO-ENTMCNC: 34.1 G/DL (ref 31.4–37.4)
MCV RBC AUTO: 94 FL (ref 82–98)
OSMOLALITY UR/SERPL-RTO: 293 MMOL/KG (ref 282–298)
OSMOLALITY UR: 931 MMOL/KG
PLATELET # BLD AUTO: 314 THOUSANDS/UL (ref 149–390)
PMV BLD AUTO: 11 FL (ref 8.9–12.7)
POTASSIUM SERPL-SCNC: 4.5 MMOL/L (ref 3.5–5.3)
PROT SERPL-MCNC: 7.6 G/DL (ref 6.4–8.2)
RBC # BLD AUTO: 4.42 MILLION/UL (ref 3.88–5.62)
SODIUM 24H UR-SCNC: 123 MOL/L
SODIUM SERPL-SCNC: 125 MMOL/L (ref 136–145)
WBC # BLD AUTO: 16.45 THOUSAND/UL (ref 4.31–10.16)

## 2019-09-12 PROCEDURE — NC001 PR NO CHARGE: Performed by: INTERNAL MEDICINE

## 2019-09-12 PROCEDURE — 99024 POSTOP FOLLOW-UP VISIT: CPT | Performed by: PHYSICIAN ASSISTANT

## 2019-09-12 PROCEDURE — 94762 N-INVAS EAR/PLS OXIMTRY CONT: CPT

## 2019-09-12 PROCEDURE — 99233 SBSQ HOSP IP/OBS HIGH 50: CPT | Performed by: INTERNAL MEDICINE

## 2019-09-12 PROCEDURE — 85027 COMPLETE CBC AUTOMATED: CPT | Performed by: INTERNAL MEDICINE

## 2019-09-12 PROCEDURE — 80053 COMPREHEN METABOLIC PANEL: CPT | Performed by: INTERNAL MEDICINE

## 2019-09-12 PROCEDURE — 82948 REAGENT STRIP/BLOOD GLUCOSE: CPT

## 2019-09-12 PROCEDURE — 83930 ASSAY OF BLOOD OSMOLALITY: CPT | Performed by: STUDENT IN AN ORGANIZED HEALTH CARE EDUCATION/TRAINING PROGRAM

## 2019-09-12 PROCEDURE — G8978 MOBILITY CURRENT STATUS: HCPCS

## 2019-09-12 PROCEDURE — 97163 PT EVAL HIGH COMPLEX 45 MIN: CPT

## 2019-09-12 PROCEDURE — 83935 ASSAY OF URINE OSMOLALITY: CPT | Performed by: STUDENT IN AN ORGANIZED HEALTH CARE EDUCATION/TRAINING PROGRAM

## 2019-09-12 PROCEDURE — 84300 ASSAY OF URINE SODIUM: CPT | Performed by: STUDENT IN AN ORGANIZED HEALTH CARE EDUCATION/TRAINING PROGRAM

## 2019-09-12 PROCEDURE — G8979 MOBILITY GOAL STATUS: HCPCS

## 2019-09-12 RX ADMIN — INSULIN LISPRO 3 UNITS: 100 INJECTION, SOLUTION INTRAVENOUS; SUBCUTANEOUS at 11:50

## 2019-09-12 RX ADMIN — LEVETIRACETAM 500 MG: 500 TABLET, FILM COATED ORAL at 21:19

## 2019-09-12 RX ADMIN — NICOTINE 21 MG: 21 PATCH, EXTENDED RELEASE TRANSDERMAL at 05:17

## 2019-09-12 RX ADMIN — FENTANYL CITRATE 50 MCG: 50 INJECTION INTRAMUSCULAR; INTRAVENOUS at 10:45

## 2019-09-12 RX ADMIN — OXYCODONE HYDROCHLORIDE 10 MG: 10 TABLET ORAL at 21:19

## 2019-09-12 RX ADMIN — INSULIN LISPRO 3 UNITS: 100 INJECTION, SOLUTION INTRAVENOUS; SUBCUTANEOUS at 07:20

## 2019-09-12 RX ADMIN — THIAMINE HCL TAB 100 MG 100 MG: 100 TAB at 08:06

## 2019-09-12 RX ADMIN — SODIUM CHLORIDE TAB 1 GM 1 G: 1 TAB at 11:56

## 2019-09-12 RX ADMIN — FLUTICASONE FUROATE AND VILANTEROL TRIFENATATE 1 PUFF: 100; 25 POWDER RESPIRATORY (INHALATION) at 08:01

## 2019-09-12 RX ADMIN — SODIUM CHLORIDE TAB 1 GM 1 G: 1 TAB at 17:08

## 2019-09-12 RX ADMIN — LEVETIRACETAM 500 MG: 500 TABLET, FILM COATED ORAL at 08:02

## 2019-09-12 RX ADMIN — PANTOPRAZOLE SODIUM 40 MG: 40 TABLET, DELAYED RELEASE ORAL at 05:17

## 2019-09-12 RX ADMIN — FENTANYL CITRATE 50 MCG: 50 INJECTION INTRAMUSCULAR; INTRAVENOUS at 02:29

## 2019-09-12 RX ADMIN — ENOXAPARIN SODIUM 30 MG: 30 INJECTION SUBCUTANEOUS at 08:01

## 2019-09-12 RX ADMIN — Medication 1 TABLET: at 08:01

## 2019-09-12 RX ADMIN — OXYCODONE HYDROCHLORIDE 10 MG: 10 TABLET ORAL at 15:12

## 2019-09-12 RX ADMIN — OXYCODONE HYDROCHLORIDE 5 MG: 5 TABLET ORAL at 17:08

## 2019-09-12 RX ADMIN — INSULIN GLARGINE 25 UNITS: 100 INJECTION, SOLUTION SUBCUTANEOUS at 08:00

## 2019-09-12 RX ADMIN — DEXAMETHASONE SODIUM PHOSPHATE 4 MG: 4 INJECTION, SOLUTION INTRAMUSCULAR; INTRAVENOUS at 11:56

## 2019-09-12 RX ADMIN — FOLIC ACID 1 MG: 1 TABLET ORAL at 08:01

## 2019-09-12 RX ADMIN — DEXAMETHASONE SODIUM PHOSPHATE 4 MG: 4 INJECTION, SOLUTION INTRAMUSCULAR; INTRAVENOUS at 05:13

## 2019-09-12 RX ADMIN — INSULIN LISPRO 3 UNITS: 100 INJECTION, SOLUTION INTRAVENOUS; SUBCUTANEOUS at 17:09

## 2019-09-12 RX ADMIN — NIFEDIPINE 60 MG: 60 TABLET, FILM COATED, EXTENDED RELEASE ORAL at 08:01

## 2019-09-12 RX ADMIN — SODIUM CHLORIDE TAB 1 GM 1 G: 1 TAB at 07:45

## 2019-09-12 RX ADMIN — OXYCODONE HYDROCHLORIDE 10 MG: 10 TABLET ORAL at 07:45

## 2019-09-12 RX ADMIN — DEXAMETHASONE SODIUM PHOSPHATE 4 MG: 4 INJECTION, SOLUTION INTRAMUSCULAR; INTRAVENOUS at 21:22

## 2019-09-12 NOTE — PROGRESS NOTES
INTERNAL MEDICINE RESIDENCY PROGRESS NOTE     Name: Rivka Martinez  Age & Sex: 81 y o  male   MRN: [id number]  Unit/Bed#: [facility] 717-01   Encounter: [id number]  Team: SOD Team A    PATIENT INFORMATION     Name: Rivka Martinez  Age & Sex: 81 y o  male   MRN: [id number]  Hospital Stay Days: 8    ASSESSMENT/PLAN     Principal Problem:    Brain mass with hemorrhage  Active Problems:    Hepatocellular carcinoma ([address] )    Hyponatremia    Steroid-induced hyperglycemia    Essential hypertension    COPD (chronic obstructive pulmonary disease) (HCC)    Leukocytosis    Tooth pain    Alcohol abuse    Cerebral edema (HCC)    Malnutrition related to chronic disease    Transaminitis    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      * Brain mass with hemorrhage  Assessment & Plan  Patient transferred from [address] [place] [address] for evaluation of mass with small focus of hemorrhage on CT head  MRI ordered by Neurosurgery revealed large centrally necrotic mass within the left frontoparietal region with surrounding vasogenic edema  Patient has history of hepatocellular carcinoma treated at [facility] with chemotherapy, immunotherapy, TACE  CT chest abdomen pelvis revealed heterogeneous liver with liver lesions, left lower lobe lung nodule, metastasis to manubrium and left 5th rib  Patient is postop day 2 from Image guided left frontal craniotomy for resection of tumor  Preliminary path report reveals "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; differential diagnosis would include metastatic small cell neuroendocrine carcinoma versus poorly differentiated pleomorphic carcinoma, each of uncertain primary  Metastatic hepatocellular carcinoma was considered in the differential diagnosis but is not favored on morphologic grounds " Referred for 2nd opinion  Patient states right arm weakness and hand  strength is slightly improved after the surgery    · Neuro checks per Neurosurgery  · Decadron 4 mg q 8 hours with Decadron taper per Neurosurgery - patient has steroid-induced hyperglycemia discussed below  GI prophylaxis with Protonix 40 mg daily  · Postop MRI revealed no residual enhancing mass and small subdural hemorrhage bilaterally  · Patient has had significant headache and dental pain since surgery  Dental pain discussed below  Patient is requiring multiple doses of opioid pain medications with dosing of fentanyl 50 mcg, oxycodone 10 mg last night morning  · PT/OT/speech consult after surgery - follow-up with PT and speech; OT recommendations in chart  · PMR following appreciate continued recommendations  · Neurosurgery consult; appreciate continued recommendations  · Oncology consult for HCC/new primary - will await pathology results from surgery and follow up outpatient for management  · Radiation oncology consult - will follow up after surgery for radiation; will follow up today for appropriate initiation of radiation therapy    Weaknessresolved as of 9/10/2019  Assessment & Plan  Likely secondary to mass and intracranial bleed  See work up as above  Steroid-induced hyperglycemia  Assessment & Plan  Patient has had uncontrolled blood glucose since initiation of Decadron therapy for above  · Endocrine recommends Lantus 25 units daily and Humalog 15 units t i d  With meals  · Correctional insulin ordered  · Continue to monitor blood glucose    Hyponatremia  Assessment & Plan  Patient currently hyponatremic to 125 corrected to 127 with glucose correction  Unclear etiology at this time question 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/tumor versus cerebral salt wasting  · Sodium chloride 1 g tabs t i d   With meals  · Will initiate fluid restriction of 2000 mL today  · Daily monitoring of BMP  · Patient currently has no overt signs of hyponatremia  · Will order a serum and urine studies    Hepatocellular carcinoma ([facility] [address] )  Assessment & Plan  Patient's history of hepatocellular carcinoma treated at [facility] in 2017 until January 2018  Patient received therapy with cisplatin, sorafenib, and TACE  CT chest abdomen pelvis with contrast revealed heterogenous liver with hepatomegaly and multifocal heterogenous liver lesions, may be due to multifocal [address]  or metastatic disease  Left lower lobe lung nodule measuring 7 6 mm, suspicious for metastatic disease  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  According to Care everywhere, patient was having partial response to therapy; however, stop therapy due to perceived lack of benefit and financial cost   Appears the patient has 2nd primary tumor as per preliminary pathology report  · Contributing to above  · AFP 4 0  · Oxycodone 5 mg and oxycodone 10 mg p r n  For pain  · Oncology consult - will follow up with patient after pathology results    Tooth pain  Assessment & Plan  Patient had significant tooth pain after OR  OMFS consult  · Plan for OR tomorrow for multiple teeth extraction  · NPO after midnight    Leukocytosis  Assessment & Plan  Patient has leukocytosis to 16 5 today on CBC  · Likely secondary to glucocorticoid use with normal temperature and nontoxic appearance  · Continue monitor CBC daily    COPD (chronic obstructive pulmonary disease) ([address] )  Assessment & Plan  Patient has significant smoking history of 2 packs per day for 30-40 years  Patient is not up-to-date on immunizations and uses rescue inhaler p r n  Ferol Napoleon Does not have maintenance inhaler  Suspect Decadron for above will help as well  Patient is at baseline respiratory status  · Breo Ellipta daily  · Pulmonary hygiene - incentive spirometer in room and patient instructed with use  · Pneumococcal vaccination       Essential hypertension  Assessment & Plan  Reports he is taking nifedipine as an outpatient but has not been on this for 3 months due to financial reasons  Most likely elevated secondary to steroids  · Nifedipine 60 mg 24 hour tablet p o   Daily  · Continue to monitor    Malnutrition related to chronic disease  Assessment & Plan  Malnutrition Findings:   Malnutrition type: Chronic illness(Chronic severe pro/vicky malnutrition r/t catabolic illness as evidenced by 15% unintentional wt loss since 6/25/19, consuming < 75% energy intake compared to est energy needs for > 1 month, Treated with diet, patient refused supplements)  Degree of Malnutrition: Other severe protein calorie malnutrition    BMI Findings:  BMI Classifications: Underweight < 18 5     There is no height or weight on file to calculate BMI  · Patient had significant weight loss over a couple months  · Patient has good appetite recently  · Nutrition recommendations as above    Alcohol abuse  Assessment & Plan  Patient endorsed drinking a 5th of whiskey alcohol multiple times per week  Patient states last drink was 3-4 days ago prior to admission  Patient denies any history of alcohol withdrawal or DT   · CIWA protocol discontinued  · Thiamine and folate supplementation    Dependence on nicotine from cigarettesresolved as of 9/10/2019  Assessment & Plan  Patient endorsed smoking 2 packs a day for last 30-40 years  · Nicotine patch 21mg daily      Disposition:  Pending further workup and evaluation by PT and speech  Anticipate discharge early next week  SUBJECTIVE     Patient seen and examined  Patient is postop day 2 from image guided resection of tumor with craniotomy  Patient had significant headache last night and required dosing of fentanyl and oxycodone  Patient has significant dental caries which may be contributing to headache  Plan for OR tomorrow  Patient states shortness of breath is at baseline  Patient denies being anxious  Patient states minimal improvement in right upper extremity and right hand dexterity since surgery      OBJECTIVE     Vitals:    09/12/19 0317 09/12/19 0600 09/12/19 0700 09/12/19 0742   BP: 151/91  154/91    BP Location:   Left arm    Pulse: 84  78    Resp:   18 Temp:   98 3 °F (36 8 °C)    TempSrc:   Oral    SpO2: 92%  93% 94%   Weight:  70 6 kg (155 lb 10 3 oz)     Height:          Temperature:   Temp (24hrs), Av 9 °F (36 6 °C), Min:97 5 °F (36 4 °C), Max:98 3 °F (36 8 °C)    Temperature: 98 3 °F (36 8 °C)  Intake & Output:  I/O       09/10 0701 -  07 -  07 -  0700    P  O  400 818     I V  (mL/kg) 2725 (34 5) 311 7 (4 4)     IV Piggyback 50      Total Intake(mL/kg) 3175 (40 1) 1129 7 (16)     Urine (mL/kg/hr) 3975 (2 1) 1050 (0 6)     Stool  0     Blood 300      Total Output 4275 1050     Net -1100 +79 7            Unmeasured Urine Occurrence   1 x    Unmeasured Stool Occurrence  1 x 1 x        Weights:   IBW: 84 5 kg    Body mass index is 19 45 kg/m²  Weight (last 2 days)     Date/Time   Weight    19 0600   70 6 (155 65)    19 0538   79 1 (174 38)    09/10/19 1654   75 4 (166 23)            Physical Exam   Constitutional: No distress  Appears uncomfortable lying on side in bed   HENT:   Bandages in place over left craniotomy site  No overt signs of wound dehiscence  Eyes: Right eye exhibits no discharge  Left eye exhibits no discharge  Cardiovascular: Normal rate and regular rhythm  Pulmonary/Chest:   Decreased breath sounds bilaterally  No consolidated breath sounds appreciated  Abdominal:   Hepatomegaly  Nontender  Musculoskeletal: He exhibits no edema  Neurological: He is alert  4+ out of 5 right hand  strength compared to left  4+ out of 5 right upper extremity strength compared to left    5/5 strength in bilateral lower extremities   Skin: Skin is warm and dry  Psychiatric: Thought content normal    Vitals reviewed  LABORATORY DATA     Labs: I have personally reviewed pertinent reports    Results from last 7 days   Lab Units 19  0715 19  0536 09/10/19  1158 09/10/19  0928 09/10/19  0050 19  0457   WBC Thousand/uL 16 45* 19 74*  --   --  15 91* 13 99* HEMOGLOBIN g/dL 14 2 13 5  --   --  14 3 15 2   I STAT HEMOGLOBIN g/dl  --   --   --  13 9  --   --    HEMATOCRIT % 41 6 39 7  --   --  42 4 46 2   HEMATOCRIT, ISTAT %  --   --   --  41  --   --    PLATELETS Thousands/uL 314 273 314  --  296 323   NEUTROS PCT %  --   --   --   --  85* 78*   MONOS PCT %  --   --   --   --  5 7      Results from last 7 days   Lab Units 09/12/19  0715 09/11/19  0906 09/11/19  0046 09/10/19  0928 09/10/19  0051   POTASSIUM mmol/L 4 5 4 3 4 3  --  5 0   CHLORIDE mmol/L 93* 94* 100  --  100   CO2 mmol/L 27 23 25  --  30   CO2, I-STAT mmol/L  --   --   --  28  --    BUN mg/dL 26* 24 30*  --  30*   CREATININE mg/dL 1 05 1 36* 1 29  --  1 34*   CALCIUM mg/dL 9 4 8 7 8 8  --  9 4   ALK PHOS U/L 235*  --   --   --  220*   ALT U/L 89*  --   --   --  81*   AST U/L 52*  --   --   --  33   GLUCOSE, ISTAT mg/dl  --   --   --  222*  --               Results from last 7 days   Lab Units 09/11/19  0536 09/10/19  0051 09/09/19  0457   INR  0 98 0 99 0 96   PTT seconds 27 26  --                IMAGING & DIAGNOSTIC TESTING     Radiology Results: I have personally reviewed pertinent reports  X-ray Chest 1 View Portable    Result Date: 9/4/2019  Impression: Suggestion of osteolytic lesion in the posterior left 5th rib  Otherwise, no evidence of acute abnormality in the chest  Workstation performed: [id number]     Ct Head Without Contrast    Result Date: 9/4/2019  Impression: Large area of subcortical white matter hypodensity consistent with vasogenic edema in the left parietal region  Small focus of hemorrhage superiorly as seen on image 2/30 with an associated more hypodense masslike area measuring 3 9 x 2 2 cm  Mild mass  effect on the adjacent cortical sulci  No significant midline shift  MRI with contrast recommended to exclude underlying mass    I personally discussed this study with Cheryl De Leon on 9/4/2019 at 11:36 AM  Workstation performed: [id number]     Mri Brain W Wo Contrast    Result Date: 9/5/2019  Impression: Large centrally necrotic mass within the left frontoparietal region with surrounding vasogenic edema  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 versus solitary metastasis  Workstation performed: [id number]     Ct Chest Abdomen Pelvis W Contrast    Result Date: 9/4/2019  Impression: Heterogenous liver with hepatomegaly and multifocal heterogenous liver lesions, may be due to multifocal [address]  or metastatic disease  Left lower lobe lung nodule measuring 7 6 mm, suspicious for metastatic disease 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 Mildly enlarged fidel hepatis lymphadenopathy The study was marked in EPIC for immediate notification  Workstation performed: [id number]     Other Diagnostic Testing: I have personally reviewed pertinent reports      ACTIVE MEDICATIONS     Current Facility-Administered Medications   Medication Dose Route Frequency    acetaminophen (TYLENOL) tablet 650 mg  650 mg Oral Q4H PRN    aluminum-magnesium hydroxide-simethicone (MYLANTA) 200-200-20 mg/5 mL oral suspension 30 mL  30 mL Oral Q6H PRN    dexamethasone (DECADRON) injection 4 mg  4 mg Intravenous Q8H    Followed by   Manzanares Melena ON 9/15/2019] dexamethasone (DECADRON) injection 2 mg  2 mg Intravenous Q6H [facility]    Followed by   Manzanares Melena ON 9/18/2019] dexamethasone (DECADRON) injection 2 mg  2 mg Intravenous Q8H    Followed by   Manzanares Melena ON 9/21/2019] dexamethasone (DECADRON) injection 2 mg  2 mg Intravenous Q12H [facility]    Followed by   Manzanares Melena ON 9/24/2019] dexamethasone (DECADRON) injection 2 mg  2 mg Intravenous Q24H    docusate sodium (COLACE) capsule 100 mg  100 mg Oral BID    [START ON 9/13/2019] enoxaparin (LOVENOX) subcutaneous injection 40 mg  40 mg Subcutaneous Daily    fentanyl citrate (PF) 100 MCG/2ML 50 mcg  50 mcg Intravenous Q2H PRN    fluticasone-vilanterol (BREO ELLIPTA) 100-25 mcg/inh inhaler 1 puff  1 puff Inhalation Daily    folic acid (FOLVITE) tablet 1 mg  1 mg Oral Daily    hydrOXYzine HCL (ATARAX) tablet 25 mg  25 mg Oral Q6H PRN    insulin glargine (LANTUS) subcutaneous injection 25 Units 0 25 mL  25 Units Subcutaneous QAM    insulin lispro (HumaLOG) 100 units/mL subcutaneous injection 1-5 Units  1-5 Units Subcutaneous TID AC    insulin lispro (HumaLOG) 100 units/mL subcutaneous injection 15 Units  15 Units Subcutaneous TID With Meals    Labetalol HCl (NORMODYNE) injection 10 mg  10 mg Intravenous Q4H PRN    levETIRAcetam (KEPPRA) tablet 500 mg  500 mg Oral Q12H [address]    multivitamin-minerals (CENTRUM) tablet 1 tablet  1 tablet Oral Daily    nicotine (NICODERM CQ) 21 mg/24 hr TD 24 hr patch 21 mg  21 mg Transdermal Daily    nicotine polacrilex (NICORETTE) gum 2 mg  2 mg Oral Q1H PRN    NIFEdipine (PROCARDIA XL) 24 hr tablet 60 mg  60 mg Oral Daily    ondansetron (ZOFRAN) injection 4 mg  4 mg Intravenous Q6H PRN    oxyCODONE (ROXICODONE) immediate release tablet 10 mg  10 mg Oral Q6H PRN    oxyCODONE (ROXICODONE) IR tablet 5 mg  5 mg Oral Q4H PRN    pantoprazole (PROTONIX) EC tablet 40 mg  40 mg Oral Early Morning    pneumococcal 13-valent conjugate vaccine (PREVNAR-13) IM injection 0 5 mL  0 5 mL Intramuscular Prior to discharge    senna (SENOKOT) tablet 8 6 mg  1 tablet Oral Daily    sodium chloride tablet 1 g  1 g Oral TID With Meals    thiamine (VITAMIN B1) tablet 100 mg  100 mg Oral Daily       VTE Pharmacologic Prophylaxis: Enoxaparin (Lovenox)  VTE Mechanical Prophylaxis: sequential compression device    Portions of the record may have been created with voice recognition software  Occasional wrong word or "sound a like" substitutions may have occurred due to the inherent limitations of voice recognition software    Read the chart carefully and recognize, using context, where substitutions have occurred   ==  Alison Gillette MD  [address] [place] [address]  Internal Medicine Residency PGY-1

## 2019-09-12 NOTE — ASSESSMENT & PLAN NOTE
Patient had significant tooth pain after OR  Patient states tooth pain better today  · Patient originally to go to OR today; however, patient is not cleared from Neurosurgery    Will need to we at least 2 weeks postop

## 2019-09-12 NOTE — ASSESSMENT & PLAN NOTE
· OMFS consulted yesterday  · NO obvious signs of infection on imaging or clinically to indicate abscess  · Patient is not cleared for surgery at this time  · Patient must wait a minimum of 2 weeks post operatively from brain surgery prior to having dental procedure unless infected dental abscess present  · Informed OMFS attending personally

## 2019-09-12 NOTE — ASSESSMENT & PLAN NOTE
· Management per primary team    · Patient most likely steroid induced hyperglycemia as no known home medication listed     · Management with insulin while inpatient per the primary team

## 2019-09-12 NOTE — ASSESSMENT & PLAN NOTE
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

## 2019-09-12 NOTE — PROGRESS NOTES
IM Medical Student Progress Note   Unit/Bed#: [facility] 717-01 Encounter: [id number]  SOD Team A      Gray Thomas  81 y o  male [id number]    Hospital Stay Days: 8      Assessment/Plan: Annette Curtis is a 82 yo M with past medical history of hepatocellular carcinoma, hepatitis c, COPD, and essential hypertension now POD#2 from resection of L frontoparietal brain mass    Principal Problem:    Brain mass with hemorrhage  Active Problems:    Essential hypertension    Alcohol abuse    Cerebral edema (HCC)    COPD (chronic obstructive pulmonary disease) (HCC)    Hepatocellular carcinoma ([address] )    Malnutrition related to chronic disease    Leukocytosis    Transaminitis    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    Tooth pain    Hyponatremia    Steroid-induced hyperglycemia    #Brain mass with hemorrhage  - Postop MRI on 9/10 showed no residual enhancing mass, b/l small subdural hemorrhages 2 mm in thickness, and residual vasogenic edema  - Preliminary report from surgical biopsy show high grade, malignant, 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  Per report, metastatic HCC not favored based on morphology, but differential includes metastatic small cell neuroendocrine tumor versus poorly differentiated pleomorphic carcinoma, though can not rule out small cell variant of GBM   Case has been referred to expert neuropathologist Dr Damari King in [place] for evaluation  - Continue Decadron taper  - Continue Atarax PRN for anxiety     #Weakness  - R hand weakness improving    #Essential Hypertension  - BP maintained at goal <206 systolic  - Continue home nifedipine and PRN labetalol    #Alcohol Abuse  - Some anxiety but not endorsing any visual disturbances or other symptoms of withdrawal  - Continue supplementation of folic acid, thiamine, and centrum multivitamin    #Dependence on nicotine from cigarettes  - Continue nicoderm patch 21 mg qd and nicorette gum 2 mg PRN    #COPD  - SOB and cough improved  - Continue respiratory protocol  Encourage incentive spirometry and OOB/ambulate  - Continue Breo Ellipta 100-25 mcg/inh  - Continue PRN Duo-Neb 0 5-2 5 mg/3 mL  - Pneumococcal 13-valent conjugate vaccine ordered to be given prior to discharge    #Hepatocellular Carcinoma  - Oncology consulted  - Continue tylenol 650 mg PRN  - Continue oxycodone 5 and 10 mg PRN    #Malnutrition related to Chronic Disease  - Continue vitamin/nutrient supplementation as above  - Continue regular diet    #Tooth Pain  - Dental extractions can not be done until a minimum of 2 weeks after surgery    #Hyponatremia  - Sodium at 125 today  127 with glucose correction    #Steroid-induced Hyperglycemia  - Monitor on insulin regimen  Patient counseled about possibility of needing to be on insulin upon discharge          Subjective:   Patient sitting up in chair and in no acute distress  He states that he is feeling alright today  He continues to have left sided toothache and headache ranging from 7-9/10 in intensity  His right hand weakness is improving  He denies any new changes in vision, weakness, numbness, tingling, or other noticeable differences  His SOB and cough have been at baseline  He continues to have abdominal pain in the RUQ that is a 5/10  He has had some nausea and loss of appetite since the surgery, but has not vomited  He had a bowel movement yesterday and this morning  He has been able to get up and walk to and from the bathroom without any problems  He denies and chest pain, palpitations, fever, chills, or sweats         Vitals: Temp (24hrs), Av 9 °F (36 6 °C), Min:97 5 °F (36 4 °C), Max:98 3 °F (36 8 °C)  Current: Temperature: 98 3 °F (36 8 °C)  Vitals:    19 0600 19 0700 19 0742 19 0800   BP:  154/91     BP Location:  Left arm     Pulse:  78     Resp:  18     Temp:  98 3 °F (36 8 °C)     TempSrc:  Oral     SpO2:  93% 94% 94%   Weight: 70 6 kg (155 lb 10 3 oz)      Height:        Body mass index is 19 45 kg/m²  I/O last 24 hours: In: 1689 7 [P O :1378; I V :311 7]  Out: 1050 [Urine:1050]    Review of Systems   Constitution: Positive for decreased appetite and weight loss  Negative for chills, diaphoresis and fever  Eyes: Negative for vision loss in left eye, vision loss in right eye and visual disturbance  Cardiovascular: Negative for chest pain, irregular heartbeat, leg swelling and palpitations  Respiratory: Positive for cough and shortness of breath  Gastrointestinal: Positive for abdominal pain and nausea  Negative for diarrhea and vomiting  Genitourinary: Negative for bladder incontinence, dysuria and hesitancy  Neurological: Positive for focal weakness and headaches  Negative for difficulty with concentration, disturbances in coordination, dizziness, numbness, paresthesias and seizures  Psychiatric/Behavioral: The patient is nervous/anxious  Physical Exam: Physical Exam   Constitutional: He is oriented to person, place, and time  No distress  HENT:   Head: Normocephalic  Dressing over surgical wound on L cranium c/d/i   Cardiovascular: Normal rate, regular rhythm and normal heart sounds  Pulmonary/Chest: Effort normal  He has no wheezes  Abdominal: Soft  He exhibits no distension  There is no tenderness  Liver is enlarged and palpable   Musculoskeletal: He exhibits no edema, tenderness or deformity  Neurological: He is alert and oriented to person, place, and time  GCS eye subscore is 4  GCS verbal subscore is 5  GCS motor subscore is 6    R hand strength 4/5 but improved from before  L hand strength 5/5   Skin: He is not diaphoretic  Psychiatric: His mood appears anxious  Invasive Devices     Peripheral Intravenous Line            Peripheral IV 09/09/19 Dorsal (posterior); Right Forearm 3 days                          Labs:   Recent Results (from the past 24 hour(s))   Fingerstick Glucose (POCT)    Collection Time: 09/11/19  4:28 PM   Result Value Ref Range    POC Glucose 369 (H) 65 - 140 mg/dl   Fingerstick Glucose (POCT)    Collection Time: 09/11/19  9:09 PM   Result Value Ref Range    POC Glucose 232 (H) 65 - 140 mg/dl   Fingerstick Glucose (POCT)    Collection Time: 09/12/19  6:28 AM   Result Value Ref Range    POC Glucose 278 (H) 65 - 140 mg/dl   CBC    Collection Time: 09/12/19  7:15 AM   Result Value Ref Range    WBC 16 45 (H) 4 31 - 10 16 Thousand/uL    RBC 4 42 3 88 - 5 62 Million/uL    Hemoglobin 14 2 12 0 - 17 0 g/dL    Hematocrit 41 6 36 5 - 49 3 %    MCV 94 82 - 98 fL    MCH 32 1 26 8 - 34 3 pg    MCHC 34 1 31 4 - 37 4 g/dL    RDW 14 3 11 6 - 15 1 %    Platelets 724 670 - 495 Thousands/uL    MPV 11 0 8 9 - 12 7 fL   Comprehensive metabolic panel    Collection Time: 09/12/19  7:15 AM   Result Value Ref Range    Sodium 125 (L) 136 - 145 mmol/L    Potassium 4 5 3 5 - 5 3 mmol/L    Chloride 93 (L) 100 - 108 mmol/L    CO2 27 21 - 32 mmol/L    ANION GAP 5 4 - 13 mmol/L    BUN 26 (H) 5 - 25 mg/dL    Creatinine 1 05 0 60 - 1 30 mg/dL    Glucose 255 (H) 65 - 140 mg/dL    Calcium 9 4 8 3 - 10 1 mg/dL    AST 52 (H) 5 - 45 U/L    ALT 89 (H) 12 - 78 U/L    Alkaline Phosphatase 235 (H) 46 - 116 U/L    Total Protein 7 6 6 4 - 8 2 g/dL    Albumin 2 7 (L) 3 5 - 5 0 g/dL    Total Bilirubin 1 79 (H) 0 20 - 1 00 mg/dL    eGFR 73 ml/min/1 73sq m   Fingerstick Glucose (POCT)    Collection Time: 09/12/19 11:25 AM   Result Value Ref Range    POC Glucose 276 (H) 65 - 140 mg/dl       Radiology Results: I have personally reviewed pertinent films in PACS      Other Diagnostic Testing:   I have personally reviewed pertinent reports          Active Meds:   Current Facility-Administered Medications   Medication Dose Route Frequency    acetaminophen (TYLENOL) tablet 650 mg  650 mg Oral Q4H PRN    aluminum-magnesium hydroxide-simethicone (MYLANTA) 200-200-20 mg/5 mL oral suspension 30 mL  30 mL Oral Q6H PRN    dexamethasone (DECADRON) injection 4 mg  4 mg Intravenous Q8H Followed by   [place] [facility] ON 9/15/2019] dexamethasone (DECADRON) injection 2 mg  2 mg Intravenous Q6H [address]    Followed by   [place] [facility] ON 9/18/2019] dexamethasone (DECADRON) injection 2 mg  2 mg Intravenous Q8H    Followed by   [place] [facility] ON 9/21/2019] dexamethasone (DECADRON) injection 2 mg  2 mg Intravenous Q12H [address]    Followed by   [place] [facility] ON 9/24/2019] dexamethasone (DECADRON) injection 2 mg  2 mg Intravenous Q24H    docusate sodium (COLACE) capsule 100 mg  100 mg Oral BID    [START ON 9/13/2019] enoxaparin (LOVENOX) subcutaneous injection 40 mg  40 mg Subcutaneous Daily    fentanyl citrate (PF) 100 MCG/2ML 50 mcg  50 mcg Intravenous Q2H PRN    fluticasone-vilanterol (BREO ELLIPTA) 100-25 mcg/inh inhaler 1 puff  1 puff Inhalation Daily    folic acid (FOLVITE) tablet 1 mg  1 mg Oral Daily    hydrOXYzine HCL (ATARAX) tablet 25 mg  25 mg Oral Q6H PRN    insulin glargine (LANTUS) subcutaneous injection 25 Units 0 25 mL  25 Units Subcutaneous QAM    insulin lispro (HumaLOG) 100 units/mL subcutaneous injection 1-5 Units  1-5 Units Subcutaneous TID AC    insulin lispro (HumaLOG) 100 units/mL subcutaneous injection 15 Units  15 Units Subcutaneous TID With Meals    Labetalol HCl (NORMODYNE) injection 10 mg  10 mg Intravenous Q4H PRN    levETIRAcetam (KEPPRA) tablet 500 mg  500 mg Oral Q12H [address]    multivitamin-minerals (CENTRUM) tablet 1 tablet  1 tablet Oral Daily    nicotine (NICODERM CQ) 21 mg/24 hr TD 24 hr patch 21 mg  21 mg Transdermal Daily    nicotine polacrilex (NICORETTE) gum 2 mg  2 mg Oral Q1H PRN    NIFEdipine (PROCARDIA XL) 24 hr tablet 60 mg  60 mg Oral Daily    ondansetron (ZOFRAN) injection 4 mg  4 mg Intravenous Q6H PRN    oxyCODONE (ROXICODONE) immediate release tablet 10 mg  10 mg Oral Q6H PRN    oxyCODONE (ROXICODONE) IR tablet 5 mg  5 mg Oral Q4H PRN    pantoprazole (PROTONIX) EC tablet 40 mg  40 mg Oral Early Morning    pneumococcal 13-valent conjugate vaccine (PREVNAR-13) IM injection 0 5 mL  0 5 mL Intramuscular Prior to discharge    senna (SENOKOT) tablet 8 6 mg  1 tablet Oral Daily    sodium chloride tablet 1 g  1 g Oral TID With Meals    thiamine (VITAMIN B1) tablet 100 mg  100 mg Oral Daily         VTE Pharmacologic Prophylaxis: Sequential compression device (Venodyne)   VTE Mechanical Prophylaxis: sequential compression device    Dara Leaver

## 2019-09-12 NOTE — ASSESSMENT & PLAN NOTE
Patient has had uncontrolled blood glucose since initiation of Decadron therapy for above  More controlled on current regime with blood glucose in the 200s yesterday  · Endocrine recommends Lantus 25 units daily and Humalog 15 units t i d   With meals  · Correctional insulin ordered  · Continue to monitor blood glucose

## 2019-09-12 NOTE — RESTORATIVE TECHNICIAN NOTE
Restorative Specialist Mobility Note       Activity: Other (Comment)(Educated/encouraged pt to ambulate with assistance 3-4 x's/day, pt refused nursing aware   Pt callbell, phone/tray within reach )    Zander GRACIA, Restorative Technician, [employer]

## 2019-09-12 NOTE — PLAN OF CARE
Problem: PHYSICAL THERAPY ADULT  Goal: Performs mobility at highest level of function for planned discharge setting  See evaluation for individualized goals  Description  Treatment/Interventions: Functional transfer training, LE strengthening/ROM, Elevations, Therapeutic exercise, Endurance training, Patient/family training, Equipment eval/education, Bed mobility, Gait training          See flowsheet documentation for full assessment, interventions and recommendations  Note:   Prognosis: Fair  Problem List: Decreased strength, Decreased endurance, Decreased mobility, Impaired balance, Decreased coordination, Decreased cognition, Impaired judgement, Decreased safety awareness, Pain  Assessment: Pt seen for high complexity physical therapy evaluation  Pt is a 82 y/o male w/ history/comorbidities of HTN, hepatocellular CA s/p partial chemo, COPD who is now admitted as a transfer from [facility] w/ R hand weakness/ numbness/tremor  Found to have large necrotic L frontoparietal brain mass w/ vasogenic edema and mass effect, as well as new findings LLL lung mets, bony rib mets  Has image guided L frontal craniotomy for tumor resection 9/10/19  Due to acute medical issues, need for transfer to higher level of care, pain, fall risk, note unstable clinical picture  PT consulted to assess post op mobility, d/c needs  Pt presents w/ decreased functional mob, standing balance, endurance, B LE strength and coordination, barriers at home  Also limited by cog status and impulsivity  Pt will benefit from skilled PT to correct for the above problems  Recommend rehab at d/c, especially considering cog and safety concerns        Recommendation: (recommend rehab at d/c)     PT - OK to Discharge: (S) Yes(when stable to rehab)    See flowsheet documentation for full assessment

## 2019-09-12 NOTE — PHYSICAL THERAPY NOTE
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    Tooth pain    Hyponatremia    Steroid-induced hyperglycemia       Past Medical History  Past Medical History:   Diagnosis Date    Cancer ([facility] )     liver    Dependence on nicotine from cigarettes 9/4/2019    Hypertension        Past Surgical History  Past Surgical History:   Procedure Laterality Date    APPENDECTOMY      CRANIOTOMY Left 9/10/2019    Procedure: Image guided left frontal craniotomy for resection of tumor;  Surgeon: Haydee Ludwig MD;  [facility]: [facility];  Service: Neurosurgery          09/12/19 1310   Note Type   Note type Eval only   Pain Assessment   Pain Assessment 0-10   Pain Score 7   Pain Type Acute pain   Pain Location Head   Patient's Stated Pain Goal No pain   Hospital Pain Intervention(s) Ambulation/increased activity   Response to Interventions unchanged   Home Living   Type of Home Other (Comment)   Additional Comments Resides alone in "hotel" vs boarding house- 2 full flights of stairs to enter  Normally indep self care, ambulates w/ out device   Prior Function   Level of [place] Independent with ADLs and functional mobility   Falls in the last 6 months 0   Restrictions/Precautions   Weight Bearing Precautions Per Order No   Other Precautions Cognitive; Impulsive; Chair Alarm; Bed Alarm;Multiple lines; Fall Risk;Pain;Seizure   General   Family/Caregiver Present No   Cognition   Overall Cognitive Status Impaired   Arousal/Participation Arousable Attention Difficulty attending to directions   Orientation Level Oriented to person;Oriented to place   Memory Unable to assess   Following Commands Follows one step commands inconsistently   Comments slow to respond at times  seems frustrated at times, states he feels fine   RLE Assessment   RLE Assessment   (strength grossly 4-/5)   LLE Assessment   LLE Assessment   (strength grossly 4-/5)   Coordination   Movements are Fluid and Coordinated 0   Coordination and Movement Description B LE ataxia   Bed Mobility   Supine to Sit 5  Supervision   Additional items Assist x 1   Transfers   Sit to Stand 4  Minimal assistance   Additional items Assist x 1   Stand to Sit 4  Minimal assistance   Additional items Assist x 1   Ambulation/Elevation   Gait pattern   (fast at times, ataxia, lateral LOB, very impulsive)   Gait Assistance 4  Minimal assist   Additional items Assist x 1   Assistive Device None   Distance 100'   Balance   Static Sitting Good   Dynamic Sitting Fair -   Static Standing Poor +   Dynamic Standing Poor +   Ambulatory Poor +   Endurance Deficit   Endurance Deficit Yes   Endurance Deficit Description fatigue, weakness, cog, impulsivity   Activity Tolerance   Activity Tolerance Patient tolerated treatment well;Patient limited by fatigue;Treatment limited secondary to medical complications (Comment); Patient limited by pain;Treatment limited secondary to agitation   Nurse Made Aware yes   Assessment   Prognosis Fair   Problem List Decreased strength;Decreased endurance;Decreased mobility; Impaired balance;Decreased coordination;Decreased cognition; Impaired judgement;Decreased safety awareness;Pain   Assessment Pt seen for high complexity physical therapy evaluation  Pt is a 82 y/o male w/ history/comorbidities of HTN, hepatocellular CA s/p partial chemo, COPD who is now admitted as a transfer from [facility] w/ R hand weakness/ numbness/tremor    Found to have large necrotic L frontoparietal brain mass w/ vasogenic edema and mass effect, as well as new findings LLL lung mets, bony rib mets  Has image guided L frontal craniotomy for tumor resection 9/10/19  Due to acute medical issues, need for transfer to higher level of care, pain, fall risk, note unstable clinical picture  PT consulted to assess post op mobility, d/c needs  Pt presents w/ decreased functional mob, standing balance, endurance, B LE strength and coordination, barriers at home  Also limited by cog status and impulsivity  Pt will benefit from skilled PT to correct for the above problems  Recommend rehab at d/c, especially considering cog and safety concerns   Goals   Patient Goals none stated, other than to rest   STG Expiration Date 09/26/19   Short Term Goal #1 1-2 wks: bed mob and transfers w/ indep, standing balance to good/normal dynamically, ambulate 200-300 ft w/ indep, increase B LE strength by 1/2 -1 grade, ambulate 1-2 flights of stairs w/ indep   Treatment Day 0   Plan   Treatment/Interventions Functional transfer training;LE strengthening/ROM; Elevations; Therapeutic exercise; Endurance training;Patient/family training;Equipment eval/education; Bed mobility;Gait training   PT Frequency   (3-5x/wk)   Recommendation   Recommendation   (recommend rehab at d/c)   PT - OK to Discharge Yes  (when stable to rehab)   Modified [place] Scale   Modified [place] Scale 4   Barthel Index   Feeding 10   Bathing 0   Grooming Score 5   Dressing Score 5   Bladder Score 10   Bowels Score 10   Toilet Use Score 5   Transfers (Bed/Chair) Score 10   Mobility (Level Surface) Score 0   Stairs Score 0   Barthel Index Score 55         Cheri De Paz PT, DPT, CSRS

## 2019-09-12 NOTE — PLAN OF CARE
Problem: Potential for Falls  Goal: Patient will remain free of falls  Description  INTERVENTIONS:  - Assess patient frequently for physical needs  -  Identify cognitive and physical deficits and behaviors that affect risk of falls  -  [place] fall precautions as indicated by assessment   - Educate patient/family on patient safety including physical limitations  - Instruct patient to call for assistance with activity based on assessment  - Modify environment to reduce risk of injury  - Consider OT/PT consult to assist with strengthening/mobility  Outcome: Progressing     Problem: Nutrition/Hydration-ADULT  Goal: Nutrient/Hydration intake appropriate for improving, restoring or maintaining nutritional needs  Description  Monitor and assess patient's nutrition/hydration status for malnutrition  Collaborate with interdisciplinary team and initiate plan and interventions as ordered  Monitor patient's weight and dietary intake as ordered or per policy  Utilize nutrition screening tool and intervene as necessary  Determine patient's food preferences and provide high-protein, high-caloric foods as appropriate       INTERVENTIONS:  - Monitor oral intake, urinary output, labs, and treatment plans  - Assess nutrition and hydration status and recommend course of action  - Evaluate amount of meals eaten  - Assist patient with eating if necessary   - Allow adequate time for meals  - Recommend/ encourage appropriate diets, oral nutritional supplements, and vitamin/mineral supplements  - Order, calculate, and assess calorie counts as needed  - Recommend, monitor, and adjust tube feedings and TPN/PPN based on assessed needs  - Assess need for intravenous fluids  - Provide specific nutrition/hydration education as appropriate  - Include patient/family/caregiver in decisions related to nutrition  Outcome: Progressing     Problem: PAIN - ADULT  Goal: Verbalizes/displays adequate comfort level or baseline comfort level  Description  Interventions:  - Encourage patient to monitor pain and request assistance  - Assess pain using appropriate pain scale  - Administer analgesics based on type and severity of pain and evaluate response  - Implement non-pharmacological measures as appropriate and evaluate response  - Consider cultural and social influences on pain and pain management  - Notify physician/advanced practitioner if interventions unsuccessful or patient reports new pain  Outcome: Progressing     Problem: INFECTION - ADULT  Goal: Absence or prevention of progression during hospitalization  Description  INTERVENTIONS:  - Assess and monitor for signs and symptoms of infection  - Monitor lab/diagnostic results  - Monitor all insertion sites, i e  indwelling lines, tubes, and drains  - Monitor endotracheal if appropriate and nasal secretions for changes in amount and color  - [place] appropriate cooling/warming therapies per order  - Administer medications as ordered  - Instruct and encourage patient and family to use good hand hygiene technique  - Identify and instruct in appropriate isolation precautions for identified infection/condition  Outcome: Progressing  Goal: Absence of fever/infection during neutropenic period  Description  INTERVENTIONS:  - Monitor WBC    Outcome: Progressing     Problem: SAFETY ADULT  Goal: Maintain or return to baseline ADL function  Description  INTERVENTIONS:  -  Assess patient's ability to carry out ADLs; assess patient's baseline for ADL function and identify physical deficits which impact ability to perform ADLs (bathing, care of mouth/teeth, toileting, grooming, dressing, etc )  - Assess/evaluate cause of self-care deficits   - Assess range of motion  - Assess patient's mobility; develop plan if impaired  - Assess patient's need for assistive devices and provide as appropriate  - Encourage maximum independence but intervene and supervise when necessary  - Involve family in performance of ADLs  - Assess for home care needs following discharge   - Consider OT consult to assist with ADL evaluation and planning for discharge  - Provide patient education as appropriate  Outcome: Progressing  Goal: Maintain or return mobility status to optimal level  Description  INTERVENTIONS:  - Assess patient's baseline mobility status (ambulation, transfers, stairs, etc )    - Identify cognitive and physical deficits and behaviors that affect mobility  - Identify mobility aids required to assist with transfers and/or ambulation (gait belt, sit-to-stand, lift, walker, cane, etc )  - [place] fall precautions as indicated by assessment  - Record patient progress and toleration of activity level on Mobility SBAR; progress patient to next Phase/Stage  - Instruct patient to call for assistance with activity based on assessment  - Consider rehabilitation consult to assist with strengthening/weightbearing, etc   Outcome: Progressing     Problem: DISCHARGE PLANNING  Goal: Discharge to home or other facility with appropriate resources  Description  INTERVENTIONS:  - Identify barriers to discharge w/patient and caregiver  - Arrange for needed discharge resources and transportation as appropriate  - Identify discharge learning needs (meds, wound care, etc )  - Arrange for interpretive services to assist at discharge as needed  - Refer to Case Management Department for coordinating discharge planning if the patient needs post-hospital services based on physician/advanced practitioner order or complex needs related to functional status, cognitive ability, or social support system  Outcome: Progressing     Problem: Knowledge Deficit  Goal: Patient/family/caregiver demonstrates understanding of disease process, treatment plan, medications, and discharge instructions  Description  Complete learning assessment and assess knowledge base    Interventions:  - Provide teaching at level of understanding  - Provide teaching via preferred learning methods  Outcome: Progressing     Problem: NEUROSENSORY - ADULT  Goal: Achieves stable or improved neurological status  Description  INTERVENTIONS  - Monitor and report changes in neurological status  - Monitor vital signs such as temperature, blood pressure, glucose, and any other labs ordered   - Initiate measures to prevent increased intracranial pressure  - Monitor for seizure activity and implement precautions if appropriate      Outcome: Progressing  Goal: Remains free of injury related to seizures activity  Description  INTERVENTIONS  - Maintain airway, patient safety  and administer oxygen as ordered  - Monitor patient for seizure activity, document and report duration and description of seizure to physician/advanced practitioner  - If seizure occurs,  ensure patient safety during seizure  - Reorient patient post seizure  - Seizure pads on all 4 side rails  - Instruct patient/family to notify RN of any seizure activity including if an aura is experienced  - Instruct patient/family to call for assistance with activity based on nursing assessment  - Administer anti-seizure medications if ordered    Outcome: Progressing  Goal: Achieves maximal functionality and self care  Description  INTERVENTIONS  - Monitor swallowing and airway patency with patient fatigue and changes in neurological status  - Encourage and assist patient to increase activity and self care     - Encourage visually impaired, hearing impaired and aphasic patients to use assistive/communication devices  Outcome: Progressing     Problem: RESPIRATORY - ADULT  Goal: Achieves optimal ventilation and oxygenation  Description  INTERVENTIONS:  - Assess for changes in respiratory status  - Assess for changes in mentation and behavior  - Position to facilitate oxygenation and minimize respiratory effort  - Oxygen administered by appropriate delivery if ordered  - Initiate smoking cessation education as indicated  - Encourage broncho-pulmonary hygiene including cough, deep breathe, Incentive Spirometry  - Assess the need for suctioning and aspirate as needed  - Assess and instruct to report SOB or any respiratory difficulty  - Respiratory Therapy support as indicated  Outcome: Progressing     Problem: METABOLIC, FLUID AND ELECTROLYTES - ADULT  Goal: Fluid balance maintained  Description  INTERVENTIONS:  - Monitor labs   - Monitor I/O and WT  - Instruct patient on fluid and nutrition as appropriate  - Assess for signs & symptoms of volume excess or deficit  Outcome: Progressing     Problem: SKIN/TISSUE INTEGRITY - ADULT  Goal: Skin integrity remains intact  Description  INTERVENTIONS  - Identify patients at risk for skin breakdown  - Assess and monitor skin integrity  - Assess and monitor nutrition and hydration status  - Monitor labs (i e  albumin)  - Assess for incontinence   - Turn and reposition patient  - Assist with mobility/ambulation  - Relieve pressure over bony prominences  - Avoid friction and shearing  - Provide appropriate hygiene as needed including keeping skin clean and dry  - Evaluate need for skin moisturizer/barrier cream  - Collaborate with interdisciplinary team (i e  Nutrition, Rehabilitation, etc )   - Patient/family teaching  Outcome: Progressing     Problem: MUSCULOSKELETAL - ADULT  Goal: Maintain or return mobility to safest level of function  Description  INTERVENTIONS:  - Assess patient's ability to carry out ADLs; assess patient's baseline for ADL function and identify physical deficits which impact ability to perform ADLs (bathing, care of mouth/teeth, toileting, grooming, dressing, etc )  - Assess/evaluate cause of self-care deficits   - Assess range of motion  - Assess patient's mobility  - Assess patient's need for assistive devices and provide as appropriate  - Encourage maximum independence but intervene and supervise when necessary  - Involve family in performance of ADLs  - Assess for home care needs following discharge - Consider OT consult to assist with ADL evaluation and planning for discharge  - Provide patient education as appropriate  Outcome: Progressing  Goal: Maintain proper alignment of affected body part  Description  INTERVENTIONS:  - Support, maintain and protect limb and body alignment  - Provide patient/ family with appropriate education  Outcome: Progressing     Problem: Prexisting or High Potential for Compromised Skin Integrity  Goal: Skin integrity is maintained or improved  Description  INTERVENTIONS:  - Identify patients at risk for skin breakdown  - Assess and monitor skin integrity  - Assess and monitor nutrition and hydration status  - Monitor labs   - Assess for incontinence   - Turn and reposition patient  - Assist with mobility/ambulation  - Relieve pressure over bony prominences  - Avoid friction and shearing  - Provide appropriate hygiene as needed including keeping skin clean and dry  - Evaluate need for skin moisturizer/barrier cream  - Collaborate with interdisciplinary team   - Patient/family teaching  - Consider wound care consult   Outcome: Progressing

## 2019-09-12 NOTE — PROGRESS NOTES
Progress Note - Laure Batch 1950, 81 y o  male MRN: [id number]    Unit/Bed#: [facility] 717-01 Encounter: [id number]    Primary Care Provider: No primary care provider on file  Date and time admitted to hospital: 9/4/2019  4:38 PM    Steroid-induced hyperglycemia  Assessment & Plan  · Management per primary team    · Patient most likely steroid induced hyperglycemia as no known home medication listed  · Management with insulin while inpatient per the primary team      Hyponatremia  Assessment & Plan  · Na 125-126 this morning  · Recommend supplementation as medically appropriate  · Possible SAIDH evaluation in post operative period  · Medical management  · Follow up labs  Tooth pain  Assessment & Plan  · OMFS consulted yesterday  · NO obvious signs of infection on imaging or clinically to indicate abscess  · Patient is not cleared for surgery at this time  · Patient must wait a minimum of 2 weeks post operatively from brain surgery prior to having dental procedure unless infected dental abscess present  · Informed OMFS attending personally  Cerebral edema (HCC)  Assessment & Plan  · 2/2 brain mass  · Decadron 4q8, wean every 72 hours    Alcohol abuse  Assessment & Plan  · CIWA - monitor for signs of withdrawal      * Brain mass with hemorrhage  Assessment & Plan  POD#2 Left craniotomy for tumor resection    Imaging:  · CTH, 9/4: Large area of subcortical white matter hypodensity consistent with vasogenic edema in the left parietal region  Small focus of hemorrhage superiorly as seen on image 2/30 with an associated more hypodense masslike area measuring 3 9 x 2 2 cm  · CTCAP, 9/4: Heterogenous liver with hepatomegaly and multifocal heterogenous liver lesions, may be due to multifocal [address]  or metastatic disease  Left lower lobe lung nodule measuring 7 6 mm, suspicious for metastatic disease  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   Mildly enlarged fidel hepatis lymphadenopathy  · MRI brain w/wo, 9/5: Large centrally necrotic mass within the left frontoparietal region measuring 2 9 x 4 3 x 3 4 cm with surrounding vasogenic edema  · MRI brain w/wo, 9/10: Post op changes from left frontal craniotomy  No residual mass identified  Plan:  · Decadron 4q8 today, continue wean as ordered every 72 hours  Can be transitioned to oral for discharge  · Keppra x7 days  · PT/OT  · DVT ppx: SCDs, lovenox  · Pain control per primary team  · CIWA - monitor closely  · Patient continuing to make progress with RUE strength  · OMFS consulted for tooth pain and recommending extractions  Would not recommend this be completed at this time  Patient is not cleared for surgery from a neurosurgical standpoint as patient is POD 2 from brain tumor resection  Patient will be cleared after he is 2 weeks post-op  · Patient hyponatremic this morning  Possible SAIDH  Management per primary team    · Neurosurgery will continue to follow at this time  Call with questions or concerns  If stable tomorrow patient will potentially be cleared for dc from neurosurgical perspective  Subjective/Objective   Chief Complaint: "not too bad"     Subjective: Patient states that he is doing quite well today  His facial and jaw pain from yesterday is slightly improved and being well relieved with medication  Possible spasm from temporalis muscle as well  Patient reports that his right arm is improving slightly from before, but that it needs more time  Only complaint is mild tenderness and soreness associated with his incision  Some blood on bandage, but nothing active and not fully saturated  Dressing can be removed tomorrow  Patient denies changes in vision, trouble with speech, new numbness, tingling, or weakness in his arms or legs  No bowel or bladder trouble at this time  Patient ambulating in room without assistance  Objective: sitting at edge of bed in NAD       I/O       09/10 0701 - 09/11 0700 09/11 0701 - 09/12 0700 09/12 0701 - 09/13 0700    P  O  400 818     I V  (mL/kg) 2725 (34 5) 311 7 (4 4)     IV Piggyback 50      Total Intake(mL/kg) 3175 (40 1) 1129 7 (16)     Urine (mL/kg/hr) 3975 (2 1) 1050 (0 6)     Stool  0     Blood 300      Total Output 4275 1050     Net -1100 +79 7            Unmeasured Urine Occurrence   1 x    Unmeasured Stool Occurrence  1 x 1 x          Invasive Devices     Peripheral Intravenous Line            Peripheral IV 09/09/19 Dorsal (posterior); Right Forearm 3 days                Physical Exam:  Vitals: Blood pressure 154/91, pulse 78, temperature 98 3 °F (36 8 °C), temperature source Oral, resp  rate 18, height 6' 3" (1 905 m), weight 70 6 kg (155 lb 10 3 oz), SpO2 94 %  ,Body mass index is 19 45 kg/m²  General appearance: alert, appears stated age, cooperative and no distress  Head: Normocephalic  Left cranial dressing in place with some blood on dressing, but no active saturation  Will attempt to keep bandage in place until tomorrow  Eyes: EOMI, PERRL  Neck: supple, symmetrical, trachea midline and NT  Back: no kyphosis present, no tenderness to percussion or palpation  Lungs: non labored breathing  Heart: regular heart rate  Neurologic:   Mental status: Alert, oriented x3, thought content appropriate  Cranial nerves: grossly intact (Cranial nerves II-XII)  No appreciable facial symmetry at this time  Sensory: normal to light touch subjectively  Motor: moving all extremities spontaneously  Slight 4-4+/5 weakness with EF and  in RUE and 4/5 HF in RLE  Otherwise 5/5 throughout  Reflexes: 3+ and symmetric in BLE  1-2 beats clonus bilaterally  Coordination: Right sided dysmetria, no drift bilaterally  Decreased coordination in RUE requiring additional focus and time to complete tasks such as lifting cup of water and shaking hand       Lab Results:  Results from last 7 days   Lab Units 09/12/19  0715 09/11/19  0536 09/10/19  1158 09/10/19  5445 09/10/19  0050 09/09/19 0457   WBC Thousand/uL 16 45* 19 74*  --   --  15 91* 13 99*   HEMOGLOBIN g/dL 14 2 13 5  --   --  14 3 15 2   I STAT HEMOGLOBIN g/dl  --   --   --  13 9  --   --    HEMATOCRIT % 41 6 39 7  --   --  42 4 46 2   HEMATOCRIT, ISTAT %  --   --   --  41  --   --    PLATELETS Thousands/uL 314 273 314  --  296 323   NEUTROS PCT %  --   --   --   --  85* 78*   MONOS PCT %  --   --   --   --  5 7     Results from last 7 days   Lab Units 09/12/19  0715 09/11/19  0906 09/11/19  0046 09/10/19  0928 09/10/19  0051   POTASSIUM mmol/L 4 5 4 3 4 3  --  5 0   CHLORIDE mmol/L 93* 94* 100  --  100   CO2 mmol/L 27 23 25  --  30   CO2, I-STAT mmol/L  --   --   --  28  --    BUN mg/dL 26* 24 30*  --  30*   CREATININE mg/dL 1 05 1 36* 1 29  --  1 34*   CALCIUM mg/dL 9 4 8 7 8 8  --  9 4   ALK PHOS U/L 235*  --   --   --  220*   ALT U/L 89*  --   --   --  81*   AST U/L 52*  --   --   --  33   GLUCOSE, ISTAT mg/dl  --   --   --  222*  --              Results from last 7 days   Lab Units 09/11/19  0536 09/10/19  0051 09/09/19  0457   INR  0 98 0 99 0 96   PTT seconds 27 26  --      No results found for: TROPONINT  ABG:No results found for: PHART, NVN6ONN, PO2ART, LRY9THM, Q2BONZPX, BEART, SOURCE    Imaging Studies: I have personally reviewed pertinent reports  and I have personally reviewed pertinent films in PACS  X-ray Chest 1 View Portable    Result Date: 9/4/2019  Impression: Suggestion of osteolytic lesion in the posterior left 5th rib  Otherwise, no evidence of acute abnormality in the chest  Workstation performed: [id number]     Ct Head Without Contrast    Result Date: 9/4/2019  Impression: Large area of subcortical white matter hypodensity consistent with vasogenic edema in the left parietal region  Small focus of hemorrhage superiorly as seen on image 2/30 with an associated more hypodense masslike area measuring 3 9 x 2 2 cm  Mild mass  effect on the adjacent cortical sulci  No significant midline shift    MRI with contrast recommended to exclude underlying mass  I personally discussed this study with José Miguel Jensen on 9/4/2019 at 11:36 AM  Workstation performed: [id number]     Mri Brain W Wo Contrast    Result Date: 9/5/2019  Impression: Large centrally necrotic mass within the left frontoparietal region with surrounding vasogenic edema  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 versus solitary metastasis  Workstation performed: [id number]     Ct Chest Abdomen Pelvis W Contrast    Result Date: 9/4/2019  Impression: Heterogenous liver with hepatomegaly and multifocal heterogenous liver lesions, may be due to multifocal [address]  or metastatic disease  Left lower lobe lung nodule measuring 7 6 mm, suspicious for metastatic disease 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 Mildly enlarged fidel hepatis lymphadenopathy The study was marked in EPIC for immediate notification  Workstation performed: [id number]       EKG, Pathology, and Other Studies: I have personally reviewed pertinent reports        VTE Pharmacologic Prophylaxis: Enoxaparin (Lovenox)    VTE Mechanical Prophylaxis: sequential compression device

## 2019-09-12 NOTE — ASSESSMENT & PLAN NOTE
· Na 125-126 this morning  · Recommend supplementation as medically appropriate  · Possible SAIDH evaluation in post operative period  · Medical management  · Follow up labs

## 2019-09-13 ENCOUNTER — TELEPHONE (OUTPATIENT)
Dept: NEUROSURGERY | Facility: CLINIC | Age: 69
End: 2019-09-13

## 2019-09-13 VITALS
TEMPERATURE: 98.1 F | DIASTOLIC BLOOD PRESSURE: 78 MMHG | RESPIRATION RATE: 20 BRPM | HEART RATE: 85 BPM | OXYGEN SATURATION: 95 % | BODY MASS INDEX: 18.83 KG/M2 | HEIGHT: 75 IN | SYSTOLIC BLOOD PRESSURE: 121 MMHG | WEIGHT: 151.46 LBS

## 2019-09-13 PROBLEM — Z29.8 SEIZURE PROPHYLAXIS: Status: ACTIVE | Noted: 2019-09-13

## 2019-09-13 PROBLEM — Z72.0 TOBACCO ABUSE: Status: ACTIVE | Noted: 2019-09-13

## 2019-09-13 PROBLEM — K21.9 GERD (GASTROESOPHAGEAL REFLUX DISEASE): Status: ACTIVE | Noted: 2019-09-13

## 2019-09-13 PROBLEM — F41.9 ANXIETY DISORDER: Status: ACTIVE | Noted: 2019-09-13

## 2019-09-13 PROBLEM — K59.00 CONSTIPATION: Status: ACTIVE | Noted: 2019-09-13

## 2019-09-13 LAB
ALBUMIN SERPL BCP-MCNC: 2.9 G/DL (ref 3.5–5)
ALP SERPL-CCNC: 218 U/L (ref 46–116)
ALT SERPL W P-5'-P-CCNC: 100 U/L (ref 12–78)
ANION GAP SERPL CALCULATED.3IONS-SCNC: 7 MMOL/L (ref 4–13)
AST SERPL W P-5'-P-CCNC: 58 U/L (ref 5–45)
BILIRUB DIRECT SERPL-MCNC: 0.7 MG/DL (ref 0–0.2)
BILIRUB SERPL-MCNC: 1.25 MG/DL (ref 0.2–1)
BUN SERPL-MCNC: 30 MG/DL (ref 5–25)
CALCIUM SERPL-MCNC: 9.1 MG/DL (ref 8.3–10.1)
CHLORIDE SERPL-SCNC: 98 MMOL/L (ref 100–108)
CO2 SERPL-SCNC: 26 MMOL/L (ref 21–32)
CREAT SERPL-MCNC: 1.02 MG/DL (ref 0.6–1.3)
ERYTHROCYTE [DISTWIDTH] IN BLOOD BY AUTOMATED COUNT: 13.9 % (ref 11.6–15.1)
GFR SERPL CREATININE-BSD FRML MDRD: 76 ML/MIN/1.73SQ M
GLUCOSE SERPL-MCNC: 197 MG/DL (ref 65–140)
GLUCOSE SERPL-MCNC: 218 MG/DL (ref 65–140)
GLUCOSE SERPL-MCNC: 237 MG/DL (ref 65–140)
GLUCOSE SERPL-MCNC: 240 MG/DL (ref 65–140)
HCT VFR BLD AUTO: 40 % (ref 36.5–49.3)
HGB BLD-MCNC: 13.7 G/DL (ref 12–17)
MCH RBC QN AUTO: 31.9 PG (ref 26.8–34.3)
MCHC RBC AUTO-ENTMCNC: 34.3 G/DL (ref 31.4–37.4)
MCV RBC AUTO: 93 FL (ref 82–98)
PLATELET # BLD AUTO: 316 THOUSANDS/UL (ref 149–390)
PMV BLD AUTO: 10.9 FL (ref 8.9–12.7)
POTASSIUM SERPL-SCNC: 4.6 MMOL/L (ref 3.5–5.3)
PROT SERPL-MCNC: 7.2 G/DL (ref 6.4–8.2)
RBC # BLD AUTO: 4.29 MILLION/UL (ref 3.88–5.62)
SODIUM SERPL-SCNC: 131 MMOL/L (ref 136–145)
WBC # BLD AUTO: 11.82 THOUSAND/UL (ref 4.31–10.16)

## 2019-09-13 PROCEDURE — 80076 HEPATIC FUNCTION PANEL: CPT | Performed by: STUDENT IN AN ORGANIZED HEALTH CARE EDUCATION/TRAINING PROGRAM

## 2019-09-13 PROCEDURE — 99024 POSTOP FOLLOW-UP VISIT: CPT | Performed by: PHYSICIAN ASSISTANT

## 2019-09-13 PROCEDURE — 99233 SBSQ HOSP IP/OBS HIGH 50: CPT | Performed by: INTERNAL MEDICINE

## 2019-09-13 PROCEDURE — NC001 PR NO CHARGE: Performed by: INTERNAL MEDICINE

## 2019-09-13 PROCEDURE — 80048 BASIC METABOLIC PNL TOTAL CA: CPT | Performed by: STUDENT IN AN ORGANIZED HEALTH CARE EDUCATION/TRAINING PROGRAM

## 2019-09-13 PROCEDURE — 94762 N-INVAS EAR/PLS OXIMTRY CONT: CPT

## 2019-09-13 PROCEDURE — 82948 REAGENT STRIP/BLOOD GLUCOSE: CPT

## 2019-09-13 PROCEDURE — 85027 COMPLETE CBC AUTOMATED: CPT | Performed by: STUDENT IN AN ORGANIZED HEALTH CARE EDUCATION/TRAINING PROGRAM

## 2019-09-13 RX ORDER — HYDROXYZINE HYDROCHLORIDE 25 MG/1
25 TABLET, FILM COATED ORAL EVERY 6 HOURS PRN
Qty: 30 TABLET | Refills: 0 | Status: SHIPPED | OUTPATIENT
Start: 2019-09-13 | End: 2020-05-14

## 2019-09-13 RX ORDER — MAGNESIUM HYDROXIDE/ALUMINUM HYDROXICE/SIMETHICONE 120; 1200; 1200 MG/30ML; MG/30ML; MG/30ML
30 SUSPENSION ORAL EVERY 6 HOURS PRN
Qty: 355 ML | Refills: 0 | Status: SHIPPED | OUTPATIENT
Start: 2019-09-13 | End: 2019-09-13

## 2019-09-13 RX ORDER — DEXAMETHASONE SODIUM PHOSPHATE 4 MG/ML
2 INJECTION, SOLUTION INTRA-ARTICULAR; INTRALESIONAL; INTRAMUSCULAR; INTRAVENOUS; SOFT TISSUE EVERY 6 HOURS SCHEDULED
Qty: 6 ML | Refills: 0 | Status: SHIPPED | OUTPATIENT
Start: 2019-09-15 | End: 2019-09-13 | Stop reason: HOSPADM

## 2019-09-13 RX ORDER — SODIUM CHLORIDE 1000 MG
TABLET, SOLUBLE MISCELLANEOUS
Qty: 21 TABLET | Refills: 0 | Status: SHIPPED | OUTPATIENT
Start: 2019-09-13 | End: 2020-05-14

## 2019-09-13 RX ORDER — DEXAMETHASONE SODIUM PHOSPHATE 4 MG/ML
2 INJECTION, SOLUTION INTRA-ARTICULAR; INTRALESIONAL; INTRAMUSCULAR; INTRAVENOUS; SOFT TISSUE EVERY 12 HOURS SCHEDULED
Qty: 3 ML | Refills: 0 | Status: SHIPPED | OUTPATIENT
Start: 2019-09-21 | End: 2019-09-13 | Stop reason: HOSPADM

## 2019-09-13 RX ORDER — PANTOPRAZOLE SODIUM 40 MG/1
40 TABLET, DELAYED RELEASE ORAL
Refills: 0 | Status: SHIPPED | OUTPATIENT
Start: 2019-09-14 | End: 2020-05-14

## 2019-09-13 RX ORDER — DEXAMETHASONE 2 MG/1
2 TABLET ORAL 3 TIMES DAILY
Qty: 9 TABLET | Refills: 0 | Status: SHIPPED | OUTPATIENT
Start: 2019-09-13 | End: 2019-09-13 | Stop reason: SDUPTHER

## 2019-09-13 RX ORDER — DEXAMETHASONE 2 MG/1
2 TABLET ORAL 2 TIMES DAILY WITH MEALS
Qty: 6 TABLET | Refills: 0 | Status: SHIPPED | OUTPATIENT
Start: 2019-09-18 | End: 2019-09-13 | Stop reason: SDUPTHER

## 2019-09-13 RX ORDER — OXYCODONE HYDROCHLORIDE 5 MG/1
5 TABLET ORAL EVERY 4 HOURS PRN
Qty: 84 TABLET | Refills: 0 | Status: SHIPPED | OUTPATIENT
Start: 2019-09-13 | End: 2019-09-27

## 2019-09-13 RX ORDER — DEXAMETHASONE SODIUM PHOSPHATE 4 MG/ML
2 INJECTION, SOLUTION INTRA-ARTICULAR; INTRALESIONAL; INTRAMUSCULAR; INTRAVENOUS; SOFT TISSUE EVERY 8 HOURS
Qty: 4.5 ML | Refills: 0 | Status: SHIPPED | OUTPATIENT
Start: 2019-09-18 | End: 2019-09-13

## 2019-09-13 RX ORDER — SENNOSIDES 8.6 MG
1 TABLET ORAL DAILY
Qty: 120 EACH | Refills: 0 | Status: SHIPPED | OUTPATIENT
Start: 2019-09-14 | End: 2020-03-02 | Stop reason: SDUPTHER

## 2019-09-13 RX ORDER — FLUTICASONE FUROATE AND VILANTEROL 100; 25 UG/1; UG/1
1 POWDER RESPIRATORY (INHALATION) DAILY
Qty: 1 INHALER | Refills: 0 | Status: SHIPPED | OUTPATIENT
Start: 2019-09-14 | End: 2020-06-09 | Stop reason: SDUPTHER

## 2019-09-13 RX ORDER — LANOLIN ALCOHOL/MO/W.PET/CERES
100 CREAM (GRAM) TOPICAL DAILY
Refills: 0 | Status: SHIPPED | OUTPATIENT
Start: 2019-09-14 | End: 2020-05-14

## 2019-09-13 RX ORDER — NICOTINE 21 MG/24HR
1 PATCH, TRANSDERMAL 24 HOURS TRANSDERMAL DAILY
Qty: 28 PATCH | Refills: 0 | Status: SHIPPED | OUTPATIENT
Start: 2019-09-14 | End: 2020-02-13 | Stop reason: SDUPTHER

## 2019-09-13 RX ORDER — INSULIN GLARGINE 100 [IU]/ML
INJECTION, SOLUTION SUBCUTANEOUS
Refills: 0 | Status: SHIPPED | OUTPATIENT
Start: 2019-09-13 | End: 2020-05-14

## 2019-09-13 RX ORDER — DEXAMETHASONE SODIUM PHOSPHATE 4 MG/ML
2 INJECTION, SOLUTION INTRA-ARTICULAR; INTRALESIONAL; INTRAMUSCULAR; INTRAVENOUS; SOFT TISSUE EVERY 24 HOURS
Qty: 1.5 ML | Refills: 0 | Status: SHIPPED | OUTPATIENT
Start: 2019-09-24 | End: 2019-09-13

## 2019-09-13 RX ORDER — ACETAMINOPHEN 325 MG/1
975 TABLET ORAL 3 TIMES DAILY
Qty: 270 TABLET | Refills: 0 | Status: SHIPPED | OUTPATIENT
Start: 2019-09-13 | End: 2019-09-13

## 2019-09-13 RX ORDER — NIFEDIPINE 60 MG/1
60 TABLET, FILM COATED, EXTENDED RELEASE ORAL DAILY
Refills: 0 | Status: SHIPPED | OUTPATIENT
Start: 2019-09-14 | End: 2020-05-14

## 2019-09-13 RX ORDER — ACETAMINOPHEN 325 MG/1
975 TABLET ORAL 3 TIMES DAILY
Status: DISCONTINUED | OUTPATIENT
Start: 2019-09-13 | End: 2019-09-13 | Stop reason: HOSPADM

## 2019-09-13 RX ORDER — OXYCODONE HYDROCHLORIDE 10 MG/1
10 TABLET ORAL EVERY 6 HOURS PRN
Qty: 56 TABLET | Refills: 0 | Status: SHIPPED | OUTPATIENT
Start: 2019-09-13 | End: 2019-09-27

## 2019-09-13 RX ORDER — DEXAMETHASONE 2 MG/1
2 TABLET ORAL 3 TIMES DAILY
Qty: 9 TABLET | Refills: 0 | Status: SHIPPED | OUTPATIENT
Start: 2019-09-18 | End: 2019-09-21

## 2019-09-13 RX ORDER — DEXAMETHASONE 2 MG/1
2 TABLET ORAL EVERY 12 HOURS
Qty: 4 TABLET | Refills: 0 | Status: SHIPPED | OUTPATIENT
Start: 2019-09-22 | End: 2019-09-24

## 2019-09-13 RX ORDER — DEXAMETHASONE 2 MG/1
2 TABLET ORAL
Qty: 2 TABLET | Refills: 0 | Status: SHIPPED | OUTPATIENT
Start: 2019-09-25 | End: 2019-09-27

## 2019-09-13 RX ORDER — DEXAMETHASONE 4 MG/1
4 TABLET ORAL 3 TIMES DAILY
Qty: 4 TABLET | Refills: 0 | Status: SHIPPED | OUTPATIENT
Start: 2019-09-13 | End: 2019-09-14

## 2019-09-13 RX ORDER — DEXAMETHASONE SODIUM PHOSPHATE 4 MG/ML
4 INJECTION, SOLUTION INTRA-ARTICULAR; INTRALESIONAL; INTRAMUSCULAR; INTRAVENOUS; SOFT TISSUE EVERY 8 HOURS
Qty: 5 ML | Refills: 0 | Status: SHIPPED | OUTPATIENT
Start: 2019-09-13 | End: 2019-09-13 | Stop reason: HOSPADM

## 2019-09-13 RX ORDER — FOLIC ACID 1 MG/1
1 TABLET ORAL DAILY
Refills: 0 | Status: SHIPPED | OUTPATIENT
Start: 2019-09-14 | End: 2020-05-14

## 2019-09-13 RX ORDER — DOCUSATE SODIUM 100 MG/1
100 CAPSULE, LIQUID FILLED ORAL 2 TIMES DAILY
Qty: 10 CAPSULE | Refills: 0 | Status: SHIPPED | OUTPATIENT
Start: 2019-09-13 | End: 2020-02-07 | Stop reason: ALTCHOICE

## 2019-09-13 RX ORDER — DEXAMETHASONE SODIUM PHOSPHATE 4 MG/ML
2 INJECTION, SOLUTION INTRA-ARTICULAR; INTRALESIONAL; INTRAMUSCULAR; INTRAVENOUS; SOFT TISSUE EVERY 8 HOURS
Qty: 4.5 ML | Refills: 0 | Status: SHIPPED | OUTPATIENT
Start: 2019-09-18 | End: 2019-09-13 | Stop reason: HOSPADM

## 2019-09-13 RX ORDER — HYDROMORPHONE HYDROCHLORIDE 2 MG/1
4 TABLET ORAL EVERY 6 HOURS PRN
Status: DISCONTINUED | OUTPATIENT
Start: 2019-09-13 | End: 2019-09-13 | Stop reason: HOSPADM

## 2019-09-13 RX ORDER — LEVETIRACETAM 500 MG/1
500 TABLET ORAL EVERY 12 HOURS SCHEDULED
Qty: 8 TABLET | Refills: 0 | Status: SHIPPED | OUTPATIENT
Start: 2019-09-13 | End: 2020-05-14

## 2019-09-13 RX ORDER — DEXAMETHASONE SODIUM PHOSPHATE 4 MG/ML
4 INJECTION, SOLUTION INTRA-ARTICULAR; INTRALESIONAL; INTRAMUSCULAR; INTRAVENOUS; SOFT TISSUE EVERY 8 HOURS
Qty: 5 ML | Refills: 0 | Status: SHIPPED | OUTPATIENT
Start: 2019-09-13 | End: 2019-09-13

## 2019-09-13 RX ORDER — ACETAMINOPHEN 325 MG/1
975 TABLET ORAL 3 TIMES DAILY
Qty: 270 TABLET | Refills: 0 | Status: SHIPPED | OUTPATIENT
Start: 2019-09-13 | End: 2019-10-13

## 2019-09-13 RX ORDER — OXYCODONE HYDROCHLORIDE 5 MG/1
5 TABLET ORAL EVERY 4 HOURS PRN
Qty: 84 TABLET | Refills: 0 | Status: SHIPPED | OUTPATIENT
Start: 2019-09-13 | End: 2019-09-13

## 2019-09-13 RX ORDER — MAGNESIUM HYDROXIDE/ALUMINUM HYDROXICE/SIMETHICONE 120; 1200; 1200 MG/30ML; MG/30ML; MG/30ML
30 SUSPENSION ORAL EVERY 6 HOURS PRN
Qty: 355 ML | Refills: 0 | Status: SHIPPED | OUTPATIENT
Start: 2019-09-13

## 2019-09-13 RX ORDER — DEXAMETHASONE SODIUM PHOSPHATE 4 MG/ML
2 INJECTION, SOLUTION INTRA-ARTICULAR; INTRALESIONAL; INTRAMUSCULAR; INTRAVENOUS; SOFT TISSUE EVERY 12 HOURS SCHEDULED
Qty: 3 ML | Refills: 0 | Status: SHIPPED | OUTPATIENT
Start: 2019-09-21 | End: 2019-09-13

## 2019-09-13 RX ORDER — ONDANSETRON 2 MG/ML
4 INJECTION INTRAMUSCULAR; INTRAVENOUS EVERY 6 HOURS PRN
Refills: 0 | Status: SHIPPED | OUTPATIENT
Start: 2019-09-13 | End: 2019-10-09

## 2019-09-13 RX ORDER — DEXAMETHASONE SODIUM PHOSPHATE 4 MG/ML
2 INJECTION, SOLUTION INTRA-ARTICULAR; INTRALESIONAL; INTRAMUSCULAR; INTRAVENOUS; SOFT TISSUE EVERY 24 HOURS
Qty: 1.5 ML | Refills: 0 | Status: SHIPPED | OUTPATIENT
Start: 2019-09-24 | End: 2019-09-13 | Stop reason: HOSPADM

## 2019-09-13 RX ORDER — DEXAMETHASONE SODIUM PHOSPHATE 4 MG/ML
2 INJECTION, SOLUTION INTRA-ARTICULAR; INTRALESIONAL; INTRAMUSCULAR; INTRAVENOUS; SOFT TISSUE EVERY 6 HOURS SCHEDULED
Qty: 6 ML | Refills: 0 | Status: SHIPPED | OUTPATIENT
Start: 2019-09-15 | End: 2019-09-13

## 2019-09-13 RX ORDER — DEXAMETHASONE 2 MG/1
2 TABLET ORAL EVERY 6 HOURS
Qty: 12 TABLET | Refills: 0 | Status: SHIPPED | OUTPATIENT
Start: 2019-09-15 | End: 2019-09-17

## 2019-09-13 RX ADMIN — INSULIN GLARGINE 25 UNITS: 100 INJECTION, SOLUTION SUBCUTANEOUS at 08:33

## 2019-09-13 RX ADMIN — FENTANYL CITRATE 50 MCG: 50 INJECTION INTRAMUSCULAR; INTRAVENOUS at 08:26

## 2019-09-13 RX ADMIN — INSULIN LISPRO 2 UNITS: 100 INJECTION, SOLUTION INTRAVENOUS; SUBCUTANEOUS at 11:24

## 2019-09-13 RX ADMIN — ACETAMINOPHEN 975 MG: 325 TABLET ORAL at 08:46

## 2019-09-13 RX ADMIN — SODIUM CHLORIDE TAB 1 GM 1 G: 1 TAB at 08:21

## 2019-09-13 RX ADMIN — FLUTICASONE FUROATE AND VILANTEROL TRIFENATATE 1 PUFF: 100; 25 POWDER RESPIRATORY (INHALATION) at 08:17

## 2019-09-13 RX ADMIN — INSULIN LISPRO 2 UNITS: 100 INJECTION, SOLUTION INTRAVENOUS; SUBCUTANEOUS at 08:16

## 2019-09-13 RX ADMIN — LEVETIRACETAM 500 MG: 500 TABLET, FILM COATED ORAL at 08:21

## 2019-09-13 RX ADMIN — DEXAMETHASONE SODIUM PHOSPHATE 4 MG: 4 INJECTION, SOLUTION INTRAMUSCULAR; INTRAVENOUS at 11:24

## 2019-09-13 RX ADMIN — FOLIC ACID 1 MG: 1 TABLET ORAL at 08:21

## 2019-09-13 RX ADMIN — HYDROXYZINE HYDROCHLORIDE 25 MG: 25 TABLET ORAL at 17:02

## 2019-09-13 RX ADMIN — FENTANYL CITRATE 50 MCG: 50 INJECTION INTRAMUSCULAR; INTRAVENOUS at 00:25

## 2019-09-13 RX ADMIN — DOCUSATE SODIUM 100 MG: 100 CAPSULE, LIQUID FILLED ORAL at 17:03

## 2019-09-13 RX ADMIN — THIAMINE HCL TAB 100 MG 100 MG: 100 TAB at 08:21

## 2019-09-13 RX ADMIN — SODIUM CHLORIDE TAB 1 GM 1 G: 1 TAB at 16:55

## 2019-09-13 RX ADMIN — ENOXAPARIN SODIUM 40 MG: 40 INJECTION SUBCUTANEOUS at 08:21

## 2019-09-13 RX ADMIN — SENNOSIDES 8.6 MG: 8.6 TABLET, FILM COATED ORAL at 08:21

## 2019-09-13 RX ADMIN — DOCUSATE SODIUM 100 MG: 100 CAPSULE, LIQUID FILLED ORAL at 08:21

## 2019-09-13 RX ADMIN — SODIUM CHLORIDE TAB 1 GM 1 G: 1 TAB at 11:24

## 2019-09-13 RX ADMIN — NIFEDIPINE 60 MG: 60 TABLET, FILM COATED, EXTENDED RELEASE ORAL at 08:17

## 2019-09-13 RX ADMIN — ACETAMINOPHEN 975 MG: 325 TABLET ORAL at 16:55

## 2019-09-13 RX ADMIN — DEXAMETHASONE SODIUM PHOSPHATE 4 MG: 4 INJECTION, SOLUTION INTRAMUSCULAR; INTRAVENOUS at 04:55

## 2019-09-13 RX ADMIN — PANTOPRAZOLE SODIUM 40 MG: 40 TABLET, DELAYED RELEASE ORAL at 05:43

## 2019-09-13 RX ADMIN — HYDROMORPHONE HYDROCHLORIDE 4 MG: 2 TABLET ORAL at 16:56

## 2019-09-13 RX ADMIN — NICOTINE 21 MG: 21 PATCH, EXTENDED RELEASE TRANSDERMAL at 05:43

## 2019-09-13 RX ADMIN — INSULIN LISPRO 1 UNITS: 100 INJECTION, SOLUTION INTRAVENOUS; SUBCUTANEOUS at 16:57

## 2019-09-13 RX ADMIN — OXYCODONE HYDROCHLORIDE 10 MG: 10 TABLET ORAL at 13:55

## 2019-09-13 RX ADMIN — OXYCODONE HYDROCHLORIDE 10 MG: 10 TABLET ORAL at 05:43

## 2019-09-13 RX ADMIN — Medication 1 TABLET: at 08:21

## 2019-09-13 NOTE — PROGRESS NOTES
INTERNAL MEDICINE RESIDENCY PROGRESS NOTE     Name: Jerod Mathis  Age & Sex: 81 y o  male   MRN: [id number]  Unit/Bed#: [facility] 717-01   Encounter: [id number]  Team: SOD Team A    PATIENT INFORMATION     Name: Jerod Mathis  Age & Sex: 81 y o  male   MRN: [id number]  Hospital Stay Days: 9    ASSESSMENT/PLAN     Principal Problem:    Brain mass with hemorrhage  Active Problems:    Hepatocellular carcinoma ([address] )    Hyponatremia    Steroid-induced hyperglycemia    Essential hypertension    COPD (chronic obstructive pulmonary disease) (HCC)    Leukocytosis    Tooth pain    Alcohol abuse    Cerebral edema (HCC)    Malnutrition related to chronic disease    Transaminitis    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      * Brain mass with hemorrhage  Assessment & Plan  MRI ordered by Neurosurgery revealed large centrally necrotic mass within the left frontoparietal region with surrounding vasogenic edema  Patient has history of [facility] with chemotherapy, immunotherapy, TACE  Patient is postop day 2 from Image guided left frontal craniotomy for resection of tumor  Preliminary path report reveals "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; differential diagnosis would include metastatic small cell neuroendocrine carcinoma versus poorly differentiated pleomorphic carcinoma, each of uncertain primary  Metastatic hepatocellular carcinoma was considered in the differential diagnosis but is not favored on morphologic grounds " Referred for 2nd opinion  Patient states right arm weakness and hand  strength is slightly improved after the surgery  · Neuro checks per Neurosurgery   · Decadron 4 mg q 8 hours with Decadron taper per Neurosurgery - patient has steroid-induced hyperglycemia discussed below    GI prophylaxis with Protonix 40 mg daily  · Postop MRI revealed no residual enhancing mass and small subdural hemorrhage bilaterally  · Patient has postop pain  Patient reports pain is better controlled this morning  Required a dose of fentanyl and oxycodone 10 mg last night  · PT/OT - recommend rehab; will follow up with case management  · PMR following appreciate continued recommendations  · Neurosurgery consult; appreciate continued recommendations  · Oncology consult for HCC/new primary - will await pathology results from surgery and follow up outpatient for management  · Radiation oncology consult - will follow up after surgery for radiation; spoke with Neurosurgery team yesterday, will be cleared for radiation after 2 weeks postop  Follow up outpatient    [facility] as of 9/10/2019  Assessment & Plan  Likely secondary to mass and intracranial bleed  See work up as above  Steroid-induced hyperglycemia  Assessment & Plan  Patient has had uncontrolled blood glucose since initiation of Decadron therapy for above  More controlled on current regime with blood glucose in the 200s yesterday  · Endocrine recommends Lantus 25 units daily and Humalog 15 units t i d  With meals  · Correctional insulin ordered  · Continue to monitor blood glucose    Hyponatremia  Assessment & Plan  Patient currently hyponatremic to 131 from 125 yesterday corrected to 132-133 with glucose correction  Most likely 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  · Sodium chloride 1 g tabs t i d  With meals  · Fluid restriction of 2000 mL today  · Daily monitoring of BMP  · Patient currently has no overt signs of hyponatremia  · Serum osmol 293, urine sodium 123, and urine osmol 931 favoring SIADH    Hepatocellular carcinoma ([address] )  Assessment & Plan  Patient's history of hepatocellular carcinoma treated at [facility] in 2017 until January 2018  Patient received therapy with cisplatin, sorafenib, and TACE    CT chest abdomen pelvis with contrast revealed heterogenous liver with hepatomegaly and multifocal heterogenous liver lesions, may be due to multifocal [address]  or metastatic disease  Left lower lobe lung nodule measuring 7 6 mm, suspicious for metastatic disease  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  According to Care everywhere, patient was having partial response to therapy; however, stop therapy due to perceived lack of benefit and financial cost   Appears the patient has 2nd primary tumor as per preliminary pathology report  · Contributing to above  · AFP 4 0  · Oxycodone 5 mg and oxycodone 10 mg p r n  For pain  · Oncology consult - will follow up with patient after pathology results    Tooth pain  Assessment & Plan  Patient had significant tooth pain after OR  Patient states tooth pain better today  · Patient originally to go to OR today; however, patient is not cleared from Neurosurgery  Will need to we at least 2 weeks postop    Leukocytosis  Assessment & Plan  Patient has leukocytosis to 11 8 decreased from 16 5 on CBC  · Likely secondary to glucocorticoid use with normal temperature and nontoxic appearance  · Continue monitor CBC daily    COPD (chronic obstructive pulmonary disease) ([facility] [address] )  Assessment & Plan  Patient has significant smoking history of 2 packs per day for 30-40 years  Patient is not up-to-date on immunizations and uses rescue inhaler p r n  Rosella Goldmann Does not have maintenance inhaler  Suspect Decadron for above will help as well  Patient is at baseline respiratory status  · Breo Ellipta daily  · Pulmonary hygiene - incentive spirometer in room and patient instructed with use  · Pneumococcal vaccination on discharge      Essential hypertension  Assessment & Plan  Reports he is taking nifedipine as an outpatient but has not been on this for 3 months due to financial reasons  Most likely elevated secondary to steroids  · Nifedipine 60 mg 24 hour tablet p o   Daily  · Continue to monitor    Malnutrition related to chronic disease  Assessment & Plan  Malnutrition Findings:   Malnutrition type: Chronic illness(Chronic severe pro/vicky malnutrition r/t catabolic illness as evidenced by 15% unintentional wt loss since 6/25/19, consuming < 75% energy intake compared to est energy needs for > 1 month, Treated with diet, patient refused supplements)  Degree of Malnutrition: Other severe protein calorie malnutrition    BMI Findings:  BMI Classifications: Underweight < 18 5     There is no height or weight on file to calculate BMI  · Patient had significant weight loss over a couple months  · Patient has good appetite recently  · Nutrition recommendations as above    Alcohol abuse  Assessment & Plan  Patient endorsed drinking a 5th of whiskey alcohol multiple times per week  Patient states last drink was 3-4 days ago prior to admission  Patient denies any history of alcohol withdrawal or DT   · CIWA protocol discontinued  · Thiamine and folate supplementation    Dependence on nicotine from cigarettesresolved as of 9/10/2019  Assessment & Plan  Patient endorsed smoking 2 packs a day for last 30-40 years  · Nicotine patch 21mg daily        Disposition:  Patient recommended to go to rehab per PT and OT  Patient desires to go home with brother today  Will speak with case management to determine discharge plan  SUBJECTIVE     Patient seen and examined  No acute events overnight  Patient states this morning he continues to have pain around his craniotomy site  Patient states his tooth pain is better  Patient expresses desire to leave hospital today  Spoke with patient that it is recommended he goes to rehab and that it may not happen until Monday  Patient frustrated with that answer  Patient states shortness of breath is at baseline  Patient continues to desire improved dexterity of his right hand  Negative for any acute complaints  Spoke with patient's nurse who wanted clarification of patient's pain regimen  She also stated that patient was in significant pain during dressing change and required fentanyl dose      OBJECTIVE     Vitals:    09/13/19 0302 19 0600 19 0749 19 0752   BP: 111/62   116/93   BP Location:       Pulse: 79   99   Resp: 16   20   Temp: 98 °F (36 7 °C)   98 8 °F (37 1 °C)   TempSrc:       SpO2: 94%  95% 94%   Weight:  68 7 kg (151 lb 7 3 oz)     Height:          Temperature:   Temp (24hrs), Av 2 °F (36 8 °C), Min:97 4 °F (36 3 °C), Max:99 8 °F (37 7 °C)    Temperature: 98 8 °F (37 1 °C)  Intake & Output:  I/O       701 -  07 -  07 -  07    P  O  818 560     I V  (mL/kg) 311 7 (4 4)      IV Piggyback       Total Intake(mL/kg) 1129 7 (16) 560 (8 2)     Urine (mL/kg/hr) 1050 (0 6) 175 (0 1)     Stool 0 0     Blood       Total Output 1050 175     Net +79 7 +385            Unmeasured Urine Occurrence  7 x     Unmeasured Stool Occurrence 1 x 1 x         Weights:   IBW: 84 5 kg    Body mass index is 18 93 kg/m²  Weight (last 2 days)     Date/Time   Weight    19 0600   68 7 (151 46)    19 0600   70 6 (155 65)    19 0538   79 1 (174 38)            Physical Exam   Constitutional: No distress  Sitting in chair   HENT:   Bandage over left craniotomy site  Minimal blood noted on bandage  Eyes: Right eye exhibits no discharge  Left eye exhibits no discharge  Cardiovascular: Normal rate and regular rhythm  Pulmonary/Chest: Effort normal  No respiratory distress  Decreased breath sounds   Abdominal: There is no tenderness  Hepatomegaly appreciated   Musculoskeletal: He exhibits no edema  Neurological: He is alert  4+ out of 5 in right upper and lower extremity compared to left  4/5 strength in right hand  compared to left   Skin: Skin is warm and dry  Psychiatric:   Irritated  Vitals reviewed  LABORATORY DATA     Labs: I have personally reviewed pertinent reports    Results from last 7 days   Lab Units 19  0455 19  0715 19  0536  09/10/19  0050 19  0457   WBC Thousand/uL 11 82* 16 45* 19 74*  --  15 91* 13 99* HEMOGLOBIN g/dL 13 7 14 2 13 5  --  14 3 15 2   I STAT HEMOGLOBIN   --   --   --    < >  --   --    HEMATOCRIT % 40 0 41 6 39 7  --  42 4 46 2   HEMATOCRIT, ISTAT   --   --   --    < >  --   --    PLATELETS Thousands/uL 316 314 273   < > 296 323   NEUTROS PCT %  --   --   --   --  85* 78*   MONOS PCT %  --   --   --   --  5 7    < > = values in this interval not displayed  Results from last 7 days   Lab Units 09/13/19  0455 09/12/19  0715 09/11/19  0906  09/10/19  0928 09/10/19  0051   POTASSIUM mmol/L 4 6 4 5 4 3   < >  --  5 0   CHLORIDE mmol/L 98* 93* 94*   < >  --  100   CO2 mmol/L 26 27 23   < >  --  30   CO2, I-STAT mmol/L  --   --   --   --  28  --    BUN mg/dL 30* 26* 24   < >  --  30*   CREATININE mg/dL 1 02 1 05 1 36*   < >  --  1 34*   CALCIUM mg/dL 9 1 9 4 8 7   < >  --  9 4   ALK PHOS U/L 218* 235*  --   --   --  220*   ALT U/L 100* 89*  --   --   --  81*   AST U/L 58* 52*  --   --   --  33   GLUCOSE, ISTAT mg/dl  --   --   --   --  222*  --     < > = values in this interval not displayed  Results from last 7 days   Lab Units 09/11/19  0536 09/10/19  0051 09/09/19  0457   INR  0 98 0 99 0 96   PTT seconds 27 26  --                IMAGING & DIAGNOSTIC TESTING     Radiology Results: I have personally reviewed pertinent reports  X-ray Chest 1 View Portable    Result Date: 9/4/2019  Impression: Suggestion of osteolytic lesion in the posterior left 5th rib  Otherwise, no evidence of acute abnormality in the chest  Workstation performed: [id number]     Ct Head Without Contrast    Result Date: 9/4/2019  Impression: Large area of subcortical white matter hypodensity consistent with vasogenic edema in the left parietal region  Small focus of hemorrhage superiorly as seen on image 2/30 with an associated more hypodense masslike area measuring 3 9 x 2 2 cm  Mild mass  effect on the adjacent cortical sulci  No significant midline shift    MRI with contrast recommended to exclude underlying mass   I personally discussed this study with Geovani Asif on 9/4/2019 at 11:36 AM  Workstation performed: [id number]     Mri Brain W Wo Contrast    Result Date: 9/5/2019  Impression: Large centrally necrotic mass within the left frontoparietal region with surrounding vasogenic edema  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 versus solitary metastasis  Workstation performed: [id number]     Ct Chest Abdomen Pelvis W Contrast    Result Date: 9/4/2019  Impression: Heterogenous liver with hepatomegaly and multifocal heterogenous liver lesions, may be due to multifocal [address]  or metastatic disease  Left lower lobe lung nodule measuring 7 6 mm, suspicious for metastatic disease 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 Mildly enlarged fidel hepatis lymphadenopathy The study was marked in EPIC for immediate notification  Workstation performed: [id number]     Other Diagnostic Testing: I have personally reviewed pertinent reports      ACTIVE MEDICATIONS     Current Facility-Administered Medications   Medication Dose Route Frequency    acetaminophen (TYLENOL) tablet 650 mg  650 mg Oral Q4H PRN    aluminum-magnesium hydroxide-simethicone (MYLANTA) 200-200-20 mg/5 mL oral suspension 30 mL  30 mL Oral Q6H PRN    dexamethasone (DECADRON) injection 4 mg  4 mg Intravenous Q8H    Followed by   Hortensia Carbajal ON 9/15/2019] dexamethasone (DECADRON) injection 2 mg  2 mg Intravenous Q6H [facility]    Followed by   Hortensia Carbajal ON 9/18/2019] dexamethasone (DECADRON) injection 2 mg  2 mg Intravenous Q8H    Followed by   Hortensia Carbajal ON 9/21/2019] dexamethasone (DECADRON) injection 2 mg  2 mg Intravenous Q12H [facility]    Followed by   Hortensia Carbajal ON 9/24/2019] dexamethasone (DECADRON) injection 2 mg  2 mg Intravenous Q24H    docusate sodium (COLACE) capsule 100 mg  100 mg Oral BID    enoxaparin (LOVENOX) subcutaneous injection 40 mg  40 mg Subcutaneous Daily    fentanyl citrate (PF) 100 MCG/2ML 50 mcg  50 mcg Intravenous Q2H PRN    fluticasone-vilanterol (BREO ELLIPTA) 100-25 mcg/inh inhaler 1 puff  1 puff Inhalation Daily    folic acid (FOLVITE) tablet 1 mg  1 mg Oral Daily    hydrOXYzine HCL (ATARAX) tablet 25 mg  25 mg Oral Q6H PRN    insulin glargine (LANTUS) subcutaneous injection 25 Units 0 25 mL  25 Units Subcutaneous QAM    insulin lispro (HumaLOG) 100 units/mL subcutaneous injection 1-5 Units  1-5 Units Subcutaneous TID AC    insulin lispro (HumaLOG) 100 units/mL subcutaneous injection 15 Units  15 Units Subcutaneous TID With Meals    Labetalol HCl (NORMODYNE) injection 10 mg  10 mg Intravenous Q4H PRN    levETIRAcetam (KEPPRA) tablet 500 mg  500 mg Oral Q12H [address]    multivitamin-minerals (CENTRUM) tablet 1 tablet  1 tablet Oral Daily    nicotine (NICODERM CQ) 21 mg/24 hr TD 24 hr patch 21 mg  21 mg Transdermal Daily    nicotine polacrilex (NICORETTE) gum 2 mg  2 mg Oral Q1H PRN    NIFEdipine (PROCARDIA XL) 24 hr tablet 60 mg  60 mg Oral Daily    ondansetron (ZOFRAN) injection 4 mg  4 mg Intravenous Q6H PRN    oxyCODONE (ROXICODONE) immediate release tablet 10 mg  10 mg Oral Q6H PRN    oxyCODONE (ROXICODONE) IR tablet 5 mg  5 mg Oral Q4H PRN    pantoprazole (PROTONIX) EC tablet 40 mg  40 mg Oral Early Morning    pneumococcal 13-valent conjugate vaccine (PREVNAR-13) IM injection 0 5 mL  0 5 mL Intramuscular Prior to discharge    senna (SENOKOT) tablet 8 6 mg  1 tablet Oral Daily    sodium chloride tablet 1 g  1 g Oral TID With Meals    thiamine (VITAMIN B1) tablet 100 mg  100 mg Oral Daily       VTE Pharmacologic Prophylaxis: Enoxaparin (Lovenox)  VTE Mechanical Prophylaxis: sequential compression device    Portions of the record may have been created with voice recognition software  Occasional wrong word or "sound a like" substitutions may have occurred due to the inherent limitations of voice recognition software    Read the chart carefully and recognize, using context, where substitutions have occurred   ==  Jessica Michelle MD  [address]  Internal Medicine Residency PGY-1

## 2019-09-13 NOTE — TELEPHONE ENCOUNTER
09/16/2019-PT DISCHARGED TO [place] [facility]     09/13/2019-IVY (09/12/2019) FOLLOW UP WITH PLAN TO SIGN OFF TOMORROW      [facility]  09/25/2019-2 [address] PATH REVIEW W/DR MATHEW

## 2019-09-13 NOTE — ASSESSMENT & PLAN NOTE
POD#3 Left craniotomy for tumor resection  · Preliminary path: Included in differential are metastatic small cell neuroendocrine carcinoma versus poorly differentiated pleomorphic carcinoma, each of uncertain primary  Metastatic hepatocellular carcinoma was considered in the differential diagnosis but is not favored on morphologic grounds  However, cannot entirely exclude the small cell variant of glioblastoma multiforme  · Specimen sent to [facility] for expert evaluation    Imaging:  · [facility], 9/4: Large area of subcortical white matter hypodensity consistent with vasogenic edema in the left parietal region  Small focus of hemorrhage superiorly as seen on image 2/30 with an associated more hypodense masslike area measuring 3 9 x 2 2 cm  · [facility], 9/4: Heterogenous liver with hepatomegaly and multifocal heterogenous liver lesions, may be due to multifocal [address]  or metastatic disease  Left lower lobe lung nodule measuring 7 6 mm, suspicious for metastatic disease  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  Mildly enlarged fidel hepatis lymphadenopathy  · MRI brain w/wo, 9/5: Large centrally necrotic mass within the left frontoparietal region measuring 2 9 x 4 3 x 3 4 cm with surrounding vasogenic edema  · MRI brain w/wo, 9/10: Post op changes from left frontal craniotomy  No residual mass identified  Plan:  · Decadron 4q8 today, continue wean as ordered every 72 hours  Can be transitioned to oral for discharge  · Keppra x7 days  · PT/OT  · DVT ppx: SCDs, lovenox  · Pain control per primary team  · Patient continuing to make progress with RUE strength  Neurosurgery will sign off at this time  Patient is cleared from a NSG perspective for discharge per PT/OT recommendations  Patient must have a BM prior to discharge, states he had one yesterday  He will follow up in 2 weeks for a path review and staple removal  Please don't hesitate to call with any questions

## 2019-09-13 NOTE — RESTORATIVE TECHNICIAN NOTE
Restorative Specialist Mobility Note       Activity: Ambulate in laureano, Ambulate in room, Bathroom privileges, Chair, Dangle, Stand at bedside(Educated/encouraged pt to ambulate with assistance 3-4 x's/day   Pt callbell, phone/tray within reach )     Assistive Device: None    Bhupendra GRACIA, Restorative Technician, [facility]

## 2019-09-13 NOTE — PROGRESS NOTES
Progress Note - Iliana Phoenix 1950, 81 y o  male MRN: [id number]    [facility] Encounter: [id number]    Primary Care Provider: No primary care provider on file  Date and time admitted to hospital: 9/4/2019  4:38 PM        * Brain mass with hemorrhage  Assessment & Plan  POD#3 Left craniotomy for tumor resection  · Preliminary path: Included in differential are metastatic small cell neuroendocrine carcinoma versus poorly differentiated pleomorphic carcinoma, each of uncertain primary  Metastatic hepatocellular carcinoma was considered in the differential diagnosis but is not favored on morphologic grounds  However,  cannot entirely exclude the small cell variant of glioblastoma multiforme  · Specimen sent to [facility] for expert evaluation    Imaging:  · [address], 9/4: Large area of subcortical white matter hypodensity consistent with vasogenic edema in the left parietal region  Small focus of hemorrhage superiorly as seen on image 2/30 with an associated more hypodense masslike area measuring 3 9 x 2 2 cm  · [facility]AP, 9/4: Heterogenous liver with hepatomegaly and multifocal heterogenous liver lesions, may be due to multifocal [address]  or metastatic disease  Left lower lobe lung nodule measuring 7 6 mm, suspicious for metastatic disease  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  Mildly enlarged fidel hepatis lymphadenopathy  · MRI brain w/wo, 9/5: Large centrally necrotic mass within the left frontoparietal region measuring 2 9 x 4 3 x 3 4 cm with surrounding vasogenic edema  · MRI brain w/wo, 9/10: Post op changes from left frontal craniotomy  No residual mass identified  Plan:  · Decadron 4q8 today, continue wean as ordered every 72 hours  Can be transitioned to oral for discharge  · Keppra x7 days  · PT/OT  · DVT ppx: SCDs, lovenox  · Pain control per primary team  · Patient continuing to make progress with RUE strength  Neurosurgery will sign off at this time   Patient is cleared from a [facility] perspective for discharge per PT/OT recommendations  Patient must have a BM prior to discharge, states he had one yesterday  He will follow up in 2 weeks for a path review and staple removal  Please don't hesitate to call with any questions  Cerebral edema (HCC)  Assessment & Plan  · 2/2 brain mass  · Decadron 4q8, wean every 72 hours    Alcohol abuse  Assessment & Plan  · CIWA discontinued  · Continue to monitor      Steroid-induced hyperglycemia  Assessment & Plan  · Management per primary team    · Patient most likely steroid induced hyperglycemia as no known home medication listed  · Management with insulin while inpatient per the primary team      Hyponatremia  Assessment & Plan  · Na improved to 131 this morning  · Recommend supplementation as medically appropriate  · Possible SAIDH evaluation in post operative period  · Medical management  · Follow up labs  Tooth pain  Assessment & Plan  · OMFS consulted, recommend tooth extraction  · NO obvious signs of infection on imaging or clinically to indicate abscess  · Patient is not cleared for surgery at this time  · Patient must wait a minimum of 2 weeks post operatively from brain surgery prior to having dental procedure unless infected dental abscess present  Hepatocellular carcinoma [facility])  Assessment & Plan  Follow up with medical oncology and radiation oncology after pathology is finalized in 2-3 weeks    Subjective/Objective   Chief Complaint: "I want to go home and smoke a cigarette"    Subjective: Patient with NAEO  C/o headache but pain controlled with current medication regimen; did require dose of fentanyl overnight  Otherwise he has no complaints  He did have BM yesterday  Walking on own and with PT     Objective: Patient sitting in chair in NAD, dressing removed  Incision CDI with staples      Intake/Output       09/13/19 0701 - 09/14/19 0700      [id number] 6540-4147 Total       Intake    P O   300  -- 300    Total Intake 300 -- 300       Output    Total Output -- -- --       Net I/O     300 -- 300          Invasive Devices     Peripheral Intravenous Line            Peripheral IV 09/13/19 Left Forearm less than 1 day                Vitals: Blood pressure 116/93, pulse 99, temperature 98 8 °F (37 1 °C), resp  rate 20, height 6' 3" (1 905 m), weight 68 7 kg (151 lb 7 3 oz), SpO2 94 %  ,Body mass index is 18 93 kg/m²  General appearance: alert, appears stated age, cooperative and no distress  Cachectic  Head: Normocephalic, Left cranial incision CDI, closed with staples  No erythema, swelling, or drainage  Eyes: EOMI, PERRL  Neck: supple, symmetrical, trachea midline   Lungs: non labored breathing  Heart: regular heart rate  Neurologic:   Mental status: Alert, oriented x4, thought content appropriate  Able to perform simple calculations  Cranial nerves: grossly intact (Cranial nerves II-XII)  Sensory: normal to LT bilaterally  Motor: moving all extremities without focal weakness, 5/5 throughout except trace RUE weakness  Reflexes: 2+ and symmetric, no clonus or hoffmans  Coordination: finger to nose abnormal right side, no drift bilaterally      Lab Results:   I have personally reviewed pertinent results  Lab Results   Component Value Date    WBC 11 82 (H) 09/13/2019    HGB 13 7 09/13/2019    HCT 40 0 09/13/2019    MCV 93 09/13/2019     09/13/2019    MCH 31 9 09/13/2019    MCHC 34 3 09/13/2019    RDW 13 9 09/13/2019    MPV 10 9 09/13/2019    SODIUM 131 (L) 09/13/2019    CL 98 (L) 09/13/2019    CO2 26 09/13/2019    BUN 30 (H) 09/13/2019    CREATININE 1 02 09/13/2019    CALCIUM 9 1 09/13/2019    AST 58 (H) 09/13/2019     (H) 09/13/2019    ALKPHOS 218 (H) 09/13/2019    EGFR 76 09/13/2019       Imaging Studies: I have personally reviewed pertinent reports     and I have personally reviewed pertinent films in PACS     Xr Mandible Panorex ([place] Only)  Result Date: 9/11/2019  Impression: Irregular surface contour of the crowns of multiple mandibular and lesser extent maxillary teeth  Clinical correlation regarding dental caries is necessary  Vague lucency left paramedian mandible, nonspecific as above  The study was marked in [facility] for immediate notification  Workstation performed: [id number]     X-ray Chest 1 View Portable  Result Date: 9/4/2019  Impression: Suggestion of osteolytic lesion in the posterior left 5th rib  Otherwise, no evidence of acute abnormality in the chest  Workstation performed: [id number]     Ct Head Without Contrast  Result Date: 9/4/2019  Impression: Large area of subcortical white matter hypodensity consistent with vasogenic edema in the left parietal region  Small focus of hemorrhage superiorly as seen on image 2/30 with an associated more hypodense masslike area measuring 3 9 x 2 2 cm  Mild mass  effect on the adjacent cortical sulci  No significant midline shift  MRI with contrast recommended to exclude underlying mass  I personally discussed this study with Glennis Bence on 9/4/2019 at 11:36 AM  Workstation performed: [id number]     Mri Brain W Wo Contrast  Result Date: 9/11/2019  Impression: Status post left parietal craniotomy and resection of enhancing mass left frontoparietal junction  No residual enhancing mass identified  Continued follow-up recommended  Residual vasogenic edema is noted resulting in mass effect on the posterior horn  left lateral ventricle without midline shift  Small subdural hemorrhage bilaterally measuring 2 mm in thickness  No additional enhancing mass identified  Workstation performed: [id number]     Mri Brain W Wo Contrast  Result Date: 9/5/2019  Impression: Large centrally necrotic mass within the left frontoparietal region with surrounding vasogenic edema  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 versus solitary metastasis   Workstation performed: [id number]     Ct Chest Abdomen Pelvis W Contrast  Result Date: 9/4/2019  Impression: Heterogenous liver with hepatomegaly and multifocal heterogenous liver lesions, may be due to multifocal [address]  or metastatic disease  Left lower lobe lung nodule measuring 7 6 mm, suspicious for metastatic disease 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 Mildly enlarged fidel hepatis lymphadenopathy The study was marked in [facility] for immediate notification  Workstation performed: [id number]       EKG, Pathology, and Other Studies: I have personally reviewed pertinent reports     and I have personally reviewed pertinent films in PACS    VTE Pharmacologic Prophylaxis: Sequential compression device (Venodyne)  and Enoxaparin (Lovenox)

## 2019-09-13 NOTE — ASSESSMENT & PLAN NOTE
· OMFS consulted, recommend tooth extraction  · NO obvious signs of infection on imaging or clinically to indicate abscess  · Patient is not cleared for surgery at this time  · Patient must wait a minimum of 2 weeks post operatively from brain surgery prior to having dental procedure unless infected dental abscess present

## 2019-09-13 NOTE — ASSESSMENT & PLAN NOTE
· Na improved to 131 this morning  · Recommend supplementation as medically appropriate  · Possible SAIDH evaluation in post operative period  · Medical management  · Follow up labs

## 2019-09-13 NOTE — SOCIAL WORK
Received a call from Justine López at [facility] who states pt is accepted and a bed is available today  Met with pt to discuss transport  Pt states his brother may be able to transport  CM called pt's brother Amara Juárez [phone] to discuss same  Graeme Chick states he will transport [facility] to [facility] at 6[facility]7pm  CM notified pt, pt's bedside RN Jonathan Marie, and Justine López at [facility] of [place] time  Facility transfer form completed  Chart copy requested

## 2019-09-13 NOTE — SOCIAL WORK
Met with pt to discuss therapies recommendation for rehab  Pt is agreeable  A post acute care recommendation was made by your care team for Acute Rehab vs SNF  Discussed [place] [facility] with patient  List of facilities given to patient via in person  patient aware the list is custom filtered for them by zip code location and that [facility] post acute providers are designated  Pt prefers a referral to 1  [facility] and 2  [address] referrals sent

## 2019-09-13 NOTE — DISCHARGE INSTRUCTIONS
Discharge Instructions  Craniotomy for tumor resection     Activity:  1  Do not lift, push or pull more than 10 pounds for 2 weeks  2  Avoid bending, lifting and twisting for 2 weeks  No running  No athletic activities until cleared  3  No driving for at least 2 weeks or until cleared by Neurosurgery  4  When able to shower, continue to use clean towel and washcloth for 2 weeks post-op  5  Do not use a hair dryer, and avoid hair products such as mousse, oils, and gels  Do not brush your hair away from the incision since this will put strain on the suture line  6  Do not dye or perm hair for 6 weeks or until cleared by physician  7  Continue to change bed linens and pajamas more frequently  Wear clean clothes daily  8  May walk as tolerated  Recommend 4 short walks daily  Surgical incision care:  1  Keep dressings in place for 3 days  After 3 days, incisions may be left open to air, but should remain clean  2  Keep incisions dry for 3 days  3  May shower after 3 days using a baby shampoo including head incision  Rinse off shampoo and pat dry  4  Avoid rubbing the incision but gently massage hair  5  Do not immerse the incisions in water for 6 weeks  6  Staples/suture will be removed at your 2 week postoperative visit  7  Do not apply any creams or ointments to the incision, unless otherwise instructed by [facility]  8  Contact office if increasing redness, drainage, pain or swelling occurs around the incisions or if you develop a fever greater than 101F  9  Do not dye/perm hair or use any hair products until cleared by Neurosurgery  Postoperative medication:  1  [facility] will provide pain medication in the postoperative period  All prescriptions must come from a single practice  a  Take medications as prescribed  Call office with any questions/concerns  b  May use over the counter Tylenol  No NSAIDs (ie   Ibuprofen, Aleve, Advil, Naproxen)  2  Please contact office for questions regarding dosage and modifications  3  No antiplatelet or anticoagulation medication (ie  Coumadin, Aspirin, Plavix) until cleared by [facility], unless otherwise instructed  Please contact [facility] if you have any questions about the effects of any of your medications on blood clotting  4  Do not operate heavy machinery or vehicles while taking sedating medications  5  Use a bowel regimen while on opioids as they induce constipation  Ie  Senokot-S, Miralax, Colace, etc  Increase fiber and water intake  Follow-up as scheduled for a 2 week post-operative visit for an incision check and final pathology  ** Please notify the office if incision becomes red, swollen, tender, or has increased drainage, and temp>101  Return to the ER if you experience increased headache, drowsiness, weakness, nausea/vomiting, or seizures  **

## 2019-09-15 NOTE — DISCHARGE SUMMARY
[facility] CENTRAL Discharge Summary - Medical Reinaldo Amend  81 y o  male MRN: [id number]    [address] [facility]  Room / Bed: [facility] 717/[facility] 763-86 Encounter: [id number]    BRIEF OVERVIEW    Admitting Provider: Jennifer Dowd MD  Discharge Provider: Deborah Huff MD  Primary Care Physician at Discharge: Jose Sneed provided to [address]  Admission Date: 9/4/2019     Discharge Date: 9/13/2019  7:31 138 Avinash Cuellar  is a 43-year-old male with a past medical history of hepatocellular carcinoma [facility] 2017 until January 2018, COPD, hypertension, alcohol abuse who originally presented to urgent care and subsequently [address] for complaints of right hand/arm weakness, numbness, and tremor for 3 months  At [address], a CT head was done which revealed a large mass with hemorrhage and vasogenic edema in the left parietal area  CT chest abdomen pelvis showed heterogeneous liver with hepatomegaly and multifocal liver lesions, left lower lung lobe nodule, bony metastatic disease in the ribs and manubrium concerning for metastasis  Patient was transferred to 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 with differential diagnosis including metastatic small cell neuroendocrine carcinoma versus poorly differentiated pleomorphic carcinoma  Oncology was consulted who recommended to follow up outpatient after pathology for brain mass came back  Radiation Oncology was consulted who recommended follow-up after Neurosurgery for radiation treatment plan  OMFS was consulted secondary to tooth pain after surgery  Patient found to have significant dental caries and original plan was to go to the OR for teeth extraction; however, patient is not cleared for surgery until 2 weeks after tumor extraction  During hospitalization, patient had elevated blood sugars secondary to high doses of Decadron  Patient was initiated on insulin regime  Patient was started on Keppra for seizure prophylaxis for 1 week duration after surgery  PT/OT evaluated patient after surgery and recommended short-term rehab  Patient was agreeable to discharge plan  Please see progress note on day of discharge the patient condition  After the operation, patient had some improvement of his right arm and hand strength  At time of discharge, patient was still struggling with fine motor of his right hand  Discharge plan  Patient discharged to [facility] short-term rehab  Patient instructed to follow up with Neurosurgery per their recommendations, Oncology, and OMFS  Patient given [employer] number to call to establish care  Patient discharged on Decadron taper, insulin, Keppra, Breo Ellipta, Nifedipine, salt tabs, and pain medications  Patient will need follow-up with PCP with regards to continuing/modifying these medications      Presenting Problem/History of Present Illness  Principal Problem:    Brain mass with hemorrhage  Active Problems:    Hepatocellular carcinoma (HCC)    Hyponatremia    Steroid-induced hyperglycemia    Essential hypertension    COPD (chronic obstructive pulmonary disease) (HCC)    Leukocytosis    Tooth pain    Alcohol abuse    Cerebral edema (HCC)    Malnutrition related to chronic disease    Transaminitis    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    GERD (gastroesophageal reflux disease)    Constipation    Anxiety disorder    Seizure prophylaxis    Tobacco abuse  Resolved Problems:    Weakness    Dependence on nicotine from cigarettes    JANEEN (acute kidney injury) [facility])        Diagnostic Procedures Performed  Imaging Studies:  XR mandible panorex ([place] only)   Final Result by Lakshmi Walton DO (09/11 9558)      Irregular surface contour of the crowns of multiple mandibular and lesser extent maxillary teeth  Clinical correlation regarding dental caries is necessary  Vague lucency left paramedian mandible, nonspecific as above  The study was marked in [facility] for immediate notification  Workstation performed: [id number]         MRI brain w wo contrast   Final Result by Fredy Jones DO (09/11 5522)      Status post left parietal craniotomy and resection of enhancing mass left frontoparietal junction  No residual enhancing mass identified  Continued follow-up recommended  Residual vasogenic edema is noted resulting in mass effect on the posterior horn    left lateral ventricle without midline shift  Small subdural hemorrhage bilaterally measuring 2 mm in thickness  No additional enhancing mass identified  Workstation performed: [id number]         MRI brain w wo contrast   Final Result by Ngozi Lopez DO (09/05 1259)      Large centrally necrotic mass within the left frontoparietal region with surrounding vasogenic edema  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 versus solitary metastasis        Workstation performed: [id number]             Therapeutic Procedures Performed  Left image guided craniotomy with tumor resection    Test Results Pending at Discharge:  Final pathology report     Medications     Medication List to be Continued at Discharge  Discharge Medication List as of 9/13/2019  6:34 PM        Discharge Medication List as of 9/13/2019 6:34 PM      START taking these medications    Details   docusate sodium (COLACE) 100 mg capsule Take 1 capsule (100 mg total) by mouth 2 (two) times a day, Starting Fri 9/13/2019, Print      fluticasone-vilanterol (BREO ELLIPTA) 100-25 mcg/inh inhaler Inhale 1 puff daily Rinse mouth after use , Starting Sat 3/26/1036, Print      folic acid (FOLVITE) 1 mg tablet Take 1 tablet (1 mg total) by mouth daily, Starting Sat 9/14/2019, Print      hydrOXYzine HCL (ATARAX) 25 mg tablet Take 1 tablet (25 mg total) by mouth every 6 (six) hours as needed for anxiety, Starting Fri 9/13/2019, Print      insulin glargine (LANTUS) 100 units/mL subcutaneous injection Patient currently taking 25 units insulin in the morning; patient will need to have this medication adjusted at facility by physician, Print      !! insulin lispro (HumaLOG) 100 units/mL injection Inject 1-5 Units under the skin 3 (three) times a day before meals, Starting Fri 9/13/2019, Print      !! insulin lispro (HumaLOG) 100 units/mL injection Patient is currently been taking 15 units t i d   With meals while in the hospital; this is secondary to patient being on steroids and dose will need to be adjusted at facility by physician , Print      levETIRAcetam (KEPPRA) 500 mg tablet Take 1 tablet (500 mg total) by mouth every 12 (twelve) hours for 8 doses, Starting Fri 9/13/2019, Until Tue 9/17/2019, Print      nicotine (NICODERM CQ) 21 mg/24 hr TD 24 hr patch Place 1 patch on the skin daily, Starting Sat 9/14/2019, Print      nicotine polacrilex (NICORETTE) 2 mg gum Chew 1 each (2 mg total) every hour as needed for smoking cessation, Starting Fri 9/13/2019, Print      NIFEdipine (ADALAT CC) 60 MG 24 hr tablet Take 1 tablet (60 mg total) by mouth daily, Starting Sat 9/14/2019, Print      ondansetron (ZOFRAN) 4 mg/2 mL injection Infuse 2 mL (4 mg total) into a venous catheter every 6 (six) hours as needed for nausea or vomiting, Starting Fri 9/13/2019, Print pantoprazole (PROTONIX) 40 mg tablet Take 1 tablet (40 mg total) by mouth daily in the early morning Take this medication for duration of steroid therapy, Starting Sat 9/14/2019, Print      senna (SENOKOT) 8 6 mg Take 1 tablet (8 6 mg total) by mouth daily, Starting Sat 9/14/2019, Print      sodium chloride 1 g tablet Take 1 g b i d  For 7 days; take 1 g daily for 7 days then discontinue, Print      thiamine 100 MG tablet Take 1 tablet (100 mg total) by mouth daily, Starting Sat 9/14/2019, Print       !! - Potential duplicate medications found  Please discuss with provider  Discharge Medication List as of 9/13/2019  6:34 PM      STOP taking these medications       dexamethasone (DECADRON) 4 mg/mL Comments:   Reason for Stopping:         dexamethasone (DECADRON) 4 mg/mL Comments:   Reason for Stopping:         dexamethasone (DECADRON) 4 mg/mL Comments:   Reason for Stopping:         dexamethasone (DECADRON) 4 mg/mL Comments:   Reason for Stopping:         dexamethasone (DECADRON) 4 mg/mL Comments:   Reason for Stopping:               Allergies  No Known Allergies  Discharge Diet: regular diet  Activity restrictions: Per PT/OT recommendations  Discharge Condition: stable  Discharged With Lines: no    Discharge Disposition: [address] / Family Member Name:  [address] [place] [address]  Neurosurgery, Oncology, Radiation Oncology, OMFS  Patient to establish care with PCP with provided [employer] line      Code Status: Prior  Advance Directive and Living Will: <no information>  Power of :    POLST:      Discharge  Statement   I spent 30 minutes minutes discharging the patient  This time was spent on the day of discharge  I had direct contact with the patient on the day of discharge

## 2019-09-16 NOTE — TELEPHONE ENCOUNTER
1st attempt - Called Prashanth Tripp  on primary contact number after surgery 9/10/2019 to check in on recovery and provide post surgical instructions  Got voicemail, left message to callback  Will make another attempt if no callback is received

## 2019-09-17 NOTE — TELEPHONE ENCOUNTER
Spoke Hernando Wilkins of [facility] rehab the nurse of Stephan Cartwright  to see how he is doing after surgery 9/10/2019  She reports that he is doing well overall and denies any incisional issues or fevers  Advised that after three days he may take a shower and gently wash the surgical site with soap and water  Use clean wash cloth, towels, and clothing  Do not submerge in water until cleared by the surgeon  Do not apply any creams, ointments, or lotions to the site  Verified date/time/location of his upcoming POV on 9/25/2019 and advised her to call the office with any further questions or concerns, or if any incisional issues or fevers would arise  Patient was appreciative for the call

## 2019-09-18 ENCOUNTER — TELEPHONE (OUTPATIENT)
Dept: HEMATOLOGY ONCOLOGY | Facility: CLINIC | Age: 69
End: 2019-09-18

## 2019-09-19 NOTE — TELEPHONE ENCOUNTER
Called and [facility] for pt to return the call and schedule a follow up appt with Saida Mclean in [facility] next week

## 2019-09-20 ENCOUNTER — TRANSITIONAL CARE MANAGEMENT (OUTPATIENT)
Dept: FAMILY MEDICINE CLINIC | Facility: CLINIC | Age: 69
End: 2019-09-20

## 2019-09-24 ENCOUNTER — OFFICE VISIT (OUTPATIENT)
Dept: FAMILY MEDICINE CLINIC | Facility: CLINIC | Age: 69
End: 2019-09-24
Payer: MEDICARE

## 2019-09-24 ENCOUNTER — TELEPHONE (OUTPATIENT)
Dept: NEUROSURGERY | Facility: CLINIC | Age: 69
End: 2019-09-24

## 2019-09-24 VITALS
HEIGHT: 75 IN | OXYGEN SATURATION: 96 % | TEMPERATURE: 98.3 F | DIASTOLIC BLOOD PRESSURE: 66 MMHG | SYSTOLIC BLOOD PRESSURE: 112 MMHG | BODY MASS INDEX: 20.02 KG/M2 | WEIGHT: 161 LBS | HEART RATE: 83 BPM

## 2019-09-24 DIAGNOSIS — Z29.8 SEIZURE PROPHYLAXIS: ICD-10-CM

## 2019-09-24 DIAGNOSIS — Z72.0 TOBACCO ABUSE: ICD-10-CM

## 2019-09-24 DIAGNOSIS — I10 ESSENTIAL HYPERTENSION: ICD-10-CM

## 2019-09-24 DIAGNOSIS — R73.9 STEROID-INDUCED HYPERGLYCEMIA: ICD-10-CM

## 2019-09-24 DIAGNOSIS — C22.0 HEPATOCELLULAR CARCINOMA (HCC): Primary | ICD-10-CM

## 2019-09-24 DIAGNOSIS — T38.0X5A STEROID-INDUCED HYPERGLYCEMIA: ICD-10-CM

## 2019-09-24 DIAGNOSIS — G93.89 BRAIN MASS: ICD-10-CM

## 2019-09-24 DIAGNOSIS — J44.9 CHRONIC OBSTRUCTIVE PULMONARY DISEASE, UNSPECIFIED COPD TYPE (HCC): ICD-10-CM

## 2019-09-24 DIAGNOSIS — F41.9 ANXIETY DISORDER, UNSPECIFIED TYPE: ICD-10-CM

## 2019-09-24 PROCEDURE — 99496 TRANSJ CARE MGMT HIGH F2F 7D: CPT | Performed by: FAMILY MEDICINE

## 2019-09-24 RX ORDER — LORAZEPAM 0.5 MG/1
TABLET ORAL 2 TIMES DAILY
COMMUNITY
End: 2020-03-02 | Stop reason: ALTCHOICE

## 2019-09-24 NOTE — TELEPHONE ENCOUNTER
Dr Jarod Amador requested for the office to give the patient a heads up how the pathology is not back  Called patient to provide update  Patient did not answer the phone  Left a voice message with my direct line

## 2019-09-24 NOTE — TELEPHONE ENCOUNTER
Received a call back from [facility] at [facility] reports the path will not be final until a few more days since Dr Tyrese Stokes ordered immunos  Dr Aramis Izquierdo was made aware path will not be back  Dr Aramis Izquierdo is still requesting to see patient for incision check

## 2019-09-24 NOTE — PROGRESS NOTES
Assessment/Plan:    Problem List Items Addressed This Visit        Digestive    Hepatocellular carcinoma ([facility] [address] ) - Primary       Respiratory    COPD (chronic obstructive pulmonary disease) ([facility] )       Cardiovascular and Mediastinum    Essential hypertension       Other    Brain mass with hemorrhage    Steroid-induced hyperglycemia    Anxiety disorder    Relevant Medications    LORazepam (ATIVAN) 0 5 mg tablet    Seizure prophylaxis    Tobacco abuse        In summary this is a 53-year-old male here today for establishing care with our office as well as a hospital follow-up visit  Upon reviewing his recent hospital stay it does appear he has indeed a poor prognosis with either widely metastatic hepatocellular carcinoma or possible new primary brain cancer  He appears in good spirits with his brother today and feels well following his hospitalization  He has appointment with Neurosurgery tomorrow to review pathology and disposition  At this point he seems interested in pursuing treatments if there offered to him as options  We also discussed palliative care should he be interested in pursuing that route as well  For now we will hold off on pursuing any other needed health maintenance items that the patient is due for as his prior he is establishing a plan for his cancer treatment  After his appointment with Neurosurgery tomorrow he will give us a call and schedule a follow-up visit so we can better discuss his long-term plan  Patient and brother know to call us sooner with any concerns or questions  Chief Complaint   Patient presents with    new patient     lost feeling on right side arm        Subjective:    Patient ID:  Jerman Solis  is a 81 y o male  Patient here for hospital follow-up visit as well as new patient appointment  Patient had a history of hepatocellular carcinoma which was treated approximately 2-3 years ago in [place]  He also has a history of COPD, hypertension, alcohol use  Patient presented to urgent care for complaints of right hand and arm weakness  He was found to have a large mass with hemorrhage in his left parietal area  Additional imaging showed multiple liver lesions, a left lower lobe lesion, metastatic bony involvement the ribs concerning for metastasis  He was transferred to [facility] for nurse surgical evaluation  During his hospital stay he was seen by Neurosurgery, Oncology, Radiation Oncology, and OMFS  Due to brain MRI completed neuro surgery proceeded with her section of brain mass, with a left frontal craniectomy  Initial pathology showed high-grade malignant lesion but official pathology was pending at time of discharge  Oncology consult recommended outpatient follow-up once pathology results were completed  Dental consult was obtained for tooth pain and patient was recommended to have outpatient tooth extraction after hospital discharge  Patient had elevated blood sugars which was deemed to be due to his high doses of Decadron which she was on during the hospital   He was also started on Keppra for seizure prophylaxis  Patient went to short-term rehab and was subsequently discharged on 09/20/19  He is here today with his brother for his appointment  Patient reports that he is doing very well and is feeling pretty good after his hospital stay  He has his neurosurgery appointment tomorrow for his staple removal and pathology review      The following portions of the patient's history were reviewed and updated as appropriate: allergies, current medications, past family history, past medical history, past social history, past surgical history and problem list   Patient Active Problem List   Diagnosis    Brain mass with hemorrhage    Essential hypertension    Alcohol abuse    Cerebral edema ([facility] [address] )    COPD (chronic obstructive pulmonary disease) ([facility] [address] )    Hepatocellular carcinoma (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)     liver    Dependence on nicotine from cigarettes 9/4/2019    Hypertension      Past Surgical History:   Procedure Laterality Date    APPENDECTOMY      CRANIOTOMY Left 9/10/2019    Procedure: Image guided left frontal craniotomy for resection of tumor;  Surgeon: Sydney Leija MD;  [facility];  Service: Neurosurgery     No Known Allergies  Family History   Problem Relation Age of Onset    Cancer Mother     Cancer Father      Social History     Socioeconomic History    Marital status: Legally      Spouse name: Not on file    Number of children: Not on file    Years of education: Not on file    Highest education level: Not on file   Occupational History    Not on file   Social Needs    Financial resource strain: Not on file    Food insecurity:     Worry: Not on file     Inability: Not on file    Transportation needs:     Medical: Not on file     Non-medical: Not on file   Tobacco Use    Smoking status: Current Every Day Smoker     Packs/day: 1 00    Smokeless tobacco: Former User     Types: Chew   Substance and Sexual Activity    Alcohol use: Not Currently     Comment: 1/5 bottle weekly     Drug use: Never    Sexual activity: Not Currently   Lifestyle    Physical activity:     Days per week: Not on file     Minutes per session: Not on file    Stress: Not on file   Relationships    Social connections:     Talks on phone: Not on file     Gets together: Not on file     Attends Sabianist service: Not on file     Active member of club or organization: Not on file     Attends meetings of clubs or organizations: Not on file     Relationship status: Not on file    Intimate partner violence:     Fear of current or ex partner: Not on file     Emotionally abused: Not on file     Physically abused: Not on file     Forced sexual activity: Not on file   Other Topics Concern    Not on file   Social History Narrative    Not on file     Current Outpatient Medications on File Prior to Visit   Medication Sig Dispense Refill    LORazepam (ATIVAN) 0 5 mg tablet Take by mouth every 8 (eight) hours as needed for anxiety      nicotine (NICODERM CQ) 21 mg/24 hr TD 24 hr patch Place 1 patch on the skin daily 28 patch 0    NIFEdipine (ADALAT CC) 60 MG 24 hr tablet Take 1 tablet (60 mg total) by mouth daily  0    oxyCODONE (ROXICODONE) 10 MG TABS Take 1 tablet (10 mg total) by mouth every 6 (six) hours as needed for severe pain for up to 14 daysMax Daily Amount: 40 mg 56 tablet 0    oxyCODONE (ROXICODONE) 5 mg immediate release tablet Take 1 tablet (5 mg total) by mouth every 4 (four) hours as needed for moderate pain for up to 14 daysMax Daily Amount: 30 mg 84 tablet 0    acetaminophen (TYLENOL) 325 mg tablet Take 3 tablets (975 mg total) by mouth 3 (three) times a day (Patient not taking: Reported on 9/24/2019) 270 tablet 0    aluminum-magnesium hydroxide-simethicone (MYLANTA) 200-200-20 mg/5 mL suspension Take 30 mL by mouth every 6 (six) hours as needed for indigestion or heartburn 355 mL 0    dexamethasone (DECADRON) 2 mg tablet Take 1 tablet (2 mg total) by mouth every 12 (twelve) hours for 2 days 4 tablet 0    [START ON 9/25/2019] dexamethasone (DECADRON) 2 mg tablet Take 1 tablet (2 mg total) by mouth daily with breakfast for 2 days 2 tablet 0    docusate sodium (COLACE) 100 mg capsule Take 1 capsule (100 mg total) by mouth 2 (two) times a day (Patient not taking: Reported on 9/24/2019) 10 capsule 0    fluticasone-vilanterol (BREO ELLIPTA) 100-25 mcg/inh inhaler Inhale 1 puff daily Rinse mouth after use   1 Inhaler 0    folic acid (FOLVITE) 1 mg tablet Take 1 tablet (1 mg total) by mouth daily (Patient not taking: Reported on 9/24/2019)  0    hydrOXYzine HCL (ATARAX) 25 mg tablet Take 1 tablet (25 mg total) by mouth every 6 (six) hours as needed for anxiety 30 tablet 0    insulin glargine (LANTUS) 100 units/mL subcutaneous injection Patient currently taking 25 units insulin in the morning; patient will need to have this medication adjusted at facility by physician  0    insulin lispro (HumaLOG) 100 units/mL injection Inject 1-5 Units under the skin 3 (three) times a day before meals  0    insulin lispro (HumaLOG) 100 units/mL injection Patient is currently been taking 15 units t i d  With meals while in the hospital; this is secondary to patient being on steroids and dose will need to be adjusted at facility by physician   0    levETIRAcetam (KEPPRA) 500 mg tablet Take 1 tablet (500 mg total) by mouth every 12 (twelve) hours for 8 doses 8 tablet 0    nicotine polacrilex (NICORETTE) 2 mg gum Chew 1 each (2 mg total) every hour as needed for smoking cessation (Patient not taking: Reported on 9/24/2019) 100 each 0    ondansetron (ZOFRAN) 4 mg/2 mL injection Infuse 2 mL (4 mg total) into a venous catheter every 6 (six) hours as needed for nausea or vomiting  0    pantoprazole (PROTONIX) 40 mg tablet Take 1 tablet (40 mg total) by mouth daily in the early morning Take this medication for duration of steroid therapy  0    senna (SENOKOT) 8 6 mg Take 1 tablet (8 6 mg total) by mouth daily 120 each 0    sodium chloride 1 g tablet Take 1 g b i d  For 7 days; take 1 g daily for 7 days then discontinue 21 tablet 0    thiamine 100 MG tablet Take 1 tablet (100 mg total) by mouth daily  0     No current facility-administered medications on file prior to visit  Review of Systems   Constitutional: Negative for chills and fever  HENT: Negative for congestion, postnasal drip, rhinorrhea and sinus pain      Eyes: Negative for photophobia and visual disturbance  Respiratory: Negative for cough and shortness of breath  Cardiovascular: Negative for chest pain, palpitations and leg swelling  Gastrointestinal: Negative for abdominal pain, constipation, diarrhea, nausea and vomiting  Genitourinary: Negative for difficulty urinating and dysuria  Musculoskeletal: Negative for arthralgias and myalgias  Skin: Negative for rash  Neurological: Negative for dizziness and syncope  Objective:    Vitals:    09/24/19 1115   BP: 112/66   BP Location: Left arm   Patient Position: Sitting   Cuff Size: Standard   Pulse: 83   Temp: 98 3 °F (36 8 °C)   TempSrc: Tympanic   SpO2: 96%   Weight: 73 kg (161 lb)   Height: 6' 3" (1 905 m)     Physical Exam   Constitutional: He is cooperative  He has a sickly appearance  No distress  HENT:   Head:       Nose: No mucosal edema or rhinorrhea  Mouth/Throat: Oropharynx is clear and moist and mucous membranes are normal  Abnormal dentition  Dental caries present  Post surgical changes   Eyes: Pupils are equal, round, and reactive to light  EOM are normal    Cardiovascular: Normal rate and regular rhythm  Exam reveals no gallop and no friction rub  No murmur heard  Pulmonary/Chest: Effort normal  No respiratory distress  He has decreased breath sounds  Abdominal: Soft  Bowel sounds are normal  He exhibits no distension  There is no tenderness  Neurological: He is alert  Skin: Skin is warm and dry  He is not diaphoretic  Psychiatric: He has a normal mood and affect   His behavior is normal          TCM Call (since 8/24/2019)     Date and time call was made  9/20/2019  3:02 PM    Hospital care reviewed  Records not available    Patient was hospitialized at  Other (comment)    Comment  [facility]    Date of discharge  09/20/19    Diagnosis  brain anursym    Disposition  Home    Were the patients medications reviewed and updated  No    Current Symptoms  None      TCM Call (since 8/24/2019)     Post hospital issues  None    Scheduled for follow up? Yes    Patients specialists  Neurologist    Did you obtain your prescribed medications  No    Why were you unable to obtain your medications  patient will be going to the pharmacy later to see what everything costs, then decides what he will be purchasing    Do you need help managing your prescriptions or medications  Yes    Is transportation to your appointment needed  Yes    Specify why  unable to drive    I have advised the patient to call PCP with any new or worsening symptoms  Missy Lee, [facility]  Family    The type of support provided  Emotional    Do you have social support  Yes, as much as I need    Are you recieving any outpatient services  Yes    What type of services  physical therapy will be starting soon    Are you recieving home care services  Yes    Types of home care services  Home health aid    Are you using any community resources  No    Current waiver services  No    Have you fallen in the last 12 months  No    Interperter language line needed  No    Counseling  Patient;  Family

## 2019-09-24 NOTE — TELEPHONE ENCOUNTER
Patient's pathology is not final yet  Called [facility] and left a voice message requesting for an update to be faxed  Provided fax number and my direct line  Dr Kendell Palomino was made aware

## 2019-09-25 ENCOUNTER — OFFICE VISIT (OUTPATIENT)
Dept: NEUROSURGERY | Facility: CLINIC | Age: 69
End: 2019-09-25

## 2019-09-25 VITALS
BODY MASS INDEX: 20.02 KG/M2 | HEART RATE: 75 BPM | WEIGHT: 161 LBS | RESPIRATION RATE: 18 BRPM | HEIGHT: 75 IN | DIASTOLIC BLOOD PRESSURE: 50 MMHG | TEMPERATURE: 97.5 F | SYSTOLIC BLOOD PRESSURE: 110 MMHG

## 2019-09-25 DIAGNOSIS — G93.89 BRAIN MASS: ICD-10-CM

## 2019-09-25 DIAGNOSIS — G93.6 CEREBRAL EDEMA (HCC): ICD-10-CM

## 2019-09-25 DIAGNOSIS — C22.0 HEPATOCELLULAR CARCINOMA (HCC): Primary | ICD-10-CM

## 2019-09-25 PROCEDURE — 99024 POSTOP FOLLOW-UP VISIT: CPT | Performed by: NEUROLOGICAL SURGERY

## 2019-09-25 NOTE — PROGRESS NOTES
Patient Id: Becca Rain  is a 81 y o  male        Handedness: Right      Assessment/Plan:    Diagnoses and all orders for this visit:    Hepatocellular carcinoma ([address] )  -     MRI brain with and without contrast; Future    Cerebral edema ([address] )  -     MRI brain with and without contrast; Future    Brain mass with hemorrhage  -     MRI brain with and without contrast; Future        Discussion Summary:   1  Status post resection of left convexity mass, 09/10/2019  Patient is clinically doing well  He has weaned off his Decadron  Final pathology has not returned  We discussed that regardless this pathology is my suspicion that he will likely need oncology and radiation oncology follow-up  I will attempt to have this scheduled so as to decrease the wait after the return of pathology  In addition I will order a post treatment/pre radiation planning MRI  Upon the return of pathology he will either be scheduled with me or the [facility] for further immediate follow-up  Chief Complaint: Post-op (2 week post op visit)      HPI:   This is a very pleasant 28-year-old gentleman with past medical history of [address]  who presented with clumsiness and poor balance while walking  He had significant weakness of his right upper extremity and discoordination preoperatively  Imaging revealed left frontal james rolandic mass with significant vasogenic edema  He underwent successful resection on 09/10/2019  Tumor pathology remains pending  Since discharge she has weaned off his Decadron  He has not had any seizures  And he has had improvement in his coordination as well as gait  Review of systems obtained by the MA reviewed and updated below  Review of Systems   Constitutional: Positive for fatigue  Negative for chills and fever  HENT: Negative  Eyes: Negative  Respiratory: Negative  Cardiovascular: Negative  Gastrointestinal: Negative  Endocrine: Negative  Genitourinary: Negative  Musculoskeletal: Positive for back pain  Negative for gait problem, myalgias, neck pain and neck stiffness  Skin: Negative  Allergic/Immunologic: Negative  Neurological: Positive for speech difficulty (happened once), weakness (general overall weakness) and numbness (fingers)  Negative for dizziness, tremors, seizures, light-headedness and headaches  Hematological: Negative  Psychiatric/Behavioral: Negative  Physical Exam  Vitals:    09/25/19 1221   BP: 110/50   Pulse: 75   Resp: 18   Temp: 97 5 °F (36 4 °C)    He is awake alert and oriented  He is cachectic in appearance  His cranial nerves are intact  He has full strength on the left upper and lower extremity  He has some discoordination with fine motor skills on his right upper extremity and still has some mild diffuse weakness, 4+ out of 5 as well as in his leg  He ambulates independently without a cane or walker  His incision is clean dry and intact  This healing well      The following portions of the patient's history were reviewed and updated as appropriate: allergies, current medications, past family history, past medical history, past social history, past surgical history and problem list     Active Ambulatory Problems     Diagnosis Date Noted    Brain mass with hemorrhage 09/04/2019    Essential hypertension 09/04/2019    Alcohol abuse 09/04/2019    Cerebral edema ([facility] ) 09/05/2019    COPD (chronic obstructive pulmonary disease) ([facility] ) 09/05/2019    Hepatocellular carcinoma ([facility] ) 09/05/2019    Malnutrition related to chronic disease 09/06/2019    Leukocytosis 09/09/2019    Transaminitis 09/10/2019    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 09/10/2019    Tooth pain 09/04/2019    Hyponatremia 09/12/2019    Steroid-induced hyperglycemia 09/12/2019    GERD (gastroesophageal reflux disease) 09/13/2019    Constipation 09/13/2019    Anxiety disorder 09/13/2019    Seizure prophylaxis 09/13/2019    Tobacco abuse 09/13/2019     Resolved Ambulatory Problems     Diagnosis Date Noted    Weakness 09/04/2019    Dependence on nicotine from cigarettes 09/04/2019    JANEEN (acute kidney injury) ([facility] [address] ) 09/10/2019     Past Medical History:   Diagnosis Date    Cancer ([facility] [address] )     Hypertension        Past Surgical History:   Procedure Laterality Date    APPENDECTOMY      CRANIOTOMY Left 9/10/2019    Procedure: Image guided left frontal craniotomy for resection of tumor;  Surgeon: Valeria Alvarez MD;  [facility];  Service: Neurosurgery         Current Outpatient Medications:     LORazepam (ATIVAN) 0 5 mg tablet, Take by mouth 2 (two) times a day , Disp: , Rfl:     nicotine (NICODERM CQ) 21 mg/24 hr TD 24 hr patch, Place 1 patch on the skin daily, Disp: 28 patch, Rfl: 0    NIFEdipine (ADALAT CC) 60 MG 24 hr tablet, Take 1 tablet (60 mg total) by mouth daily, Disp: , Rfl: 0    oxyCODONE (ROXICODONE) 10 MG TABS, Take 1 tablet (10 mg total) by mouth every 6 (six) hours as needed for severe pain for up to 14 daysMax Daily Amount: 40 mg, Disp: 56 tablet, Rfl: 0    oxyCODONE (ROXICODONE) 5 mg immediate release tablet, Take 1 tablet (5 mg total) by mouth every 4 (four) hours as needed for moderate pain for up to 14 daysMax Daily Amount: 30 mg, Disp: 84 tablet, Rfl: 0    acetaminophen (TYLENOL) 325 mg tablet, Take 3 tablets (975 mg total) by mouth 3 (three) times a day (Patient not taking: Reported on 9/24/2019), Disp: 270 tablet, Rfl: 0    aluminum-magnesium hydroxide-simethicone (MYLANTA) 200-200-20 mg/5 mL suspension, Take 30 mL by mouth every 6 (six) hours as needed for indigestion or heartburn (Patient not taking: Reported on 9/25/2019), Disp: 355 mL, Rfl: 0    dexamethasone (DECADRON) 2 mg tablet, Take 1 tablet (2 mg total) by mouth daily with breakfast for 2 days (Patient not taking: Reported on 9/25/2019), Disp: 2 tablet, Rfl: 0    docusate sodium (COLACE) 100 mg capsule, Take 1 capsule (100 mg total) by mouth 2 (two) times a day (Patient not taking: Reported on 9/24/2019), Disp: 10 capsule, Rfl: 0    fluticasone-vilanterol (BREO ELLIPTA) 100-25 mcg/inh inhaler, Inhale 1 puff daily Rinse mouth after use  (Patient not taking: Reported on 9/25/2019), Disp: 1 Inhaler, Rfl: 0    folic acid (FOLVITE) 1 mg tablet, Take 1 tablet (1 mg total) by mouth daily (Patient not taking: Reported on 9/24/2019), Disp: , Rfl: 0    hydrOXYzine HCL (ATARAX) 25 mg tablet, Take 1 tablet (25 mg total) by mouth every 6 (six) hours as needed for anxiety (Patient not taking: Reported on 9/25/2019), Disp: 30 tablet, Rfl: 0    insulin glargine (LANTUS) 100 units/mL subcutaneous injection, Patient currently taking 25 units insulin in the morning; patient will need to have this medication adjusted at facility by physician (Patient not taking: Reported on 9/25/2019), Disp: , Rfl: 0    insulin lispro (HumaLOG) 100 units/mL injection, Inject 1-5 Units under the skin 3 (three) times a day before meals (Patient not taking: Reported on 9/25/2019), Disp: , Rfl: 0    insulin lispro (HumaLOG) 100 units/mL injection, Patient is currently been taking 15 units t i d  With meals while in the hospital; this is secondary to patient being on steroids and dose will need to be adjusted at facility by physician   (Patient not taking: Reported on 9/25/2019), Disp: , Rfl: 0    levETIRAcetam (KEPPRA) 500 mg tablet, Take 1 tablet (500 mg total) by mouth every 12 (twelve) hours for 8 doses, Disp: 8 tablet, Rfl: 0    nicotine polacrilex (NICORETTE) 2 mg gum, Chew 1 each (2 mg total) every hour as needed for smoking cessation (Patient not taking: Reported on 9/24/2019), Disp: 100 each, Rfl: 0    ondansetron (ZOFRAN) 4 mg/2 mL injection, Infuse 2 mL (4 mg total) into a venous catheter every 6 (six) hours as needed for nausea or vomiting (Patient not taking: Reported on 9/25/2019), Disp: , Rfl: 0    pantoprazole (PROTONIX) 40 mg tablet, Take 1 tablet (40 mg total) by mouth daily in the early morning Take this medication for duration of steroid therapy (Patient not taking: Reported on 9/25/2019), Disp: , Rfl: 0    senna (SENOKOT) 8 6 mg, Take 1 tablet (8 6 mg total) by mouth daily (Patient not taking: Reported on 9/25/2019), Disp: 120 each, Rfl: 0    sodium chloride 1 g tablet, Take 1 g b i d  For 7 days; take 1 g daily for 7 days then discontinue (Patient not taking: Reported on 9/25/2019), Disp: 21 tablet, Rfl: 0    thiamine 100 MG tablet, Take 1 tablet (100 mg total) by mouth daily (Patient not taking: Reported on 9/25/2019), Disp: , Rfl: 0    Results/Data: We reviewed his preoperative and postoperative imaging  There does not appear to be any residual tumor radiographically and there was none macroscopically at the time surgery

## 2019-09-30 ENCOUNTER — HOSPITAL ENCOUNTER (OUTPATIENT)
Dept: MRI IMAGING | Facility: HOSPITAL | Age: 69
Discharge: HOME | End: 2019-09-30
Payer: MEDICARE

## 2019-09-30 ENCOUNTER — TELEPHONE (OUTPATIENT)
Dept: NEUROSURGERY | Facility: CLINIC | Age: 69
End: 2019-09-30

## 2019-09-30 DIAGNOSIS — G93.6 CEREBRAL EDEMA (HCC): ICD-10-CM

## 2019-09-30 DIAGNOSIS — G93.89 BRAIN MASS: ICD-10-CM

## 2019-09-30 DIAGNOSIS — C22.0 HEPATOCELLULAR CARCINOMA (HCC): ICD-10-CM

## 2019-09-30 PROCEDURE — 70553 MRI BRAIN STEM W/O & W/DYE: CPT

## 2019-09-30 PROCEDURE — A9585 GADOBUTROL INJECTION: HCPCS | Performed by: RADIOLOGY

## 2019-09-30 RX ADMIN — GADOBUTROL 7 ML: 604.72 INJECTION INTRAVENOUS at 11:52

## 2019-09-30 NOTE — TELEPHONE ENCOUNTER
Called [facility] to see if there is any update on the pathology  Left a voice message with the fax number and my direct line

## 2019-10-01 ENCOUNTER — TELEPHONE (OUTPATIENT)
Dept: NEUROSURGERY | Facility: CLINIC | Age: 69
End: 2019-10-01

## 2019-10-01 ENCOUNTER — TELEPHONE (OUTPATIENT)
Dept: HEMATOLOGY ONCOLOGY | Facility: CLINIC | Age: 69
End: 2019-10-01

## 2019-10-01 NOTE — TELEPHONE ENCOUNTER
Morteza Marin from Dr Petty Pierce office called regarding pt that GBM is returning    Dr Rosaline Trejo has seen patient in hospital on 09/05/2019

## 2019-10-01 NOTE — TELEPHONE ENCOUNTER
Patient called the office reporting he is returning a call from someone in the office  He could not tell me the name of the person  Asked around the office and did not find any staff member who tried to call him  Informed patient of this  Patient understood  He also inquired if he could have a refill of the oxycodone for his head pain  Patient reports his head pain is a 6 out of 10 when he lays on the left side of his head  Patient is s/p left frontal crani for resection tumor  Patient reports if he doesn't lay on his left side of his head, then he does not have any pain  Informed patient to not lay on his left side of his head and how the office will not refill the oxycodone  Patient understood and was appreciative

## 2019-10-02 NOTE — TELEPHONE ENCOUNTER
I sent a request for an appt for this patient to [facility], [place] so that she can review the patients information with Dr Paul Barrera and get this patient scheduled  Dr Paul Barrera normally sees patients with GBM

## 2019-10-03 ENCOUNTER — TELEPHONE (OUTPATIENT)
Dept: FAMILY MEDICINE CLINIC | Facility: CLINIC | Age: 69
End: 2019-10-03

## 2019-10-03 NOTE — TELEPHONE ENCOUNTER
Pt came in to let us know that he is having phone trouble  He said the results of his surgery will not be in for a couple weeks

## 2019-10-04 ENCOUNTER — TELEPHONE (OUTPATIENT)
Dept: NEUROSURGERY | Facility: CLINIC | Age: 69
End: 2019-10-04

## 2019-10-04 NOTE — TELEPHONE ENCOUNTER
Dr Austin Garner requested for patient to be placed on Monday's schedule to review pathology  Called patient to schedule  Patient did not answer  Left a voice message with my direct line

## 2019-10-04 NOTE — TELEPHONE ENCOUNTER
Called patient's brother, Shannan Isidro  Explained how Dr Zack Guardado wishes to see patient on Monday 10/7/19 to review pathology further  Shannan Isidro said to schedule for 10/7/19 at 3:30 pm and he will call the patient  Shannan Isidro said he will tell the patient to call my direct line to confirm appointment for 10/7/19  Provided my direct line  He was appreciative

## 2019-10-07 ENCOUNTER — OFFICE VISIT (OUTPATIENT)
Dept: NEUROSURGERY | Facility: CLINIC | Age: 69
End: 2019-10-07

## 2019-10-07 VITALS
BODY MASS INDEX: 20.64 KG/M2 | WEIGHT: 166 LBS | RESPIRATION RATE: 18 BRPM | DIASTOLIC BLOOD PRESSURE: 74 MMHG | HEART RATE: 77 BPM | TEMPERATURE: 98.2 F | HEIGHT: 75 IN | SYSTOLIC BLOOD PRESSURE: 142 MMHG

## 2019-10-07 DIAGNOSIS — G93.6 CEREBRAL EDEMA (HCC): ICD-10-CM

## 2019-10-07 DIAGNOSIS — C71.9 GLIOBLASTOMA (HCC): Primary | ICD-10-CM

## 2019-10-07 PROCEDURE — 99024 POSTOP FOLLOW-UP VISIT: CPT | Performed by: NEUROLOGICAL SURGERY

## 2019-10-07 NOTE — PROGRESS NOTES
Patient Id: Melissa Leroy  is a 81 y o  male        Handedness: Right     Assessment/Plan:    Diagnoses and all orders for this visit:    Glioblastoma ([facility] [address] [address] )    Cerebral edema ([address] )      Discussion Summary:   1  Status post resection of left convexity mass, 09/10/2019  Patient approximately 1 month status post surgery  Most recent pathology has been concerning for a high grade glioma, GBM, on top of his worsening metastatic hepatocellular carcinoma  IDH1 non mutant MGMT methylation status pending  Today we discussed the natural history and diagnosis of glioblastoma  We discussed that this is a non curable disease, however therapies are targeted to increase both her quality and quantity of life  We discussed the importance of adjuvant therapy including radiation and chemotherapy  This will also need to be done in conjunction with treatment for his liver cancer as determined by Medical Oncology  Follow-up is already scheduled with both radiation oncology and Neuro-Oncology      His pre radiation MRI was reviewed today  I do not see any evidence of recurrent or residual disease  Chief Complaint: Post-op (path discussion)        HPI:   This is a very pleasant 24-year-old gentleman with past medical history of [address]  who presented with clumsiness and poor balance while walking  He had significant weakness of his right upper extremity and discoordination preoperatively  Imaging revealed left frontal james rolandic mass with significant vasogenic edema  He underwent successful resection on 09/10/2019  Tumor pathology has returned consistent with possible high-grade glioma  He continues to have significant improvement with his right hand  He is able to tie his shoes now and has more coordination  He continues to have numbness along his incision especially when he lays down  He still continues to have difficulty sleeping    Review of systems obtained by the MA reviewed and updated below        Review of Systems   Constitutional: Negative  HENT: Negative  Eyes: Negative  Respiratory: Negative  Cardiovascular: Negative  Gastrointestinal: Negative  Endocrine: Negative  Genitourinary: Negative  Musculoskeletal: Negative  Skin: Negative  Allergic/Immunologic: Negative  Neurological: Positive for numbness (when he lays on his right side) and headaches (when he lays on the left side)  Negative for dizziness, tremors, seizures, speech difficulty and weakness  Hematological: Negative  Psychiatric/Behavioral: Positive for sleep disturbance (not sleeping at night)  The patient is nervous/anxious  Physical Exam  Vitals:    10/07/19 1549   BP: 142/74   Pulse: 77   Resp: 18   Temp: 98 2 °F (36 8 °C)   He is well appearing  Affect is appropriate  His BMI is Body mass index is 20 75 kg/m²  [place] He is awake alert and oriented  Hearing and vision are grossly intact  His pupils are equal round reactive to light  His extraocular movements are intact  His face is symmetric  Tongue is midline  Facial sensation is intact and symmetric throughout  Shoulder shrug is 5/5  There is no drift or dysmetria  He has full strength in his left upper and lower extremity  He has some mild right upper extremity weakness, approximately 4+ and hand  wrist extension and elbow extension  His incision is healing well      The following portions of the patient's history were reviewed and updated as appropriate: allergies, current medications, past family history, past medical history, past social history, past surgical history and problem list     Active Ambulatory Problems     Diagnosis Date Noted    Brain mass with hemorrhage 09/04/2019    Essential hypertension 09/04/2019    Alcohol abuse 09/04/2019    Cerebral edema ([facility] ) 09/05/2019    COPD (chronic obstructive pulmonary disease) ([facility] ) 09/05/2019    Hepatocellular carcinoma ([place] [address] ) 09/05/2019    Malnutrition related to chronic disease 09/06/2019    Leukocytosis 09/09/2019    Transaminitis 09/10/2019    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 09/10/2019    Tooth pain 09/04/2019    Hyponatremia 09/12/2019    Steroid-induced hyperglycemia 09/12/2019    GERD (gastroesophageal reflux disease) 09/13/2019    Constipation 09/13/2019    Anxiety disorder 09/13/2019    Seizure prophylaxis 09/13/2019    Tobacco abuse 09/13/2019    Glioblastoma ([facility] [address] ) 10/07/2019     Resolved Ambulatory Problems     Diagnosis Date Noted    Weakness 09/04/2019    Dependence on nicotine from cigarettes 09/04/2019    JANEEN (acute kidney injury) ([facility] [address] ) 09/10/2019     Past Medical History:   Diagnosis Date    Cancer ([facility] [address] )     Hypertension        Past Surgical History:   Procedure Laterality Date    APPENDECTOMY      CRANIOTOMY Left 9/10/2019    Procedure: Image guided left frontal craniotomy for resection of tumor;  Surgeon: Asher Plaza MD;  Location: [facility];  Service: Neurosurgery         Current Outpatient Medications:     LORazepam (ATIVAN) 0 5 mg tablet, Take by mouth 2 (two) times a day , Disp: , Rfl:     Naproxen Sodium (ALEVE PO), Take by mouth as needed, Disp: , Rfl:     nicotine (NICODERM CQ) 21 mg/24 hr TD 24 hr patch, Place 1 patch on the skin daily, Disp: 28 patch, Rfl: 0    sodium chloride 1 g tablet, Take 1 g b i d   For 7 days; take 1 g daily for 7 days then discontinue, Disp: 21 tablet, Rfl: 0    acetaminophen (TYLENOL) 325 mg tablet, Take 3 tablets (975 mg total) by mouth 3 (three) times a day (Patient not taking: Reported on 9/24/2019), Disp: 270 tablet, Rfl: 0    aluminum-magnesium hydroxide-simethicone (MYLANTA) 200-200-20 mg/5 mL suspension, Take 30 mL by mouth every 6 (six) hours as needed for indigestion or heartburn (Patient not taking: Reported on 9/25/2019), Disp: 355 mL, Rfl: 0    docusate sodium (COLACE) 100 mg capsule, Take 1 capsule (100 mg total) by mouth 2 (two) times a day (Patient not taking: Reported on 9/24/2019), Disp: 10 capsule, Rfl: 0    fluticasone-vilanterol (BREO ELLIPTA) 100-25 mcg/inh inhaler, Inhale 1 puff daily Rinse mouth after use  (Patient not taking: Reported on 9/25/2019), Disp: 1 Inhaler, Rfl: 0    folic acid (FOLVITE) 1 mg tablet, Take 1 tablet (1 mg total) by mouth daily (Patient not taking: Reported on 9/24/2019), Disp: , Rfl: 0    hydrOXYzine HCL (ATARAX) 25 mg tablet, Take 1 tablet (25 mg total) by mouth every 6 (six) hours as needed for anxiety (Patient not taking: Reported on 9/25/2019), Disp: 30 tablet, Rfl: 0    insulin glargine (LANTUS) 100 units/mL subcutaneous injection, Patient currently taking 25 units insulin in the morning; patient will need to have this medication adjusted at facility by physician (Patient not taking: Reported on 9/25/2019), Disp: , Rfl: 0    insulin lispro (HumaLOG) 100 units/mL injection, Inject 1-5 Units under the skin 3 (three) times a day before meals (Patient not taking: Reported on 9/25/2019), Disp: , Rfl: 0    insulin lispro (HumaLOG) 100 units/mL injection, Patient is currently been taking 15 units t i d  With meals while in the hospital; this is secondary to patient being on steroids and dose will need to be adjusted at facility by physician   (Patient not taking: Reported on 9/25/2019), Disp: , Rfl: 0    levETIRAcetam (KEPPRA) 500 mg tablet, Take 1 tablet (500 mg total) by mouth every 12 (twelve) hours for 8 doses, Disp: 8 tablet, Rfl: 0    nicotine polacrilex (NICORETTE) 2 mg gum, Chew 1 each (2 mg total) every hour as needed for smoking cessation (Patient not taking: Reported on 9/24/2019), Disp: 100 each, Rfl: 0    NIFEdipine (ADALAT CC) 60 MG 24 hr tablet, Take 1 tablet (60 mg total) by mouth daily (Patient not taking: Reported on 10/7/2019), Disp: , Rfl: 0    ondansetron (ZOFRAN) 4 mg/2 mL injection, Infuse 2 mL (4 mg total) into a venous catheter every 6 (six) hours as needed for nausea or vomiting (Patient not taking: Reported on 9/25/2019), Disp: , Rfl: 0    pantoprazole (PROTONIX) 40 mg tablet, Take 1 tablet (40 mg total) by mouth daily in the early morning Take this medication for duration of steroid therapy (Patient not taking: Reported on 9/25/2019), Disp: , Rfl: 0    senna (SENOKOT) 8 6 mg, Take 1 tablet (8 6 mg total) by mouth daily (Patient not taking: Reported on 9/25/2019), Disp: 120 each, Rfl: 0    thiamine 100 MG tablet, Take 1 tablet (100 mg total) by mouth daily (Patient not taking: Reported on 9/25/2019), Disp: , Rfl: 0    Results/Data: We reviewed his MRI in detail  No evidence of residual tumor

## 2019-10-09 ENCOUNTER — RADIATION ONCOLOGY CONSULT (OUTPATIENT)
Dept: RADIATION ONCOLOGY | Facility: HOSPITAL | Age: 69
End: 2019-10-09
Attending: STUDENT IN AN ORGANIZED HEALTH CARE EDUCATION/TRAINING PROGRAM
Payer: MEDICARE

## 2019-10-09 ENCOUNTER — CONSULT (OUTPATIENT)
Dept: HEMATOLOGY ONCOLOGY | Facility: CLINIC | Age: 69
End: 2019-10-09
Payer: MEDICARE

## 2019-10-09 VITALS
HEIGHT: 74 IN | DIASTOLIC BLOOD PRESSURE: 64 MMHG | OXYGEN SATURATION: 96 % | HEART RATE: 77 BPM | SYSTOLIC BLOOD PRESSURE: 122 MMHG | WEIGHT: 165.5 LBS | RESPIRATION RATE: 17 BRPM | BODY MASS INDEX: 21.24 KG/M2 | TEMPERATURE: 98 F

## 2019-10-09 VITALS
HEART RATE: 75 BPM | RESPIRATION RATE: 18 BRPM | HEIGHT: 74 IN | DIASTOLIC BLOOD PRESSURE: 64 MMHG | OXYGEN SATURATION: 95 % | WEIGHT: 165 LBS | TEMPERATURE: 98.9 F | BODY MASS INDEX: 21.17 KG/M2 | SYSTOLIC BLOOD PRESSURE: 120 MMHG

## 2019-10-09 DIAGNOSIS — C71.9 GLIOBLASTOMA (HCC): Primary | ICD-10-CM

## 2019-10-09 DIAGNOSIS — C22.0 HEPATOCELLULAR CARCINOMA (HCC): ICD-10-CM

## 2019-10-09 DIAGNOSIS — D37.6 NEOPLASM OF UNCERTAIN BEHAVIOR OF LIVER, GALLBLADDER AND BILE DUCTS: ICD-10-CM

## 2019-10-09 PROCEDURE — 99205 OFFICE O/P NEW HI 60 MIN: CPT | Performed by: INTERNAL MEDICINE

## 2019-10-09 PROCEDURE — 99211 OFF/OP EST MAY X REQ PHY/QHP: CPT | Performed by: STUDENT IN AN ORGANIZED HEALTH CARE EDUCATION/TRAINING PROGRAM

## 2019-10-09 RX ORDER — OXYCODONE HYDROCHLORIDE 5 MG/1
5 TABLET ORAL EVERY 4 HOURS PRN
Qty: 60 TABLET | Refills: 0 | Status: SHIPPED | OUTPATIENT
Start: 2019-10-09 | End: 2019-10-29 | Stop reason: SDUPTHER

## 2019-10-09 RX ORDER — ONDANSETRON HYDROCHLORIDE 8 MG/1
8 TABLET, FILM COATED ORAL EVERY 8 HOURS PRN
Qty: 30 TABLET | Refills: 3 | Status: SHIPPED | OUTPATIENT
Start: 2019-10-09 | End: 2020-02-13 | Stop reason: SDUPTHER

## 2019-10-09 NOTE — LETTER
October 9, 2019     Ana Sotelo, [address] [facility] [address] [facility] [address]  [facility] [address]    Patient: Guerline Smith  YOB: 1950   Date of Visit: 10/9/2019       Dear Dr Rogers Goyal: Thank you for referring Viki Wen to me for evaluation  Below are my notes for this consultation  If you have questions, please do not hesitate to call me  I look forward to following your patient along with you  Sincerely,        Anabel Oden DO        CC: MD Anabel Vernon DO  10/9/2019  2:46 PM  Incomplete    Guerline Smith   1950  [address] [facility] [place] [address]    Chief Complaint   Patient presents with   Simona Pointer History    Dr Juan Rutherford saw pt inpt 9/6/19- Pt is a 81 y o  male who presents with multifocal hepatocellular carcinoma in a background of hepatitis-C and cirrhosis diagnosed in 2017  Patient is status post TACE x2 and oral Sorafenib therapy while living in [facility]  He had some initial response to treatment and then progressed and discontinued Sorafenib therapy  He did not go back for follow-up  He has now moved from [facility]  to be with his family in [place], [place]  He has been admitted with right hand weakness/clumsiness and right lower extremity weakness  CT of the head and MRI of the brain revealed a large centrally necrotic mass in the left frontoparietal region measuring 2 9 x 4 3 x 3 4 cm with surrounding vasogenic edema  He has now been placed on Decadron and has some improvement in his right-sided weakness  He has been seen by Neurosurgery and plan is for him to have craniotomy and resection of his solitary brain metastasis on Monday or Tuesday of next week  Once he recovers from the surgery, he will require postoperative radiation therapy either to the whole brain over 2 weeks verses treatment locally to the resection cavity    We discussed the acute side effects and potential chronic complications of postoperative radiation therapy  He does agree to receive treatment once he heals from the surgery and will be re-evaluated at that time  He also understands that he will need further systemic treatment for his recurrent disease in the liver as well as bone metastasis and fidel hepatis lymphadenopathy  9/4/19 CT C/A/P- Heterogenous liver with hepatomegaly and multifocal heterogenous liver lesions, may be due to multifocal [address]  or metastatic disease  Left lower lobe lung nodule measuring 7 6 mm, suspicious for metastatic disease  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  Mildly enlarged fidel hepatis lymphadenopathy    9/10/19 Imagine guided L frontal craniotomy for resection of tumor    Pt d/c from hospital 9/13/19 9/25/19 Dr Austin Garner- doing clinically well  Weaned off Decadron  He has had improvement in his coordination and gait  Final path has not returned  Will order post treatment/pre radiation planning MRI    9/30/19 MRI brain- Postoperative change status post left frontal parietal tumor resection  Subacute blood products within the resection cavity with minimal peripheral enhancement present  Improving surrounding edema  Continued surveillance imaging is recommended  There is a small extradural collection underlying the craniotomy site which is improving in size  Only minimal mass effect upon the adjacent brain parenchyma  No new enhancing lesions  10/7/19 Dr Austin Garner- pathology concerning for a high-grade glioma, GBM  Discussed GBM  Discussed chemo and radiation  F/u scheduled with rad onc and med onc   MRI reviewed, do not see evidence of recurrent or residual disease    10/9/19 Dr [facility])    9/5/2019 Initial Diagnosis     Hepatocellular carcinoma ([address] )        Glioblastoma ([facility] )    9/10/2019 Biopsy     Imagine guided L frontal craniotomy for resection of tumor    Preliminary Diagnosis   SECOND PRELIMINARY REPORT:  A & B  Brain, left frontal mass, biopsy for frozen section & excision:  - 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, namely glioblastoma, with PNET-like component - see Note  Immunohistochemical stains (performed at [facility], [place], [place]) demonstrate the following tumor cell immunophenotype:   * Positive: GFAP (patchy), synaptophysin, and chromogranin (patchy), cytokeratin CAM5 2 (focal dot-like staining)  * Negative: IDH1 (R132H), BRAF V600E, EGFR vIII, TTF1, and CK18             10/7/2019 Initial Diagnosis     Glioblastoma ([facility] )         History of Present Illness:  2017 patient was found to have right lobe hepatocellular carcinoma with hepatitis C and cirrhosis  July 2017 and September 2017 patient had chemoembolization  He was started on Nexavar 200 mg p o  B i d  He had some improvement in his tumor with eventual stabilization after about 5-6 months of Nexavar  He tolerated the Nexavar with expected toxicities, nausea, poor appetite, fatigue, diarrhea  He self discontinued the medication due to toxicity and flat efficacy  His life partner passed away from cancer  He relocated to the [facility] to be near family  September 2019 patient presented to the hospital with right hand weakness numbness and tremor for 3 months  CT showed edema in the left parietal area with some associated hemorrhage and mass  CT chest abdomen pelvis showed multifocal hepatic lesions as well as a 7 6 mm left lower lobe pulmonary lesion and osseous lesions in the 5th rib and manubrium consistent with metastases  September 10, 2019 patient underwent resection of the left frontal lesion  This showed findings consistent with malignant neuro epithelial tumor with PNET component  Positive GFAP, synaptophysin, patchy chromogranin  Negative IDH1, B Anastacio, EGFR V3, TTF 1  Review of Systems   Constitutional: Negative for chills and fever  HENT: Negative for nosebleeds  Eyes: Negative for discharge  Respiratory: Negative for cough and shortness of breath  Cardiovascular: Negative for chest pain  Gastrointestinal: Negative for abdominal pain, constipation and diarrhea  Endocrine: Negative for polydipsia  Genitourinary: Negative for hematuria  Musculoskeletal: Negative for arthralgias  Skin: Negative for color change  Allergic/Immunologic: Negative for immunocompromised state  Neurological: Negative for dizziness and headaches  Hematological: Negative for adenopathy  Psychiatric/Behavioral: Negative for agitation         Patient Active Problem List   Diagnosis    Brain mass with hemorrhage    Essential hypertension    Alcohol abuse    Cerebral edema (HCC)    COPD (chronic obstructive pulmonary disease) (HCC)    Hepatocellular carcinoma (HCC)    Malnutrition related to chronic disease    Leukocytosis    Transaminitis    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    Tooth pain    Hyponatremia    Steroid-induced hyperglycemia    GERD (gastroesophageal reflux disease)    Constipation    Anxiety disorder    Seizure prophylaxis    Tobacco abuse    Glioblastoma ([address] )     Past Medical History:   Diagnosis Date    Cancer [facility])     liver    Dependence on nicotine from cigarettes 9/4/2019    Hypertension      Past Surgical History:   Procedure Laterality Date    APPENDECTOMY      CRANIOTOMY Left 9/10/2019    Procedure: Image guided left frontal craniotomy for resection of tumor;  Surgeon: Bala Ring MD;  [facility];  Service: Neurosurgery     Family History   Problem Relation Age of Onset    Cancer Mother     Cancer Father      Social History     Socioeconomic History    Marital status: Legally      Spouse name: Not on file    Number of children: Not on file    Years of education: Not on file    Highest education level: Not on file   Occupational History    Not on file Social Needs    Financial resource strain: Not on file    Food insecurity:     Worry: Not on file     Inability: Not on file    Transportation needs:     Medical: Not on file     Non-medical: Not on file   Tobacco Use    Smoking status: Current Every Day Smoker     Packs/day: 1 00    Smokeless tobacco: Former User     Types: Chew   Substance and Sexual Activity    Alcohol use: Not Currently     Comment: 1/5 bottle weekly     Drug use: Never    Sexual activity: Not Currently   Lifestyle    Physical activity:     Days per week: Not on file     Minutes per session: Not on file    Stress: Not on file   Relationships    Social connections:     Talks on phone: Not on file     Gets together: Not on file     Attends Muslim service: Not on file     Active member of club or organization: Not on file     Attends meetings of clubs or organizations: Not on file     Relationship status: Not on file    Intimate partner violence:     Fear of current or ex partner: Not on file     Emotionally abused: Not on file     Physically abused: Not on file     Forced sexual activity: Not on file   Other Topics Concern    Not on file   Social History Narrative    Not on file       Current Outpatient Medications:     LORazepam (ATIVAN) 0 5 mg tablet, Take by mouth 2 (two) times a day , Disp: , Rfl:     Naproxen Sodium (ALEVE PO), Take by mouth as needed, Disp: , Rfl:     nicotine (NICODERM CQ) 21 mg/24 hr TD 24 hr patch, Place 1 patch on the skin daily, Disp: 28 patch, Rfl: 0    sodium chloride 1 g tablet, Take 1 g b i d   For 7 days; take 1 g daily for 7 days then discontinue, Disp: 21 tablet, Rfl: 0    acetaminophen (TYLENOL) 325 mg tablet, Take 3 tablets (975 mg total) by mouth 3 (three) times a day (Patient not taking: Reported on 10/9/2019), Disp: 270 tablet, Rfl: 0    aluminum-magnesium hydroxide-simethicone (MYLANTA) 200-200-20 mg/5 mL suspension, Take 30 mL by mouth every 6 (six) hours as needed for indigestion or heartburn (Patient not taking: Reported on 10/9/2019), Disp: 355 mL, Rfl: 0    docusate sodium (COLACE) 100 mg capsule, Take 1 capsule (100 mg total) by mouth 2 (two) times a day (Patient not taking: Reported on 10/9/2019), Disp: 10 capsule, Rfl: 0    fluticasone-vilanterol (BREO ELLIPTA) 100-25 mcg/inh inhaler, Inhale 1 puff daily Rinse mouth after use  (Patient not taking: Reported on 10/9/2019), Disp: 1 Inhaler, Rfl: 0    folic acid (FOLVITE) 1 mg tablet, Take 1 tablet (1 mg total) by mouth daily (Patient not taking: Reported on 10/9/2019), Disp: , Rfl: 0    hydrOXYzine HCL (ATARAX) 25 mg tablet, Take 1 tablet (25 mg total) by mouth every 6 (six) hours as needed for anxiety (Patient not taking: Reported on 10/9/2019), Disp: 30 tablet, Rfl: 0    insulin glargine (LANTUS) 100 units/mL subcutaneous injection, Patient currently taking 25 units insulin in the morning; patient will need to have this medication adjusted at facility by physician (Patient not taking: Reported on 10/9/2019), Disp: , Rfl: 0    insulin lispro (HumaLOG) 100 units/mL injection, Inject 1-5 Units under the skin 3 (three) times a day before meals (Patient not taking: Reported on 10/9/2019), Disp: , Rfl: 0    insulin lispro (HumaLOG) 100 units/mL injection, Patient is currently been taking 15 units t i d  With meals while in the hospital; this is secondary to patient being on steroids and dose will need to be adjusted at facility by physician   (Patient not taking: Reported on 10/9/2019), Disp: , Rfl: 0    levETIRAcetam (KEPPRA) 500 mg tablet, Take 1 tablet (500 mg total) by mouth every 12 (twelve) hours for 8 doses, Disp: 8 tablet, Rfl: 0    nicotine polacrilex (NICORETTE) 2 mg gum, Chew 1 each (2 mg total) every hour as needed for smoking cessation (Patient not taking: Reported on 10/9/2019), Disp: 100 each, Rfl: 0    NIFEdipine (ADALAT CC) 60 MG 24 hr tablet, Take 1 tablet (60 mg total) by mouth daily (Patient not taking: Reported on 10/9/2019), Disp: , Rfl: 0    ondansetron (ZOFRAN) 4 mg/2 mL injection, Infuse 2 mL (4 mg total) into a venous catheter every 6 (six) hours as needed for nausea or vomiting (Patient not taking: Reported on 10/9/2019), Disp: , Rfl: 0    pantoprazole (PROTONIX) 40 mg tablet, Take 1 tablet (40 mg total) by mouth daily in the early morning Take this medication for duration of steroid therapy (Patient not taking: Reported on 10/9/2019), Disp: , Rfl: 0    senna (SENOKOT) 8 6 mg, Take 1 tablet (8 6 mg total) by mouth daily (Patient not taking: Reported on 10/9/2019), Disp: 120 each, Rfl: 0    thiamine 100 MG tablet, Take 1 tablet (100 mg total) by mouth daily (Patient not taking: Reported on 10/9/2019), Disp: , Rfl: 0  No Known Allergies  Vitals:    10/09/19 1306   BP: 122/64   Pulse: 77   Resp: 17   Temp: 98 °F (36 7 °C)   SpO2: 96%       Physical Exam   Constitutional: He is oriented to person, place, and time  He appears well-developed  HENT:   Head: Normocephalic  Eyes: Pupils are equal, round, and reactive to light  Neck: Neck supple  Cardiovascular: Normal rate and regular rhythm  No murmur heard  Pulmonary/Chest: Breath sounds normal  He has no wheezes  He has no rales  Abdominal: Soft  There is no tenderness  Musculoskeletal: Normal range of motion  He exhibits no edema or tenderness  Lymphadenopathy:     He has no cervical adenopathy  Neurological: He is alert and oriented to person, place, and time  He has normal reflexes  No cranial nerve deficit  Skin: No rash noted  No erythema  Psychiatric: He has a normal mood and affect  His behavior is normal          Labs:  CBC, Coags, BMP, Mg, Phos    Imaging  Xr Mandible Panorex ([place] Only)    Result Date: 9/11/2019  Narrative: MANDIBLE - PANOREX INDICATION:   abscess   COMPARISON:  Images from CT head 9/4/2019 VIEWS:  XR MANDIBLE PANOREX ([place] ONLY) FINDINGS: Irregular contour of the crowns of multiple mandibular teeth and to a lesser extent the maxillary incisors  Cannot determine chronicity in the absence of old films  Please note sensitivity is of this technique regarding dental caries is significantly diminished in relation to dedicated bitewing radiographs (which are not available in the hospital setting)  Subtle dental caries cannot entirely be excluded and clinical correlation is necessary  There is a vague lucency of the left paramedian mandible, nonspecific  This could potentially be artifactual, however if there is concern for periapical abscess, CT imaging with contrast could be obtained  There is no fracture  Included portions of the maxillary sinuses appear grossly clear  Impression: Irregular surface contour of the crowns of multiple mandibular and lesser extent maxillary teeth  Clinical correlation regarding dental caries is necessary  Vague lucency left paramedian mandible, nonspecific as above  The study was marked in [facility] for immediate notification  Workstation performed: [id number]     Mri Brain With And Without Contrast    Result Date: 10/1/2019  Narrative: MRI BRAIN WITH AND WITHOUT CONTRAST INDICATION: C22 0: Liver cell carcinoma with intracranial metastasis  Patient is status post resection  G93 6: Cerebral edema G93 9: Disorder of brain, unspecified  COMPARISON:  9/10/2019, 9/5/2019  TECHNIQUE: Sagittal T1, axial T2, axial FLAIR, axial T1, axial Gradient, axial diffusion  Sagittal, axial T1 postcontrast   Axial 3D T1 gradient post contrast with coronal recons     IV Contrast:  7 mL of gadobutrol injection (MULTI-DOSE)  IMAGE QUALITY:   Diagnostic  FINDINGS: BRAIN PARENCHYMA:  Patient is status post resection of left frontoparietal tumor  Resection cavity demonstrates heterogeneous signal on T1-weighted imaging consistent with subacute blood products and peripheral hemosiderin deposition  On T2 and FLAIR imaging there is surrounding edema which is improving compared to the prior examination    Postcontrast imaging demonstrates minimal peripheral enhancement of the resection cavity  Underlying the craniotomy site there is a small extradural collection which is improving  The remainder of the brain parenchyma is stable with mild chronic microangiopathic change  There are no new enhancing lesions identified  Slight dural thickening and enhancement is seen within the left hemisphere, postsurgical in nature  VENTRICLES:  Normal  SELLA AND PITUITARY GLAND:  Normal  ORBITS:  Normal  PARANASAL SINUSES:  Normal  VASCULATURE:  Evaluation of the major intracranial vasculature demonstrates appropriate flow voids  CALVARIUM AND SKULL BASE:  Expected postoperative change status post left temporoparietal craniotomy EXTRACRANIAL SOFT TISSUES:  Normal      Impression: Postoperative change status post left frontal parietal tumor resection  Subacute blood products within the resection cavity with minimal peripheral enhancement present  Improving surrounding edema  Continued surveillance imaging is recommended  There is a small extradural collection underlying the craniotomy site which is improving in size  Only minimal mass effect upon the adjacent brain parenchyma  No new enhancing lesions  Workstation performed: [id number]     Mri Brain W Wo Contrast    Result Date: 9/11/2019  Narrative: MRI BRAIN WITH AND WITHOUT CONTRAST INDICATION: MRI brain within 24 hours post op   s/p craniotomy for resection of tumor  COMPARISON:  Preoperative brain MRI September 5, 2019 TECHNIQUE: Sagittal T1, axial T2, axial FLAIR, axial T1, axial [place], axial diffusion  Sagittal, axial T1 postcontrast   Axial bravo postcontrast with coronal reconstructions  IV Contrast:  7 mL of Gadobutrol injection (SINGLE-DOSE)  IMAGE QUALITY:   Diagnostic  FINDINGS: BRAIN PARENCHYMA:  Status post left parietal craniotomy and resection of enhancing mass  There is a resection cavity identified with some surrounding blood products    No residual enhancing mass identified  Persistent vasogenic edema noted extending into the posterior frontal lobe and anterior parietal lobe  Small volume of extra-axial air identified which is typical postoperative setting  Suspected small subdural hemorrhage bilaterally  No significant mass effect as result of subdural blood  Small scattered hyperintensities on T2/FLAIR imaging are noted in the periventricular and subcortical white matter demonstrating an appearance that is statistically most likely to represent mild microangiopathic change  VENTRICLES:  Minimal mass effect on the posterior horn left lateral ventricle without midline shift  SELLA AND PITUITARY GLAND:  Normal  ORBITS:  Normal  PARANASAL SINUSES:  Normal  VASCULATURE:  Evaluation of the major intracranial vasculature demonstrates appropriate flow voids  CALVARIUM AND SKULL BASE:  Normal  EXTRACRANIAL SOFT TISSUES:  Normal      Impression: Status post left parietal craniotomy and resection of enhancing mass left frontoparietal junction  No residual enhancing mass identified  Continued follow-up recommended  Residual vasogenic edema is noted resulting in mass effect on the posterior horn  left lateral ventricle without midline shift  Small subdural hemorrhage bilaterally measuring 2 mm in thickness  No additional enhancing mass identified  Workstation performed: [id number]     I reviewed the above laboratory and imaging data  Discussion/Summary:  In summary, this is a 61-year-old male history of hepatocellular carcinoma and new brain tumor, as outlined  The pulmonary nodule may be reflective of metastatic hepatocellular carcinoma, supported by the presence of bone lesions consistent with metastases  I suspect that this is the less threatening of his 2 problems currently  This is status post resection  We reviewed that radiation and chemotherapy could be offered postoperatively with a goal of disease control, palliative benefit, survival benefit    We reviewed potential toxicities of Temodar including but not limited to nausea, vomiting, constipation, cytopenias, fatigue  Other side effects are possible though less likely  Our chemotherapy nurse review the above information as well as providing written information in this regard  We reviewed prophylactic measures taken routinely as well as monitoring to allow for early intervention as appropriate  In 2 months or so we would re-stage extracranial disease by way of CT chest abdomen this  Further treatment could be considered for his hepatocellular carcinoma, if progressive  I reviewed the above considerations at length with the patient and his brother  They voiced understanding and agreement  Ysabel Wright,   10/9/2019  1:36 PM  Sign at close encounter    Edson Meraz   1950  [address] [facility] [place]  [address] [facility] [place] [address]    Chief Complaint   Patient presents with   Victoria Bouquet History    Dr Crystal Joseph saw pt inpt 9/6/19- Pt is a 81 y o  male who presents with multifocal hepatocellular carcinoma in a background of hepatitis-C and cirrhosis diagnosed in 2017  Patient is status post TACE x2 and oral Sorafenib therapy while living in [facility]  He had some initial response to treatment and then progressed and discontinued Sorafenib therapy  He did not go back for follow-up  He has now moved from [place] to be with his family in [place], [place]  He has been admitted with right hand weakness/clumsiness and right lower extremity weakness  CT of the head and MRI of the brain revealed a large centrally necrotic mass in the left frontoparietal region measuring 2 9 x 4 3 x 3 4 cm with surrounding vasogenic edema  He has now been placed on Decadron and has some improvement in his right-sided weakness    He has been seen by Neurosurgery and plan is for him to have craniotomy and resection of his solitary brain metastasis on Monday or Tuesday of next week  Once he recovers from the surgery, he will require postoperative radiation therapy either to the whole brain over 2 weeks verses treatment locally to the resection cavity  We discussed the acute side effects and potential chronic complications of postoperative radiation therapy  He does agree to receive treatment once he heals from the surgery and will be re-evaluated at that time  He also understands that he will need further systemic treatment for his recurrent disease in the liver as well as bone metastasis and fidel hepatis lymphadenopathy  9/4/19 CT C/A/P- Heterogenous liver with hepatomegaly and multifocal heterogenous liver lesions, may be due to multifocal [address]  or metastatic disease  Left lower lobe lung nodule measuring 7 6 mm, suspicious for metastatic disease  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  Mildly enlarged fidel hepatis lymphadenopathy    9/10/19 Imagine guided L frontal craniotomy for resection of tumor    Pt d/c from hospital 9/13/19 9/25/19 Dr Maurizio Palomo- doing clinically well  Weaned off Decadron  He has had improvement in his coordination and gait  Final path has not returned  Will order post treatment/pre radiation planning MRI    9/30/19 MRI brain- Postoperative change status post left frontal parietal tumor resection  Subacute blood products within the resection cavity with minimal peripheral enhancement present  Improving surrounding edema  Continued surveillance imaging is recommended  There is a small extradural collection underlying the craniotomy site which is improving in size  Only minimal mass effect upon the adjacent brain parenchyma  No new enhancing lesions  10/7/19 Dr Maurizio Palomo- pathology concerning for a high-grade glioma, GBM  Discussed GBM  Discussed chemo and radiation  F/u scheduled with rad onc and med onc   MRI reviewed, do not see evidence of recurrent or residual disease    10/9/19 Dr Ann-Marie Johnson [facility] )    9/5/2019 Initial Diagnosis     Hepatocellular carcinoma ([facility] )        Glioblastoma ([facility] )    9/10/2019 Biopsy     Imagine guided L frontal craniotomy for resection of tumor    Preliminary Diagnosis   SECOND PRELIMINARY REPORT:  A & B  Brain, left frontal mass, biopsy for frozen section & excision:  - 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, namely glioblastoma, with PNET-like component - see Note  Immunohistochemical stains (performed at [place], [place]) demonstrate the following tumor cell immunophenotype:   * Positive: GFAP (patchy), synaptophysin, and chromogranin (patchy), cytokeratin CAM5 2 (focal dot-like staining)  * Negative: IDH1 (R132H), BRAF V600E, EGFR vIII, TTF1, and CK18             10/7/2019 Initial Diagnosis     [facility]         History of Present Illness:      Review of Systems   Constitutional: Negative for chills and fever  HENT: Negative for nosebleeds  Eyes: Negative for discharge  Respiratory: Negative for cough and shortness of breath  Cardiovascular: Negative for chest pain  Gastrointestinal: Negative for abdominal pain, constipation and diarrhea  Endocrine: Negative for polydipsia  Genitourinary: Negative for hematuria  Musculoskeletal: Negative for arthralgias  Skin: Negative for color change  Allergic/Immunologic: Negative for immunocompromised state  Neurological: Negative for dizziness and headaches  Hematological: Negative for adenopathy  Psychiatric/Behavioral: Negative for agitation         Patient Active Problem List   Diagnosis    Brain mass with hemorrhage    Essential hypertension    Alcohol abuse    Cerebral edema (HCC)    COPD (chronic obstructive pulmonary disease) (HCC)    [facility]atocellular [facility] ([facility] )    Malnutrition related to chronic disease    Leukocytosis    Transaminitis    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    Tooth pain    Hyponatremia    Steroid-induced hyperglycemia    GERD (gastroesophageal reflux disease)    Constipation    Anxiety disorder    Seizure prophylaxis    Tobacco abuse    Glioblastoma (HCC)     Past Medical History:   Diagnosis Date    Cancer [facility])     liver    Dependence on nicotine from cigarettes 9/4/2019    Hypertension      Past Surgical History:   Procedure Laterality Date    APPENDECTOMY      CRANIOTOMY Left 9/10/2019    Procedure: Image guided left frontal craniotomy for resection of tumor;  Surgeon: Asher Plaza MD;  [facility];  Service: Neurosurgery     Family History   Problem Relation Age of Onset    Cancer Mother     Cancer Father      Social History     Socioeconomic History    Marital status: Legally      Spouse name: Not on file    Number of children: Not on file    Years of education: Not on file    Highest education level: Not on file   Occupational History    Not on file   Social Needs    Financial resource strain: Not on file    Food insecurity:     Worry: Not on file     Inability: Not on file    Transportation needs:     Medical: Not on file     Non-medical: Not on file   Tobacco Use    Smoking status: Current Every Day Smoker     Packs/day: 1 00    Smokeless tobacco: Former User     Types: Chew   Substance and Sexual Activity    Alcohol use: Not Currently     Comment: 1/5 bottle weekly     Drug use: Never    Sexual activity: Not Currently   Lifestyle    Physical activity:     Days per week: Not on file     Minutes per session: Not on file    Stress: Not on file   Relationships    Social connections:     Talks on phone: Not on file     Gets together: Not on file     Attends [employer] service: Not on file     Active member of club or organization: Not on file     Attends meetings of clubs or organizations: Not on file     Relationship status: Not on file    Intimate partner violence:     Fear of current or ex partner: Not on file Emotionally abused: Not on file     Physically abused: Not on file     Forced sexual activity: Not on file   Other Topics Concern    Not on file   Social History Narrative    Not on file       Current Outpatient Medications:     LORazepam (ATIVAN) 0 5 mg tablet, Take by mouth 2 (two) times a day , Disp: , Rfl:     Naproxen Sodium (ALEVE PO), Take by mouth as needed, Disp: , Rfl:     nicotine (NICODERM CQ) 21 mg/24 hr TD 24 hr patch, Place 1 patch on the skin daily, Disp: 28 patch, Rfl: 0    sodium chloride 1 g tablet, Take 1 g b i d  For 7 days; take 1 g daily for 7 days then discontinue, Disp: 21 tablet, Rfl: 0    acetaminophen (TYLENOL) 325 mg tablet, Take 3 tablets (975 mg total) by mouth 3 (three) times a day (Patient not taking: Reported on 10/9/2019), Disp: 270 tablet, Rfl: 0    aluminum-magnesium hydroxide-simethicone (MYLANTA) 200-200-20 mg/5 mL suspension, Take 30 mL by mouth every 6 (six) hours as needed for indigestion or heartburn (Patient not taking: Reported on 10/9/2019), Disp: 355 mL, Rfl: 0    docusate sodium (COLACE) 100 mg capsule, Take 1 capsule (100 mg total) by mouth 2 (two) times a day (Patient not taking: Reported on 10/9/2019), Disp: 10 capsule, Rfl: 0    fluticasone-vilanterol (BREO ELLIPTA) 100-25 mcg/inh inhaler, Inhale 1 puff daily Rinse mouth after use   (Patient not taking: Reported on 10/9/2019), Disp: 1 Inhaler, Rfl: 0    folic acid (FOLVITE) 1 mg tablet, Take 1 tablet (1 mg total) by mouth daily (Patient not taking: Reported on 10/9/2019), Disp: , Rfl: 0    hydrOXYzine HCL (ATARAX) 25 mg tablet, Take 1 tablet (25 mg total) by mouth every 6 (six) hours as needed for anxiety (Patient not taking: Reported on 10/9/2019), Disp: 30 tablet, Rfl: 0    insulin glargine (LANTUS) 100 units/mL subcutaneous injection, Patient currently taking 25 units insulin in the morning; patient will need to have this medication adjusted at facility by physician (Patient not taking: Reported on 10/9/2019), Disp: , Rfl: 0    insulin lispro (HumaLOG) 100 units/mL injection, Inject 1-5 Units under the skin 3 (three) times a day before meals (Patient not taking: Reported on 10/9/2019), Disp: , Rfl: 0    insulin lispro (HumaLOG) 100 units/mL injection, Patient is currently been taking 15 units t i d  With meals while in the hospital; this is secondary to patient being on steroids and dose will need to be adjusted at facility by physician  (Patient not taking: Reported on 10/9/2019), Disp: , Rfl: 0    levETIRAcetam (KEPPRA) 500 mg tablet, Take 1 tablet (500 mg total) by mouth every 12 (twelve) hours for 8 doses, Disp: 8 tablet, Rfl: 0    nicotine polacrilex (NICORETTE) 2 mg gum, Chew 1 each (2 mg total) every hour as needed for smoking cessation (Patient not taking: Reported on 10/9/2019), Disp: 100 each, Rfl: 0    NIFEdipine (ADALAT CC) 60 MG 24 hr tablet, Take 1 tablet (60 mg total) by mouth daily (Patient not taking: Reported on 10/9/2019), Disp: , Rfl: 0    ondansetron (ZOFRAN) 4 mg/2 mL injection, Infuse 2 mL (4 mg total) into a venous catheter every 6 (six) hours as needed for nausea or vomiting (Patient not taking: Reported on 10/9/2019), Disp: , Rfl: 0    pantoprazole (PROTONIX) 40 mg tablet, Take 1 tablet (40 mg total) by mouth daily in the early morning Take this medication for duration of steroid therapy (Patient not taking: Reported on 10/9/2019), Disp: , Rfl: 0    senna (SENOKOT) 8 6 mg, Take 1 tablet (8 6 mg total) by mouth daily (Patient not taking: Reported on 10/9/2019), Disp: 120 each, Rfl: 0    thiamine 100 MG tablet, Take 1 tablet (100 mg total) by mouth daily (Patient not taking: Reported on 10/9/2019), Disp: , Rfl: 0  No Known Allergies  Vitals:    10/09/19 1306   BP: 122/64   Pulse: 77   Resp: 17   Temp: 98 °F (36 7 °C)   SpO2: 96%       Physical Exam   Constitutional: He is oriented to person, place, and time  He appears well-developed  HENT:   Head: Normocephalic     Eyes: Pupils are equal, round, and reactive to light  Neck: Neck supple  Cardiovascular: Normal rate and regular rhythm  No murmur heard  Pulmonary/Chest: Breath sounds normal  He has no wheezes  He has no rales  Abdominal: Soft  There is no tenderness  Musculoskeletal: Normal range of motion  He exhibits no edema or tenderness  Lymphadenopathy:     He has no cervical adenopathy  Neurological: He is alert and oriented to person, place, and time  He has normal reflexes  No cranial nerve deficit  Skin: No rash noted  No erythema  Psychiatric: He has a normal mood and affect  His behavior is normal          Labs:  CBC, Coags, BMP, Mg, Phos    Imaging  Xr Mandible Panorex ([place] Only)    Result Date: 9/11/2019  Narrative: MANDIBLE - PANOREX INDICATION:   abscess  COMPARISON:  Images from CT head 9/4/2019 VIEWS:  XR MANDIBLE PANOREX ([place] ONLY) FINDINGS: Irregular contour of the crowns of multiple mandibular teeth and to a lesser extent the maxillary incisors  Cannot determine chronicity in the absence of old films  Please note sensitivity is of this technique regarding dental caries is significantly diminished in relation to dedicated bitewing radiographs (which are not available in the hospital setting)  Subtle dental caries cannot entirely be excluded and clinical correlation is necessary  There is a vague lucency of the left paramedian mandible, nonspecific  This could potentially be artifactual, however if there is concern for periapical abscess, CT imaging with contrast could be obtained  There is no fracture  Included portions of the maxillary sinuses appear grossly clear  Impression: Irregular surface contour of the crowns of multiple mandibular and lesser extent maxillary teeth  Clinical correlation regarding dental caries is necessary  Vague lucency left paramedian mandible, nonspecific as above  The study was marked in [facility] for immediate notification   Workstation performed: [id number]     Mri Brain With And Without Contrast    Result Date: 10/1/2019  Narrative: MRI BRAIN WITH AND WITHOUT CONTRAST INDICATION: C22 0: Liver cell carcinoma with intracranial metastasis  Patient is status post resection  G93 6: Cerebral edema G93 9: Disorder of brain, unspecified  COMPARISON:  9/10/2019, 9/5/2019  TECHNIQUE: Sagittal T1, axial T2, axial FLAIR, axial T1, axial Gradient, axial diffusion  Sagittal, axial T1 postcontrast   Axial 3D T1 gradient post contrast with coronal recons     IV Contrast:  7 mL of gadobutrol injection (MULTI-DOSE)  IMAGE QUALITY:   Diagnostic  FINDINGS: BRAIN PARENCHYMA:  Patient is status post resection of left frontoparietal tumor  Resection cavity demonstrates heterogeneous signal on T1-weighted imaging consistent with subacute blood products and peripheral hemosiderin deposition  On T2 and FLAIR imaging there is surrounding edema which is improving compared to the prior examination  Postcontrast imaging demonstrates minimal peripheral enhancement of the resection cavity  Underlying the craniotomy site there is a small extradural collection which is improving  The remainder of the brain parenchyma is stable with mild chronic microangiopathic change  There are no new enhancing lesions identified  Slight dural thickening and enhancement is seen within the left hemisphere, postsurgical in nature  VENTRICLES:  Normal  SELLA AND PITUITARY GLAND:  Normal  ORBITS:  Normal  PARANASAL SINUSES:  Normal  VASCULATURE:  Evaluation of the major intracranial vasculature demonstrates appropriate flow voids  CALVARIUM AND SKULL BASE:  Expected postoperative change status post left temporoparietal craniotomy EXTRACRANIAL SOFT TISSUES:  Normal      Impression: Postoperative change status post left frontal parietal tumor resection  Subacute blood products within the resection cavity with minimal peripheral enhancement present  Improving surrounding edema    Continued surveillance imaging is recommended  There is a small extradural collection underlying the craniotomy site which is improving in size  Only minimal mass effect upon the adjacent brain parenchyma  No new enhancing lesions  Workstation performed: [id number]     Mri Brain W Wo Contrast    Result Date: 9/11/2019  Narrative: MRI BRAIN WITH AND WITHOUT CONTRAST INDICATION: MRI brain within 24 hours post op   s/p craniotomy for resection of tumor  COMPARISON:  Preoperative brain MRI September 5, 2019 TECHNIQUE: Sagittal T1, axial T2, axial FLAIR, axial T1, axial [place], axial diffusion  Sagittal, axial T1 postcontrast   Axial bravo postcontrast with coronal reconstructions  IV Contrast:  7 mL of Gadobutrol injection (SINGLE-DOSE)  IMAGE QUALITY:   Diagnostic  FINDINGS: BRAIN PARENCHYMA:  Status post left parietal craniotomy and resection of enhancing mass  There is a resection cavity identified with some surrounding blood products  No residual enhancing mass identified  Persistent vasogenic edema noted extending into the posterior frontal lobe and anterior parietal lobe  Small volume of extra-axial air identified which is typical postoperative setting  Suspected small subdural hemorrhage bilaterally  No significant mass effect as result of subdural blood  Small scattered hyperintensities on T2/FLAIR imaging are noted in the periventricular and subcortical white matter demonstrating an appearance that is statistically most likely to represent mild microangiopathic change  VENTRICLES:  Minimal mass effect on the posterior horn left lateral ventricle without midline shift  SELLA AND PITUITARY GLAND:  Normal  ORBITS:  Normal  PARANASAL SINUSES:  Normal  VASCULATURE:  Evaluation of the major intracranial vasculature demonstrates appropriate flow voids   CALVARIUM AND SKULL BASE:  Normal  EXTRACRANIAL SOFT TISSUES:  Normal      Impression: Status post left parietal craniotomy and resection of enhancing mass left frontoparietal junction  No residual enhancing mass identified  Continued follow-up recommended  Residual vasogenic edema is noted resulting in mass effect on the posterior horn  left lateral ventricle without midline shift  Small subdural hemorrhage bilaterally measuring 2 mm in thickness  No additional enhancing mass identified  Workstation performed: [id number]     I reviewed the above laboratory and imaging data      Discussion/Summary:

## 2019-10-09 NOTE — PROGRESS NOTES
Roselia Minh  1950 is a 81 y o  male     Dr Jm Shay saw pt inpt 9/6/19- Pt is a 81 y o  male who presents with multifocal hepatocellular carcinoma in a background of hepatitis-C and cirrhosis diagnosed in 2017  Patient is status post TACE x2 and oral Sorafenib therapy while living in [facility]  He had some initial response to treatment and then progressed and discontinued Sorafenib therapy  He did not go back for follow-up  He has now moved from [place] to be with his family in [address] [place] [address]  He has been admitted with right hand weakness/clumsiness and right lower extremity weakness  CT of the head and MRI of the brain revealed a large centrally necrotic mass in the left frontoparietal region measuring 2 9 x 4 3 x 3 4 cm with surrounding vasogenic edema  He has now been placed on Decadron and has some improvement in his right-sided weakness  9/4/19 CT C/A/P- Heterogenous liver with hepatomegaly and multifocal heterogenous liver lesions, may be due to multifocal [address]  or metastatic disease  Left lower lobe lung nodule measuring 7 6 mm, suspicious for metastatic disease  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  Mildly enlarged fidel hepatis lymphadenopathy    9/10/19 Imagine guided L frontal craniotomy for resection of tumor    Pt d/c from hospital 9/13/19 9/25/19 Dr Ciera West- doing clinically well  Weaned off Decadron  He has had improvement in his coordination and gait  Final path has not returned  Will order post treatment/pre radiation planning MRI    9/30/19 MRI brain- Postoperative change status post left frontal parietal tumor resection  Subacute blood products within the resection cavity with minimal peripheral enhancement present  Improving surrounding edema  Continued surveillance imaging is recommended  There is a small extradural collection underlying the craniotomy site which is improving in size   Only minimal mass effect upon the adjacent brain parenchyma  No new enhancing lesions  10/7/19 Dr Lefty Contreras- pathology concerning for a high-grade glioma, GBM  Discussed GBM  Discussed chemo and radiation  F/u scheduled with rad onc and med onc  MRI reviewed, do not see evidence of recurrent or residual disease    10/9/19 Dr Sachin Braxton- Reviewed that radiation and chemo could be offered post op  Reviewed [place]  Pulmonary nodule may be reflective of metastatic hepatocellular carcinoma supported by the presence of bone lesions consistent with metastases  In 2 months would re-stage extracranial disease with CT chest and abdomen  Further treatment could be considered for hepatocellular carcinoma if progressive  Hepatocellular carcinoma ([facility] )    9/5/2019 Initial Diagnosis     Hepatocellular carcinoma ([facility] )        Glioblastoma ([facility] )    9/10/2019 Biopsy     Imagine guided L frontal craniotomy for resection of tumor    Preliminary Diagnosis   SECOND PRELIMINARY REPORT:  A & B  Brain, left frontal mass, biopsy for frozen section & excision:  - 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, namely glioblastoma, with PNET-like component - see Note  Immunohistochemical stains (performed at [place], [place]) demonstrate the following tumor cell immunophenotype:   * Positive: GFAP (patchy), synaptophysin, and chromogranin (patchy), cytokeratin CAM5 2 (focal dot-like staining)     * Negative: IDH1 (R132H), BRAF V600E, EGFR vIII, TTF1, and CK18             10/7/2019 Initial Diagnosis     Glioblastoma (HCC)         Clinical Trial: no      Health Maintenance   Topic Date Due    Hepatitis C Screening  1950    Medicare Annual Wellness Visit (AWV)  1950    CRC Screening: Colonoscopy  1950    HEPATITIS B VACCINES (1 of 3 - Risk 3-dose series) 06/14/1971    DTaP,Tdap,and Td Vaccines (1 - Tdap) 07/02/1997    Pneumococcal Vaccine: 65+ Years (1 of 2 - PCV13) 06/14/2017    INFLUENZA VACCINE  07/01/2019    Fall Risk  09/24/2020    Depression Screening PHQ  09/24/2020    BMI: Adult  10/07/2020    Pneumococcal Vaccine: Pediatrics (0 to 5 Years) and At-Risk Patients (6 to 59 Years)  Aged Out       Past Medical History:   Diagnosis Date    Cancer [facility])     liver    Dependence on nicotine from cigarettes 9/4/2019    Hypertension     Liver cancer ([facility] )        Past Surgical History:   Procedure Laterality Date    APPENDECTOMY      CRANIOTOMY Left 9/10/2019    Procedure: Image guided left frontal craniotomy for resection of tumor;  Surgeon: Antoine Sheth MD;  Location: [facility];  Service: Neurosurgery       Family History   Problem Relation Age of Onset    Breast cancer Mother     Liver cancer Father     Throat cancer Paternal Uncle        Social History     Tobacco Use    Smoking status: Current Every Day Smoker     Packs/day: 1 00     Years: 40 00     Pack years: 40 00    Smokeless tobacco: Former User     Types: Chew   Substance Use Topics    Alcohol use: Not Currently     Comment: occasional, 1/5 / week in the past    Drug use: Never          Current Outpatient Medications:     LORazepam (ATIVAN) 0 5 mg tablet, Take by mouth 2 (two) times a day , Disp: , Rfl:     NIFEdipine (ADALAT CC) 60 MG 24 hr tablet, Take 1 tablet (60 mg total) by mouth daily, Disp: , Rfl: 0    oxyCODONE (ROXICODONE) 5 mg immediate release tablet, Take 1 tablet (5 mg total) by mouth every 4 (four) hours as needed for moderate painMax Daily Amount: 30 mg, Disp: 60 tablet, Rfl: 0    sodium chloride 1 g tablet, Take 1 g b i d   For 7 days; take 1 g daily for 7 days then discontinue, Disp: 21 tablet, Rfl: 0    acetaminophen (TYLENOL) 325 mg tablet, Take 3 tablets (975 mg total) by mouth 3 (three) times a day (Patient not taking: Reported on 10/9/2019), Disp: 270 tablet, Rfl: 0    aluminum-magnesium hydroxide-simethicone (MYLANTA) 200-200-20 mg/5 mL suspension, Take 30 mL by mouth every 6 (six) hours as needed for indigestion or heartburn (Patient not taking: Reported on 10/9/2019), Disp: 355 mL, Rfl: 0    docusate sodium (COLACE) 100 mg capsule, Take 1 capsule (100 mg total) by mouth 2 (two) times a day (Patient not taking: Reported on 10/9/2019), Disp: 10 capsule, Rfl: 0    fluticasone-vilanterol (BREO ELLIPTA) 100-25 mcg/inh inhaler, Inhale 1 puff daily Rinse mouth after use  (Patient not taking: Reported on 10/9/2019), Disp: 1 Inhaler, Rfl: 0    folic acid (FOLVITE) 1 mg tablet, Take 1 tablet (1 mg total) by mouth daily (Patient not taking: Reported on 10/9/2019), Disp: , Rfl: 0    hydrOXYzine HCL (ATARAX) 25 mg tablet, Take 1 tablet (25 mg total) by mouth every 6 (six) hours as needed for anxiety (Patient not taking: Reported on 10/9/2019), Disp: 30 tablet, Rfl: 0    insulin glargine (LANTUS) 100 units/mL subcutaneous injection, Patient currently taking 25 units insulin in the morning; patient will need to have this medication adjusted at facility by physician (Patient not taking: Reported on 10/9/2019), Disp: , Rfl: 0    insulin lispro (HumaLOG) 100 units/mL injection, Inject 1-5 Units under the skin 3 (three) times a day before meals (Patient not taking: Reported on 10/9/2019), Disp: , Rfl: 0    insulin lispro (HumaLOG) 100 units/mL injection, Patient is currently been taking 15 units t i d  With meals while in the hospital; this is secondary to patient being on steroids and dose will need to be adjusted at facility by physician   (Patient not taking: Reported on 10/9/2019), Disp: , Rfl: 0    levETIRAcetam (KEPPRA) 500 mg tablet, Take 1 tablet (500 mg total) by mouth every 12 (twelve) hours for 8 doses, Disp: 8 tablet, Rfl: 0    Naproxen Sodium (ALEVE PO), Take by mouth as needed, Disp: , Rfl:     nicotine (NICODERM CQ) 21 mg/24 hr TD 24 hr patch, Place 1 patch on the skin daily (Patient not taking: Reported on 10/9/2019), Disp: 28 patch, Rfl: 0    nicotine polacrilex (NICORETTE) 2 mg gum, Chew 1 each (2 mg total) every hour as needed for smoking cessation (Patient not taking: Reported on 10/9/2019), Disp: 100 each, Rfl: 0    ondansetron (ZOFRAN) 8 mg tablet, Take 1 tablet (8 mg total) by mouth every 8 (eight) hours as needed for nausea or vomiting (Patient not taking: Reported on 10/9/2019), Disp: 30 tablet, Rfl: 3    pantoprazole (PROTONIX) 40 mg tablet, Take 1 tablet (40 mg total) by mouth daily in the early morning Take this medication for duration of steroid therapy (Patient not taking: Reported on 10/9/2019), Disp: , Rfl: 0    senna (SENOKOT) 8 6 mg, Take 1 tablet (8 6 mg total) by mouth daily (Patient not taking: Reported on 10/9/2019), Disp: 120 each, Rfl: 0    thiamine 100 MG tablet, Take 1 tablet (100 mg total) by mouth daily (Patient not taking: Reported on 10/9/2019), Disp: , Rfl: 0    No Known Allergies     Review of Systems:  Review of Systems   Constitutional: Positive for fatigue  HENT: Negative  Eyes: Negative  Respiratory: Positive for cough (brown mucous production at times)  Cardiovascular: Negative  Gastrointestinal: Negative  Endocrine: Negative  Genitourinary: Positive for frequency  Musculoskeletal: Positive for back pain  Skin: Positive for wound (surgical incision healing well)  Neurological: Positive for weakness (R sided weakness improved), numbness (R hand) and headaches (come and go)  Hematological: Negative  Psychiatric/Behavioral: Negative  Vitals:    10/09/19 1503   BP: 120/64   Pulse: 75   Resp: 18   Temp: 98 9 °F (37 2 °C)   SpO2: 95%   Weight: 74 8 kg (165 lb)   Height: 6' 2" (1 88 m)       Pain Score:   6    Pain assessment: 6    No results found for: PSA:    Imaging:No images are attached to the encounter       Teaching NCI RT packet given

## 2019-10-09 NOTE — PROGRESS NOTES
Roselia Wilkinson   1950  [address] [facility]  [address] [id number]    Chief Complaint   Patient presents with   Lynita Model History    Dr Jm Shay saw pt inpt 9/6/19- Pt is a 81 y o  male who presents with multifocal hepatocellular carcinoma in a background of hepatitis-C and cirrhosis diagnosed in 2017  Patient is status post TACE x2 and oral Sorafenib therapy while living in [facility]  He had some initial response to treatment and then progressed and discontinued Sorafenib therapy  He did not go back for follow-up  He has now moved from [place] to be with his family in [place], [place]  He has been admitted with right hand weakness/clumsiness and right lower extremity weakness  CT of the head and MRI of the brain revealed a large centrally necrotic mass in the left frontoparietal region measuring 2 9 x 4 3 x 3 4 cm with surrounding vasogenic edema  He has now been placed on Decadron and has some improvement in his right-sided weakness  He has been seen by Neurosurgery and plan is for him to have craniotomy and resection of his solitary brain metastasis on Monday or Tuesday of next week  Once he recovers from the surgery, he will require postoperative radiation therapy either to the whole brain over 2 weeks verses treatment locally to the resection cavity  We discussed the acute side effects and potential chronic complications of postoperative radiation therapy  He does agree to receive treatment once he heals from the surgery and will be re-evaluated at that time  He also understands that he will need further systemic treatment for his recurrent disease in the liver as well as bone metastasis and fidel hepatis lymphadenopathy  9/4/19 CT C/A/P- Heterogenous liver with hepatomegaly and multifocal heterogenous liver lesions, may be due to multifocal [address]  or metastatic disease    Left lower lobe lung nodule measuring 7 6 mm, suspicious for metastatic disease  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  Mildly enlarged fidel hepatis lymphadenopathy    9/10/19 Imagine guided L frontal craniotomy for resection of tumor    Pt d/c from hospital 9/13/19 9/25/19 Dr Yonas Scales- doing clinically well  Weaned off Decadron  He has had improvement in his coordination and gait  Final path has not returned  Will order post treatment/pre radiation planning MRI    9/30/19 MRI brain- Postoperative change status post left frontal parietal tumor resection  Subacute blood products within the resection cavity with minimal peripheral enhancement present  Improving surrounding edema  Continued surveillance imaging is recommended  There is a small extradural collection underlying the craniotomy site which is improving in size  Only minimal mass effect upon the adjacent brain parenchyma  No new enhancing lesions  10/7/19 Dr Yonas Scales- pathology concerning for a high-grade glioma, GBM  Discussed GBM  Discussed chemo and radiation  F/u scheduled with rad onc and med onc  MRI reviewed, do not see evidence of recurrent or residual disease    10/9/19 Dr Marco Antonio [facility])    9/5/2019 Initial Diagnosis     Hepatocellular carcinoma ([facility] [address] )        Glioblastoma ([facility] [address] )    9/10/2019 Biopsy     Imagine guided L frontal craniotomy for resection of tumor    Preliminary Diagnosis   SECOND PRELIMINARY REPORT:  A & B  Brain, left frontal mass, biopsy for frozen section & excision:  - 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, namely glioblastoma, with PNET-like component - see Note  Immunohistochemical stains (performed at [facility], [place], [place]) demonstrate the following tumor cell immunophenotype:   * Positive: GFAP (patchy), synaptophysin, and chromogranin (patchy), cytokeratin CAM5 2 (focal dot-like staining)     * Negative: IDH1 (R132H), BRAF V600E, EGFR vIII, TTF1, and CK18             10/7/2019 Initial Diagnosis     Glioblastoma ([facility] [address] )         History of Present Illness:  2017 patient was found to have right lobe hepatocellular carcinoma with hepatitis C and cirrhosis  July 2017 and September 2017 patient had chemoembolization  He was started on Nexavar 200 mg p o  B i d  He had some improvement in his tumor with eventual stabilization after about 5-6 months of Nexavar  He tolerated the Nexavar with expected toxicities, nausea, poor appetite, fatigue, diarrhea  He self discontinued the medication due to toxicity and flat efficacy  His life partner passed away from cancer  He relocated to the [facility] to be near family  September 2019 patient presented to the hospital with right hand weakness numbness and tremor for 3 months  CT showed edema in the left parietal area with some associated hemorrhage and mass  CT chest abdomen pelvis showed multifocal hepatic lesions as well as a 7 6 mm left lower lobe pulmonary lesion and osseous lesions in the 5th rib and manubrium consistent with metastases  September 10, 2019 patient underwent resection of the left frontal lesion  This showed findings consistent with malignant neuro epithelial tumor with PNET component  Positive GFAP, synaptophysin, patchy chromogranin  Negative IDH1, B Anastacio, EGFR V3, TTF 1  Review of Systems   Constitutional: Negative for chills and fever  HENT: Negative for nosebleeds  Eyes: Negative for discharge  Respiratory: Negative for cough and shortness of breath  Cardiovascular: Negative for chest pain  Gastrointestinal: Negative for abdominal pain, constipation and diarrhea  Endocrine: Negative for polydipsia  Genitourinary: Negative for hematuria  Musculoskeletal: Negative for arthralgias  Skin: Negative for color change  Allergic/Immunologic: Negative for immunocompromised state  Neurological: Negative for dizziness and headaches     Hematological: Negative for adenopathy  Psychiatric/Behavioral: Negative for agitation         Patient Active Problem List   Diagnosis    Brain mass with hemorrhage    Essential hypertension    Alcohol abuse    Cerebral edema (HCC)    COPD (chronic obstructive pulmonary disease) (HCC)    Hepatocellular carcinoma (HCC)    Malnutrition related to chronic disease    Leukocytosis    Transaminitis    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    Tooth pain    Hyponatremia    Steroid-induced hyperglycemia    GERD (gastroesophageal reflux disease)    Constipation    Anxiety disorder    Seizure prophylaxis    Tobacco abuse    Glioblastoma ([address] )     Past Medical History:   Diagnosis Date    Cancer [facility])     liver    Dependence on nicotine from cigarettes 9/4/2019    Hypertension      Past Surgical History:   Procedure Laterality Date    APPENDECTOMY      CRANIOTOMY Left 9/10/2019    Procedure: Image guided left frontal craniotomy for resection of tumor;  Surgeon: Terry Martines MD;  [facility];  Service: Neurosurgery     Family History   Problem Relation Age of Onset    Cancer Mother     Cancer Father      Social History     Socioeconomic History    Marital status: Legally      Spouse name: Not on file    Number of children: Not on file    Years of education: Not on file    Highest education level: Not on file   Occupational History    Not on file   Social Needs    Financial resource strain: Not on file    Food insecurity:     Worry: Not on file     Inability: Not on file    Transportation needs:     Medical: Not on file     Non-medical: Not on file   Tobacco Use    Smoking status: Current Every Day Smoker     Packs/day: 1 00    Smokeless tobacco: Former User     Types: Chew   Substance and Sexual Activity    Alcohol use: Not Currently     Comment: 1/5 bottle weekly     Drug use: Never    Sexual activity: Not Currently   Lifestyle    Physical activity:     Days per week: Not on file     Minutes per session: Not on file    Stress: Not on file   Relationships    Social connections:     Talks on phone: Not on file     Gets together: Not on file     Attends Judaism service: Not on file     Active member of club or organization: Not on file     Attends meetings of clubs or organizations: Not on file     Relationship status: Not on file    Intimate partner violence:     Fear of current or ex partner: Not on file     Emotionally abused: Not on file     Physically abused: Not on file     Forced sexual activity: Not on file   Other Topics Concern    Not on file   Social History Narrative    Not on file       Current Outpatient Medications:     LORazepam (ATIVAN) 0 5 mg tablet, Take by mouth 2 (two) times a day , Disp: , Rfl:     Naproxen Sodium (ALEVE PO), Take by mouth as needed, Disp: , Rfl:     nicotine (NICODERM CQ) 21 mg/24 hr TD 24 hr patch, Place 1 patch on the skin daily, Disp: 28 patch, Rfl: 0    sodium chloride 1 g tablet, Take 1 g b i d  For 7 days; take 1 g daily for 7 days then discontinue, Disp: 21 tablet, Rfl: 0    acetaminophen (TYLENOL) 325 mg tablet, Take 3 tablets (975 mg total) by mouth 3 (three) times a day (Patient not taking: Reported on 10/9/2019), Disp: 270 tablet, Rfl: 0    aluminum-magnesium hydroxide-simethicone (MYLANTA) 200-200-20 mg/5 mL suspension, Take 30 mL by mouth every 6 (six) hours as needed for indigestion or heartburn (Patient not taking: Reported on 10/9/2019), Disp: 355 mL, Rfl: 0    docusate sodium (COLACE) 100 mg capsule, Take 1 capsule (100 mg total) by mouth 2 (two) times a day (Patient not taking: Reported on 10/9/2019), Disp: 10 capsule, Rfl: 0    fluticasone-vilanterol (BREO ELLIPTA) 100-25 mcg/inh inhaler, Inhale 1 puff daily Rinse mouth after use   (Patient not taking: Reported on 10/9/2019), Disp: 1 Inhaler, Rfl: 0    folic acid (FOLVITE) 1 mg tablet, Take 1 tablet (1 mg total) by mouth daily (Patient not taking: Reported on 10/9/2019), Disp: , Rfl: 0    hydrOXYzine HCL (ATARAX) 25 mg tablet, Take 1 tablet (25 mg total) by mouth every 6 (six) hours as needed for anxiety (Patient not taking: Reported on 10/9/2019), Disp: 30 tablet, Rfl: 0    insulin glargine (LANTUS) 100 units/mL subcutaneous injection, Patient currently taking 25 units insulin in the morning; patient will need to have this medication adjusted at facility by physician (Patient not taking: Reported on 10/9/2019), Disp: , Rfl: 0    insulin lispro (HumaLOG) 100 units/mL injection, Inject 1-5 Units under the skin 3 (three) times a day before meals (Patient not taking: Reported on 10/9/2019), Disp: , Rfl: 0    insulin lispro (HumaLOG) 100 units/mL injection, Patient is currently been taking 15 units t i d  With meals while in the hospital; this is secondary to patient being on steroids and dose will need to be adjusted at facility by physician   (Patient not taking: Reported on 10/9/2019), Disp: , Rfl: 0    levETIRAcetam (KEPPRA) 500 mg tablet, Take 1 tablet (500 mg total) by mouth every 12 (twelve) hours for 8 doses, Disp: 8 tablet, Rfl: 0    nicotine polacrilex (NICORETTE) 2 mg gum, Chew 1 each (2 mg total) every hour as needed for smoking cessation (Patient not taking: Reported on 10/9/2019), Disp: 100 each, Rfl: 0    NIFEdipine (ADALAT CC) 60 MG 24 hr tablet, Take 1 tablet (60 mg total) by mouth daily (Patient not taking: Reported on 10/9/2019), Disp: , Rfl: 0    ondansetron (ZOFRAN) 4 mg/2 mL injection, Infuse 2 mL (4 mg total) into a venous catheter every 6 (six) hours as needed for nausea or vomiting (Patient not taking: Reported on 10/9/2019), Disp: , Rfl: 0    pantoprazole (PROTONIX) 40 mg tablet, Take 1 tablet (40 mg total) by mouth daily in the early morning Take this medication for duration of steroid therapy (Patient not taking: Reported on 10/9/2019), Disp: , Rfl: 0    senna (SENOKOT) 8 6 mg, Take 1 tablet (8 6 mg total) by mouth daily (Patient not taking: Reported on 10/9/2019), Disp: 120 each, Rfl: 0    thiamine 100 MG tablet, Take 1 tablet (100 mg total) by mouth daily (Patient not taking: Reported on 10/9/2019), Disp: , Rfl: 0  No Known Allergies  Vitals:    10/09/19 1306   BP: 122/64   Pulse: 77   Resp: 17   Temp: 98 °F (36 7 °C)   SpO2: 96%       Physical Exam   Constitutional: He is oriented to person, place, and time  He appears well-developed  HENT:   Head: Normocephalic  Eyes: Pupils are equal, round, and reactive to light  Neck: Neck supple  Cardiovascular: Normal rate and regular rhythm  No murmur heard  Pulmonary/Chest: Breath sounds normal  He has no wheezes  He has no rales  Abdominal: Soft  There is no tenderness  Musculoskeletal: Normal range of motion  He exhibits no edema or tenderness  Lymphadenopathy:     He has no cervical adenopathy  Neurological: He is alert and oriented to person, place, and time  He has normal reflexes  No cranial nerve deficit  Skin: No rash noted  No erythema  Psychiatric: He has a normal mood and affect  His behavior is normal          Labs:  CBC, Coags, BMP, Mg, Phos    Imaging  Xr Mandible Panorex ([place] Only)    Result Date: 9/11/2019  Narrative: MANDIBLE - PANOREX INDICATION:   abscess  COMPARISON:  Images from CT head 9/4/2019 VIEWS:  XR MANDIBLE PANOREX ([place] ONLY) FINDINGS: Irregular contour of the crowns of multiple mandibular teeth and to a lesser extent the maxillary incisors  Cannot determine chronicity in the absence of old films  Please note sensitivity is of this technique regarding dental caries is significantly diminished in relation to dedicated bitewing radiographs (which are not available in the hospital setting)  Subtle dental caries cannot entirely be excluded and clinical correlation is necessary  There is a vague lucency of the left paramedian mandible, nonspecific    This could potentially be artifactual, however if there is concern for periapical abscess, CT imaging with contrast could be obtained  There is no fracture  Included portions of the maxillary sinuses appear grossly clear  Impression: Irregular surface contour of the crowns of multiple mandibular and lesser extent maxillary teeth  Clinical correlation regarding dental caries is necessary  Vague lucency left paramedian mandible, nonspecific as above  The study was marked in [facility] for immediate notification  Workstation performed: [id number]     Mri Brain With And Without Contrast    Result Date: 10/1/2019  Narrative: MRI BRAIN WITH AND WITHOUT CONTRAST INDICATION: C22 0: Liver cell carcinoma with intracranial metastasis  Patient is status post resection  G93 6: Cerebral edema G93 9: Disorder of brain, unspecified  COMPARISON:  9/10/2019, 9/5/2019  TECHNIQUE: Sagittal T1, axial T2, axial FLAIR, axial T1, axial Gradient, axial diffusion  Sagittal, axial T1 postcontrast   Axial 3D T1 gradient post contrast with coronal recons     IV Contrast:  7 mL of gadobutrol injection (MULTI-DOSE)  IMAGE QUALITY:   Diagnostic  FINDINGS: BRAIN PARENCHYMA:  Patient is status post resection of left frontoparietal tumor  Resection cavity demonstrates heterogeneous signal on T1-weighted imaging consistent with subacute blood products and peripheral hemosiderin deposition  On T2 and FLAIR imaging there is surrounding edema which is improving compared to the prior examination  Postcontrast imaging demonstrates minimal peripheral enhancement of the resection cavity  Underlying the craniotomy site there is a small extradural collection which is improving  The remainder of the brain parenchyma is stable with mild chronic microangiopathic change  There are no new enhancing lesions identified  Slight dural thickening and enhancement is seen within the left hemisphere, postsurgical in nature   VENTRICLES:  Normal  SELLA AND PITUITARY GLAND:  Normal  ORBITS:  Normal  PARANASAL SINUSES:  Normal  VASCULATURE:  Evaluation of the major intracranial vasculature demonstrates appropriate flow voids  CALVARIUM AND SKULL BASE:  Expected postoperative change status post left temporoparietal craniotomy EXTRACRANIAL SOFT TISSUES:  Normal      Impression: Postoperative change status post left frontal parietal tumor resection  Subacute blood products within the resection cavity with minimal peripheral enhancement present  Improving surrounding edema  Continued surveillance imaging is recommended  There is a small extradural collection underlying the craniotomy site which is improving in size  Only minimal mass effect upon the adjacent brain parenchyma  No new enhancing lesions  Workstation performed: [id number]     Mri Brain W Wo Contrast    Result Date: 9/11/2019  Narrative: MRI BRAIN WITH AND WITHOUT CONTRAST INDICATION: MRI brain within 24 hours post op   s/p craniotomy for resection of tumor  COMPARISON:  Preoperative brain MRI September 5, 2019 TECHNIQUE: Sagittal T1, axial T2, axial FLAIR, axial T1, axial [place], axial diffusion  Sagittal, axial T1 postcontrast   Axial bravo postcontrast with coronal reconstructions  IV Contrast:  7 mL of Gadobutrol injection (SINGLE-DOSE)  IMAGE QUALITY:   Diagnostic  FINDINGS: BRAIN PARENCHYMA:  Status post left parietal craniotomy and resection of enhancing mass  There is a resection cavity identified with some surrounding blood products  No residual enhancing mass identified  Persistent vasogenic edema noted extending into the posterior frontal lobe and anterior parietal lobe  Small volume of extra-axial air identified which is typical postoperative setting  Suspected small subdural hemorrhage bilaterally  No significant mass effect as result of subdural blood  Small scattered hyperintensities on T2/FLAIR imaging are noted in the periventricular and subcortical white matter demonstrating an appearance that is statistically most likely to represent mild microangiopathic change   VENTRICLES:  Minimal mass effect on the posterior horn left lateral ventricle without midline shift  SELLA AND PITUITARY GLAND:  Normal  ORBITS:  Normal  PARANASAL SINUSES:  Normal  VASCULATURE:  Evaluation of the major intracranial vasculature demonstrates appropriate flow voids  CALVARIUM AND SKULL BASE:  Normal  EXTRACRANIAL SOFT TISSUES:  Normal      Impression: Status post left parietal craniotomy and resection of enhancing mass left frontoparietal junction  No residual enhancing mass identified  Continued follow-up recommended  Residual vasogenic edema is noted resulting in mass effect on the posterior horn  left lateral ventricle without midline shift  Small subdural hemorrhage bilaterally measuring 2 mm in thickness  No additional enhancing mass identified  Workstation performed: [id number]     I reviewed the above laboratory and imaging data  Discussion/Summary:  In summary, this is a 14-year-old male history of hepatocellular carcinoma and new brain tumor, as outlined  The pulmonary nodule may be reflective of metastatic hepatocellular carcinoma, supported by the presence of bone lesions consistent with metastases  I suspect that this is the less threatening of his 2 problems currently  This is status post resection  We reviewed that radiation and chemotherapy could be offered postoperatively with a goal of disease control, palliative benefit, survival benefit  We reviewed potential toxicities of Temodar including but not limited to nausea, vomiting, constipation, cytopenias, fatigue  Other side effects are possible though less likely  Our chemotherapy nurse review the above information as well as providing written information in this regard  We reviewed prophylactic measures taken routinely as well as monitoring to allow for early intervention as appropriate  In 2 months or so we would re-stage extracranial disease by way of CT chest abdomen this    Further treatment could be considered for his hepatocellular carcinoma, if progressive  I reviewed the above considerations at length with the patient and his brother  They voiced understanding and agreement

## 2019-10-09 NOTE — PROGRESS NOTES
Consultation - Radiation Oncology     OJT:[id number] : 1950  Encounter: [id number]  Patient Information: Naun Crowe  CHIEF COMPLAINT  Chief Complaint   Patient presents with    Follow-up     radiation oncology     Cancer Staging  No matching staging information was found for the patient  History of Present Illness   Naun Crowe  is a 81y o  year old male who presents with multifocal hepatocellular carcinoma in a background of hepatitis-C and cirrhosis diagnosed in   [facility] x2 and oral Sorafenib therapy while living in [facility]   He had some initial response to treatment and then progressed and discontinued Geraline Olszewski did not go back for follow-up  Kenji Caro has now moved from [place] to be with his family in [place], [place] has been admitted with right hand weakness/clumsiness and right lower extremity weakness   CT of the head and MRI of the brain revealed a large centrally necrotic mass in the left frontoparietal region measuring 2 9 x 4 3 x 3 4 cm with surrounding vasogenic edema   He has now been placed on Decadron and has some improvement in his right-sided weakness   He has been seen by Neurosurgery and plan is for him to have craniotomy and resection of his solitary brain metastasis on Monday or Tuesday of next week   Once he recovers from the surgery, he will require postoperative radiation therapy either to the whole brain over 2 weeks verses treatment locally to the resection cavity   We discussed the acute side effects and potential chronic complications of postoperative radiation therapy  Kenji Caro does agree to receive treatment once he heals from the surgery and will be re-evaluated at that time  Kenji Caro also understands that he will need further systemic treatment for his recurrent disease in the liver as well as bone metastasis and fidel hepatis lymphadenopathy      19 CT C/A/P- Heterogenous liver with hepatomegaly and multifocal heterogenous liver lesions, may be due to multifocal [address]  or metastatic disease  Left lower lobe lung nodule measuring 7 6 mm, suspicious for metastatic disease  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  Mildly enlarged fidel hepatis lymphadenopathy     9/10/19 Imagine guided L frontal craniotomy for resection of tumor     Pt d/c from hospital 9/13/19 9/25/19 Dr Dominic Oquendo- doing clinically well  Weaned off Decadron  He has had improvement in his coordination and gait  Final path has not returned  Will order post treatment/pre radiation planning MRI     9/30/19 MRI brain- Postoperative change status post left frontal parietal tumor resection   Subacute blood products within the resection cavity with minimal peripheral enhancement present   Improving surrounding edema   Continued surveillance imaging is recommended  There is a small extradural collection underlying the craniotomy site which is improving in size  Only minimal mass effect upon the adjacent brain parenchyma  No new enhancing lesions      10/7/19 Dr Dominic Oquendo- pathology concerning for a high-grade glioma, GBM  Discussed GBM  Discussed chemo and radiation  F/u scheduled with rad onc and med onc  MRI reviewed, do not see evidence of recurrent or residual disease     10/9/19 Dr Merritt Gilmore [facility])    9/5/2019 Initial Diagnosis     Hepatocellular carcinoma ([address] )        Glioblastoma ([address] )    9/10/2019 Biopsy     Imagine guided L frontal craniotomy for resection of tumor    Preliminary Diagnosis   SECOND PRELIMINARY REPORT:  A & B  Brain, left frontal mass, biopsy for frozen section & excision:  - 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, namely glioblastoma, with PNET-like component - see Note    Immunohistochemical stains (performed at [place], [place]) demonstrate the following tumor cell immunophenotype:   * Positive: GFAP (patchy), synaptophysin, and chromogranin (patchy), cytokeratin CAM5 2 (focal dot-like staining)  * Negative: IDH1 (R132H), BRAF V600E, EGFR vIII, TTF1, and CK18             10/7/2019 Initial Diagnosis     Glioblastoma ([address] )           Past Medical History:   Diagnosis Date    Cancer [facility])     liver    Dependence on nicotine from cigarettes 9/4/2019    Hypertension     Liver cancer [facility])      Past Surgical History:   Procedure Laterality Date    APPENDECTOMY      CRANIOTOMY Left 9/10/2019    Procedure: Image guided left frontal craniotomy for resection of tumor;  Surgeon: Marcell Karimi MD;  [facility];  Service: Neurosurgery       Family History   Problem Relation Age of Onset    Breast cancer Mother     Liver cancer Father     Throat cancer Paternal Uncle        Social History   Social History     Substance and Sexual Activity   Alcohol Use Not Currently    Comment: occasional, 1/5 / week in the past     Social History     Substance and Sexual Activity   Drug Use Never     Social History     Tobacco Use   Smoking Status Current Every Day Smoker    Packs/day: 1 00    Years: 40 00    Pack years: 40 00   Smokeless Tobacco Former User    Types: Chew         Meds/Allergies     Current Outpatient Medications:     LORazepam (ATIVAN) 0 5 mg tablet, Take by mouth 2 (two) times a day , Disp: , Rfl:     NIFEdipine (ADALAT CC) 60 MG 24 hr tablet, Take 1 tablet (60 mg total) by mouth daily, Disp: , Rfl: 0    oxyCODONE (ROXICODONE) 5 mg immediate release tablet, Take 1 tablet (5 mg total) by mouth every 4 (four) hours as needed for moderate painMax Daily Amount: 30 mg, Disp: 60 tablet, Rfl: 0    sodium chloride 1 g tablet, Take 1 g b i d   For 7 days; take 1 g daily for 7 days then discontinue, Disp: 21 tablet, Rfl: 0    acetaminophen (TYLENOL) 325 mg tablet, Take 3 tablets (975 mg total) by mouth 3 (three) times a day (Patient not taking: Reported on 10/9/2019), Disp: 270 tablet, Rfl: 0    aluminum-magnesium hydroxide-simethicone (MYLANTA) 200-200-20 mg/5 mL suspension, Take 30 mL by mouth every 6 (six) hours as needed for indigestion or heartburn (Patient not taking: Reported on 10/9/2019), Disp: 355 mL, Rfl: 0    docusate sodium (COLACE) 100 mg capsule, Take 1 capsule (100 mg total) by mouth 2 (two) times a day (Patient not taking: Reported on 10/9/2019), Disp: 10 capsule, Rfl: 0    fluticasone-vilanterol (BREO ELLIPTA) 100-25 mcg/inh inhaler, Inhale 1 puff daily Rinse mouth after use  (Patient not taking: Reported on 10/9/2019), Disp: 1 Inhaler, Rfl: 0    folic acid (FOLVITE) 1 mg tablet, Take 1 tablet (1 mg total) by mouth daily (Patient not taking: Reported on 10/9/2019), Disp: , Rfl: 0    hydrOXYzine HCL (ATARAX) 25 mg tablet, Take 1 tablet (25 mg total) by mouth every 6 (six) hours as needed for anxiety (Patient not taking: Reported on 10/9/2019), Disp: 30 tablet, Rfl: 0    insulin glargine (LANTUS) 100 units/mL subcutaneous injection, Patient currently taking 25 units insulin in the morning; patient will need to have this medication adjusted at facility by physician (Patient not taking: Reported on 10/9/2019), Disp: , Rfl: 0    insulin lispro (HumaLOG) 100 units/mL injection, Inject 1-5 Units under the skin 3 (three) times a day before meals (Patient not taking: Reported on 10/9/2019), Disp: , Rfl: 0    insulin lispro (HumaLOG) 100 units/mL injection, Patient is currently been taking 15 units t i d  With meals while in the hospital; this is secondary to patient being on steroids and dose will need to be adjusted at facility by physician   (Patient not taking: Reported on 10/9/2019), Disp: , Rfl: 0    levETIRAcetam (KEPPRA) 500 mg tablet, Take 1 tablet (500 mg total) by mouth every 12 (twelve) hours for 8 doses, Disp: 8 tablet, Rfl: 0    Naproxen Sodium (ALEVE PO), Take by mouth as needed, Disp: , Rfl:     nicotine (NICODERM CQ) 21 mg/24 hr TD 24 hr patch, Place 1 patch on the skin daily (Patient not taking: Reported on 10/9/2019), Disp: 28 patch, Rfl: 0    nicotine polacrilex (NICORETTE) 2 mg gum, Chew 1 each (2 mg total) every hour as needed for smoking cessation (Patient not taking: Reported on 10/9/2019), Disp: 100 each, Rfl: 0    ondansetron (ZOFRAN) 8 mg tablet, Take 1 tablet (8 mg total) by mouth every 8 (eight) hours as needed for nausea or vomiting (Patient not taking: Reported on 10/9/2019), Disp: 30 tablet, Rfl: 3    pantoprazole (PROTONIX) 40 mg tablet, Take 1 tablet (40 mg total) by mouth daily in the early morning Take this medication for duration of steroid therapy (Patient not taking: Reported on 10/9/2019), Disp: , Rfl: 0    senna (SENOKOT) 8 6 mg, Take 1 tablet (8 6 mg total) by mouth daily (Patient not taking: Reported on 10/9/2019), Disp: 120 each, Rfl: 0    thiamine 100 MG tablet, Take 1 tablet (100 mg total) by mouth daily (Patient not taking: Reported on 10/9/2019), Disp: , Rfl: 0  No Known Allergies      Review of Systems Constitutional: Positive for fatigue  HENT: Negative  Eyes: Negative  Respiratory: Positive for cough (brown mucous production at times)  Cardiovascular: Negative  Gastrointestinal: Negative  Endocrine: Negative  Genitourinary: Positive for frequency  Musculoskeletal: Positive for back pain  Skin: Positive for wound (surgical incision healing well)  Neurological: Positive for weakness (R sided weakness improved), numbness (R hand) and headaches (come and go)  Hematological: Negative  Psychiatric/Behavioral: Negative    OBJECTIVE:   /64   Pulse 75   Temp 98 9 °F (37 2 °C)   Resp 18   Ht 6' 2" (1 88 m)   Wt 74 8 kg (165 lb)   SpO2 95%   BMI 21 18 kg/m²   Pain Assessment:  6  Performance Status: ECOG/Zubrod/WHO: 1 - Symptomatic but completely ambulatory    Physical Exam GENERAL:  Appears stated age, in no apparent distress  Alert and oriented  HEENT:  Normocephalic, atraumatic   extraocular muscles intact   Oral mucosa moist   PULMONARY:  Respirations unlabored  CARDIOVASCULAR:  Regular rate  ABDOMEN:  Soft, nondistended  NEUROLOGIC: Moving all extremities, No focal deficits noted  EXTREMITIES: no clubbing, cyanosis, or edema  PSYCHIATRIC: normal mood and affect  Appropriate thought content and judgement  RESULTS  Lab Results    Chemistry        Component Value Date/Time    K 4 6 09/13/2019 0455    CL 98 (L) 09/13/2019 0455    CO2 26 09/13/2019 0455    CO2 28 09/10/2019 0928    BUN 30 (H) 09/13/2019 0455    CREATININE 1 02 09/13/2019 0455        Component Value Date/Time    CALCIUM 9 1 09/13/2019 0455    ALKPHOS 218 (H) 09/13/2019 0455    AST 58 (H) 09/13/2019 0455     (H) 09/13/2019 0455            Lab Results   Component Value Date    WBC 11 82 (H) 09/13/2019    HGB 13 7 09/13/2019    HCT 40 0 09/13/2019    MCV 93 09/13/2019     09/13/2019         Imaging Studies  Xr Mandible Panorex ([place] Only)    Result Date: 9/11/2019  Narrative: MANDIBLE - PANOREX INDICATION:   abscess  COMPARISON:  Images from CT head 9/4/2019 VIEWS:  XR MANDIBLE PANOREX ([place] ONLY) FINDINGS: Irregular contour of the crowns of multiple mandibular teeth and to a lesser extent the maxillary incisors  Cannot determine chronicity in the absence of old films  Please note sensitivity is of this technique regarding dental caries is significantly diminished in relation to dedicated bitewing radiographs (which are not available in the hospital setting)  Subtle dental caries cannot entirely be excluded and clinical correlation is necessary  There is a vague lucency of the left paramedian mandible, nonspecific  This could potentially be artifactual, however if there is concern for periapical abscess, CT imaging with contrast could be obtained  There is no fracture  Included portions of the maxillary sinuses appear grossly clear       Impression: Irregular surface contour of the crowns of multiple mandibular and lesser extent maxillary teeth  Clinical correlation regarding dental caries is necessary  Vague lucency left paramedian mandible, nonspecific as above  The study was marked in [facility] for immediate notification  Workstation performed: [id number]     Mri Brain With And Without Contrast    Result Date: 10/1/2019  Narrative: MRI BRAIN WITH AND WITHOUT CONTRAST INDICATION: C22 0: Liver cell carcinoma with intracranial metastasis  Patient is status post resection  G93 6: Cerebral edema G93 9: Disorder of brain, unspecified  COMPARISON:  9/10/2019, 9/5/2019  TECHNIQUE: Sagittal T1, axial T2, axial FLAIR, axial T1, axial Gradient, axial diffusion  Sagittal, axial T1 postcontrast   Axial 3D T1 gradient post contrast with coronal recons     IV Contrast:  7 mL of gadobutrol injection (MULTI-DOSE)  IMAGE QUALITY:   Diagnostic  FINDINGS: BRAIN PARENCHYMA:  Patient is status post resection of left frontoparietal tumor  Resection cavity demonstrates heterogeneous signal on T1-weighted imaging consistent with subacute blood products and peripheral hemosiderin deposition  On T2 and FLAIR imaging there is surrounding edema which is improving compared to the prior examination  Postcontrast imaging demonstrates minimal peripheral enhancement of the resection cavity  Underlying the craniotomy site there is a small extradural collection which is improving  The remainder of the brain parenchyma is stable with mild chronic microangiopathic change  There are no new enhancing lesions identified  Slight dural thickening and enhancement is seen within the left hemisphere, postsurgical in nature  VENTRICLES:  Normal  SELLA AND PITUITARY GLAND:  Normal  ORBITS:  Normal  PARANASAL SINUSES:  Normal  VASCULATURE:  Evaluation of the major intracranial vasculature demonstrates appropriate flow voids   CALVARIUM AND SKULL BASE:  Expected postoperative change status post left temporoparietal craniotomy EXTRACRANIAL SOFT TISSUES:  Normal      Impression: Postoperative change status post left frontal parietal tumor resection  Subacute blood products within the resection cavity with minimal peripheral enhancement present  Improving surrounding edema  Continued surveillance imaging is recommended  There is a small extradural collection underlying the craniotomy site which is improving in size  Only minimal mass effect upon the adjacent brain parenchyma  No new enhancing lesions  Workstation performed: [id number]     Mri Brain W Wo Contrast    Result Date: 9/11/2019  Narrative: MRI BRAIN WITH AND WITHOUT CONTRAST INDICATION: MRI brain within 24 hours post op   s/p craniotomy for resection of tumor  COMPARISON:  Preoperative brain MRI September 5, 2019 TECHNIQUE: Sagittal T1, axial T2, axial FLAIR, axial T1, axial [place], axial diffusion  Sagittal, axial T1 postcontrast   Axial bravo postcontrast with coronal reconstructions  IV Contrast:  7 mL of Gadobutrol injection (SINGLE-DOSE)  IMAGE QUALITY:   Diagnostic  FINDINGS: BRAIN PARENCHYMA:  Status post left parietal craniotomy and resection of enhancing mass  There is a resection cavity identified with some surrounding blood products  No residual enhancing mass identified  Persistent vasogenic edema noted extending into the posterior frontal lobe and anterior parietal lobe  Small volume of extra-axial air identified which is typical postoperative setting  Suspected small subdural hemorrhage bilaterally  No significant mass effect as result of subdural blood  Small scattered hyperintensities on T2/FLAIR imaging are noted in the periventricular and subcortical white matter demonstrating an appearance that is statistically most likely to represent mild microangiopathic change  VENTRICLES:  Minimal mass effect on the posterior horn left lateral ventricle without midline shift   SELLA AND PITUITARY GLAND:  Normal  ORBITS:  Normal  PARANASAL SINUSES:  Normal  VASCULATURE:  Evaluation of the major intracranial vasculature demonstrates appropriate flow voids  CALVARIUM AND SKULL BASE:  Normal  EXTRACRANIAL SOFT TISSUES:  Normal      Impression: Status post left parietal craniotomy and resection of enhancing mass left frontoparietal junction  No residual enhancing mass identified  Continued follow-up recommended  Residual vasogenic edema is noted resulting in mass effect on the posterior horn  left lateral ventricle without midline shift  Small subdural hemorrhage bilaterally measuring 2 mm in thickness  No additional enhancing mass identified  Workstation performed: [id number]         Pathology:high grade malignant epithelioid neoplasm most in keeping with malignant neuroepithelial tumor namely glioblastoma with PNET-like component        ASSESSMENT  1  Glioblastoma ([facility] [address] )       Cancer Staging  No matching staging information was found for the patient  PLAN/DISCUSSION  No orders of the defined types were placed in this encounter  Evert Foley  is a 81y o  year old male with metastatic hepatocellular carcinoma and  glioblastoma of the left frontoparietal lobe status post gross total resection  We discussed the results of the most recent MRI,which shows no recurrent/residual tumor  We discussed pathology, natural history, and prognosis of the patient's diagnosis  [facility] NCCN guidelines recommend concurrent chemo radiation with temodar after maximal safe resection  We discussed the mechanism of action of irradiation, logistics of treatment including CT simulation and mask making, and side effects including but not limited to fatigue, worsening edema, headache, nausea, vomiting, worsening neurologic deficit, dysphagia, odynophagia, and late delayed effects of radiation necrosis, neurocognitive/memory deficits,  vision/hearing changes, cataracts, and secondary malignancy  I explained that acute side effects are generally managed with steroid use through treatment if needed       After this discussion, we agreed to the following:  PLAN-  Informed consent was obtained, with plan for patient to return for  CT simulation  Recommendations are for 46 Gy in 23 fractions followed by boost to 60 Gy in 30 fractions total, with start to be coordinated with Geremias Weeks MD  10/9/2019,4:28 PM      Portions of the record may have been created with voice recognition software   Occasional wrong word or "sound a like" substitutions may have occurred due to the inherent limitations of voice recognition software   Read the chart carefully and recognize, using context, where substitutions have occurred

## 2019-10-10 ENCOUNTER — DOCUMENTATION (OUTPATIENT)
Dept: HEMATOLOGY ONCOLOGY | Facility: CLINIC | Age: 69
End: 2019-10-10

## 2019-10-10 DIAGNOSIS — C71.9 GLIOBLASTOMA (HCC): Primary | ICD-10-CM

## 2019-10-10 RX ORDER — TEMOZOLOMIDE 140 MG/1
CAPSULE ORAL
Qty: 42 CAPSULE | Refills: 0 | Status: SHIPPED | OUTPATIENT
Start: 2019-10-10 | End: 2020-02-11 | Stop reason: ALTCHOICE

## 2019-10-10 NOTE — PROGRESS NOTES
10/9/2019   Received notification from clinical pt will be starting Temodar 140 mg    Per [facility], the patient does not have any part D coverage  It will have to go through part B    Since [facility] can not bill through part B, will be sending this over to [facility]  Notified clinical, [facility], finance, and [facility] [facility]  Forwarded pt information over to [facility] [facility]  10/14/2019 received notification from finance that the patient has     [address]  ID#    [id number]  BIN#  [id number]  PCN#   [id number]  GRP#   [id number]    Radha from [facility] re-ran the claim under [employer] and stated the denial she received was because this is not covered, must go through part b for cancer treatment  Radha  Notified clinical and finance as well  Received notification from finance:    Floresita GARCIA is going to provide the Temodar free of charge  [facility] will be reaching out to him today for shipment tomorrow

## 2019-10-14 ENCOUNTER — DOCUMENTATION (OUTPATIENT)
Dept: HEMATOLOGY ONCOLOGY | Facility: CLINIC | Age: 69
End: 2019-10-14

## 2019-10-14 NOTE — PROGRESS NOTES
10-9-19  Received new oral chemo e-mail from provider for Temodar    10-11-19  Rcvd e-mail with copay amount from specialty pharmacy in the amount of $[phone] 61    10-14-19  Reached out to patient to discuss funding possibilities due to no part d coverage  Patient stated he does have [address]  ID#    [id number]  BIN#  [id number]  PCN#   [id number]  GRP#   [id number]     [phone]     Forwarded information to [place]ol[place]-for possible [place] if needed also to [facility] [phone] to run copay again  Received response from [facility] stating medication is still not covered  [employer] application completed and forwarded to providers office for signature  Once obtained will fax application to [employer] application from provider, Faxed to [facility]  Received call from [facility] stating concetta received and it will be processed urgent  Waiting on response    Received call from [facility]    For patient Odalis GARCIA is going to provide the Temodar free of charge  [facility] will be reaching out to him today for shipment tomorrow      EPIC NOTED, Patient notified

## 2019-10-15 ENCOUNTER — TELEPHONE (OUTPATIENT)
Dept: NEUROSURGERY | Facility: CLINIC | Age: 69
End: 2019-10-15

## 2019-10-15 NOTE — TELEPHONE ENCOUNTER
Patient called inquiring if he can get the flu shot  He is about to start the chemo and RT within the next few days  Discussed with Dr Elma Roberts nurse, Oneda Skiff said it is okay for the patient to receive a flu vaccination prior to starting treatment  Informed the patient of this  Patient said he will get the flu shot either today or tomorrow  He was appreciative

## 2019-10-17 ENCOUNTER — APPOINTMENT (OUTPATIENT)
Dept: RADIATION ONCOLOGY | Facility: CLINIC | Age: 69
End: 2019-10-17
Attending: STUDENT IN AN ORGANIZED HEALTH CARE EDUCATION/TRAINING PROGRAM
Payer: MEDICARE

## 2019-10-17 PROCEDURE — 77334 RADIATION TREATMENT AID(S): CPT | Performed by: STUDENT IN AN ORGANIZED HEALTH CARE EDUCATION/TRAINING PROGRAM

## 2019-10-17 PROCEDURE — 77470 SPECIAL RADIATION TREATMENT: CPT | Performed by: STUDENT IN AN ORGANIZED HEALTH CARE EDUCATION/TRAINING PROGRAM

## 2019-10-22 ENCOUNTER — TELEPHONE (OUTPATIENT)
Dept: NEUROSURGERY | Facility: CLINIC | Age: 69
End: 2019-10-22

## 2019-10-22 NOTE — TELEPHONE ENCOUNTER
Received a call from Dr [place] reporting the path is now final  Please review final path  Thanks  Final Diagnosis   A & B  Brain, left frontal mass, biopsy for frozen section & excision:  - 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, namely glioblastoma, with PNET-like component - see Note  * Immunohistochemical stains (performed at [facility], [place], [place]) demonstrate the following tumor cell immunophenotype:    -- Positive: GFAP (patchy), synaptophysin, and chromogranin (patchy), cytokeratin CAM5 2 (focal dot-like staining)  -- Negative: IDH1 (R132H), BRAF V600E, EGFR vIII, TTF1, and CK18   * [place] targeted RNAseq assay is Negative in [facility] for Gene Fusions in 85 cancer related genes including the following genes in a clinically validated panel:  ALK, ERB, EWSR1, FGFR3, FGFR2, FOX01, NTRK3, RET, POS1, SS18, STAT6, TAF15, TFE3, BRAF, NTRK1, FUS, CIC  * MGMT promoter hypermethylation in [address] [place] [address] Pathology Lab is Negative/NOT DETECTED  * While DNA-methylation profiling at [facility] fails to provide a conclusive match, multiple chromosomal gains and losses are noted on copy number analysis (these results are for investigational use only)     Electronically signed by Lunette Hammans, MD on 10/22/2019 at  9:12 AM

## 2019-10-23 PROCEDURE — 77338 DESIGN MLC DEVICE FOR IMRT: CPT | Performed by: STUDENT IN AN ORGANIZED HEALTH CARE EDUCATION/TRAINING PROGRAM

## 2019-10-23 PROCEDURE — 77301 RADIOTHERAPY DOSE PLAN IMRT: CPT | Performed by: STUDENT IN AN ORGANIZED HEALTH CARE EDUCATION/TRAINING PROGRAM

## 2019-10-23 PROCEDURE — 77300 RADIATION THERAPY DOSE PLAN: CPT | Performed by: STUDENT IN AN ORGANIZED HEALTH CARE EDUCATION/TRAINING PROGRAM

## 2019-10-28 ENCOUNTER — TELEPHONE (OUTPATIENT)
Dept: HEMATOLOGY ONCOLOGY | Facility: CLINIC | Age: 69
End: 2019-10-28

## 2019-10-28 ENCOUNTER — TELEPHONE (OUTPATIENT)
Dept: RADIATION ONCOLOGY | Facility: CLINIC | Age: 69
End: 2019-10-28

## 2019-10-28 NOTE — TELEPHONE ENCOUNTER
Patient called to get a refill on his pain medication he ran out of and did not inform the name of medication and dosage due to informing him, that he had a follow up this week at [facility]  Patient didn't understand how he was scheduled since he is supposed to have radiation Monday through Wednesday  Patient was going to call radiation oncology to discuss his radiation schedule  Please call back and advise  Thank you

## 2019-10-28 NOTE — TELEPHONE ENCOUNTER
Pt called the Radiation Oncology office requesting a refill of his  Oxycodone  He was c/o continued hand swelling and pain, as well as low back pain  He is now out of his medication  I forwarded message to Dr Diana Costa  Now forwarding to Dr Dalton Mohr, requested by Dr Diana Costa since he wrote the previous prescription

## 2019-10-28 NOTE — TELEPHONE ENCOUNTER
Pt called stating he would like a refill of his Oxycodone for his low back pain, and his hand pain  He is currently out of the medication, and has been having swelling and  pain continuing, in his hand   I reviewed his medication, he previously was taking Oxycodone 5 mg Q 4 hrs prn  Will request refill by Dr Jon Wei  He is scheduled to start chemo radiation tomorrow   NK

## 2019-10-29 ENCOUNTER — APPOINTMENT (OUTPATIENT)
Dept: RADIATION ONCOLOGY | Facility: CLINIC | Age: 69
End: 2019-10-29
Attending: STUDENT IN AN ORGANIZED HEALTH CARE EDUCATION/TRAINING PROGRAM
Payer: MEDICARE

## 2019-10-29 DIAGNOSIS — C71.9 GLIOBLASTOMA (HCC): ICD-10-CM

## 2019-10-29 PROCEDURE — 77417 THER RADIOLOGY PORT IMAGE(S): CPT | Performed by: STUDENT IN AN ORGANIZED HEALTH CARE EDUCATION/TRAINING PROGRAM

## 2019-10-29 PROCEDURE — 77386 HB NTSTY MODUL RAD TX DLVR CPLX: CPT | Performed by: STUDENT IN AN ORGANIZED HEALTH CARE EDUCATION/TRAINING PROGRAM

## 2019-10-29 RX ORDER — OXYCODONE HYDROCHLORIDE 5 MG/1
5 TABLET ORAL EVERY 4 HOURS PRN
Qty: 60 TABLET | Refills: 0 | Status: SHIPPED | OUTPATIENT
Start: 2019-10-29 | End: 2020-02-07 | Stop reason: SDUPTHER

## 2019-10-29 NOTE — TELEPHONE ENCOUNTER
Oxycodone rx was sent to pt  Pt has a f/u with DAYANA Krishna on 11/13 @ 8:30 in hospitals office, then radiation at 9:45 the same day in hospitals  [facility] is already set up  Pt voiced understanding

## 2019-10-30 ENCOUNTER — APPOINTMENT (OUTPATIENT)
Dept: RADIATION ONCOLOGY | Facility: CLINIC | Age: 69
End: 2019-10-30
Attending: STUDENT IN AN ORGANIZED HEALTH CARE EDUCATION/TRAINING PROGRAM
Payer: MEDICARE

## 2019-10-30 PROCEDURE — 77386 HB NTSTY MODUL RAD TX DLVR CPLX: CPT | Performed by: STUDENT IN AN ORGANIZED HEALTH CARE EDUCATION/TRAINING PROGRAM

## 2019-10-31 ENCOUNTER — APPOINTMENT (OUTPATIENT)
Dept: RADIATION ONCOLOGY | Facility: CLINIC | Age: 69
End: 2019-10-31
Attending: STUDENT IN AN ORGANIZED HEALTH CARE EDUCATION/TRAINING PROGRAM
Payer: MEDICARE

## 2019-10-31 PROCEDURE — 77386 HB NTSTY MODUL RAD TX DLVR CPLX: CPT | Performed by: STUDENT IN AN ORGANIZED HEALTH CARE EDUCATION/TRAINING PROGRAM

## 2019-11-01 ENCOUNTER — TELEPHONE (OUTPATIENT)
Dept: HEMATOLOGY ONCOLOGY | Facility: CLINIC | Age: 69
End: 2019-11-01

## 2019-11-01 ENCOUNTER — APPOINTMENT (OUTPATIENT)
Dept: RADIATION ONCOLOGY | Facility: CLINIC | Age: 69
End: 2019-11-01
Attending: STUDENT IN AN ORGANIZED HEALTH CARE EDUCATION/TRAINING PROGRAM
Payer: MEDICARE

## 2019-11-01 PROCEDURE — 77386 HB NTSTY MODUL RAD TX DLVR CPLX: CPT | Performed by: STUDENT IN AN ORGANIZED HEALTH CARE EDUCATION/TRAINING PROGRAM

## 2019-11-01 NOTE — TELEPHONE ENCOUNTER
Spoke with pt and let him know that he will be taking his Temodar 7 days a week as long as he is doing radiation, once radiation is done he will stop the pills as well  He voiced understanding

## 2019-11-01 NOTE — TELEPHONE ENCOUNTER
Patient Is taking chemo pills after his radiation, he called to ask if he should continue taking the pills not only Monday thru Friday, but to also  take them on Saturday and Sundays

## 2019-11-04 ENCOUNTER — APPOINTMENT (OUTPATIENT)
Dept: RADIATION ONCOLOGY | Facility: CLINIC | Age: 69
End: 2019-11-04
Attending: STUDENT IN AN ORGANIZED HEALTH CARE EDUCATION/TRAINING PROGRAM
Payer: MEDICARE

## 2019-11-04 ENCOUNTER — APPOINTMENT (OUTPATIENT)
Dept: LAB | Facility: CLINIC | Age: 69
End: 2019-11-04
Payer: MEDICARE

## 2019-11-04 PROCEDURE — 77336 RADIATION PHYSICS CONSULT: CPT | Performed by: STUDENT IN AN ORGANIZED HEALTH CARE EDUCATION/TRAINING PROGRAM

## 2019-11-04 PROCEDURE — 77386 HB NTSTY MODUL RAD TX DLVR CPLX: CPT | Performed by: STUDENT IN AN ORGANIZED HEALTH CARE EDUCATION/TRAINING PROGRAM

## 2019-11-05 ENCOUNTER — APPOINTMENT (OUTPATIENT)
Dept: RADIATION ONCOLOGY | Facility: CLINIC | Age: 69
End: 2019-11-05
Attending: STUDENT IN AN ORGANIZED HEALTH CARE EDUCATION/TRAINING PROGRAM
Payer: MEDICARE

## 2019-11-05 PROCEDURE — 77417 THER RADIOLOGY PORT IMAGE(S): CPT | Performed by: STUDENT IN AN ORGANIZED HEALTH CARE EDUCATION/TRAINING PROGRAM

## 2019-11-05 PROCEDURE — 77386 HB NTSTY MODUL RAD TX DLVR CPLX: CPT | Performed by: STUDENT IN AN ORGANIZED HEALTH CARE EDUCATION/TRAINING PROGRAM

## 2019-11-06 ENCOUNTER — APPOINTMENT (OUTPATIENT)
Dept: RADIATION ONCOLOGY | Facility: CLINIC | Age: 69
End: 2019-11-06
Attending: STUDENT IN AN ORGANIZED HEALTH CARE EDUCATION/TRAINING PROGRAM
Payer: MEDICARE

## 2019-11-06 PROCEDURE — 77386 HB NTSTY MODUL RAD TX DLVR CPLX: CPT | Performed by: STUDENT IN AN ORGANIZED HEALTH CARE EDUCATION/TRAINING PROGRAM

## 2019-11-07 ENCOUNTER — APPOINTMENT (OUTPATIENT)
Dept: RADIATION ONCOLOGY | Facility: CLINIC | Age: 69
End: 2019-11-07
Attending: STUDENT IN AN ORGANIZED HEALTH CARE EDUCATION/TRAINING PROGRAM
Payer: MEDICARE

## 2019-11-07 PROCEDURE — 77386 HB NTSTY MODUL RAD TX DLVR CPLX: CPT | Performed by: STUDENT IN AN ORGANIZED HEALTH CARE EDUCATION/TRAINING PROGRAM

## 2019-11-08 ENCOUNTER — APPOINTMENT (OUTPATIENT)
Dept: RADIATION ONCOLOGY | Facility: CLINIC | Age: 69
End: 2019-11-08
Attending: STUDENT IN AN ORGANIZED HEALTH CARE EDUCATION/TRAINING PROGRAM
Payer: MEDICARE

## 2019-11-08 PROCEDURE — 77386 HB NTSTY MODUL RAD TX DLVR CPLX: CPT | Performed by: STUDENT IN AN ORGANIZED HEALTH CARE EDUCATION/TRAINING PROGRAM

## 2019-11-11 ENCOUNTER — APPOINTMENT (OUTPATIENT)
Dept: RADIATION ONCOLOGY | Facility: CLINIC | Age: 69
End: 2019-11-11
Attending: STUDENT IN AN ORGANIZED HEALTH CARE EDUCATION/TRAINING PROGRAM
Payer: MEDICARE

## 2019-11-11 ENCOUNTER — APPOINTMENT (OUTPATIENT)
Dept: LAB | Facility: CLINIC | Age: 69
End: 2019-11-11
Payer: MEDICARE

## 2019-11-11 PROCEDURE — 77386 HB NTSTY MODUL RAD TX DLVR CPLX: CPT | Performed by: STUDENT IN AN ORGANIZED HEALTH CARE EDUCATION/TRAINING PROGRAM

## 2019-11-11 PROCEDURE — 77336 RADIATION PHYSICS CONSULT: CPT | Performed by: STUDENT IN AN ORGANIZED HEALTH CARE EDUCATION/TRAINING PROGRAM

## 2019-11-12 ENCOUNTER — APPOINTMENT (OUTPATIENT)
Dept: RADIATION ONCOLOGY | Facility: CLINIC | Age: 69
End: 2019-11-12
Attending: STUDENT IN AN ORGANIZED HEALTH CARE EDUCATION/TRAINING PROGRAM
Payer: MEDICARE

## 2019-11-12 PROCEDURE — 77386 HB NTSTY MODUL RAD TX DLVR CPLX: CPT | Performed by: STUDENT IN AN ORGANIZED HEALTH CARE EDUCATION/TRAINING PROGRAM

## 2019-11-12 PROCEDURE — 77417 THER RADIOLOGY PORT IMAGE(S): CPT | Performed by: STUDENT IN AN ORGANIZED HEALTH CARE EDUCATION/TRAINING PROGRAM

## 2019-11-13 ENCOUNTER — APPOINTMENT (OUTPATIENT)
Dept: RADIATION ONCOLOGY | Facility: CLINIC | Age: 69
End: 2019-11-13
Attending: STUDENT IN AN ORGANIZED HEALTH CARE EDUCATION/TRAINING PROGRAM
Payer: MEDICARE

## 2019-11-13 ENCOUNTER — TELEPHONE (OUTPATIENT)
Dept: HEMATOLOGY ONCOLOGY | Facility: CLINIC | Age: 69
End: 2019-11-13

## 2019-11-13 PROCEDURE — 77386 HB NTSTY MODUL RAD TX DLVR CPLX: CPT | Performed by: STUDENT IN AN ORGANIZED HEALTH CARE EDUCATION/TRAINING PROGRAM

## 2019-11-13 NOTE — TELEPHONE ENCOUNTER
----- Message from Lona Ribera sent at 11/13/2019  9:03 AM EST -----  Regarding: Today's 8:30am No Show  Contact: [phone]  Fred Odonnell, he did not answer, left a voice message reminding Norbert Og to call the office to reschedule

## 2019-11-14 ENCOUNTER — APPOINTMENT (OUTPATIENT)
Dept: RADIATION ONCOLOGY | Facility: CLINIC | Age: 69
End: 2019-11-14
Attending: STUDENT IN AN ORGANIZED HEALTH CARE EDUCATION/TRAINING PROGRAM
Payer: MEDICARE

## 2019-11-14 PROCEDURE — 77386 HB NTSTY MODUL RAD TX DLVR CPLX: CPT | Performed by: STUDENT IN AN ORGANIZED HEALTH CARE EDUCATION/TRAINING PROGRAM

## 2019-11-15 ENCOUNTER — APPOINTMENT (OUTPATIENT)
Dept: RADIATION ONCOLOGY | Facility: CLINIC | Age: 69
End: 2019-11-15
Attending: STUDENT IN AN ORGANIZED HEALTH CARE EDUCATION/TRAINING PROGRAM
Payer: MEDICARE

## 2019-11-15 PROCEDURE — 77386 HB NTSTY MODUL RAD TX DLVR CPLX: CPT | Performed by: STUDENT IN AN ORGANIZED HEALTH CARE EDUCATION/TRAINING PROGRAM

## 2019-11-18 ENCOUNTER — APPOINTMENT (OUTPATIENT)
Dept: RADIATION ONCOLOGY | Facility: CLINIC | Age: 69
End: 2019-11-18
Attending: STUDENT IN AN ORGANIZED HEALTH CARE EDUCATION/TRAINING PROGRAM
Payer: MEDICARE

## 2019-11-18 ENCOUNTER — APPOINTMENT (OUTPATIENT)
Dept: LAB | Facility: CLINIC | Age: 69
End: 2019-11-18
Payer: MEDICARE

## 2019-11-18 PROCEDURE — 77336 RADIATION PHYSICS CONSULT: CPT | Performed by: STUDENT IN AN ORGANIZED HEALTH CARE EDUCATION/TRAINING PROGRAM

## 2019-11-18 PROCEDURE — 77386 HB NTSTY MODUL RAD TX DLVR CPLX: CPT | Performed by: STUDENT IN AN ORGANIZED HEALTH CARE EDUCATION/TRAINING PROGRAM

## 2019-11-19 ENCOUNTER — APPOINTMENT (OUTPATIENT)
Dept: RADIATION ONCOLOGY | Facility: CLINIC | Age: 69
End: 2019-11-19
Attending: STUDENT IN AN ORGANIZED HEALTH CARE EDUCATION/TRAINING PROGRAM
Payer: MEDICARE

## 2019-11-19 PROCEDURE — 77417 THER RADIOLOGY PORT IMAGE(S): CPT | Performed by: STUDENT IN AN ORGANIZED HEALTH CARE EDUCATION/TRAINING PROGRAM

## 2019-11-19 PROCEDURE — 77386 HB NTSTY MODUL RAD TX DLVR CPLX: CPT | Performed by: STUDENT IN AN ORGANIZED HEALTH CARE EDUCATION/TRAINING PROGRAM

## 2019-11-20 ENCOUNTER — APPOINTMENT (OUTPATIENT)
Dept: RADIATION ONCOLOGY | Facility: CLINIC | Age: 69
End: 2019-11-20
Attending: STUDENT IN AN ORGANIZED HEALTH CARE EDUCATION/TRAINING PROGRAM
Payer: MEDICARE

## 2019-11-20 PROCEDURE — 77386 HB NTSTY MODUL RAD TX DLVR CPLX: CPT | Performed by: STUDENT IN AN ORGANIZED HEALTH CARE EDUCATION/TRAINING PROGRAM

## 2019-11-20 PROCEDURE — 77417 THER RADIOLOGY PORT IMAGE(S): CPT | Performed by: STUDENT IN AN ORGANIZED HEALTH CARE EDUCATION/TRAINING PROGRAM

## 2019-11-21 ENCOUNTER — APPOINTMENT (OUTPATIENT)
Dept: RADIATION ONCOLOGY | Facility: CLINIC | Age: 69
End: 2019-11-21
Attending: STUDENT IN AN ORGANIZED HEALTH CARE EDUCATION/TRAINING PROGRAM
Payer: MEDICARE

## 2019-11-21 PROCEDURE — 77386 HB NTSTY MODUL RAD TX DLVR CPLX: CPT | Performed by: STUDENT IN AN ORGANIZED HEALTH CARE EDUCATION/TRAINING PROGRAM

## 2019-11-22 ENCOUNTER — APPOINTMENT (OUTPATIENT)
Dept: RADIATION ONCOLOGY | Facility: CLINIC | Age: 69
End: 2019-11-22
Attending: STUDENT IN AN ORGANIZED HEALTH CARE EDUCATION/TRAINING PROGRAM
Payer: MEDICARE

## 2019-11-22 PROCEDURE — 77386 HB NTSTY MODUL RAD TX DLVR CPLX: CPT | Performed by: STUDENT IN AN ORGANIZED HEALTH CARE EDUCATION/TRAINING PROGRAM

## 2019-11-24 ENCOUNTER — APPOINTMENT (OUTPATIENT)
Dept: RADIATION ONCOLOGY | Facility: CLINIC | Age: 69
End: 2019-11-24
Attending: STUDENT IN AN ORGANIZED HEALTH CARE EDUCATION/TRAINING PROGRAM
Payer: MEDICARE

## 2019-11-24 PROCEDURE — 77386 HB NTSTY MODUL RAD TX DLVR CPLX: CPT | Performed by: STUDENT IN AN ORGANIZED HEALTH CARE EDUCATION/TRAINING PROGRAM

## 2019-11-24 PROCEDURE — 77336 RADIATION PHYSICS CONSULT: CPT | Performed by: STUDENT IN AN ORGANIZED HEALTH CARE EDUCATION/TRAINING PROGRAM

## 2019-11-25 ENCOUNTER — APPOINTMENT (OUTPATIENT)
Dept: RADIATION ONCOLOGY | Facility: CLINIC | Age: 69
End: 2019-11-25
Attending: STUDENT IN AN ORGANIZED HEALTH CARE EDUCATION/TRAINING PROGRAM
Payer: MEDICARE

## 2019-11-25 ENCOUNTER — APPOINTMENT (OUTPATIENT)
Dept: LAB | Facility: CLINIC | Age: 69
End: 2019-11-25
Payer: MEDICARE

## 2019-11-25 PROCEDURE — 77386 HB NTSTY MODUL RAD TX DLVR CPLX: CPT | Performed by: STUDENT IN AN ORGANIZED HEALTH CARE EDUCATION/TRAINING PROGRAM

## 2019-11-25 PROCEDURE — 77417 THER RADIOLOGY PORT IMAGE(S): CPT | Performed by: STUDENT IN AN ORGANIZED HEALTH CARE EDUCATION/TRAINING PROGRAM

## 2019-11-26 ENCOUNTER — APPOINTMENT (OUTPATIENT)
Dept: RADIATION ONCOLOGY | Facility: CLINIC | Age: 69
End: 2019-11-26
Attending: STUDENT IN AN ORGANIZED HEALTH CARE EDUCATION/TRAINING PROGRAM
Payer: MEDICARE

## 2019-11-26 PROCEDURE — 77386 HB NTSTY MODUL RAD TX DLVR CPLX: CPT | Performed by: STUDENT IN AN ORGANIZED HEALTH CARE EDUCATION/TRAINING PROGRAM

## 2019-11-26 PROCEDURE — 77300 RADIATION THERAPY DOSE PLAN: CPT | Performed by: STUDENT IN AN ORGANIZED HEALTH CARE EDUCATION/TRAINING PROGRAM

## 2019-11-26 PROCEDURE — 77338 DESIGN MLC DEVICE FOR IMRT: CPT | Performed by: STUDENT IN AN ORGANIZED HEALTH CARE EDUCATION/TRAINING PROGRAM

## 2019-11-27 ENCOUNTER — APPOINTMENT (OUTPATIENT)
Dept: RADIATION ONCOLOGY | Facility: CLINIC | Age: 69
End: 2019-11-27
Attending: STUDENT IN AN ORGANIZED HEALTH CARE EDUCATION/TRAINING PROGRAM
Payer: MEDICARE

## 2019-11-27 PROCEDURE — 77386 HB NTSTY MODUL RAD TX DLVR CPLX: CPT | Performed by: STUDENT IN AN ORGANIZED HEALTH CARE EDUCATION/TRAINING PROGRAM

## 2019-12-02 ENCOUNTER — APPOINTMENT (OUTPATIENT)
Dept: RADIATION ONCOLOGY | Facility: CLINIC | Age: 69
End: 2019-12-02
Attending: STUDENT IN AN ORGANIZED HEALTH CARE EDUCATION/TRAINING PROGRAM
Payer: MEDICARE

## 2019-12-03 ENCOUNTER — APPOINTMENT (OUTPATIENT)
Dept: RADIATION ONCOLOGY | Facility: CLINIC | Age: 69
End: 2019-12-03
Attending: STUDENT IN AN ORGANIZED HEALTH CARE EDUCATION/TRAINING PROGRAM
Payer: MEDICARE

## 2019-12-03 ENCOUNTER — APPOINTMENT (OUTPATIENT)
Dept: LAB | Facility: CLINIC | Age: 69
End: 2019-12-03
Attending: STUDENT IN AN ORGANIZED HEALTH CARE EDUCATION/TRAINING PROGRAM
Payer: MEDICARE

## 2019-12-03 ENCOUNTER — TRANSCRIBE ORDERS (OUTPATIENT)
Dept: INFUSION CENTER | Facility: CLINIC | Age: 69
End: 2019-12-03

## 2019-12-03 DIAGNOSIS — C79.31 METASTASIS TO BRAIN (HCC): Primary | ICD-10-CM

## 2019-12-03 DIAGNOSIS — C79.31 METASTASIS TO BRAIN (HCC): ICD-10-CM

## 2019-12-03 LAB
ALBUMIN SERPL BCP-MCNC: 2.9 G/DL (ref 3.5–5.7)
ALP SERPL-CCNC: 319 U/L (ref 46–116)
ALT SERPL W P-5'-P-CCNC: 60 U/L (ref 12–78)
ANION GAP SERPL CALCULATED.3IONS-SCNC: 6 MMOL/L (ref 4–13)
AST SERPL W P-5'-P-CCNC: 56 U/L (ref 5–45)
BASOPHILS # BLD AUTO: 0.08 THOUSANDS/ΜL (ref 0–0.1)
BASOPHILS NFR BLD AUTO: 1 % (ref 0–1)
BILIRUB SERPL-MCNC: 1 MG/DL (ref 0.2–1)
BUN SERPL-MCNC: 18 MG/DL (ref 5–25)
CALCIUM SERPL-MCNC: 9.4 MG/DL (ref 8.3–10.1)
CHLORIDE SERPL-SCNC: 104 MMOL/L (ref 98–108)
CO2 SERPL-SCNC: 29 MMOL/L (ref 21–32)
CREAT SERPL-MCNC: 1.3 MG/DL (ref 0.6–1.3)
EOSINOPHIL # BLD AUTO: 0.58 THOUSAND/ΜL (ref 0–0.61)
EOSINOPHIL NFR BLD AUTO: 10 % (ref 0–6)
ERYTHROCYTE [DISTWIDTH] IN BLOOD BY AUTOMATED COUNT: 16.5 % (ref 11.6–15.1)
GFR SERPL CREATININE-BSD FRML MDRD: 56 ML/MIN/1.73SQ M
GLUCOSE SERPL-MCNC: 133 MG/DL (ref 65–140)
HCT VFR BLD AUTO: 37.2 % (ref 36.5–49.3)
HGB BLD-MCNC: 12.3 G/DL (ref 12–17)
LYMPHOCYTES # BLD AUTO: 1.12 THOUSANDS/ΜL (ref 0.6–4.47)
LYMPHOCYTES NFR BLD AUTO: 20 % (ref 14–44)
MCH RBC QN AUTO: 31.1 PG (ref 26.8–34.3)
MCHC RBC AUTO-ENTMCNC: 33.1 G/DL (ref 31.4–37.4)
MCV RBC AUTO: 94 FL (ref 82–98)
MONOCYTES # BLD AUTO: 0.6 THOUSAND/ΜL (ref 0.17–1.22)
MONOCYTES NFR BLD AUTO: 11 % (ref 4–12)
NEUTROPHILS # BLD AUTO: 3.29 THOUSANDS/ΜL (ref 1.85–7.62)
NEUTS SEG NFR BLD AUTO: 58 % (ref 43–75)
PLATELET # BLD AUTO: 198 THOUSANDS/UL (ref 149–390)
PMV BLD AUTO: 11 FL (ref 8.9–12.7)
POTASSIUM SERPL-SCNC: 4.7 MMOL/L (ref 3.5–5.3)
PROT SERPL-MCNC: 7.1 G/DL (ref 6.4–8.2)
RBC # BLD AUTO: 3.95 MILLION/UL (ref 3.88–5.62)
SODIUM SERPL-SCNC: 139 MMOL/L (ref 136–145)
WBC # BLD AUTO: 5.67 THOUSAND/UL (ref 4.31–10.16)

## 2019-12-03 PROCEDURE — 77386 HB NTSTY MODUL RAD TX DLVR CPLX: CPT | Performed by: STUDENT IN AN ORGANIZED HEALTH CARE EDUCATION/TRAINING PROGRAM

## 2019-12-03 PROCEDURE — 77417 THER RADIOLOGY PORT IMAGE(S): CPT | Performed by: STUDENT IN AN ORGANIZED HEALTH CARE EDUCATION/TRAINING PROGRAM

## 2019-12-03 PROCEDURE — 36415 COLL VENOUS BLD VENIPUNCTURE: CPT

## 2019-12-03 PROCEDURE — 85025 COMPLETE CBC W/AUTO DIFF WBC: CPT

## 2019-12-03 PROCEDURE — 80053 COMPREHEN METABOLIC PANEL: CPT

## 2019-12-04 ENCOUNTER — APPOINTMENT (OUTPATIENT)
Dept: RADIATION ONCOLOGY | Facility: CLINIC | Age: 69
End: 2019-12-04
Attending: STUDENT IN AN ORGANIZED HEALTH CARE EDUCATION/TRAINING PROGRAM
Payer: MEDICARE

## 2019-12-04 PROCEDURE — 77336 RADIATION PHYSICS CONSULT: CPT | Performed by: STUDENT IN AN ORGANIZED HEALTH CARE EDUCATION/TRAINING PROGRAM

## 2019-12-04 PROCEDURE — 77386 HB NTSTY MODUL RAD TX DLVR CPLX: CPT | Performed by: STUDENT IN AN ORGANIZED HEALTH CARE EDUCATION/TRAINING PROGRAM

## 2019-12-05 ENCOUNTER — APPOINTMENT (OUTPATIENT)
Dept: RADIATION ONCOLOGY | Facility: CLINIC | Age: 69
End: 2019-12-05
Attending: STUDENT IN AN ORGANIZED HEALTH CARE EDUCATION/TRAINING PROGRAM
Payer: MEDICARE

## 2019-12-06 ENCOUNTER — APPOINTMENT (OUTPATIENT)
Dept: RADIATION ONCOLOGY | Facility: CLINIC | Age: 69
End: 2019-12-06
Attending: STUDENT IN AN ORGANIZED HEALTH CARE EDUCATION/TRAINING PROGRAM
Payer: MEDICARE

## 2019-12-09 ENCOUNTER — TRANSCRIBE ORDERS (OUTPATIENT)
Dept: INFUSION CENTER | Facility: CLINIC | Age: 69
End: 2019-12-09

## 2019-12-09 ENCOUNTER — APPOINTMENT (OUTPATIENT)
Dept: RADIATION ONCOLOGY | Facility: CLINIC | Age: 69
End: 2019-12-09
Attending: STUDENT IN AN ORGANIZED HEALTH CARE EDUCATION/TRAINING PROGRAM
Payer: MEDICARE

## 2019-12-09 ENCOUNTER — APPOINTMENT (OUTPATIENT)
Dept: LAB | Facility: CLINIC | Age: 69
End: 2019-12-09
Attending: STUDENT IN AN ORGANIZED HEALTH CARE EDUCATION/TRAINING PROGRAM
Payer: MEDICARE

## 2019-12-09 DIAGNOSIS — C79.49 SECONDARY MALIGNANT NEOPLASM OF BRAIN AND SPINAL CORD (HCC): Primary | ICD-10-CM

## 2019-12-09 DIAGNOSIS — C79.49 SECONDARY MALIGNANT NEOPLASM OF BRAIN AND SPINAL CORD (HCC): ICD-10-CM

## 2019-12-09 DIAGNOSIS — C79.31 SECONDARY MALIGNANT NEOPLASM OF BRAIN AND SPINAL CORD (HCC): ICD-10-CM

## 2019-12-09 DIAGNOSIS — C79.31 SECONDARY MALIGNANT NEOPLASM OF BRAIN AND SPINAL CORD (HCC): Primary | ICD-10-CM

## 2019-12-09 LAB
ANISOCYTOSIS BLD QL SMEAR: PRESENT
BASOPHILS # BLD MANUAL: 0 THOUSAND/UL (ref 0–0.1)
BASOPHILS NFR MAR MANUAL: 0 % (ref 0–1)
EOSINOPHIL # BLD MANUAL: 0.59 THOUSAND/UL (ref 0–0.4)
EOSINOPHIL NFR BLD MANUAL: 10 % (ref 0–6)
ERYTHROCYTE [DISTWIDTH] IN BLOOD BY AUTOMATED COUNT: 16.6 % (ref 11.6–15.1)
HCT VFR BLD AUTO: 37.7 % (ref 36.5–49.3)
HGB BLD-MCNC: 12.5 G/DL (ref 12–17)
LG PLATELETS BLD QL SMEAR: PRESENT
LYMPHOCYTES # BLD AUTO: 0.77 THOUSAND/UL (ref 0.6–4.47)
LYMPHOCYTES # BLD AUTO: 13 % (ref 14–44)
MCH RBC QN AUTO: 31.3 PG (ref 26.8–34.3)
MCHC RBC AUTO-ENTMCNC: 33.2 G/DL (ref 31.4–37.4)
MCV RBC AUTO: 95 FL (ref 82–98)
MONOCYTES # BLD AUTO: 0.53 THOUSAND/UL (ref 0–1.22)
MONOCYTES NFR BLD: 9 % (ref 4–12)
NEUTROPHILS # BLD MANUAL: 4.04 THOUSAND/UL (ref 1.85–7.62)
NEUTS BAND NFR BLD MANUAL: 1 % (ref 0–8)
NEUTS SEG NFR BLD AUTO: 67 % (ref 43–75)
PLATELET # BLD AUTO: 169 THOUSANDS/UL (ref 149–390)
PLATELET BLD QL SMEAR: ADEQUATE
PMV BLD AUTO: 11.7 FL (ref 8.9–12.7)
RBC # BLD AUTO: 3.99 MILLION/UL (ref 3.88–5.62)
TARGETS BLD QL SMEAR: PRESENT
TOTAL CELLS COUNTED SPEC: 100
WBC # BLD AUTO: 5.94 THOUSAND/UL (ref 4.31–10.16)

## 2019-12-09 PROCEDURE — 36415 COLL VENOUS BLD VENIPUNCTURE: CPT

## 2019-12-09 PROCEDURE — 85027 COMPLETE CBC AUTOMATED: CPT

## 2019-12-09 PROCEDURE — 85007 BL SMEAR W/DIFF WBC COUNT: CPT

## 2019-12-09 PROCEDURE — 77386 HB NTSTY MODUL RAD TX DLVR CPLX: CPT | Performed by: STUDENT IN AN ORGANIZED HEALTH CARE EDUCATION/TRAINING PROGRAM

## 2019-12-10 ENCOUNTER — APPOINTMENT (OUTPATIENT)
Dept: LAB | Facility: CLINIC | Age: 69
End: 2019-12-10
Attending: STUDENT IN AN ORGANIZED HEALTH CARE EDUCATION/TRAINING PROGRAM
Payer: MEDICARE

## 2019-12-10 ENCOUNTER — APPOINTMENT (OUTPATIENT)
Dept: RADIATION ONCOLOGY | Facility: CLINIC | Age: 69
End: 2019-12-10
Attending: STUDENT IN AN ORGANIZED HEALTH CARE EDUCATION/TRAINING PROGRAM
Payer: MEDICARE

## 2019-12-10 DIAGNOSIS — C79.31 SECONDARY MALIGNANT NEOPLASM OF BRAIN AND SPINAL CORD (HCC): Primary | ICD-10-CM

## 2019-12-10 DIAGNOSIS — C79.49 SECONDARY MALIGNANT NEOPLASM OF BRAIN AND SPINAL CORD (HCC): ICD-10-CM

## 2019-12-10 DIAGNOSIS — C79.49 SECONDARY MALIGNANT NEOPLASM OF BRAIN AND SPINAL CORD (HCC): Primary | ICD-10-CM

## 2019-12-10 DIAGNOSIS — C79.31 SECONDARY MALIGNANT NEOPLASM OF BRAIN AND SPINAL CORD (HCC): ICD-10-CM

## 2019-12-10 LAB
ALBUMIN SERPL BCP-MCNC: 3 G/DL (ref 3.5–5.7)
ALP SERPL-CCNC: 374 U/L (ref 46–116)
ALT SERPL W P-5'-P-CCNC: 57 U/L (ref 12–78)
ANION GAP SERPL CALCULATED.3IONS-SCNC: 10 MMOL/L (ref 4–13)
AST SERPL W P-5'-P-CCNC: 49 U/L (ref 5–45)
BILIRUB SERPL-MCNC: 1.3 MG/DL (ref 0.2–1)
BUN SERPL-MCNC: 20 MG/DL (ref 5–25)
CALCIUM SERPL-MCNC: 9.7 MG/DL (ref 8.3–10.1)
CHLORIDE SERPL-SCNC: 103 MMOL/L (ref 98–108)
CO2 SERPL-SCNC: 29 MMOL/L (ref 21–32)
CREAT SERPL-MCNC: 1.7 MG/DL (ref 0.6–1.3)
GFR SERPL CREATININE-BSD FRML MDRD: 41 ML/MIN/1.73SQ M
GLUCOSE SERPL-MCNC: 137 MG/DL (ref 65–140)
POTASSIUM SERPL-SCNC: 5 MMOL/L (ref 3.5–5.3)
PROT SERPL-MCNC: 7.7 G/DL (ref 6.4–8.2)
SODIUM SERPL-SCNC: 142 MMOL/L (ref 136–145)

## 2019-12-10 PROCEDURE — 80053 COMPREHEN METABOLIC PANEL: CPT

## 2019-12-10 PROCEDURE — 77386 HB NTSTY MODUL RAD TX DLVR CPLX: CPT | Performed by: STUDENT IN AN ORGANIZED HEALTH CARE EDUCATION/TRAINING PROGRAM

## 2019-12-10 PROCEDURE — 36415 COLL VENOUS BLD VENIPUNCTURE: CPT

## 2019-12-11 ENCOUNTER — APPOINTMENT (OUTPATIENT)
Dept: RADIATION ONCOLOGY | Facility: CLINIC | Age: 69
End: 2019-12-11
Attending: STUDENT IN AN ORGANIZED HEALTH CARE EDUCATION/TRAINING PROGRAM
Payer: MEDICARE

## 2019-12-11 PROCEDURE — 77417 THER RADIOLOGY PORT IMAGE(S): CPT | Performed by: STUDENT IN AN ORGANIZED HEALTH CARE EDUCATION/TRAINING PROGRAM

## 2019-12-11 PROCEDURE — 77386 HB NTSTY MODUL RAD TX DLVR CPLX: CPT | Performed by: STUDENT IN AN ORGANIZED HEALTH CARE EDUCATION/TRAINING PROGRAM

## 2019-12-12 ENCOUNTER — APPOINTMENT (OUTPATIENT)
Dept: RADIATION ONCOLOGY | Facility: CLINIC | Age: 69
End: 2019-12-12
Attending: STUDENT IN AN ORGANIZED HEALTH CARE EDUCATION/TRAINING PROGRAM
Payer: MEDICARE

## 2019-12-12 PROCEDURE — 77386 HB NTSTY MODUL RAD TX DLVR CPLX: CPT | Performed by: STUDENT IN AN ORGANIZED HEALTH CARE EDUCATION/TRAINING PROGRAM

## 2019-12-13 ENCOUNTER — APPOINTMENT (OUTPATIENT)
Dept: RADIATION ONCOLOGY | Facility: CLINIC | Age: 69
End: 2019-12-13
Attending: STUDENT IN AN ORGANIZED HEALTH CARE EDUCATION/TRAINING PROGRAM
Payer: MEDICARE

## 2019-12-13 DIAGNOSIS — C71.9 GLIOBLASTOMA (HCC): Primary | ICD-10-CM

## 2019-12-13 PROCEDURE — 77336 RADIATION PHYSICS CONSULT: CPT | Performed by: STUDENT IN AN ORGANIZED HEALTH CARE EDUCATION/TRAINING PROGRAM

## 2019-12-13 PROCEDURE — 77386 HB NTSTY MODUL RAD TX DLVR CPLX: CPT | Performed by: STUDENT IN AN ORGANIZED HEALTH CARE EDUCATION/TRAINING PROGRAM

## 2019-12-18 ENCOUNTER — TELEPHONE (OUTPATIENT)
Dept: HEMATOLOGY ONCOLOGY | Facility: MEDICAL CENTER | Age: 69
End: 2019-12-18

## 2019-12-18 NOTE — TELEPHONE ENCOUNTER
Patient called in as he missed his appointment, I reschedule patient for 01/21/2020 I verified date, time and location

## 2020-01-08 ENCOUNTER — HOSPITAL ENCOUNTER (OUTPATIENT)
Dept: MRI IMAGING | Facility: HOSPITAL | Age: 70
Discharge: HOME | End: 2020-01-08
Payer: MEDICARE

## 2020-01-08 DIAGNOSIS — C71.9 GLIOBLASTOMA (HCC): ICD-10-CM

## 2020-01-08 PROCEDURE — A9585 GADOBUTROL INJECTION: HCPCS | Performed by: STUDENT IN AN ORGANIZED HEALTH CARE EDUCATION/TRAINING PROGRAM

## 2020-01-08 PROCEDURE — 70553 MRI BRAIN STEM W/O & W/DYE: CPT

## 2020-01-08 RX ADMIN — GADOBUTROL 7 ML: 604.72 INJECTION INTRAVENOUS at 09:32

## 2020-01-13 ENCOUNTER — APPOINTMENT (OUTPATIENT)
Dept: RADIATION ONCOLOGY | Facility: CLINIC | Age: 70
End: 2020-01-13
Attending: STUDENT IN AN ORGANIZED HEALTH CARE EDUCATION/TRAINING PROGRAM
Payer: MEDICARE

## 2020-01-13 ENCOUNTER — DOCUMENTATION (OUTPATIENT)
Dept: INFUSION CENTER | Facility: CLINIC | Age: 70
End: 2020-01-13

## 2020-01-13 ENCOUNTER — APPOINTMENT (OUTPATIENT)
Dept: LAB | Facility: CLINIC | Age: 70
End: 2020-01-13
Attending: STUDENT IN AN ORGANIZED HEALTH CARE EDUCATION/TRAINING PROGRAM
Payer: MEDICARE

## 2020-01-13 VITALS
SYSTOLIC BLOOD PRESSURE: 140 MMHG | BODY MASS INDEX: 19.83 KG/M2 | HEART RATE: 69 BPM | OXYGEN SATURATION: 95 % | WEIGHT: 154.5 LBS | TEMPERATURE: 98.3 F | HEIGHT: 74 IN | RESPIRATION RATE: 16 BRPM | DIASTOLIC BLOOD PRESSURE: 80 MMHG

## 2020-01-13 DIAGNOSIS — R19.7 DIARRHEA DUE TO MALABSORPTION: ICD-10-CM

## 2020-01-13 DIAGNOSIS — C22.0 HEPATOCELLULAR CARCINOMA (HCC): ICD-10-CM

## 2020-01-13 DIAGNOSIS — K90.9 DIARRHEA DUE TO MALABSORPTION: ICD-10-CM

## 2020-01-13 DIAGNOSIS — C71.9 GLIOBLASTOMA (HCC): ICD-10-CM

## 2020-01-13 DIAGNOSIS — C22.0 HEPATOCELLULAR CARCINOMA (HCC): Primary | ICD-10-CM

## 2020-01-13 LAB
ALBUMIN SERPL BCP-MCNC: 3.2 G/DL (ref 3.5–5.7)
ALP SERPL-CCNC: 355 U/L (ref 46–116)
ALT SERPL W P-5'-P-CCNC: 71 U/L (ref 12–78)
ANION GAP SERPL CALCULATED.3IONS-SCNC: 12 MMOL/L (ref 4–13)
ANISOCYTOSIS BLD QL SMEAR: PRESENT
AST SERPL W P-5'-P-CCNC: 57 U/L (ref 5–45)
BASOPHILS # BLD MANUAL: 0 THOUSAND/UL (ref 0–0.1)
BASOPHILS NFR MAR MANUAL: 0 % (ref 0–1)
BILIRUB SERPL-MCNC: 1.4 MG/DL (ref 0.2–1)
BUN SERPL-MCNC: 17 MG/DL (ref 5–25)
CALCIUM SERPL-MCNC: 9.8 MG/DL (ref 8.3–10.1)
CHLORIDE SERPL-SCNC: 100 MMOL/L (ref 98–108)
CO2 SERPL-SCNC: 27 MMOL/L (ref 21–32)
CREAT SERPL-MCNC: 1.1 MG/DL (ref 0.6–1.3)
EOSINOPHIL # BLD MANUAL: 0.32 THOUSAND/UL (ref 0–0.4)
EOSINOPHIL NFR BLD MANUAL: 6 % (ref 0–6)
ERYTHROCYTE [DISTWIDTH] IN BLOOD BY AUTOMATED COUNT: 15.9 % (ref 11.6–15.1)
GFR SERPL CREATININE-BSD FRML MDRD: 69 ML/MIN/1.73SQ M
GLUCOSE SERPL-MCNC: 121 MG/DL (ref 65–140)
HCT VFR BLD AUTO: 39.9 % (ref 36.5–49.3)
HGB BLD-MCNC: 13.2 G/DL (ref 12–17)
LYMPHOCYTES # BLD AUTO: 0.96 THOUSAND/UL (ref 0.6–4.47)
LYMPHOCYTES # BLD AUTO: 18 % (ref 14–44)
MCH RBC QN AUTO: 31 PG (ref 26.8–34.3)
MCHC RBC AUTO-ENTMCNC: 33.1 G/DL (ref 31.4–37.4)
MCV RBC AUTO: 94 FL (ref 82–98)
MONOCYTES # BLD AUTO: 0.32 THOUSAND/UL (ref 0–1.22)
MONOCYTES NFR BLD: 6 % (ref 4–12)
NEUTROPHILS # BLD MANUAL: 3.72 THOUSAND/UL (ref 1.85–7.62)
NEUTS BAND NFR BLD MANUAL: 1 % (ref 0–8)
NEUTS SEG NFR BLD AUTO: 69 % (ref 43–75)
PLATELET # BLD AUTO: 287 THOUSANDS/UL (ref 149–390)
PLATELET BLD QL SMEAR: ADEQUATE
PMV BLD AUTO: 11.3 FL (ref 8.9–12.7)
POTASSIUM SERPL-SCNC: 4 MMOL/L (ref 3.5–5.3)
PROT SERPL-MCNC: 8.1 G/DL (ref 6.4–8.2)
RBC # BLD AUTO: 4.26 MILLION/UL (ref 3.88–5.62)
SODIUM SERPL-SCNC: 139 MMOL/L (ref 136–145)
TARGETS BLD QL SMEAR: PRESENT
TOTAL CELLS COUNTED SPEC: 100
WBC # BLD AUTO: 5.31 THOUSAND/UL (ref 4.31–10.16)

## 2020-01-13 PROCEDURE — 85027 COMPLETE CBC AUTOMATED: CPT

## 2020-01-13 PROCEDURE — 85007 BL SMEAR W/DIFF WBC COUNT: CPT

## 2020-01-13 PROCEDURE — 99211 OFF/OP EST MAY X REQ PHY/QHP: CPT | Performed by: STUDENT IN AN ORGANIZED HEALTH CARE EDUCATION/TRAINING PROGRAM

## 2020-01-13 PROCEDURE — 80053 COMPREHEN METABOLIC PANEL: CPT

## 2020-01-13 PROCEDURE — 36415 COLL VENOUS BLD VENIPUNCTURE: CPT

## 2020-01-13 NOTE — PROGRESS NOTES
Follow-up - Radiation Oncology   Jena Suero  1950 81 y o  male [id number]      History of Present Illness   Cancer Staging  No matching staging information was found for the patient  Jena Suero  is a 81y o  year old male with with metastatic hepatocellular carcinoma and  glioblastoma of the left frontoparietal lobe status post gross total resection  Recommendations are for 46 Gy in 23 fractions followed by boost to 60 Gy in 30 fractions total, with start to be coordinated with [facility]  His treatment was initiated 10/29/19  During treatment he had some GI complaints, namely diarrhea, dry heaves and had missed treatment with c/o " stomach bug"  x 2 days  He did use Imodium which eventually helped improve this  He also had RLQ pain, 5/10, and abd was distended , with palpable hepatomegaly  Pt declined an abd u/s during his radiation treatments      His treatments ended 12/13/19  He was to have an appt with Dr Shreya Bell, 12/18, but pt cancelled  He was rescheduled for 1/21/19 1/8/20 MRI Latonya Jiménez  IMPRESSION:   Resolving postoperative changes of prior left frontoparietal mass resection   No enhancement to suggest recurrent or new metastatic disease      Today he reports he's been having diarrhea for 1 month, and has been feeling lightheaded and dizzy  Feels weak  Looks thin,a nd has had 11 lb wt loss since EOT  Has no money for meds, but does continue to smoke  1 1/2 PPD  Has been staying in his room, and not seeing family, and has missed other MD office visits   " I feel lost" has pain complaints, but doesn't seek medical asst for this     He does not have any GI f/u scheduled at this time         Historical Information      Hepatocellular carcinoma ([facility] [address] )    9/5/2019 Initial Diagnosis     Hepatocellular carcinoma ([facility] [address] )        Glioblastoma ([facility] [address] )    2019 Initial Diagnosis     Glioblastoma ([facility] [address] )      9/10/2019 Biopsy     Imagine guided L frontal craniotomy for resection of tumor    Preliminary Diagnosis SECOND PRELIMINARY REPORT:  A & B  Brain, left frontal mass, biopsy for frozen section & excision:  - 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, namely glioblastoma, with PNET-like component - see Note  Immunohistochemical stains (performed at [facility], [place], [place]) demonstrate the following tumor cell immunophenotype:   * Positive: GFAP (patchy), synaptophysin, and chromogranin (patchy), cytokeratin CAM5 2 (focal dot-like staining)     * Negative: IDH1 (R132H), BRAF V600E, EGFR vIII, TTF1, and CK18             10/29/2019 - 12/13/2019 Radiation     Treatment:  Course: C1    Plan ID Energy Fractions Dose per Fraction (cGy) Dose Correction (cGy) Total Dose Delivered (cGy) Elapsed Days   CD L Front 6X 7 / 7 200 0 1,400 10   L Front Lobe 6X 23 / 23 200 0 4,600 34     Dr Umm Johnson      10/29/2019 - 12/13/2019 Chemotherapy     Temodar 140 mg daily with radiation  Dr Ernestine Jensen         Past Medical History:   Diagnosis Date    Cancer [facility])     liver    Dependence on nicotine from cigarettes 9/4/2019    Hypertension     Liver cancer [facility])      Past Surgical History:   Procedure Laterality Date    APPENDECTOMY      BRAIN SURGERY      CRANIOTOMY Left 9/10/2019    Procedure: Image guided left frontal craniotomy for resection of tumor;  Surgeon: Ava Franco MD;  Location: [facility];  Service: Neurosurgery       Social History   Social History     Substance and Sexual Activity   Alcohol Use Not Currently    Comment: occasional, 1/5 / week in the past     Social History     Substance and Sexual Activity   Drug Use Yes    Types: Oxycodone    Comment: ran out     Social History     Tobacco Use   Smoking Status Current Every Day Smoker    Packs/day: 1 50    Years: 40 00    Pack years: 60 00   Smokeless Tobacco Former User    Types: Chew         Meds/Allergies     Current Outpatient Medications:     aluminum-magnesium hydroxide-simethicone (MYLANTA) 200-200-20 mg/5 mL suspension, Take 30 mL by mouth every 6 (six) hours as needed for indigestion or heartburn (Patient not taking: Reported on 10/9/2019), Disp: 355 mL, Rfl: 0    docusate sodium (COLACE) 100 mg capsule, Take 1 capsule (100 mg total) by mouth 2 (two) times a day (Patient not taking: Reported on 10/9/2019), Disp: 10 capsule, Rfl: 0    fluticasone-vilanterol (BREO ELLIPTA) 100-25 mcg/inh inhaler, Inhale 1 puff daily Rinse mouth after use  (Patient not taking: Reported on 10/9/2019), Disp: 1 Inhaler, Rfl: 0    folic acid (FOLVITE) 1 mg tablet, Take 1 tablet (1 mg total) by mouth daily (Patient not taking: Reported on 10/9/2019), Disp: , Rfl: 0    hydrOXYzine HCL (ATARAX) 25 mg tablet, Take 1 tablet (25 mg total) by mouth every 6 (six) hours as needed for anxiety (Patient not taking: Reported on 10/9/2019), Disp: 30 tablet, Rfl: 0    insulin glargine (LANTUS) 100 units/mL subcutaneous injection, Patient currently taking 25 units insulin in the morning; patient will need to have this medication adjusted at facility by physician (Patient not taking: Reported on 10/9/2019), Disp: , Rfl: 0    insulin lispro (HumaLOG) 100 units/mL injection, Inject 1-5 Units under the skin 3 (three) times a day before meals (Patient not taking: Reported on 10/9/2019), Disp: , Rfl: 0    insulin lispro (HumaLOG) 100 units/mL injection, Patient is currently been taking 15 units t i d  With meals while in the hospital; this is secondary to patient being on steroids and dose will need to be adjusted at facility by physician   (Patient not taking: Reported on 10/9/2019), Disp: , Rfl: 0    levETIRAcetam (KEPPRA) 500 mg tablet, Take 1 tablet (500 mg total) by mouth every 12 (twelve) hours for 8 doses, Disp: 8 tablet, Rfl: 0    LORazepam (ATIVAN) 0 5 mg tablet, Take by mouth 2 (two) times a day , Disp: , Rfl:     Naproxen Sodium (ALEVE PO), Take by mouth as needed, Disp: , Rfl:     nicotine (NICODERM CQ) 21 mg/24 hr TD 24 hr patch, Place 1 patch on the skin daily (Patient not taking: Reported on 10/9/2019), Disp: 28 patch, Rfl: 0    nicotine polacrilex (NICORETTE) 2 mg gum, Chew 1 each (2 mg total) every hour as needed for smoking cessation (Patient not taking: Reported on 10/9/2019), Disp: 100 each, Rfl: 0    NIFEdipine (ADALAT CC) 60 MG 24 hr tablet, Take 1 tablet (60 mg total) by mouth daily (Patient not taking: Reported on 1/13/2020), Disp: , Rfl: 0    ondansetron (ZOFRAN) 8 mg tablet, Take 1 tablet (8 mg total) by mouth every 8 (eight) hours as needed for nausea or vomiting (Patient not taking: Reported on 10/9/2019), Disp: 30 tablet, Rfl: 3    oxyCODONE (ROXICODONE) 5 mg immediate release tablet, Take 1 tablet (5 mg total) by mouth every 4 (four) hours as needed for moderate painMax Daily Amount: 30 mg (Patient not taking: Reported on 1/13/2020), Disp: 60 tablet, Rfl: 0    pantoprazole (PROTONIX) 40 mg tablet, Take 1 tablet (40 mg total) by mouth daily in the early morning Take this medication for duration of steroid therapy (Patient not taking: Reported on 10/9/2019), Disp: , Rfl: 0    senna (SENOKOT) 8 6 mg, Take 1 tablet (8 6 mg total) by mouth daily (Patient not taking: Reported on 10/9/2019), Disp: 120 each, Rfl: 0    sodium chloride 1 g tablet, Take 1 g b i d  For 7 days; take 1 g daily for 7 days then discontinue (Patient not taking: Reported on 1/13/2020), Disp: 21 tablet, Rfl: 0    temozolomide (TEMODAR) 140 mg capsule, Take one capsule by mouth daily with radiation therapy (Patient not taking: Reported on 1/13/2020), Disp: 42 capsule, Rfl: 0    thiamine 100 MG tablet, Take 1 tablet (100 mg total) by mouth daily (Patient not taking: Reported on 10/9/2019), Disp: , Rfl: 0  No Known Allergies      Review of Systems Constitutional: Positive for activity change (very little ), appetite change (was poor but a little better lately), fatigue (9/10  ) and unexpected weight change (lost 11 lbs last month)     HENT: Positive for hearing loss     Eyes: Positive for visual disturbance (blurred vision)  Respiratory: Positive for cough (frequent , green sputum) and shortness of breath  Cardiovascular: Positive for chest pain (left sided 4/10, after coughing  )  Gastrointestinal: Positive for abdominal distention (cl right side), abdominal pain (RLQ  5/10 sharp pains), diarrhea (each time he ate had diarrhea, no blood seen) and nausea  Endocrine: Positive for cold intolerance  Genitourinary: Negative  Musculoskeletal: Positive for back pain (low bacck, and up the spine  intermittently )  Skin: Positive for color change (dry patches, hyperpigmentation)  Allergic/Immunologic: Negative  Neurological: Positive for dizziness (worse today, feels dizziness, worse today), weakness (of arms and legs) and numbness (right hand)  Hematological: Negative  Psychiatric/Behavioral: The patient is nervous/anxious  Feels out of place  Moved form [employer], now lives near sister, but doesn't often see her      Depressed but denies suicidal thoughts        OBJECTIVE:   /80 Comment: standing  Pulse 69   Temp 98 3 °F (36 8 °C)   Resp 16   Ht 6' 2" (1 88 m)   Wt 70 1 kg (154 lb 8 oz)   SpO2 95%   BMI 19 84 kg/m²   Pain Assessment:  4  ECOG/[place]ubrod/WHO: 1 - Symptomatic but completely ambulatory    Physical Exam GENERAL:  Appears stated age, in no apparent distress  Alert and oriented  HEENT:  Normocephalic, atraumatic   extraocular muscles intact  Oral mucosa moist   PULMONARY:  Respirations unlabored  CARDIOVASCULAR:  Regular rate  ABDOMEN:  Soft, nondistended, nontender to palpation  No hepatosplenomegaly  NEUROLOGIC: Moving all extremities, No focal deficits noted  EXTREMITIES: no clubbing, cyanosis, or edema  PSYCHIATRIC: normal mood and affect  Appropriate thought content and judgement  RESULTS    Lab Results: No results found for this or any previous visit (from the past 672 hour(s))      Imaging Studies:Mri Brain W Wo Contrast    Result Date: 1/9/2020  Narrative: MRI BRAIN WITH AND WITHOUT CONTRAST INDICATION: History of liver cell carcinoma with intracranial metastasis  Status post resection  COMPARISON:  MRI of the brain from September 30, 2019  TECHNIQUE: Sagittal T1, axial T2, axial FLAIR, axial T1, axial [place], axial diffusion  Sagittal, axial T1 postcontrast   Axial bravo postcontrast with coronal reconstructions  IV Contrast:  7 mL of Gadobutrol injection (SINGLE-DOSE)  IMAGE QUALITY:   Diagnostic  FINDINGS: BRAIN PARENCHYMA:  Postsurgical changes of the left parietal calvarium are noted with underlying FLAIR and T2 hyperintense signal within the left anterior parietal lobe  There is minimal gliosis also noted in the left posterior frontal lobe  The surgical cavity is smaller  The associated vasogenic edema has improved  The regional mass effect and sulcal effacement has resolved  Residual FLAIR hyperintense signal within the anterior parietal subcortical and deep white matter is not associated with enhancement  Thin rim of enhancement along the medial margin of the surgical cavity is consistent with postoperative change  Gradient susceptibility artifact in the left anterior parietal surgical bed is again noted with siderosis noted along the pial surfaces of the left posterior frontal and anterior parietal lobes  Scattered foci of periventricular, subcortical and deep cerebral white matter cerebral chronic microangiopathic disease is slightly more conspicuous than noted on the prior study  Punctate focus of gliosis noted in the left cerebellum on image 8 of series 7  VENTRICLES:  Normal for the patient's age  There is a small stable postoperative fluid collection subjacent to the craniotomy without significant mass effect on the underlying parietal cortex    SELLA AND PITUITARY GLAND:  Normal  ORBITS:  Normal  PARANASAL SINUSES:  Normal  VASCULATURE:  Evaluation of the major intracranial vasculature demonstrates appropriate flow voids  CALVARIUM AND SKULL BASE:  Left parietal craniotomy  Incidentally noted prominent anterior bridging ossification along the anterior longitudinal ligament in the visualized upper cervical spine with relatively preserved disc spaces  This may represent diffuse idiopathic skeletal hyperostosis  EXTRACRANIAL SOFT TISSUES:  Normal      Impression: Resolving postoperative changes of prior left frontoparietal mass resection  No enhancement to suggest recurrent or new metastatic disease  Workstation performed: [id number]           Assessment/Plan:  Orders Placed This Encounter   Procedures    MRI brain w wo contrast    CBC and differential    Comprehensive metabolic panel    Ambulatory referral to [address] [place] [address]  is a 81y o  year old male with metastatic hepatocellular carcinoma and  glioblastoma of the left frontoparietal lobe status post gross total resection, followed by adjuvant chemoradiation completed 12/13/19  Patient returns for routine follow up  We reviewed MRI brain results which show no evidence of recurrence at this time  We will plan to have patient's imaging [facility] at [facility], however otherwise will plan for MRI brain in 3 months  He is scheduled for follow up with Dr Laila Richardson 1/21/20  He also states that he had protracted course of diarrhea for approximately a month during end of treatment and after, which has been improving on imodium, now with formed stools  He lost 11 lbs, and is not taking any of his medication as he states he can't afford it  He does not follow with GI regarding HCC/hep C cirrhosis  We will refer patient to palliative care, and have him meet with  today  He will plan to discuss hepatitis C treatment with Dr Laila Richardson at follow up          Sameer Casey MD  1/13/2020,9:12 AM    Portions of the record may have been created with voice recognition software   Occasional wrong word or "sound a like" substitutions may have occurred due to the inherent limitations of voice recognition software   Read the chart carefully and recognize, using context, where substitutions have occurred

## 2020-01-13 NOTE — PROGRESS NOTES
Yoel Perez  1950 is a 81 y o  male       Follow up visit   Yoel Perez  is a 81y o  year old male with metastatic hepatocellular carcinoma and  glioblastoma of the left frontoparietal lobe status post gross total resection  Recommendations are for 46 Gy in 23 fractions followed by boost to 60 Gy in 30 fractions total, with start to be coordinated with Geremias  His treatment was initiated 10/29/19  During treatment he had some GI complaints, namely diarrhea, dry heaves and had missed treatment with c/o " stomach bug"  x 2 days  He did use Imodium which eventually helped improve this  He also had RLQ pain, 5/10, and abd was distended , with palpable hepatomegaly  Pt declined an abd u/s during his radiation treatments  His treatments ended 12/13/19  He was to have an appt with Dr Ernestine Jensen, 12/18, but pt cancelled  He was rescheduled for 1/21/19 1/8/20 MRI [facility]  IMPRESSION:   Resolving postoperative changes of prior left frontoparietal mass resection  No enhancement to suggest recurrent or new metastatic disease  Today he reports he's been having diarrhea for 1 month, and has been feeling lightheaded and dizzy  Feels weak  Looks thin,a nd has had 11 lb wt loss since EOT  Has no money for meds, but does continue to smoke  1 1/2 PPD  Has been staying in his room, and not seeing family, and has missed other MD office visits   " I feel lost" has pain complaints, but doesn't seek medical asst for this    He does not have any GI f/u scheduled at this time  Hepatocellular carcinoma ([facility] [address] )    9/5/2019 Initial Diagnosis     Hepatocellular carcinoma ([facility] [address] )        Glioblastoma ([facility] [address] )    2019 Initial Diagnosis     Glioblastoma ([facility] [address] )      9/10/2019 Biopsy     Imagine guided L frontal craniotomy for resection of tumor    Preliminary Diagnosis   SECOND PRELIMINARY REPORT:  A & B   Brain, left frontal mass, biopsy for frozen section & excision:  - 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, namely glioblastoma, with PNET-like component - see Note  Immunohistochemical stains (performed at [facility], [place], [place]) demonstrate the following tumor cell immunophenotype:   * Positive: GFAP (patchy), synaptophysin, and chromogranin (patchy), cytokeratin CAM5 2 (focal dot-like staining)     * Negative: IDH1 (R132H), BRAF V600E, EGFR vIII, TTF1, and CK18             10/29/2019 - 12/13/2019 Radiation     Treatment:  Course: C1    Plan ID Energy Fractions Dose per Fraction (cGy) Dose Correction (cGy) Total Dose Delivered (cGy) Elapsed Days   CD L Front 6X 7 / 7 200 0 1,400 10   L Front Lobe 6X 23 / 23 200 0 4,600 34     Dr Kristina Lima      10/29/2019 - 12/13/2019 Chemotherapy     Temodar 140 mg daily with radiation  Dr Emmy Jain Trial: no      Health Maintenance   Topic Date Due    Hepatitis C Screening  1950    Medicare Annual Wellness Visit (AWV)  1950    CRC Screening: Colonoscopy  1950    Hepatitis A Vaccine (1 of 2 - Risk 2-dose series) 06/14/1951    DTaP,Tdap,and Td Vaccines (1 - Tdap) 06/14/1963    Hepatitis B Vaccine (1 of 3 - Risk 3-dose series) 06/14/1971    Pneumococcal Vaccine: 65+ Years (1 of 2 - PCV13) 06/14/2017    Influenza Vaccine  07/01/2019    Fall Risk  09/24/2020    Depression Screening PHQ  10/09/2020    BMI: Adult  10/09/2020    Pneumococcal Vaccine: Pediatrics (0 to 5 Years) and At-Risk Patients (6 to 59 Years)  Aged Out    HIB Vaccine  Aged Out    IPV Vaccine  Aged Out    Meningococcal ACWY Vaccine  Aged Out    HPV Vaccine  Aged Out       Patient Active Problem List   Diagnosis    Brain mass with hemorrhage    Essential hypertension    Alcohol abuse    Cerebral edema ([address]    COPD (chronic obstructive pulmonary disease) ([address]    Hepatocellular carcinoma ([address]    Malnutrition related to chronic disease    Leukocytosis    Transaminitis    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    Tooth pain  Hyponatremia    Steroid-induced hyperglycemia    GERD (gastroesophageal reflux disease)    Constipation    Anxiety disorder    Seizure prophylaxis    Tobacco abuse    Glioblastoma (HCC)     Past Medical History:   Diagnosis Date    Cancer [facility])     liver    Dependence on nicotine from cigarettes 9/4/2019    Hypertension     Liver cancer [facility])      Past Surgical History:   Procedure Laterality Date    APPENDECTOMY      BRAIN SURGERY      CRANIOTOMY Left 9/10/2019    Procedure: Image guided left frontal craniotomy for resection of tumor;  Surgeon: Marcell Karimi MD;  [facility]: [facility];  Service: Neurosurgery     Family History   Problem Relation Age of Onset    Breast cancer Mother     Liver cancer Father     Throat cancer Paternal Uncle      Social History     Socioeconomic History    Marital status: Legally      Spouse name: Not on file    Number of children: Not on file    Years of education: Not on file    Highest education level: Not on file   Occupational History    Not on file   Social Needs    Financial resource strain: Not on file    Food insecurity:     Worry: Not on file     Inability: Not on file    Transportation needs:     Medical: Not on file     Non-medical: Not on file   Tobacco Use    Smoking status: Current Every Day Smoker     Packs/day: 1 50     Years: 40 00     Pack years: 60 00    Smokeless tobacco: Former User     Types: Chew   Substance and Sexual Activity    Alcohol use: Not Currently     Comment: occasional, 1/5 / week in the past    Drug use: Yes     Types: Oxycodone     Comment: ran out    Sexual activity: Not Currently   Lifestyle    Physical activity:     Days per week: Not on file     Minutes per session: Not on file    Stress: Not on file   Relationships    Social connections:     Talks on phone: Not on file     Gets together: Not on file     Attends [employer] service: Not on file     Active member of club or organization: Not on file Attends meetings of clubs or organizations: Not on file     Relationship status: Not on file    Intimate partner violence:     Fear of current or ex partner: Not on file     Emotionally abused: Not on file     Physically abused: Not on file     Forced sexual activity: Not on file   Other Topics Concern    Not on file   Social History Narrative    Not on file       Current Outpatient Medications:     aluminum-magnesium hydroxide-simethicone (MYLANTA) 200-200-20 mg/5 mL suspension, Take 30 mL by mouth every 6 (six) hours as needed for indigestion or heartburn (Patient not taking: Reported on 10/9/2019), Disp: 355 mL, Rfl: 0    docusate sodium (COLACE) 100 mg capsule, Take 1 capsule (100 mg total) by mouth 2 (two) times a day (Patient not taking: Reported on 10/9/2019), Disp: 10 capsule, Rfl: 0    fluticasone-vilanterol (BREO ELLIPTA) 100-25 mcg/inh inhaler, Inhale 1 puff daily Rinse mouth after use  (Patient not taking: Reported on 10/9/2019), Disp: 1 Inhaler, Rfl: 0    folic acid (FOLVITE) 1 mg tablet, Take 1 tablet (1 mg total) by mouth daily (Patient not taking: Reported on 10/9/2019), Disp: , Rfl: 0    hydrOXYzine HCL (ATARAX) 25 mg tablet, Take 1 tablet (25 mg total) by mouth every 6 (six) hours as needed for anxiety (Patient not taking: Reported on 10/9/2019), Disp: 30 tablet, Rfl: 0    insulin glargine (LANTUS) 100 units/mL subcutaneous injection, Patient currently taking 25 units insulin in the morning; patient will need to have this medication adjusted at facility by physician (Patient not taking: Reported on 10/9/2019), Disp: , Rfl: 0    insulin lispro (HumaLOG) 100 units/mL injection, Inject 1-5 Units under the skin 3 (three) times a day before meals (Patient not taking: Reported on 10/9/2019), Disp: , Rfl: 0    insulin lispro (HumaLOG) 100 units/mL injection, Patient is currently been taking 15 units t i d   With meals while in the hospital; this is secondary to patient being on steroids and dose will need to be adjusted at facility by physician  (Patient not taking: Reported on 10/9/2019), Disp: , Rfl: 0    levETIRAcetam (KEPPRA) 500 mg tablet, Take 1 tablet (500 mg total) by mouth every 12 (twelve) hours for 8 doses, Disp: 8 tablet, Rfl: 0    LORazepam (ATIVAN) 0 5 mg tablet, Take by mouth 2 (two) times a day , Disp: , Rfl:     Naproxen Sodium (ALEVE PO), Take by mouth as needed, Disp: , Rfl:     nicotine (NICODERM CQ) 21 mg/24 hr TD 24 hr patch, Place 1 patch on the skin daily (Patient not taking: Reported on 10/9/2019), Disp: 28 patch, Rfl: 0    nicotine polacrilex (NICORETTE) 2 mg gum, Chew 1 each (2 mg total) every hour as needed for smoking cessation (Patient not taking: Reported on 10/9/2019), Disp: 100 each, Rfl: 0    NIFEdipine (ADALAT CC) 60 MG 24 hr tablet, Take 1 tablet (60 mg total) by mouth daily (Patient not taking: Reported on 1/13/2020), Disp: , Rfl: 0    ondansetron (ZOFRAN) 8 mg tablet, Take 1 tablet (8 mg total) by mouth every 8 (eight) hours as needed for nausea or vomiting (Patient not taking: Reported on 10/9/2019), Disp: 30 tablet, Rfl: 3    oxyCODONE (ROXICODONE) 5 mg immediate release tablet, Take 1 tablet (5 mg total) by mouth every 4 (four) hours as needed for moderate painMax Daily Amount: 30 mg (Patient not taking: Reported on 1/13/2020), Disp: 60 tablet, Rfl: 0    pantoprazole (PROTONIX) 40 mg tablet, Take 1 tablet (40 mg total) by mouth daily in the early morning Take this medication for duration of steroid therapy (Patient not taking: Reported on 10/9/2019), Disp: , Rfl: 0    senna (SENOKOT) 8 6 mg, Take 1 tablet (8 6 mg total) by mouth daily (Patient not taking: Reported on 10/9/2019), Disp: 120 each, Rfl: 0    sodium chloride 1 g tablet, Take 1 g b i d   For 7 days; take 1 g daily for 7 days then discontinue (Patient not taking: Reported on 1/13/2020), Disp: 21 tablet, Rfl: 0    temozolomide (TEMODAR) 140 mg capsule, Take one capsule by mouth daily with radiation therapy (Patient not taking: Reported on 1/13/2020), Disp: 42 capsule, Rfl: 0    thiamine 100 MG tablet, Take 1 tablet (100 mg total) by mouth daily (Patient not taking: Reported on 10/9/2019), Disp: , Rfl: 0  No Known Allergies    Review of Systems:  Review of Systems   Constitutional: Positive for activity change (very little ), appetite change (was poor but a little better lately), fatigue (9/10  ) and unexpected weight change (lost 11 lbs last month)  HENT: Positive for hearing loss  Eyes: Positive for visual disturbance (blurred vision)  Respiratory: Positive for cough (frequent , green sputum) and shortness of breath  Cardiovascular: Positive for chest pain (left sided 4/10, after coughing  )  Gastrointestinal: Positive for abdominal distention (cl right side), abdominal pain (RLQ  5/10 sharp pains), diarrhea (each time he ate had diarrhea, no blood seen) and nausea  Endocrine: Positive for cold intolerance  Genitourinary: Negative  Musculoskeletal: Positive for back pain (low bacck, and up the spine  intermittently )  Skin: Positive for color change (dry patches, hyperpigmentation)  Allergic/Immunologic: Negative  Neurological: Positive for dizziness (worse today, feels dizziness, worse today), weakness (of arms and legs) and numbness (right hand)  Hematological: Negative  Psychiatric/Behavioral: The patient is nervous/anxious  Feels out of place  Moved form [facility], now lives near sister, but doesn't often see her  Depressed but denies suicidal thoughts       Vitals:    01/13/20 0758 01/13/20 0809   BP: 126/66 140/80   Pulse: 69    Resp: 16    Temp: 98 3 °F (36 8 °C)    SpO2: 95%    Weight:  70 1 kg (154 lb 8 oz)   Height: 6' 2" (1 88 m)                Imaging:Mri Brain W Wo Contrast    Result Date: 1/9/2020  Narrative: MRI BRAIN WITH AND WITHOUT CONTRAST INDICATION: History of liver cell carcinoma with intracranial metastasis  Status post resection  COMPARISON:  MRI of the brain from September 30, 2019  TECHNIQUE: Sagittal T1, axial T2, axial FLAIR, axial T1, axial [place], axial diffusion  Sagittal, axial T1 postcontrast   Axial bravo postcontrast with coronal reconstructions  IV Contrast:  7 mL of Gadobutrol injection (SINGLE-DOSE)  IMAGE QUALITY:   Diagnostic  FINDINGS: BRAIN PARENCHYMA:  Postsurgical changes of the left parietal calvarium are noted with underlying FLAIR and T2 hyperintense signal within the left anterior parietal lobe  There is minimal gliosis also noted in the left posterior frontal lobe  The surgical cavity is smaller  The associated vasogenic edema has improved  The regional mass effect and sulcal effacement has resolved  Residual FLAIR hyperintense signal within the anterior parietal subcortical and deep white matter is not associated with enhancement  Thin rim of enhancement along the medial margin of the surgical cavity is consistent with postoperative change  Gradient susceptibility artifact in the left anterior parietal surgical bed is again noted with siderosis noted along the pial surfaces of the left posterior frontal and anterior parietal lobes  Scattered foci of periventricular, subcortical and deep cerebral white matter cerebral chronic microangiopathic disease is slightly more conspicuous than noted on the prior study  Punctate focus of gliosis noted in the left cerebellum on image 8 of series 7  VENTRICLES:  Normal for the patient's age  There is a small stable postoperative fluid collection subjacent to the craniotomy without significant mass effect on the underlying parietal cortex  SELLA AND PITUITARY GLAND:  Normal  ORBITS:  Normal  PARANASAL SINUSES:  Normal  VASCULATURE:  Evaluation of the major intracranial vasculature demonstrates appropriate flow voids  CALVARIUM AND SKULL BASE:  Left parietal craniotomy    Incidentally noted prominent anterior bridging ossification along the anterior longitudinal ligament in the visualized upper cervical spine with relatively preserved disc spaces  This may represent diffuse idiopathic skeletal hyperostosis  EXTRACRANIAL SOFT TISSUES:  Normal      Impression: Resolving postoperative changes of prior left frontoparietal mass resection  No enhancement to suggest recurrent or new metastatic disease   Workstation performed: [id number]

## 2020-01-13 NOTE — SOCIAL WORK
MSW met with pt in counselor's office this morning  Pt just finished visit with Dr Halie Almonte in radiation oncology and was awaiting transportation  Pt shared that he is unable to afford his bills  MSW inquired what bills and he stated "all of them"  He did not have any bills on his person at the time of the meeting, but said he will bring them to the hospital when he comes back next time  Pt also shared that he is not taking his blood pressure medications due to cost  MSW spoke with pt of looking into the matter and provided return contact information  MSW will reach out to oncology finance and nurse navigator to further assist and follow up with pt  MSW spoke further with PERLA Knapp of pt not taking medications and cancelling office visits  MSW noted this information, pt added to MSW patient list to continue follow up support

## 2020-01-15 ENCOUNTER — DOCUMENTATION (OUTPATIENT)
Dept: NEUROSURGERY | Facility: CLINIC | Age: 70
End: 2020-01-15

## 2020-01-17 PROBLEM — Z71.89 GOALS OF CARE, COUNSELING/DISCUSSION: Status: ACTIVE | Noted: 2020-01-17

## 2020-01-21 ENCOUNTER — OFFICE VISIT (OUTPATIENT)
Dept: HEMATOLOGY ONCOLOGY | Facility: CLINIC | Age: 70
End: 2020-01-21
Payer: MEDICARE

## 2020-01-21 VITALS
BODY MASS INDEX: 20.53 KG/M2 | DIASTOLIC BLOOD PRESSURE: 74 MMHG | WEIGHT: 160 LBS | HEART RATE: 88 BPM | HEIGHT: 74 IN | TEMPERATURE: 98.4 F | SYSTOLIC BLOOD PRESSURE: 138 MMHG | OXYGEN SATURATION: 94 % | RESPIRATION RATE: 17 BRPM

## 2020-01-21 DIAGNOSIS — C22.0 HEPATOCELLULAR CARCINOMA (HCC): Primary | ICD-10-CM

## 2020-01-21 PROCEDURE — 99215 OFFICE O/P EST HI 40 MIN: CPT | Performed by: INTERNAL MEDICINE

## 2020-01-21 NOTE — PROGRESS NOTES
[facility]  [address] [address]  Angelica Frost [id number] [phone]    [address] ,7/47/1872, [id number]  01/21/20    Discussion:   In summary, this is a 59-year-old male history of glioblastoma with PNET features  This is status post resection with adjuvant radiation and concomitant Temodar  Repeat MRI of the brain shows post treatment changes without evidence of new or progressive disease  Over the past month or so the patient has had progressive diarrhea  He is taking Imodium at least 4 times a day with transient benefit but prompt recurrence  The etiology of his diarrhea is unclear  He did have hepatic lesions  Interestingly, AFP in September 2019 was normal, somewhat surprising given new multifocal hepatic lesions consistent with metastases, and history of hepatocellular carcinoma  Biopsy of the liver certainly could be considered  Repeat CT scan, AFP are requested  I would defer treatment for his brain tumor until his hepatic lesions are better understood  I discussed the above with the patient  The patient  voiced understanding and agreement   ______________________________________________________________________    Chief Complaint   Patient presents with    Follow-up       HPI:     Hepatocellular carcinoma ([address] )    9/5/2019 Initial Diagnosis     Hepatocellular carcinoma ([facility] )        Glioblastoma ([facility] )    2019 Initial Diagnosis     Glioblastoma ([facility] )      9/10/2019 Biopsy     Imagine guided L frontal craniotomy for resection of tumor    Preliminary Diagnosis   SECOND PRELIMINARY REPORT:  A & B  Brain, left frontal mass, biopsy for frozen section & excision:  - 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, namely glioblastoma, with PNET-like component - see Note    Immunohistochemical stains (performed at [place], [place]) demonstrate the following tumor cell immunophenotype:   * Positive: GFAP (patchy), synaptophysin, and chromogranin (patchy), cytokeratin CAM5 2 (focal dot-like staining)  * Negative: IDH1 (R132H), BRAF V600E, EGFR vIII, TTF1, and CK18             10/29/2019 - 12/13/2019 Radiation     Treatment:  Course: C1    Plan ID Energy Fractions Dose per Fraction (cGy) Dose Correction (cGy) Total Dose Delivered (cGy) Elapsed Days   CD L Front 6X 7 / 7 200 0 1,400 10   L Front Lobe 6X 23 / 23 200 0 4,600 34     Dr Bull Almanzar      10/29/2019 - 12/13/2019 Chemotherapy     Temodar 140 mg daily with radiation  Dr Ricky Colunga         Interval History:  Clinically stable  Weight stable over the past 6 months  ECOG-  1 - Symptomatic but completely ambulatory    Review of Systems   Constitutional: Positive for fatigue  Negative for chills and fever  HENT: Negative for nosebleeds  Eyes: Negative for discharge  Respiratory: Negative for cough and shortness of breath  Cardiovascular: Negative for chest pain  Gastrointestinal: Positive for abdominal pain (Right upper quadrant) and diarrhea  Negative for constipation  Endocrine: Negative for polydipsia  Genitourinary: Negative for hematuria  Musculoskeletal: Negative for arthralgias  Skin: Negative for color change  Allergic/Immunologic: Negative for immunocompromised state  Neurological: Negative for dizziness and headaches  Hematological: Negative for adenopathy  Psychiatric/Behavioral: Negative for agitation         Past Medical History:   Diagnosis Date    Cancer [facility])     liver    Dependence on nicotine from cigarettes 9/4/2019    Hypertension     Liver cancer [facility])      Patient Active Problem List   Diagnosis    Brain mass with hemorrhage    Essential hypertension    Alcohol abuse    Cerebral edema (HCC)    COPD [facility]chronic obstructive [facility] disease) [facility] )    Hepatocellular carcinoma ([facility] )    Malnutrition related to chronic disease    Leukocytosis    Transaminitis    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    Tooth pain    Hyponatremia    Steroid-induced hyperglycemia    GERD (gastroesophageal reflux disease)    Constipation    Anxiety disorder    Seizure prophylaxis    Tobacco abuse    Glioblastoma (HCC)    Goals of care, counseling/discussion       Current Outpatient Medications:     Naproxen Sodium (ALEVE PO), Take by mouth as needed, Disp: , Rfl:     oxyCODONE (ROXICODONE) 5 mg immediate release tablet, Take 1 tablet (5 mg total) by mouth every 4 (four) hours as needed for moderate painMax Daily Amount: 30 mg, Disp: 60 tablet, Rfl: 0    aluminum-magnesium hydroxide-simethicone (MYLANTA) 200-200-20 mg/5 mL suspension, Take 30 mL by mouth every 6 (six) hours as needed for indigestion or heartburn (Patient not taking: Reported on 10/9/2019), Disp: 355 mL, Rfl: 0    docusate sodium (COLACE) 100 mg capsule, Take 1 capsule (100 mg total) by mouth 2 (two) times a day (Patient not taking: Reported on 10/9/2019), Disp: 10 capsule, Rfl: 0    fluticasone-vilanterol (BREO ELLIPTA) 100-25 mcg/inh inhaler, Inhale 1 puff daily Rinse mouth after use   (Patient not taking: Reported on 10/9/2019), Disp: 1 Inhaler, Rfl: 0    folic acid (FOLVITE) 1 mg tablet, Take 1 tablet (1 mg total) by mouth daily (Patient not taking: Reported on 10/9/2019), Disp: , Rfl: 0    hydrOXYzine HCL (ATARAX) 25 mg tablet, Take 1 tablet (25 mg total) by mouth every 6 (six) hours as needed for anxiety (Patient not taking: Reported on 10/9/2019), Disp: 30 tablet, Rfl: 0    insulin glargine (LANTUS) 100 units/mL subcutaneous injection, Patient currently taking 25 units insulin in the morning; patient will need to have this medication adjusted at facility by physician (Patient not taking: Reported on 10/9/2019), Disp: , Rfl: 0    insulin lispro (HumaLOG) 100 units/mL injection, Inject 1-5 Units under the skin 3 (three) times a day before meals (Patient not taking: Reported on 10/9/2019), Disp: , Rfl: 0    insulin lispro (HumaLOG) 100 units/mL injection, Patient is currently been taking 15 units t i d  With meals while in the hospital; this is secondary to patient being on steroids and dose will need to be adjusted at facility by physician  (Patient not taking: Reported on 10/9/2019), Disp: , Rfl: 0    levETIRAcetam (KEPPRA) 500 mg tablet, Take 1 tablet (500 mg total) by mouth every 12 (twelve) hours for 8 doses (Patient not taking: Reported on 1/21/2020), Disp: 8 tablet, Rfl: 0    LORazepam (ATIVAN) 0 5 mg tablet, Take by mouth 2 (two) times a day , Disp: , Rfl:     nicotine (NICODERM CQ) 21 mg/24 hr TD 24 hr patch, Place 1 patch on the skin daily (Patient not taking: Reported on 10/9/2019), Disp: 28 patch, Rfl: 0    nicotine polacrilex (NICORETTE) 2 mg gum, Chew 1 each (2 mg total) every hour as needed for smoking cessation (Patient not taking: Reported on 10/9/2019), Disp: 100 each, Rfl: 0    NIFEdipine (ADALAT CC) 60 MG 24 hr tablet, Take 1 tablet (60 mg total) by mouth daily (Patient not taking: Reported on 1/13/2020), Disp: , Rfl: 0    ondansetron (ZOFRAN) 8 mg tablet, Take 1 tablet (8 mg total) by mouth every 8 (eight) hours as needed for nausea or vomiting (Patient not taking: Reported on 10/9/2019), Disp: 30 tablet, Rfl: 3    pantoprazole (PROTONIX) 40 mg tablet, Take 1 tablet (40 mg total) by mouth daily in the early morning Take this medication for duration of steroid therapy (Patient not taking: Reported on 10/9/2019), Disp: , Rfl: 0    senna (SENOKOT) 8 6 mg, Take 1 tablet (8 6 mg total) by mouth daily (Patient not taking: Reported on 10/9/2019), Disp: 120 each, Rfl: 0    sodium chloride 1 g tablet, Take 1 g b i d   For 7 days; take 1 g daily for 7 days then discontinue (Patient not taking: Reported on 1/13/2020), Disp: 21 tablet, Rfl: 0    temozolomide (TEMODAR) 140 mg capsule, Take one capsule by mouth daily with radiation therapy (Patient not taking: Reported on 1/13/2020), Disp: 42 capsule, Rfl: 0   thiamine 100 MG tablet, Take 1 tablet (100 mg total) by mouth daily (Patient not taking: Reported on 10/9/2019), Disp: , Rfl: 0  No Known Allergies  Past Surgical History:   Procedure Laterality Date    APPENDECTOMY      BRAIN SURGERY      CRANIOTOMY Left 9/10/2019    Procedure: Image guided left frontal craniotomy for resection of tumor;  Surgeon: Monico Kumar MD;  [facility];  Service: Neurosurgery     Social History     Objective:  Vitals:    01/21/20 1553   BP: 138/74   BP Location: Right arm   Patient Position: Sitting   Pulse: 88   Resp: 17   Temp: 98 4 °F (36 9 °C)   TempSrc: Oral   SpO2: 94%   Weight: 72 6 kg (160 lb)   Height: 6' 2" (1 88 m)     Physical Exam   Constitutional: He is oriented to person, place, and time  He appears well-developed  HENT:   Head: Normocephalic  Eyes: Pupils are equal, round, and reactive to light  Neck: Neck supple  Cardiovascular: Normal rate and regular rhythm  No murmur heard  Pulmonary/Chest: Breath sounds normal  He has no wheezes  He has no rales  Abdominal: Soft  He exhibits mass (Hepatomegaly extending to the umbilicus)  There is no tenderness  Musculoskeletal: Normal range of motion  He exhibits no edema or tenderness  Lymphadenopathy:     He has no cervical adenopathy  Neurological: He is alert and oriented to person, place, and time  He has normal reflexes  No cranial nerve deficit  Skin: No rash noted  No erythema  Psychiatric: He has a normal mood and affect  His behavior is normal          Labs: I personally reviewed the labs and imaging pertinent to this patient care

## 2020-01-28 ENCOUNTER — APPOINTMENT (OUTPATIENT)
Dept: LAB | Facility: HOSPITAL | Age: 70
End: 2020-01-28
Attending: INTERNAL MEDICINE
Payer: MEDICARE

## 2020-01-28 LAB — AFP-TM SERPL-MCNC: 3.5 NG/ML (ref 0.5–8)

## 2020-01-28 PROCEDURE — 82105 ALPHA-FETOPROTEIN SERUM: CPT | Performed by: INTERNAL MEDICINE

## 2020-01-28 PROCEDURE — 36415 COLL VENOUS BLD VENIPUNCTURE: CPT | Performed by: INTERNAL MEDICINE

## 2020-01-29 ENCOUNTER — HOSPITAL ENCOUNTER (OUTPATIENT)
Dept: CT IMAGING | Facility: HOSPITAL | Age: 70
Discharge: HOME | End: 2020-01-29
Attending: INTERNAL MEDICINE
Payer: MEDICARE

## 2020-01-29 DIAGNOSIS — C22.0 HEPATOCELLULAR CARCINOMA (HCC): ICD-10-CM

## 2020-01-29 PROCEDURE — 71260 CT THORAX DX C+: CPT

## 2020-01-29 PROCEDURE — 74177 CT ABD & PELVIS W/CONTRAST: CPT

## 2020-01-29 RX ADMIN — IOHEXOL 100 ML: 350 INJECTION, SOLUTION INTRAVENOUS at 10:29

## 2020-02-03 ENCOUNTER — TELEPHONE (OUTPATIENT)
Dept: HEMATOLOGY ONCOLOGY | Facility: CLINIC | Age: 70
End: 2020-02-03

## 2020-02-03 NOTE — TELEPHONE ENCOUNTER
Patient called to confirm their appointment  Appointment confirmed for [facility]              Appointment date and time: 2-6-20 @ 3:30               [place] location:[place]                Patient verbalized understanding of above

## 2020-02-03 NOTE — TELEPHONE ENCOUNTER
Call received from [facility] [address] from Radiology called with clinically significant results on 1/29/20 CT C/A/P  Results in Epic  Ira Pool RN  tasked and IM

## 2020-02-06 ENCOUNTER — TELEPHONE (OUTPATIENT)
Dept: HEMATOLOGY ONCOLOGY | Facility: CLINIC | Age: 70
End: 2020-02-06

## 2020-02-07 ENCOUNTER — SOCIAL WORK (OUTPATIENT)
Dept: PALLIATIVE MEDICINE | Age: 70
End: 2020-02-07
Payer: MEDICARE

## 2020-02-07 ENCOUNTER — OFFICE VISIT (OUTPATIENT)
Dept: PALLIATIVE MEDICINE | Age: 70
End: 2020-02-07
Payer: MEDICARE

## 2020-02-07 VITALS
HEART RATE: 79 BPM | OXYGEN SATURATION: 98 % | WEIGHT: 158 LBS | TEMPERATURE: 96.3 F | RESPIRATION RATE: 18 BRPM | HEIGHT: 74 IN | SYSTOLIC BLOOD PRESSURE: 130 MMHG | DIASTOLIC BLOOD PRESSURE: 60 MMHG | BODY MASS INDEX: 20.28 KG/M2

## 2020-02-07 DIAGNOSIS — C71.9 GLIOBLASTOMA (HCC): ICD-10-CM

## 2020-02-07 DIAGNOSIS — Z72.0 TOBACCO ABUSE: ICD-10-CM

## 2020-02-07 DIAGNOSIS — J44.9 CHRONIC OBSTRUCTIVE PULMONARY DISEASE, UNSPECIFIED COPD TYPE (HCC): ICD-10-CM

## 2020-02-07 DIAGNOSIS — F41.9 ANXIETY DISORDER, UNSPECIFIED TYPE: ICD-10-CM

## 2020-02-07 DIAGNOSIS — G89.3 CANCER ASSOCIATED PAIN: ICD-10-CM

## 2020-02-07 DIAGNOSIS — R19.7 DIARRHEA, UNSPECIFIED TYPE: ICD-10-CM

## 2020-02-07 DIAGNOSIS — D38.1 NEOPLASM OF UNCERTAIN BEHAVIOR OF LEFT LOWER LOBE OF LUNG: ICD-10-CM

## 2020-02-07 DIAGNOSIS — Z71.89 COUNSELING AND COORDINATION OF CARE: Primary | ICD-10-CM

## 2020-02-07 DIAGNOSIS — C22.0 HEPATOCELLULAR CARCINOMA (HCC): Primary | ICD-10-CM

## 2020-02-07 PROCEDURE — 1160F RVW MEDS BY RX/DR IN RCRD: CPT | Performed by: NURSE PRACTITIONER

## 2020-02-07 PROCEDURE — 3075F SYST BP GE 130 - 139MM HG: CPT

## 2020-02-07 PROCEDURE — 3078F DIAST BP <80 MM HG: CPT

## 2020-02-07 PROCEDURE — 3075F SYST BP GE 130 - 139MM HG: CPT | Performed by: NURSE PRACTITIONER

## 2020-02-07 PROCEDURE — 99215 OFFICE O/P EST HI 40 MIN: CPT | Performed by: NURSE PRACTITIONER

## 2020-02-07 PROCEDURE — 3078F DIAST BP <80 MM HG: CPT | Performed by: NURSE PRACTITIONER

## 2020-02-07 PROCEDURE — 3008F BODY MASS INDEX DOCD: CPT | Performed by: NURSE PRACTITIONER

## 2020-02-07 PROCEDURE — 1160F RVW MEDS BY RX/DR IN RCRD: CPT

## 2020-02-07 PROCEDURE — 3008F BODY MASS INDEX DOCD: CPT

## 2020-02-07 PROCEDURE — 99024 POSTOP FOLLOW-UP VISIT: CPT

## 2020-02-07 RX ORDER — MIRTAZAPINE 15 MG/1
15 TABLET, FILM COATED ORAL
Qty: 30 TABLET | Refills: 0 | Status: SHIPPED | OUTPATIENT
Start: 2020-02-07 | End: 2020-03-02 | Stop reason: SDUPTHER

## 2020-02-07 RX ORDER — OXYCODONE HYDROCHLORIDE 10 MG/1
10 TABLET ORAL EVERY 4 HOURS PRN
Qty: 90 TABLET | Refills: 0 | Status: SHIPPED | OUTPATIENT
Start: 2020-02-07 | End: 2020-03-02 | Stop reason: SDUPTHER

## 2020-02-07 RX ORDER — DIPHENOXYLATE HYDROCHLORIDE AND ATROPINE SULFATE 2.5; .025 MG/1; MG/1
1 TABLET ORAL 4 TIMES DAILY PRN
Qty: 30 TABLET | Refills: 0 | Status: SHIPPED | OUTPATIENT
Start: 2020-02-07 | End: 2020-03-23 | Stop reason: SDUPTHER

## 2020-02-07 RX ORDER — ALPRAZOLAM 0.5 MG/1
0.5 TABLET ORAL 3 TIMES DAILY PRN
Qty: 60 TABLET | Refills: 0 | Status: SHIPPED | OUTPATIENT
Start: 2020-02-07 | End: 2020-03-02 | Stop reason: ALTCHOICE

## 2020-02-07 RX ORDER — DEXAMETHASONE 4 MG/1
4 TABLET ORAL
Qty: 30 TABLET | Refills: 0 | Status: SHIPPED | OUTPATIENT
Start: 2020-02-07 | End: 2020-03-02 | Stop reason: SDUPTHER

## 2020-02-07 NOTE — PROGRESS NOTES
Palliative and Supportive Care   Myke Douglas  81 y o  male [id number]    Assessment/Plan:  1  Hepatocellular carcinoma (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    4  Chronic obstructive pulmonary disease, unspecified COPD type ([facility] )    5  Anxiety disorder, unspecified type    6  Cancer associated pain    7  Tobacco abuse    8  Diarrhea, unspecified type        Requested Prescriptions     Signed Prescriptions Disp Refills    oxyCODONE (ROXICODONE) 10 MG TABS 90 tablet 0     Sig: Take 1 tablet (10 mg total) by mouth every 4 (four) hours as needed for moderate painMax Daily Amount: 60 mg    mirtazapine (REMERON) 15 mg tablet 30 tablet 0     Sig: Take 1 tablet (15 mg total) by mouth daily at bedtime    dexamethasone (DECADRON) 4 mg tablet 30 tablet 0     Sig: Take 1 tablet (4 mg total) by mouth daily with breakfast    ALPRAZolam (XANAX) 0 5 mg tablet 60 tablet 0     Sig: Take 1 tablet (0 5 mg total) by mouth 3 (three) times a day as needed for anxiety    diphenoxylate-atropine (LOMOTIL) 2 5-0 025 mg per tablet 30 tablet 0     Sig: Take 1 tablet by mouth 4 (four) times a day as needed for diarrhea     Medications Discontinued During This Encounter   Medication Reason    Naproxen Sodium (ALEVE PO) Therapy completed    docusate sodium (COLACE) 100 mg capsule Therapy completed    oxyCODONE (ROXICODONE) 5 mg immediate release tablet Reorder     The focus of today's visit was to introduce palliative care to [facility] and to initiate an assessment of his chronic conditions, symptoms, functional status, QOL, capacity for self management and goals of care  [facility] was seen in conjunction with [address] [place] [address] today  A significant amount of time was spent providing supportive listening as he shared his medical history, ongoing grief over losing his significant other, financial struggles and current symptoms       I reviewed the results of his most recent imaging that indicates progression of his disease  I answered questions regarding his overall prognosis to the best of my ability and strongly encouraged him to further discuss possible treatment options with Dr Marsha Mary at his appointment next week  Gurvinder James is aware he has uncurable cancer but is willing to undergo any treatment offered that will help prolong his life  In regards to his symptoms, he has cancer related pain, depression, anxiety, decreased appetite, weight loss, diarrhea  The following recommendations were made:  Cancer pain:  · Start dexamethasone 4 mg daily for spectrum of palliative symptom control including pain, poor appetite, decreased energy  He was instructed to STOP Aleve and avoid NSAIDS while on steroids  · Oxycodone 10 mg Q 4 PRN  He was instructed to keep record of how often he is taking  His overall OME with help determine need for possible long acting opioid     Depression/Anxiety:  · Start Mirtazapine 15 mg at bedtime   · Xanax PRN     Diarrhea:  · Continue Imodium prn  · Add Lomotil prn for diarrhea not controlled by Imodium   · Dicussed BRAT diet and importance of staying hydrated     Gurvinder James will benefit from ongoing palliative care to help with symptom management and provide support  We appreciate the referral, and wish for him to continue to follow with us  If there are questions or concerns, please contact us through our clinic/answering service 24 hours a day, seven days a week  I have queried the patient's controlled substance dispensing history in the Prescription Drug Monitoring Program in compliance with regulations before I have prescribed any controlled substances  The prescription history is consistent with prescribed therapy and our practice policies  Return in about 2 weeks (around 2/21/2020) for Recheck, symptom assessment and management  Subjective: In attendance at this visit: Evert Alaina    Chief Complaint  New consultation for:  symptom management, goal of care assessment and decisional support, disease process education and discussion of prognosis, emotional support in the setting of serious illness  FREDIS Bates  is a 81 y o  male with a history of Hepatitis C and cirrhosis,  metastatic hepatocellular carcinoma diagnosed in 2017 while living in [place] s/p TACE x2 and oral Sorafenib therapy which he self discontinued secondary to side effects  He recently moved to the [facility] to be close to family after suffering loss of his life partner due to cancer  He was diagnosed with glioblastoma of the left frontoparietal lobe and underwent a gross total resection in 9/2019  He completed a coarse of adjuvant chemoradiation therapy in 12/2019 withTemodar  He underwent CT CAB on 1/29/20 results indicated "Interval progression of hepatic tumors with increased number and size of lesions  Progression of metastatic disease in the lungs and left 5th rib "     He presents today for consultation, he was referred by his radiation oncologist Dr Griselda Bueno  He presents alone  He shares his medical history and admits to a history of non-compliance with keeping doctor's appointments and taking his recommended medications  He states he has been struggling with life ever since losing  his girlfriend in 2016, this initially resulted in him going on drinking binges and feelings of "wanting to give up on life"  He reports he is no longer drinking  He has symptoms of pain in his abdomen, worse in RUQ  There is a palpable mass present  Pain radiates around his back  He had been given a prescription for oxycodone 5 mg by Dr Sachin Braxton and this helped somewhat  He has run out of this medication  In fact, he has run out of all his medications and has not been taking them due to lack of finances  His pain has been so bad he has been taking Aleve and went through an entire bottle over this past week  Appetite is decreased   He has been struggling with diarrhea for over a month, [facility]  He has lost over 50 lbs since diagnosis  He endorses feeling anxious and depressed and states  "I feel lost"     Pt is living alone in an apartment struggling to make ends meet  His brother is very supportive and has been helping him financially  He struggles with the fact he requires help and states "I have never liked to ask anyone for help my whole life"     Pt does have 3 children from whom he is estranged  The following portions of the medical history were reviewed: past medical history, problem list, medication list, and social history  Current Outpatient Medications:     ALPRAZolam (XANAX) 0 5 mg tablet, Take 1 tablet (0 5 mg total) by mouth 3 (three) times a day as needed for anxiety, Disp: 60 tablet, Rfl: 0    aluminum-magnesium hydroxide-simethicone (MYLANTA) 200-200-20 mg/5 mL suspension, Take 30 mL by mouth every 6 (six) hours as needed for indigestion or heartburn (Patient not taking: Reported on 10/9/2019), Disp: 355 mL, Rfl: 0    dexamethasone (DECADRON) 4 mg tablet, Take 1 tablet (4 mg total) by mouth daily with breakfast, Disp: 30 tablet, Rfl: 0    diphenoxylate-atropine (LOMOTIL) 2 5-0 025 mg per tablet, Take 1 tablet by mouth 4 (four) times a day as needed for diarrhea, Disp: 30 tablet, Rfl: 0    fluticasone-vilanterol (BREO ELLIPTA) 100-25 mcg/inh inhaler, Inhale 1 puff daily Rinse mouth after use   (Patient not taking: Reported on 10/9/2019), Disp: 1 Inhaler, Rfl: 0    folic acid (FOLVITE) 1 mg tablet, Take 1 tablet (1 mg total) by mouth daily (Patient not taking: Reported on 10/9/2019), Disp: , Rfl: 0    hydrOXYzine HCL (ATARAX) 25 mg tablet, Take 1 tablet (25 mg total) by mouth every 6 (six) hours as needed for anxiety (Patient not taking: Reported on 10/9/2019), Disp: 30 tablet, Rfl: 0    insulin glargine (LANTUS) 100 units/mL subcutaneous injection, Patient currently taking 25 units insulin in the morning; patient will need to have this medication adjusted at facility by physician (Patient not taking: Reported on 10/9/2019), Disp: , Rfl: 0    insulin lispro (HumaLOG) 100 units/mL injection, Inject 1-5 Units under the skin 3 (three) times a day before meals (Patient not taking: Reported on 10/9/2019), Disp: , Rfl: 0    insulin lispro (HumaLOG) 100 units/mL injection, Patient is currently been taking 15 units t i d  With meals while in the hospital; this is secondary to patient being on steroids and dose will need to be adjusted at facility by physician   (Patient not taking: Reported on 10/9/2019), Disp: , Rfl: 0    levETIRAcetam (KEPPRA) 500 mg tablet, Take 1 tablet (500 mg total) by mouth every 12 (twelve) hours for 8 doses (Patient not taking: Reported on 1/21/2020), Disp: 8 tablet, Rfl: 0    LORazepam (ATIVAN) 0 5 mg tablet, Take by mouth 2 (two) times a day , Disp: , Rfl:     mirtazapine (REMERON) 15 mg tablet, Take 1 tablet (15 mg total) by mouth daily at bedtime, Disp: 30 tablet, Rfl: 0    nicotine (NICODERM CQ) 21 mg/24 hr TD 24 hr patch, Place 1 patch on the skin daily (Patient not taking: Reported on 10/9/2019), Disp: 28 patch, Rfl: 0    nicotine polacrilex (NICORETTE) 2 mg gum, Chew 1 each (2 mg total) every hour as needed for smoking cessation (Patient not taking: Reported on 10/9/2019), Disp: 100 each, Rfl: 0    NIFEdipine (ADALAT CC) 60 MG 24 hr tablet, Take 1 tablet (60 mg total) by mouth daily (Patient not taking: Reported on 1/13/2020), Disp: , Rfl: 0    ondansetron (ZOFRAN) 8 mg tablet, Take 1 tablet (8 mg total) by mouth every 8 (eight) hours as needed for nausea or vomiting (Patient not taking: Reported on 10/9/2019), Disp: 30 tablet, Rfl: 3    oxyCODONE (ROXICODONE) 10 MG TABS, Take 1 tablet (10 mg total) by mouth every 4 (four) hours as needed for moderate painMax Daily Amount: 60 mg, Disp: 90 tablet, Rfl: 0    pantoprazole (PROTONIX) 40 mg tablet, Take 1 tablet (40 mg total) by mouth daily in the early morning Take this medication for duration of steroid therapy (Patient not taking: Reported on 10/9/2019), Disp: , Rfl: 0    senna (SENOKOT) 8 6 mg, Take 1 tablet (8 6 mg total) by mouth daily (Patient not taking: Reported on 10/9/2019), Disp: 120 each, Rfl: 0    sodium chloride 1 g tablet, Take 1 g b i d  For 7 days; take 1 g daily for 7 days then discontinue (Patient not taking: Reported on 1/13/2020), Disp: 21 tablet, Rfl: 0    temozolomide (TEMODAR) 140 mg capsule, Take one capsule by mouth daily with radiation therapy (Patient not taking: Reported on 1/13/2020), Disp: 42 capsule, Rfl: 0    thiamine 100 MG tablet, Take 1 tablet (100 mg total) by mouth daily (Patient not taking: Reported on 10/9/2019), Disp: , Rfl: 0  Review of Systems   Constitutional: Positive for activity change, appetite change, fatigue and unexpected weight change  Gastrointestinal: Positive for abdominal distention, abdominal pain and diarrhea  Musculoskeletal: Positive for arthralgias and back pain  Neurological: Positive for weakness  Psychiatric/Behavioral: Positive for dysphoric mood and sleep disturbance  The patient is nervous/anxious  All other systems negative    Objective:  Vital Signs  /60 (BP Location: Left arm, Patient Position: Sitting, Cuff Size: Standard)   Pulse 79   Temp (!) 96 3 °F (35 7 °C) (Tympanic)   Resp 18   Ht 6' 2" (1 88 m)   Wt 71 7 kg (158 lb)   SpO2 98%   BMI 20 29 kg/m²    Physical Exam    Constitutional: Alert, pleasant  Appears chronically ill, underweight  In no acute physical or emotional distress  Head: Normocephalic and atraumatic  Eyes: EOM are normal  No ocular discharge  No scleral icterus  Neck: No visible adenopathy or masses  Respiratory: Effort normal  No stridor  No respiratory distress  Gastrointestinal: +Hepatomegaly  Tender to palpation  +BS  Musculoskeletal: No edema  Neurological: Alert, oriented and appropriately conversant     Skin: No diaphoresis, no rashes seen on exposed areas of skin  Psychiatric: Dysphoric mood  Tearful at times   Behavior, judgement and thought content appear normal

## 2020-02-07 NOTE — PROGRESS NOTES
Palliative Care MSW and APOLLO Rodriguez, met with pt this morning for his initial visit  Pt was educated on palliative medicine and our role  Pt was alert and very pleasant  Pt reports that he has not been taking any of his medications, as he is unable to afford them  MSW educated on [employer] and provided him with the application  MSW also provided pt with a list of food navas near his home  MSW reached out to Deidre Swartz, cancer counselor, to see if there is any financial assistance that he may receive  Wanda Jasso reviewed his recent scans, as pt requested an update on his cancer locations, as well as if anything had worsened  Pt lives alone on the third floor of a hotel in [place]  Pt has 2 sisters and a brother that live in the area  Pt also has 2 daughters and a son, who he is estranged from  Pt states that his brother, Yulissa Walker, is very supportive and has been lending pt money, as he has a very limited income  Pt was in a relationship for 20 years and in 2016 his girlfriend passed away from cancer  Pt was her caregiver and the loss had effected him dramatically  Pt reports that he basically gave up afterwards and just partied  Pt was very honest and upfront  Pt continues to smoke, but due to his illness it helps him cope with all of the stress  Pt states he has been trying to cut back  Pt also reports that he has stopped drinking, but will have one once in a while  Wanda Jasso educated him on the effects that alcohol will have on his liver and he verbalized an understanding  Pt had had chronic diarrhea and has been losing his balance at times  Wanda Jasso educated him on drinking fluids and prescribed something to help with the diarrhea, which should have better results then the imodium that he has been taking  Pt has lost about 50 pounds since his diagnosis  MSW provided him with some ensure  MSW provided a great deal of active listening and emotional support   MSW will continue to provided ongoing support as needed  For additional information please refer to provider notes

## 2020-02-11 ENCOUNTER — TELEPHONE (OUTPATIENT)
Dept: HEMATOLOGY ONCOLOGY | Facility: CLINIC | Age: 70
End: 2020-02-11

## 2020-02-11 NOTE — TELEPHONE ENCOUNTER
LVM informing Norbert Og that his appt with DAYANA Quiroga is set for 2/13/2020 at 3:30pm at the [facility]

## 2020-02-12 ENCOUNTER — DOCUMENTATION (OUTPATIENT)
Dept: INFUSION CENTER | Facility: CLINIC | Age: 70
End: 2020-02-12

## 2020-02-12 NOTE — SOCIAL WORK
MSW called pt and left a voicemail with return contact info  Pt returned call soon after and stated that he was on the phone with Dr Tristen Gil office for his appt tomorrow  MSW spoke to pt more about financial concerns he has previously expressed, and MSW's recent conversation with Juan M Madrigal from palliative  Pt stated that he has an rx plan that is $12 per month and that he also filled out paperwork with [facility]  Rx costs anywhere from $6-9 per medication  Pt's monthly income is $1041  Pt reports having trouble paying bills since "all of this started", having received medical bills but stating he would not be paying any of them because he cannot afford to  MSW requested that pt bring paper bills to his appt tomorrow so that copies could be made for MSW to further look into  Pt acknowledged this and said he will bring them  MSW will follow up with pt tomorrow

## 2020-02-12 NOTE — SOCIAL WORK
MSW received an email from fellow Jackelin Desouza from palliative care regarding meeting with pt  Elesa Setting reports pt admitting to not taking his medications as he is unable to afford them  He has Medicare and no supplementary  Elesa Setting gave him a [employer] application and a list of food navas  We spoke of compassion fund utilization, which MSW previously reviewed with pt last month followed by a referral to oncology finance  Follow up questions included whether or not pt had an rx plan, what the rx costs were, household bills and income  MSW has intentions to connect him to financial assistance and instructed that he bring in paper bills next time we met  MSW and SW will be working on financial relief and following up with pt accordingly to ensure that pt is taking his medications as prescribed

## 2020-02-13 ENCOUNTER — DOCUMENTATION (OUTPATIENT)
Dept: INFUSION CENTER | Facility: CLINIC | Age: 70
End: 2020-02-13

## 2020-02-13 ENCOUNTER — OFFICE VISIT (OUTPATIENT)
Dept: HEMATOLOGY ONCOLOGY | Facility: CLINIC | Age: 70
End: 2020-02-13
Payer: MEDICARE

## 2020-02-13 VITALS
BODY MASS INDEX: 20.17 KG/M2 | HEIGHT: 74 IN | HEART RATE: 86 BPM | TEMPERATURE: 97.3 F | OXYGEN SATURATION: 95 % | DIASTOLIC BLOOD PRESSURE: 70 MMHG | SYSTOLIC BLOOD PRESSURE: 110 MMHG | RESPIRATION RATE: 18 BRPM | WEIGHT: 157.2 LBS

## 2020-02-13 DIAGNOSIS — Z79.899 IMMUNOCOMPROMISED STATE DUE TO DRUG THERAPY (HCC): ICD-10-CM

## 2020-02-13 DIAGNOSIS — C22.0 HEPATOCELLULAR CARCINOMA (HCC): ICD-10-CM

## 2020-02-13 DIAGNOSIS — Z72.0 TOBACCO ABUSE: ICD-10-CM

## 2020-02-13 DIAGNOSIS — C71.9 GLIOBLASTOMA (HCC): Primary | ICD-10-CM

## 2020-02-13 DIAGNOSIS — D84.821 IMMUNOCOMPROMISED STATE DUE TO DRUG THERAPY (HCC): ICD-10-CM

## 2020-02-13 PROCEDURE — 3078F DIAST BP <80 MM HG: CPT | Performed by: PHYSICIAN ASSISTANT

## 2020-02-13 PROCEDURE — 1160F RVW MEDS BY RX/DR IN RCRD: CPT | Performed by: PHYSICIAN ASSISTANT

## 2020-02-13 PROCEDURE — 3074F SYST BP LT 130 MM HG: CPT | Performed by: PHYSICIAN ASSISTANT

## 2020-02-13 PROCEDURE — 99215 OFFICE O/P EST HI 40 MIN: CPT | Performed by: PHYSICIAN ASSISTANT

## 2020-02-13 PROCEDURE — 3008F BODY MASS INDEX DOCD: CPT | Performed by: PHYSICIAN ASSISTANT

## 2020-02-13 RX ORDER — TEMOZOLOMIDE 100 MG/1
300 CAPSULE ORAL DAILY
Qty: 15 CAPSULE | Refills: 3 | Status: SHIPPED | OUTPATIENT
Start: 2020-02-13 | End: 2020-02-18 | Stop reason: SDUPTHER

## 2020-02-13 RX ORDER — ONDANSETRON HYDROCHLORIDE 8 MG/1
8 TABLET, FILM COATED ORAL EVERY 8 HOURS PRN
Qty: 30 TABLET | Refills: 3 | Status: SHIPPED | OUTPATIENT
Start: 2020-02-13

## 2020-02-13 RX ORDER — NICOTINE 21 MG/24HR
1 PATCH, TRANSDERMAL 24 HOURS TRANSDERMAL DAILY
Qty: 28 PATCH | Refills: 3 | Status: SHIPPED | OUTPATIENT
Start: 2020-02-13

## 2020-02-13 NOTE — PATIENT INSTRUCTIONS
Milk shake recipe    · Scoop of way protein  · Two scoops ice cream  · 1 packet of carination instant breakfast  · Add whole milk/ 2% milk until desired consistency    New medications:  Lenvima - for Liver Cancer to be taken daily  Temodar - for Brain Cancer to be take days 1-5 every 28 days  Please undergo blood work every 28 days prior to taking Temodar    Blood work includes CBCD, CMP, TSH and U/A

## 2020-02-13 NOTE — SOCIAL WORK
MSW spoke with hem onc office to confirm that pt brought paperwork to his office visit and requested for paperwork to be emailed or faxed to MSW for further support

## 2020-02-13 NOTE — PROGRESS NOTES
[id number] [facility] [address] [address] [phone]  Progress Note  Derian Mustafa , 1950, [id number]  2/13/2020    Assessment/Plan:  1  Glioblastoma [facility])  Patient completed chemo plus radiation in November 2000 and 19  Follow-up imaging was completed in early January 2020  Patient will need to begin consolidative Temodar which was calculated to be 300 mg for ease of dosing  Patient was advised to undergo blood work prior to each round of Temodar  Patient was advised that this medication does cause immunocompetent position  Patient will undergo blood work including CD4 count at regular intervals  If CD4 count is found to be low, administration of Bactrim would be necessary  Patient was advised to take Zofran prior to oral Temodar  A refill of this medication has been called out to his pharmacy today  Regimen:  Temodar 150mg/m2 PO days 1-5  Calculated dose = 295 mg, rounded to 300 for ease of dosing  Cycle length = 28 days    - ondansetron (ZOFRAN) 8 mg tablet; Take 1 tablet (8 mg total) by mouth every 8 (eight) hours as needed for nausea or vomiting  Dispense: 30 tablet; Refill: 3  - CBC and differential; Standing  - Comprehensive metabolic panel; Standing  - TSH, 3rd generation with Free T4 reflex; Standing  - UA (URINE) with reflex to Scope; Standing  - CBC and differential  - Comprehensive metabolic panel  - TSH, 3rd generation with Free T4 reflex  - UA (URINE) with reflex to Scope  - temozolomide (TEMODAR) 100 mg capsule; Take 3 capsules (300 mg total) by mouth daily  Dispense: 15 capsule; Refill: 3  - T-helper cells (CD4) count; Standing  - T-helper cells (CD4) count    2  Hepatocellular carcinoma ([address] )  On CT scan completed on 1/29/20 patient's hepatic tumors have increased in number as well as in size with progression of metastatic in disease in the lungs and the left 5th rib  Patient will require treatment    NCCN guidelines were reviewed and consultation with managing oncologist, Dr Mackenzie Szymanski was completed prior to appointment  Dr Mackenzie Szymanski  has advised to start the patient on [address]  Potiental side effects include but aren't limited to hypertension, fatigue, diarrhea, decreased appetite, arthralgia/myalgia,  decreased weight, abdominal pain, palmar-plantar erythrodysesthesia  syndrome, proteinuria, dysphonia, hemorrhagic events, hypothyroidism, and  Nausea  Other dangerous side effects of this medication include increased risk for arterial thrombus  We discussed signs and symptoms of this and the patient was advised if he experiences this to go directly to the emergency room  Patient voiced agreement to treatment  Clinical nursing staff spent time with the patient reviewing side effects  Regimen:  Lenvima 12 mg PO daily     - CBC and differential; Standing  - Comprehensive metabolic panel; Standing  - TSH, 3rd generation with Free T4 reflex; Standing  - UA (URINE) with reflex to Scope; Standing  - CBC and differential  - Comprehensive metabolic panel  - TSH, 3rd generation with Free T4 reflex  - UA (URINE) with reflex to Scope  - Lenvatinib, 12 MG Daily Dose, (LENVIMA, 12 MG DAILY DOSE,) 3 x 4 MG CPPK; Take 12 mg by mouth daily  Dispense: 28 each; Refill: 3    3  Tobacco abuse  Patient is down from 3 packs a day to 1 pack a day  Patient requests a Nicoderm patch  I have set up the medication to his pharmacy  - nicotine (NICODERM CQ) 21 mg/24 hr TD 24 hr patch; Place 1 patch on the skin daily  Dispense: 28 patch; Refill: 3    4  Immunocompromised state due to drug therapy  Temodar can cause an immunocompromised status  Please see blood work above  Patient will undergo testing for CD4 count and if below 500, Bactrim prophylaxis will be administered to prevent PCP     - T-helper cells (CD4) count; Standing  - T-helper cells (CD4) count    The patient is scheduled for follow-up in approximately 4 weeks     Patient voiced agreement and understanding to the above  Patient knows to call the Hematology/Oncology office with any questions and concerns regarding the above  I have spent 40 minutes with Patient  today in which greater than 50% of this time was spent in counseling/coordination of care regarding Diagnostic results, Risks and benefits of tx options and Impressions  Goals and Barriers:    Current Goal:   Prolong Survival from Cancer  Barriers: None  Patient's Capacity to Self Care:  Patient able to self care   -------------------------------------------------------------------------------------------------------    Chief Complaint   Patient presents with    Follow-up     History of present illness/Cancer History:      Hepatocellular carcinoma ([facility] [address] )    2017 Initial Diagnosis     In 2017 patient was diagnosed to have multi focal hepatocellular carcinoma and hepatitis C  He had 2 treatments with TACE and oral sorafenib at outside institution  He said there was some initial response and then disease progressed and he discontinued sorafenib and did not go back to the doctor  1/29/2020 Progression     Interval progression of hepatic tumors with increased number and size of lesions      Progression of metastatic disease in the lungs and left 5th rib  Ct was compared to CT of the CAP from 9/2019 2/13/2020 -  Chemotherapy     Lenvima 12 mg PO daily        Glioblastoma ([facility] [address] )    2019 Initial Diagnosis     He was here now because of weakness in his right hand and he has left fronto parietal brain lesion with surrounding edema  9/10/2019 Biopsy     Imagine guided L frontal craniotomy for resection of tumor    Preliminary Diagnosis   SECOND PRELIMINARY REPORT:  A & B  Brain, left frontal mass, biopsy for frozen section & excision:  - 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, namely glioblastoma, with PNET-like component - see Note  Immunohistochemical stains (performed at [facility], [place], [place]) demonstrate the following tumor cell immunophenotype:   * Positive: GFAP (patchy), synaptophysin, and chromogranin (patchy), cytokeratin CAM5 2 (focal dot-like staining)  * Negative: IDH1 (R132H), BRAF V600E, EGFR vIII, TTF1, and CK18             10/29/2019 - 2019 Radiation     Treatment:  Course: C1    Plan ID Energy Fractions Dose per Fraction (cGy) Dose Correction (cGy) Total Dose Delivered (cGy) Elapsed Days   CD L Front 6X 7 / 7 200 0 1,400 10   L Front Lobe 6X 23 / 23 200 0 4,600 34     Dr Antoine Azul      10/29/2019 - 2019 Chemotherapy     Temodar 140 mg daily with radiation  Dr Marsha Mary      2020 -  Chemotherapy     Temodar 150mg/m2 PO days 1-5  Calculated dose = 295 mg, rounded to 300 for ease of dosing  Cycle length = 28 days    Consolidative treatment was held secondary to evaluation for hepatocellular carcinoma  ECO - Symptomatic but completely ambulatory    Interval history: This is a 53-year-old man who returns for follow-up  Patient overall feeling okay  We discussed recent CT findings regarding liver cancer  Patient understands that he has progressed  We discussed the locations of progression  Patient is breathing fine  Patient is presently trying to quit smoking and is down from 3 packs to 1  He requests nicotine patch  Otherwise, patient notes that his weight has been fluctuant but is on the higher end of the fluctuation at the present time  Patient refuses nutrition consult  Patient notes that he is not presently on blood pressure medications  Patient's blood pressure has been improved since brain surgery  Review of Systems   Constitutional: Positive for appetite change ( increased) and unexpected weight change (Weight fluctuations despite increased appetite  )  Negative for activity change, chills, fatigue and fever     HENT: Negative for hearing loss, mouth sores, nosebleeds, sore throat, trouble swallowing and voice change  Eyes: Negative for visual disturbance  Respiratory: Positive for cough  Negative for shortness of breath ( chronic)  Cardiovascular: Negative for chest pain, palpitations and leg swelling  Gastrointestinal: Negative for abdominal pain, blood in stool, constipation, diarrhea, nausea and vomiting  Endocrine: Negative for cold intolerance  Genitourinary: Negative for decreased urine volume, dysuria and hematuria  Musculoskeletal: Negative for arthralgias and myalgias  Skin: Negative for rash  Neurological: Positive for weakness  Negative for dizziness, numbness and headaches  Hematological: Negative for adenopathy  Does not bruise/bleed easily  Psychiatric/Behavioral: Negative for dysphoric mood         Current Outpatient Medications:     ALPRAZolam (XANAX) 0 5 mg tablet, Take 1 tablet (0 5 mg total) by mouth 3 (three) times a day as needed for anxiety, Disp: 60 tablet, Rfl: 0    dexamethasone (DECADRON) 4 mg tablet, Take 1 tablet (4 mg total) by mouth daily with breakfast, Disp: 30 tablet, Rfl: 0    diphenoxylate-atropine (LOMOTIL) 2 5-0 025 mg per tablet, Take 1 tablet by mouth 4 (four) times a day as needed for diarrhea, Disp: 30 tablet, Rfl: 0    LORazepam (ATIVAN) 0 5 mg tablet, Take by mouth 2 (two) times a day , Disp: , Rfl:     mirtazapine (REMERON) 15 mg tablet, Take 1 tablet (15 mg total) by mouth daily at bedtime, Disp: 30 tablet, Rfl: 0    ondansetron (ZOFRAN) 8 mg tablet, Take 1 tablet (8 mg total) by mouth every 8 (eight) hours as needed for nausea or vomiting, Disp: 30 tablet, Rfl: 3    oxyCODONE (ROXICODONE) 10 MG TABS, Take 1 tablet (10 mg total) by mouth every 4 (four) hours as needed for moderate painMax Daily Amount: 60 mg, Disp: 90 tablet, Rfl: 0    aluminum-magnesium hydroxide-simethicone (MYLANTA) 200-200-20 mg/5 mL suspension, Take 30 mL by mouth every 6 (six) hours as needed for indigestion or heartburn (Patient not taking: Reported on 10/9/2019), Disp: 355 mL, Rfl: 0    fluticasone-vilanterol (BREO ELLIPTA) 100-25 mcg/inh inhaler, Inhale 1 puff daily Rinse mouth after use  (Patient not taking: Reported on 10/9/2019), Disp: 1 Inhaler, Rfl: 0    folic acid (FOLVITE) 1 mg tablet, Take 1 tablet (1 mg total) by mouth daily (Patient not taking: Reported on 10/9/2019), Disp: , Rfl: 0    hydrOXYzine HCL (ATARAX) 25 mg tablet, Take 1 tablet (25 mg total) by mouth every 6 (six) hours as needed for anxiety (Patient not taking: Reported on 10/9/2019), Disp: 30 tablet, Rfl: 0    insulin glargine (LANTUS) 100 units/mL subcutaneous injection, Patient currently taking 25 units insulin in the morning; patient will need to have this medication adjusted at facility by physician (Patient not taking: Reported on 10/9/2019), Disp: , Rfl: 0    insulin lispro (HumaLOG) 100 units/mL injection, Inject 1-5 Units under the skin 3 (three) times a day before meals (Patient not taking: Reported on 10/9/2019), Disp: , Rfl: 0    insulin lispro (HumaLOG) 100 units/mL injection, Patient is currently been taking 15 units t i d  With meals while in the hospital; this is secondary to patient being on steroids and dose will need to be adjusted at facility by physician   (Patient not taking: Reported on 10/9/2019), Disp: , Rfl: 0    Lenvatinib, 12 MG Daily Dose, (LENVIMA, 12 MG DAILY DOSE,) 3 x 4 MG CPPK, Take 12 mg by mouth daily, Disp: 28 each, Rfl: 3    levETIRAcetam (KEPPRA) 500 mg tablet, Take 1 tablet (500 mg total) by mouth every 12 (twelve) hours for 8 doses (Patient not taking: Reported on 1/21/2020), Disp: 8 tablet, Rfl: 0    nicotine (NICODERM CQ) 21 mg/24 hr TD 24 hr patch, Place 1 patch on the skin daily, Disp: 28 patch, Rfl: 3    NIFEdipine (ADALAT CC) 60 MG 24 hr tablet, Take 1 tablet (60 mg total) by mouth daily (Patient not taking: Reported on 1/13/2020), Disp: , Rfl: 0    pantoprazole (PROTONIX) 40 mg tablet, Take 1 tablet (40 mg total) by mouth daily in the early morning Take this medication for duration of steroid therapy (Patient not taking: Reported on 10/9/2019), Disp: , Rfl: 0    senna (SENOKOT) 8 6 mg, Take 1 tablet (8 6 mg total) by mouth daily (Patient not taking: Reported on 10/9/2019), Disp: 120 each, Rfl: 0    sodium chloride 1 g tablet, Take 1 g b i d  For 7 days; take 1 g daily for 7 days then discontinue (Patient not taking: Reported on 1/13/2020), Disp: 21 tablet, Rfl: 0    temozolomide (TEMODAR) 100 mg capsule, Take 3 capsules (300 mg total) by mouth daily, Disp: 15 capsule, Rfl: 3    thiamine 100 MG tablet, Take 1 tablet (100 mg total) by mouth daily (Patient not taking: Reported on 10/9/2019), Disp: , Rfl: 0    No Known Allergies    Objective:   /70 (BP Location: Left arm, Patient Position: Sitting, Cuff Size: Adult)   Pulse 86   Temp (!) 97 3 °F (36 3 °C)   Resp 18   Ht 6' 2" (1 88 m)   Wt 71 3 kg (157 lb 3 2 oz)   SpO2 95%   BMI 20 18 kg/m²   Wt Readings from Last 6 Encounters:   02/13/20 71 3 kg (157 lb 3 2 oz)   02/07/20 71 7 kg (158 lb)   01/21/20 72 6 kg (160 lb)   01/13/20 70 1 kg (154 lb 8 oz)   10/09/19 74 8 kg (165 lb)   10/09/19 75 1 kg (165 lb 8 oz)     Physical Exam   Constitutional: He is oriented to person, place, and time  He appears well-developed  He appears cachectic  No distress  HENT:   Head: Normocephalic and atraumatic  Mouth/Throat: No oropharyngeal exudate  Eyes: Pupils are equal, round, and reactive to light  Conjunctivae and EOM are normal  No scleral icterus  Neck: Normal range of motion  Cardiovascular: Normal rate and regular rhythm  No murmur heard  Pulmonary/Chest: Effort normal and breath sounds normal  No respiratory distress  Abdominal: Soft  Bowel sounds are normal  He exhibits no distension  There is no hepatosplenomegaly  There is no tenderness  Musculoskeletal: He exhibits no edema or tenderness  Lymphadenopathy:     He has no cervical adenopathy     Neurological: He is alert and oriented to person, place, and time  No cranial nerve deficit  Skin: No rash noted  No erythema  No pallor  Pertinent Laboratory Results and Imaging Review:  No visits with results within 3 Week(s) from this visit  Latest known visit with results is:   Office Visit on 01/21/2020   Component Date Value Ref Range Status    AFP TUMOR MARKER 01/28/2020 3 5  0 5 - 8 ng/mL Final       CT chest abdomen pelvis w contrast  Narrative: CT CHEST, ABDOMEN AND PELVIS WITH IV CONTRAST    INDICATION:   C22 0: Liver cell carcinoma  COMPARISON:  CT dated 9/4/2019    TECHNIQUE: CT examination of the chest, abdomen and pelvis was performed  Images were obtained at arterial and delayed venous phases  Axial, sagittal, and coronal 2D reformatted images were created from the source data and submitted for interpretation  Radiation dose length product (DLP) for this visit:  1385 8 mGy-cm   This examination, like all CT scans performed in the [facility], was performed utilizing techniques to minimize radiation dose exposure, including the use of iterative   reconstruction and automated exposure control  IV Contrast:  100 mL of iohexol (OMNIPAQUE)  Enteric Contrast: Enteric contrast was administered  FINDINGS:    CHEST    LUNGS:    There are multiple new lesions as well as increase in size of previously seen lesions  Index lesions as described below:  9 mm lesion in the right middle lobe on series 5, image #106, previously 7 mm  New 12 mm lesion in the right middle lobe on series 5, image #103   7 mm lesion in the right lower lobe on series 5, image #112, previously 5 mm  11 mm lesion in the left lower lobe on series 5, image #115, previously 7 mm  There is no tracheal or endobronchial lesion  PLEURA:  Unremarkable  HEART/GREAT VESSELS:  Unremarkable for patient's age  MEDIASTINUM AND NINO:  Stable appearance of borderline mediastinal lymph nodes    For example, subcarinal lymph node is measured 15 mm in short axis (previously 15 mm)  CHEST WALL AND LOWER NECK:   Significant increase in size of a lytic expansile lesion along the posterior left 5th rib now measuring 5 3 x 2 9 cm (previously 3 6 x 2 1 cm)  Focal nonspecific lucency in the manubrium is stable in appearance  ABDOMEN    LIVER/BILIARY TREE:  There are innumerable arterially enhancing masses throughout both right and left lobes of the liver with significant enlargement of the liver  Prior imaging was not performed in arterial phase and therefore unable to be compared  Comparing the delayed venous phase, there appear to be increase in number of lesions and interval increase in size of previously seen lesions  These lesions cause significant enlargement of the liver; for example, the craniocaudal dimension of the right   lobe is measured 22 4 cm (previously 19 7 cm)  The right lobe of the liver is nearly completely filled by tumors and the left lobe the liver is 80% to 90% occupied by tumors  There is a large necrotic cavity 7 3 x 6 0 cm in segment 8 with internal   viable nodular components along the periphery and retraction of the capsule and adjacent trace perihepatic ascites, likely representing a previously treated area  GALLBLADDER:  Cholelithiasis  No pericholecystic inflammatory change  SPLEEN:  Unremarkable  PANCREAS:  Unremarkable  ADRENAL GLANDS:  Unremarkable  KIDNEYS/URETERS:  Unremarkable  No hydronephrosis  STOMACH AND BOWEL:  Colonic diverticulosis  APPENDIX:  No findings to suggest appendicitis  ABDOMINOPELVIC CAVITY:  Trace perihepatic ascites  No pneumoperitoneum  Trace amount of pelvic ascites  VESSELS:  Central portal veins and hepatic veins appear to be patent  PELVIS    REPRODUCTIVE ORGANS:  Unremarkable for patient's age  URINARY BLADDER:  Unremarkable  ABDOMINAL WALL/INGUINAL REGIONS:  Unremarkable      OSSEOUS STRUCTURES:  No acute fracture or destructive osseous lesion  Impression: Interval progression of hepatic tumors with increased number and size of lesions  Progression of metastatic disease in the lungs and left 5th rib  The study was marked in EPIC for significant notification  Workstation performed: [id number]      The following historical data was reviewed      Past Medical History:   Diagnosis Date    Cancer [facility])     liver    Dependence on nicotine from cigarettes 9/4/2019    Hypertension     Liver cancer [facility])        Past Surgical History:   Procedure Laterality Date    APPENDECTOMY      BRAIN SURGERY      CRANIOTOMY Left 9/10/2019    Procedure: Image guided left frontal craniotomy for resection of tumor;  Surgeon: Shahram Wheeler MD;  [facility]: [facility];  Service: Neurosurgery       Social History     Socioeconomic History    Marital status: Legally      Spouse name: None    Number of children: None    Years of education: None    Highest education level: None   Occupational History    None   Social Needs    Financial resource strain: None    Food insecurity:     Worry: None     Inability: None    Transportation needs:     Medical: None     Non-medical: None   Tobacco Use    Smoking status: Current Every Day Smoker     Packs/day: 1 50     Years: 40 00     Pack years: 60 00    Smokeless tobacco: Former User     Types: Chew   Substance and Sexual Activity    Alcohol use: Not Currently     Comment: occasional, 1/5 / week in the past    Drug use: Yes     Types: Oxycodone     Comment: ran out    Sexual activity: Not Currently   Lifestyle    Physical activity:     Days per week: None     Minutes per session: None    Stress: None   Relationships    Social connections:     Talks on phone: None     Gets together: None     Attends [employer] service: None     Active member of club or organization: None     Attends meetings of clubs or organizations: None     Relationship status: None    Intimate partner violence:     Fear of current or ex partner: None     Emotionally abused: None     Physically abused: None     Forced sexual activity: None   Other Topics Concern    None   Social History Narrative    None       Family History   Problem Relation Age of Onset    Breast cancer Mother     Liver cancer Father     Throat cancer Paternal Uncle        Please note: This report has been generated by a voice recognition software system  Therefore there may be syntax, spelling, and/or grammatical errors  Please call if you have any questions

## 2020-02-14 ENCOUNTER — DOCUMENTATION (OUTPATIENT)
Dept: HEMATOLOGY ONCOLOGY | Facility: CLINIC | Age: 70
End: 2020-02-14

## 2020-02-14 NOTE — PROGRESS NOTES
2/13/2020 received notification from clinical that the pt will be starting Temozolomide 300mg q d  X 5 days every 28 days    2/14/2020 per [facility], this is going through his [address]  No [place] is needed  Clinical and finance were also notified  2/19/2020 per finance pt has [facility]     ID #[id number]  Ivonne Carlson #[id number]  N # [id number]  GRP #[id number]    [phone]    Called [facility] requested a form to be faxed so we can submit for auth     2/20/2020 did not received the form  Called [facility] again  Received the form, completed it, sent it to Dr Ernestine Jensen for review and signature, received it back and submitted for auth     2/25/2020  Called [facility] @ 8:30 ([phone])  Spoke with Shahriar Grant who stated the temozolomide has been approved   [place] is valid from 2/21/2020 through 8/21/2020    Notified clinical and finance  Asked [facility] to cancel the order since they are non-par with [facility]    Forwarded pt information with auth information over to [facility]  Requested the rx be sent there as well

## 2020-02-17 ENCOUNTER — DOCUMENTATION (OUTPATIENT)
Dept: HEMATOLOGY ONCOLOGY | Facility: CLINIC | Age: 70
End: 2020-02-17

## 2020-02-17 NOTE — PROGRESS NOTES
2/13/2020 received notification from clinical pt will be starting Lenvima 12 mg q d    Pt has [facility]  ID # [id number]  BIN #[id number]  PCN #[id number]  GRP # [id number]    Pt has [facility]  ID # [id number]  BIN # [id number]  PCN # [id number]  GRP # [id number]    [phone]    Could not submit for auth since chart notes were not entered  2/17/2020 chart notes entered  Submitted for auth through cover my meds  Received approval letter  Abigail Becker #[id number] is valid from 2/17/2020 through 12/31/2020  Notified clinical, [facility], and finance  2/18/2020  Per [facility] they are not able to supply  Forwarded the pt information over to [facility]  Requested the rx be sent there too     2/19/2020 per finance pt has [facility] secondary see above for information    Called [facility] requested a form to be faxed so we can submit for auth     2/20/2020 did not received the form  Called [facility] again  Received the form, completed it, sent it to Dr Odalis Madera for review and signature, received it back and submitted for auth     2/25/2020  Called [facility] @ 8:30 ([phone])  Spoke with Milagro Gandhi who stated the Hattie Baeza has been approved   Abigail Becker is valid from 2/21/2020 through 2/21/2021    Notified clinical and finance  Asked [facility] to cancel the order since they are non-par with [facility]    Forwarded pt information with auth information over to [facility]      Requested the rx be sent there as well

## 2020-02-18 ENCOUNTER — DOCUMENTATION (OUTPATIENT)
Dept: INFUSION CENTER | Facility: CLINIC | Age: 70
End: 2020-02-18

## 2020-02-18 DIAGNOSIS — C71.9 GLIOBLASTOMA (HCC): ICD-10-CM

## 2020-02-18 DIAGNOSIS — C22.0 HEPATOCELLULAR CARCINOMA (HCC): ICD-10-CM

## 2020-02-18 RX ORDER — TEMOZOLOMIDE 100 MG/1
300 CAPSULE ORAL DAILY
Qty: 15 CAPSULE | Refills: 3 | Status: SHIPPED | OUTPATIENT
Start: 2020-02-18 | End: 2020-02-19 | Stop reason: SDUPTHER

## 2020-02-18 NOTE — SOCIAL WORK
MSW received message from pt's physician office that they received paperwork from pt addressed to MSW and sent it by intermail to MSW's [facility] location  MSW will follow up with pt's billing paperwork once received

## 2020-02-19 DIAGNOSIS — C71.9 GLIOBLASTOMA (HCC): ICD-10-CM

## 2020-02-19 DIAGNOSIS — C22.0 HEPATOCELLULAR CARCINOMA (HCC): ICD-10-CM

## 2020-02-19 RX ORDER — TEMOZOLOMIDE 100 MG/1
300 CAPSULE ORAL DAILY
Qty: 15 CAPSULE | Refills: 3 | Status: SHIPPED | OUTPATIENT
Start: 2020-02-19 | End: 2020-02-25 | Stop reason: SDUPTHER

## 2020-02-25 ENCOUNTER — DOCUMENTATION (OUTPATIENT)
Dept: INFUSION CENTER | Facility: CLINIC | Age: 70
End: 2020-02-25

## 2020-02-25 DIAGNOSIS — C22.0 HEPATOCELLULAR CARCINOMA (HCC): ICD-10-CM

## 2020-02-25 DIAGNOSIS — C71.9 GLIOBLASTOMA (HCC): ICD-10-CM

## 2020-02-25 RX ORDER — TEMOZOLOMIDE 100 MG/1
300 CAPSULE ORAL DAILY
Qty: 15 CAPSULE | Refills: 3 | Status: SHIPPED | OUTPATIENT
Start: 2020-02-25 | End: 2020-02-26 | Stop reason: SDUPTHER

## 2020-02-25 NOTE — SOCIAL WORK
MSW received a large packet from [employer] with receipts and bills inside  Pt is in need of financial assistance as he previously stated that he could use help with all of his bills, and reportedly was not taking medication as prescribed due to cost  MSW sent a message to fellow Alexey Robles with update and will forward packet to oncology finance  MSW will continue to follow up as needed

## 2020-02-26 ENCOUNTER — TELEPHONE (OUTPATIENT)
Dept: INFUSION CENTER | Facility: HOSPITAL | Age: 70
End: 2020-02-26

## 2020-02-26 ENCOUNTER — TELEPHONE (OUTPATIENT)
Dept: PALLIATIVE MEDICINE | Facility: CLINIC | Age: 70
End: 2020-02-26

## 2020-02-26 DIAGNOSIS — C71.9 GLIOBLASTOMA (HCC): ICD-10-CM

## 2020-02-26 DIAGNOSIS — C22.0 HEPATOCELLULAR CARCINOMA (HCC): ICD-10-CM

## 2020-02-26 NOTE — TELEPHONE ENCOUNTER
Pt called office to speak with you  He states he wants to d/c use of Oxycodone and Xanax  I asked about his pain, he states he isn't experiencing any pain  He shares he also still experiences anxiety, but still wants to stop Xanax

## 2020-02-26 NOTE — TELEPHONE ENCOUNTER
Donnamae Rinne from [facility]    Temodar clarified with pharmacist per OV note from 2/13/2020 of Merrick Saldivar PA:    Regimen:  Temodar 150mg/m2 PO days 1-5  Calculated dose = 295 mg, rounded to 300 for ease of dosing  Cycle length = 28 days    Cycle clarified per note with pharmacist

## 2020-02-26 NOTE — TELEPHONE ENCOUNTER
Dustin aMrie from [facility] is needed for 12mg of [place] Elizabeth, call  [phone]  ID #[id number]    Will pass on to Be Goel

## 2020-02-27 ENCOUNTER — TELEPHONE (OUTPATIENT)
Dept: INFUSION CENTER | Facility: CLINIC | Age: 70
End: 2020-02-27

## 2020-02-27 ENCOUNTER — TELEPHONE (OUTPATIENT)
Dept: HEMATOLOGY ONCOLOGY | Facility: CLINIC | Age: 70
End: 2020-02-27

## 2020-02-27 RX ORDER — TEMOZOLOMIDE 100 MG/1
CAPSULE ORAL
Qty: 15 CAPSULE | Refills: 3 | Status: SHIPPED | OUTPATIENT
Start: 2020-02-27 | End: 2020-05-14

## 2020-02-27 NOTE — TELEPHONE ENCOUNTER
Rey Clarke from [facility] called and prior authorization need to be for Lenvima "DAILY DOSE OF 12 MG"  if this helps you can use the  NDC# [id number]  Narcisa call back # [phone]    Email sent to oral chemo team

## 2020-02-27 NOTE — TELEPHONE ENCOUNTER
rcv'd email from [facility] that pt req's Laura Barrios for Princess [place]anc from [address] spoke with Raymond Dale who stated Princess Blanc authorization is on file approved 2/21/2020-2/21/2021 4mg  Per Raymond Dale the claim is not processing because [facility] is [facility] to [facility] 12mg instead of 4mg 3x a day for a total dose of 12mg per day  Called [facility] was told Juli Riley , [facility] rep is handling this case and to contact her  I sent an email to [facility] with above information

## 2020-02-27 NOTE — TELEPHONE ENCOUNTER
MSW sent pt's envelope of bills and receipts through inter-mail service to be sent to palliative SW Lory Wilson  Pt will be meeting with [facility] next Monday and can review financial needs in person  Corinne Hail will be able to assess if pt is taking his medications as prescribed as it was a previous concern from staff with pt reporting that he could not afford his medications  MSW will continue to coordinate with pt and Corinne Hail to address pt's needs

## 2020-02-29 NOTE — PROGRESS NOTES
Palliative and Supportive Care   Edson Meraz  81 y o  male [id number]    Assessment/Plan:  1  Glioblastoma ([facility] )    2  [facility]at[facility] ([facility] )    3  Chronic obstructive pulmonary disease, unspecified COPD type ([facility] )    4  Anxiety disorder, unspecified type    5  Cancer associated pain    6  Alcohol abuse    7  Constipation, unspecified constipation type      Requested Prescriptions     Signed Prescriptions Disp Refills    mirtazapine (REMERON) 15 mg tablet 30 tablet 0     Sig: Take 1 tablet (15 mg total) by mouth daily at bedtime    dexamethasone (DECADRON) 4 mg tablet 30 tablet 0     Sig: Take 1 tablet (4 mg total) by mouth daily with breakfast    oxyCODONE (ROXICODONE) 10 MG TABS 120 tablet 0     Sig: Take 1 tablet (10 mg total) by mouth every 4 (four) hours as needed for moderate painMax Daily Amount: 60 mg    senna (SENOKOT) 8 6 mg 120 each 0     Sig: Take 1 tablet (8 6 mg total) by mouth daily     Medications Discontinued During This Encounter   Medication Reason    ALPRAZolam (XANAX) 0 5 mg tablet Therapy completed    LORazepam (ATIVAN) 0 5 mg tablet Therapy completed    mirtazapine (REMERON) 15 mg tablet Reorder    dexamethasone (DECADRON) 4 mg tablet Reorder    oxyCODONE (ROXICODONE) 10 MG TABS Reorder    senna (SENOKOT) 8 6 mg Reorder     Dari Odonnell was seen in conjunction with Fina Messina LSW today  Time spent providing supportive listening and encouragement  He was confused regarding how he should be taking his Temodar  Time spent reviewing and writing down instructions on how this medication needs to be taken  A great deal of time was also spent counseling on use of Oxycodone to manage his cancer pain  We discussed in detail differences between addiction vs dependence  He has a palpable RUQ mass that is resulting in pain   He expressed concerns that he will misuse this medication because he has an "addictive personality" and has made poor choices in the past    I shared that his honesty regarding his feelings and concerns is admirable and that I would prescribe only a limited supply of this medication at a time and we agreed that he would call our office at any time with symptoms of worsening pain or feelings that he was mis-using his meds  I also reminded him that he did not misuse his last prescription of Oxycodone  Per review of PDMP, he took this medication as prescribed  For Pain:  · Continue dexamethasone 4 mg daily  · Continue Oxycodone 10 mg Q 4 PRN  · Senna to prevent OIC    Depression/Anxiety:  · Mirtazapine 15 mg QHS  · D/C benzodiazepines     I have queried the patient's controlled substance dispensing history in the Prescription Drug Monitoring Program in compliance with regulations before I have prescribed any controlled substances  The prescription history is consistent with prescribed therapy and our practice policies  Return in about 4 weeks (around 3/30/2020) for symptom assessment and management  Subjective: In attendance at this visit: Jena Suero  Chief Complaint  Follow up visit for:  symptom management  HPI     Jena Suero  is a 81 y o  male with  a history of Hepatitis C and cirrhosis,  metastatic hepatocellular carcinoma diagnosed in 2017 while living in [place] s/p TACE x2 and oral Sorafenib therapy which he self discontinued secondary to side effects  He recently moved to the [facility] to be close to family after suffering loss of his life partner due to cancer  He was diagnosed with glioblastoma of the left frontoparietal lobe and underwent a gross total resection in 9/2019  He completed a coarse of adjuvant chemoradiation therapy in 12/2019 with Temodar  He is currently undergoing treatment for both cancers: Taking Temodar for glioblastoma and Lenvatinib for hepatocellular cancer       At last visit, he was prescribed low dose oxycodone for cancer related pain and started on Mirtazapine and lorazepam for significant anxiety and depression  He called the office last week with concerns  He felt he was taking his oxycodone more often then he needed since he ran out of it a few days ago and stated "I thought I was stronger than that"  However, he was only prescribed a 2 week supply (#90 on 2/7/20)  He has been consistently taking this 3-4 times a day to help with his pain  He has pain in RUQ where a palpable mass is present  He has noticed improvement in his energy and appetite with daily steroid use  Diarrhea has resolved  His mood is better, he has been talking with his siblings and reaching out to them for support  The following portions of the medical history were reviewed: past medical history, problem list, medication list, and social history      Current Outpatient Medications:     dexamethasone (DECADRON) 4 mg tablet, Take 1 tablet (4 mg total) by mouth daily with breakfast, Disp: 30 tablet, Rfl: 0    fluticasone-vilanterol (BREO ELLIPTA) 100-25 mcg/inh inhaler, Inhale 1 puff daily Rinse mouth after use , Disp: 1 Inhaler, Rfl: 0    hydrOXYzine HCL (ATARAX) 25 mg tablet, Take 1 tablet (25 mg total) by mouth every 6 (six) hours as needed for anxiety, Disp: 30 tablet, Rfl: 0    Lenvatinib, 12 MG Daily Dose, (Lenvima, 12 MG Daily Dose,) 3 x 4 MG CPPK, Take 12 mg by mouth daily, Disp: 30 each, Rfl: 3    mirtazapine (REMERON) 15 mg tablet, Take 1 tablet (15 mg total) by mouth daily at bedtime, Disp: 30 tablet, Rfl: 0    oxyCODONE (ROXICODONE) 10 MG TABS, Take 1 tablet (10 mg total) by mouth every 4 (four) hours as needed for moderate painMax Daily Amount: 60 mg, Disp: 120 tablet, Rfl: 0    temozolomide (TEMODAR) 100 mg capsule, Take 3 caps by mouth on days 1-5 every 28 days, Disp: 15 capsule, Rfl: 3    aluminum-magnesium hydroxide-simethicone (MYLANTA) 200-200-20 mg/5 mL suspension, Take 30 mL by mouth every 6 (six) hours as needed for indigestion or heartburn (Patient not taking: Reported on 10/9/2019), Disp: 355 mL, Rfl: 0    diphenoxylate-atropine (LOMOTIL) 2 5-0 025 mg per tablet, Take 1 tablet by mouth 4 (four) times a day as needed for diarrhea (Patient not taking: Reported on 3/2/2020), Disp: 30 tablet, Rfl: 0    folic acid (FOLVITE) 1 mg tablet, Take 1 tablet (1 mg total) by mouth daily (Patient not taking: Reported on 10/9/2019), Disp: , Rfl: 0    insulin glargine (LANTUS) 100 units/mL subcutaneous injection, Patient currently taking 25 units insulin in the morning; patient will need to have this medication adjusted at facility by physician (Patient not taking: Reported on 10/9/2019), Disp: , Rfl: 0    insulin lispro (HumaLOG) 100 units/mL injection, Inject 1-5 Units under the skin 3 (three) times a day before meals (Patient not taking: Reported on 10/9/2019), Disp: , Rfl: 0    insulin lispro (HumaLOG) 100 units/mL injection, Patient is currently been taking 15 units t i d  With meals while in the hospital; this is secondary to patient being on steroids and dose will need to be adjusted at facility by physician   (Patient not taking: Reported on 10/9/2019), Disp: , Rfl: 0    levETIRAcetam (KEPPRA) 500 mg tablet, Take 1 tablet (500 mg total) by mouth every 12 (twelve) hours for 8 doses (Patient not taking: Reported on 1/21/2020), Disp: 8 tablet, Rfl: 0    nicotine (NICODERM CQ) 21 mg/24 hr TD 24 hr patch, Place 1 patch on the skin daily (Patient not taking: Reported on 3/2/2020), Disp: 28 patch, Rfl: 3    NIFEdipine (ADALAT CC) 60 MG 24 hr tablet, Take 1 tablet (60 mg total) by mouth daily (Patient not taking: Reported on 1/13/2020), Disp: , Rfl: 0    ondansetron (ZOFRAN) 8 mg tablet, Take 1 tablet (8 mg total) by mouth every 8 (eight) hours as needed for nausea or vomiting (Patient not taking: Reported on 3/2/2020), Disp: 30 tablet, Rfl: 3    pantoprazole (PROTONIX) 40 mg tablet, Take 1 tablet (40 mg total) by mouth daily in the early morning Take this medication for duration of steroid therapy (Patient not taking: Reported on 10/9/2019), Disp: , Rfl: 0    senna (SENOKOT) 8 6 mg, Take 1 tablet (8 6 mg total) by mouth daily, Disp: 120 each, Rfl: 0    sodium chloride 1 g tablet, Take 1 g b i d  For 7 days; take 1 g daily for 7 days then discontinue (Patient not taking: Reported on 1/13/2020), Disp: 21 tablet, Rfl: 0    thiamine 100 MG tablet, Take 1 tablet (100 mg total) by mouth daily (Patient not taking: Reported on 10/9/2019), Disp: , Rfl: 0  Review of Systems   Constitutional: Positive for appetite change and fatigue  Gastrointestinal: Positive for abdominal pain  Musculoskeletal: Positive for arthralgias and back pain  Psychiatric/Behavioral: Positive for dysphoric mood  The patient is nervous/anxious  All other systems negative    Objective:  Vital Signs  /64 (BP Location: Left arm, Patient Position: Sitting, Cuff Size: Standard)   Pulse 72   Temp (!) 97 °F (36 1 °C) (Tympanic)   Resp 18   Ht 6' 2" (1 88 m)   Wt 67 6 kg (149 lb)   SpO2 95%   BMI 19 13 kg/m²    Physical Exam    Constitutional: Alert, pleasant and cooperative  Appears chronically ill, underweight  In no acute physical or emotional distress  Head: Normocephalic and atraumatic  Eyes: EOM are normal  No ocular discharge  No scleral icterus  Neck: No visible adenopathy or masses  Respiratory: Effort normal  No stridor  No respiratory distress  Gastrointestinal: +hepatomegaly  Tenderness in RUQ  Musculoskeletal: No edema  Neurological: Alert, oriented and appropriately conversant  Skin: No diaphoresis, no rashes seen on exposed areas of skin  Psychiatric: Dysphoric mood, brighter affect today    Behavior, judgement and thought content appear normal

## 2020-03-01 DIAGNOSIS — C71.9 GLIOBLASTOMA (HCC): ICD-10-CM

## 2020-03-01 DIAGNOSIS — F41.9 ANXIETY DISORDER, UNSPECIFIED TYPE: ICD-10-CM

## 2020-03-01 DIAGNOSIS — C22.0 HEPATOCELLULAR CARCINOMA (HCC): ICD-10-CM

## 2020-03-02 ENCOUNTER — OFFICE VISIT (OUTPATIENT)
Dept: PALLIATIVE MEDICINE | Age: 70
End: 2020-03-02
Payer: MEDICARE

## 2020-03-02 ENCOUNTER — TELEPHONE (OUTPATIENT)
Dept: PALLIATIVE MEDICINE | Facility: HOSPITAL | Age: 70
End: 2020-03-02

## 2020-03-02 ENCOUNTER — SOCIAL WORK (OUTPATIENT)
Dept: PALLIATIVE MEDICINE | Age: 70
End: 2020-03-02
Payer: MEDICARE

## 2020-03-02 VITALS
TEMPERATURE: 97 F | WEIGHT: 149 LBS | HEART RATE: 72 BPM | DIASTOLIC BLOOD PRESSURE: 64 MMHG | OXYGEN SATURATION: 95 % | SYSTOLIC BLOOD PRESSURE: 120 MMHG | HEIGHT: 74 IN | RESPIRATION RATE: 18 BRPM | BODY MASS INDEX: 19.12 KG/M2

## 2020-03-02 DIAGNOSIS — K59.00 CONSTIPATION, UNSPECIFIED CONSTIPATION TYPE: ICD-10-CM

## 2020-03-02 DIAGNOSIS — F41.9 ANXIETY DISORDER, UNSPECIFIED TYPE: ICD-10-CM

## 2020-03-02 DIAGNOSIS — C71.9 GLIOBLASTOMA (HCC): Primary | ICD-10-CM

## 2020-03-02 DIAGNOSIS — Z71.89 COUNSELING AND COORDINATION OF CARE: Primary | ICD-10-CM

## 2020-03-02 DIAGNOSIS — C22.0 HEPATOCELLULAR CARCINOMA (HCC): ICD-10-CM

## 2020-03-02 DIAGNOSIS — G89.3 CANCER ASSOCIATED PAIN: ICD-10-CM

## 2020-03-02 DIAGNOSIS — F10.10 ALCOHOL ABUSE: ICD-10-CM

## 2020-03-02 DIAGNOSIS — J44.9 CHRONIC OBSTRUCTIVE PULMONARY DISEASE, UNSPECIFIED COPD TYPE (HCC): ICD-10-CM

## 2020-03-02 PROBLEM — K59.03 DRUG-INDUCED CONSTIPATION: Status: ACTIVE | Noted: 2019-09-13

## 2020-03-02 PROCEDURE — 99215 OFFICE O/P EST HI 40 MIN: CPT | Performed by: NURSE PRACTITIONER

## 2020-03-02 PROCEDURE — 3078F DIAST BP <80 MM HG: CPT

## 2020-03-02 PROCEDURE — 3078F DIAST BP <80 MM HG: CPT | Performed by: NURSE PRACTITIONER

## 2020-03-02 PROCEDURE — NC001 PR NO CHARGE

## 2020-03-02 PROCEDURE — 3074F SYST BP LT 130 MM HG: CPT | Performed by: NURSE PRACTITIONER

## 2020-03-02 PROCEDURE — 1160F RVW MEDS BY RX/DR IN RCRD: CPT | Performed by: NURSE PRACTITIONER

## 2020-03-02 PROCEDURE — 3074F SYST BP LT 130 MM HG: CPT

## 2020-03-02 PROCEDURE — 3008F BODY MASS INDEX DOCD: CPT | Performed by: NURSE PRACTITIONER

## 2020-03-02 PROCEDURE — 1160F RVW MEDS BY RX/DR IN RCRD: CPT

## 2020-03-02 PROCEDURE — 3008F BODY MASS INDEX DOCD: CPT

## 2020-03-02 RX ORDER — SENNOSIDES 8.6 MG
1 TABLET ORAL DAILY
Qty: 120 EACH | Refills: 0 | Status: SHIPPED | OUTPATIENT
Start: 2020-03-02 | End: 2020-05-14 | Stop reason: SDUPTHER

## 2020-03-02 RX ORDER — DEXAMETHASONE 4 MG/1
4 TABLET ORAL
Qty: 30 TABLET | Refills: 0 | Status: SHIPPED | OUTPATIENT
Start: 2020-03-02 | End: 2020-03-23 | Stop reason: SDUPTHER

## 2020-03-02 RX ORDER — MIRTAZAPINE 15 MG/1
TABLET, FILM COATED ORAL
Qty: 30 TABLET | Refills: 0 | OUTPATIENT
Start: 2020-03-02

## 2020-03-02 RX ORDER — MIRTAZAPINE 15 MG/1
15 TABLET, FILM COATED ORAL
Qty: 30 TABLET | Refills: 0 | Status: SHIPPED | OUTPATIENT
Start: 2020-03-02 | End: 2020-03-25

## 2020-03-02 RX ORDER — OXYCODONE HYDROCHLORIDE 10 MG/1
10 TABLET ORAL EVERY 4 HOURS PRN
Qty: 120 TABLET | Refills: 0 | Status: SHIPPED | OUTPATIENT
Start: 2020-03-02 | End: 2020-03-23 | Stop reason: SDUPTHER

## 2020-03-02 NOTE — TELEPHONE ENCOUNTER
MSW reviewed the mail/bills that pt had sent over to [facility]  MSW will advise pt to open the mail, as the majority of it is unopened and to call the medical facilities to set up a payment plan if able  MSW will also advise pt to call the utility companies as well to see if he qualifies for any financial assistance

## 2020-03-02 NOTE — TELEPHONE ENCOUNTER
MSW received envelope with pts unpaid bills from  Mayela Newsome  MSW will review and advise pt  during his visit this afternoon

## 2020-03-02 NOTE — PROGRESS NOTES
Palliative Care MSW and APOLLO Pugh, met with pt for his follow up visit  Pt presents as very pleasant and social  Pt was concerned with his oxycodone use and did not want to take it  Pt has a history of addiction and was afraid that he was overusing the oxycodone  Leda Peterson reviewed how many she prescribed to him and that he was not misusing it  Pt expressed that he is experiencing pain and Leda Peterson and I reiterated that due to his diagnosis of two types of cancer, the usage is justified  Leda Peterson expressed that if he begins experiencing more pain to call the palliative office and adjustments can be made  Pt voiced understanding and was visibly relieved that he was not taking too much pain medication  MSW presented pt with his folder of mail, which the majority was unopened  MSW encouraged pt to open the [employer] mail and [facility] mail and if he has a balance he can speak to the finance department to arrange a payment plan  Pt was agreeable and stated he understood  MSW also educated pt on the open link, which is a local resource in [place] that provides assistance for finances, medical transportation, food and nutrition  Pt stated that he has heard of it before and will stop by tomorrow  MSW also provided pt with a list of [facility] resources  MSW inquired if pt applied for the [employer] card  Pt states that he did receive it in the mail and that his prescriptions are more affordable, costing seven or eight dollars  MSW provided additional prescription resources  MSW asked pt if he has any utilities to pay and he stated they are included in his rent  Pt was very appreciative  Pt reviewed his cancer medications with Leda Peterson, as he was confused about when to take them  Leda Peterson wrote the schedule down for him and he felt much more comfortable  MSW provided active listening and emotional support  MSW will continue to provide ongoing support     For additional information please refer to provider notes

## 2020-03-04 NOTE — TELEPHONE ENCOUNTER
Per Sergio Wilson email 3/4/20 about Temodar and Sweta Kid  "Both drugs to be shipped and delivered"

## 2020-03-09 ENCOUNTER — TELEPHONE (OUTPATIENT)
Dept: PALLIATIVE MEDICINE | Facility: HOSPITAL | Age: 70
End: 2020-03-09

## 2020-03-09 NOTE — TELEPHONE ENCOUNTER
Palliative MSW received a message from [place] ([place]) regarding pt cancelling his upcoming appointment on March 30, 2020  MSW left a detailed message for pt and asked for a return phone call if needs to reschedule meeting or to speak with me

## 2020-03-13 ENCOUNTER — TELEPHONE (OUTPATIENT)
Dept: HEMATOLOGY ONCOLOGY | Facility: CLINIC | Age: 70
End: 2020-03-13

## 2020-03-13 NOTE — TELEPHONE ENCOUNTER
Spoke to Jerry Santos and informed him that he has an appt on 3/16/2020 at 10:30am with CHANTELLE Larsen at the [address] [place] [address] location and reminded him to get his lab done prior to his appt  Pt expressed understanding  Jerry Santos then expressed concern about not receiving a phone call from the specialty pharmacy regarding the medication Temodar 100 mg capsules and he takes 3 tablets on days 1- 5 (every 28 days)  I called the [facility] pharmacy in [place], [place] and they stated that they will call the patient and get this situated taken care  I called Jerry Santos back and LVM and informed him that the pharmacy told me that they would reach out  If he did not receive a call back from the pharmacy I instructed Jerry Santos to give our office a call back

## 2020-03-18 ENCOUNTER — HOSPITAL ENCOUNTER (EMERGENCY)
Facility: HOSPITAL | Age: 70
Discharge: HOME | End: 2020-03-18
Attending: EMERGENCY MEDICINE | Admitting: EMERGENCY MEDICINE
Payer: MEDICARE

## 2020-03-18 ENCOUNTER — TELEPHONE (OUTPATIENT)
Dept: HEMATOLOGY ONCOLOGY | Facility: CLINIC | Age: 70
End: 2020-03-18

## 2020-03-18 ENCOUNTER — APPOINTMENT (EMERGENCY)
Dept: CT IMAGING | Facility: HOSPITAL | Age: 70
End: 2020-03-18
Payer: MEDICARE

## 2020-03-18 ENCOUNTER — TELEPHONE (OUTPATIENT)
Dept: PALLIATIVE MEDICINE | Facility: CLINIC | Age: 70
End: 2020-03-18

## 2020-03-18 VITALS
OXYGEN SATURATION: 92 % | RESPIRATION RATE: 18 BRPM | TEMPERATURE: 98 F | BODY MASS INDEX: 19.26 KG/M2 | HEART RATE: 95 BPM | SYSTOLIC BLOOD PRESSURE: 130 MMHG | DIASTOLIC BLOOD PRESSURE: 85 MMHG | WEIGHT: 150 LBS

## 2020-03-18 DIAGNOSIS — R19.7 DIARRHEA: Primary | ICD-10-CM

## 2020-03-18 DIAGNOSIS — E86.0 DEHYDRATION: ICD-10-CM

## 2020-03-18 LAB
ALBUMIN SERPL BCP-MCNC: 2.4 G/DL (ref 3.5–5)
ALP SERPL-CCNC: 425 U/L (ref 46–116)
ALT SERPL W P-5'-P-CCNC: 73 U/L (ref 12–78)
ANION GAP SERPL CALCULATED.3IONS-SCNC: 7 MMOL/L (ref 4–13)
AST SERPL W P-5'-P-CCNC: 67 U/L (ref 5–45)
BASOPHILS # BLD AUTO: 0.05 THOUSANDS/ΜL (ref 0–0.1)
BASOPHILS NFR BLD AUTO: 1 % (ref 0–1)
BILIRUB SERPL-MCNC: 2.1 MG/DL (ref 0.2–1)
BUN SERPL-MCNC: 26 MG/DL (ref 5–25)
CALCIUM SERPL-MCNC: 8.9 MG/DL (ref 8.3–10.1)
CHLORIDE SERPL-SCNC: 100 MMOL/L (ref 100–108)
CO2 SERPL-SCNC: 28 MMOL/L (ref 21–32)
CREAT SERPL-MCNC: 1.37 MG/DL (ref 0.6–1.3)
EOSINOPHIL # BLD AUTO: 0.26 THOUSAND/ΜL (ref 0–0.61)
EOSINOPHIL NFR BLD AUTO: 3 % (ref 0–6)
ERYTHROCYTE [DISTWIDTH] IN BLOOD BY AUTOMATED COUNT: 15.5 % (ref 11.6–15.1)
GFR SERPL CREATININE-BSD FRML MDRD: 53 ML/MIN/1.73SQ M
GLUCOSE SERPL-MCNC: 117 MG/DL (ref 65–140)
HCT VFR BLD AUTO: 39 % (ref 36.5–49.3)
HGB BLD-MCNC: 12.7 G/DL (ref 12–17)
IMM GRANULOCYTES # BLD AUTO: 0.06 THOUSAND/UL (ref 0–0.2)
IMM GRANULOCYTES NFR BLD AUTO: 1 % (ref 0–2)
LYMPHOCYTES # BLD AUTO: 1.12 THOUSANDS/ΜL (ref 0.6–4.47)
LYMPHOCYTES NFR BLD AUTO: 11 % (ref 14–44)
MAGNESIUM SERPL-MCNC: 2 MG/DL (ref 1.6–2.6)
MCH RBC QN AUTO: 31.9 PG (ref 26.8–34.3)
MCHC RBC AUTO-ENTMCNC: 32.6 G/DL (ref 31.4–37.4)
MCV RBC AUTO: 98 FL (ref 82–98)
MONOCYTES # BLD AUTO: 1.12 THOUSAND/ΜL (ref 0.17–1.22)
MONOCYTES NFR BLD AUTO: 11 % (ref 4–12)
NEUTROPHILS # BLD AUTO: 7.47 THOUSANDS/ΜL (ref 1.85–7.62)
NEUTS SEG NFR BLD AUTO: 73 % (ref 43–75)
NRBC BLD AUTO-RTO: 0 /100 WBCS
PLATELET # BLD AUTO: 447 THOUSANDS/UL (ref 149–390)
PMV BLD AUTO: 11.6 FL (ref 8.9–12.7)
POTASSIUM SERPL-SCNC: 4.2 MMOL/L (ref 3.5–5.3)
PROT SERPL-MCNC: 8.3 G/DL (ref 6.4–8.2)
RBC # BLD AUTO: 3.98 MILLION/UL (ref 3.88–5.62)
SODIUM SERPL-SCNC: 135 MMOL/L (ref 136–145)
WBC # BLD AUTO: 10.08 THOUSAND/UL (ref 4.31–10.16)

## 2020-03-18 PROCEDURE — 85025 COMPLETE CBC W/AUTO DIFF WBC: CPT | Performed by: EMERGENCY MEDICINE

## 2020-03-18 PROCEDURE — 83735 ASSAY OF MAGNESIUM: CPT | Performed by: EMERGENCY MEDICINE

## 2020-03-18 PROCEDURE — 36415 COLL VENOUS BLD VENIPUNCTURE: CPT | Performed by: EMERGENCY MEDICINE

## 2020-03-18 PROCEDURE — 74177 CT ABD & PELVIS W/CONTRAST: CPT

## 2020-03-18 PROCEDURE — 96360 HYDRATION IV INFUSION INIT: CPT

## 2020-03-18 PROCEDURE — 99284 EMERGENCY DEPT VISIT MOD MDM: CPT | Performed by: EMERGENCY MEDICINE

## 2020-03-18 PROCEDURE — 80053 COMPREHEN METABOLIC PANEL: CPT | Performed by: EMERGENCY MEDICINE

## 2020-03-18 PROCEDURE — 99284 EMERGENCY DEPT VISIT MOD MDM: CPT

## 2020-03-18 RX ORDER — OXYCODONE HYDROCHLORIDE 5 MG/1
5 TABLET ORAL ONCE
Status: COMPLETED | OUTPATIENT
Start: 2020-03-18 | End: 2020-03-18

## 2020-03-18 RX ADMIN — IOHEXOL 100 ML: 350 INJECTION, SOLUTION INTRAVENOUS at 12:55

## 2020-03-18 RX ADMIN — OXYCODONE HYDROCHLORIDE 5 MG: 5 TABLET ORAL at 11:48

## 2020-03-18 RX ADMIN — SODIUM CHLORIDE 1000 ML: 0.9 INJECTION, SOLUTION INTRAVENOUS at 11:57

## 2020-03-18 RX ADMIN — OXYCODONE HYDROCHLORIDE 5 MG: 5 TABLET ORAL at 13:41

## 2020-03-18 NOTE — TELEPHONE ENCOUNTER
[facility] stating they [facility]ed a form (Medicare Specialty Form on 3/5 to [place]) for Dr Annette Estrada for Temodar  to sign and still not received  Requested for it to be re-faxed today to the [facility] info provided for Torrie  Please have Dr Annette Estrada sign today and refax (number is on fax to send back) to [employer] specialty pharm

## 2020-03-18 NOTE — ED NOTES
Matthew lewis called for patient, patient was unable to find ride home  Consent signed and in chart        Esequiel Alvarez RN  03/18/20 [id number]

## 2020-03-18 NOTE — ED PROVIDER NOTES
History  Chief Complaint   Patient presents with    Diarrhea     c/o intermittent diarrhea x 2 months  Pt  states nothing is different today, but he has lost weight over the last month  History provided by:  Patient   used: No    Diarrhea   Associated symptoms: abdominal pain    Associated symptoms: no chills, no diaphoresis, no fever, no headaches and no vomiting      Patient is a 72-year-old male presenting to emergency department with diarrhea for 2 months  Worsened last week  Constant  No blood  Water  No vomiting  No nausea  Worsening abdominal pain  Patient with history of liver cancer  Gets chemotherapy pills  Has been metastasized  No fevers  No chest pain  No shortness of breath  No trauma or falls  MDM check electrolytes, CT abdomen pelvis, re-evaluate      Prior to Admission Medications   Prescriptions Last Dose Informant Patient Reported? Taking? Lenvatinib, 12 MG Daily Dose, (Lenvima, 12 MG Daily Dose,) 3 x 4 MG CPPK   No No   Sig: Take 12 mg by mouth daily   NIFEdipine (ADALAT CC) 60 MG 24 hr tablet  Self No No   Sig: Take 1 tablet (60 mg total) by mouth daily   Patient not taking: Reported on 1/13/2020   aluminum-magnesium hydroxide-simethicone (MYLANTA) 200-200-20 mg/5 mL suspension  Self No No   Sig: Take 30 mL by mouth every 6 (six) hours as needed for indigestion or heartburn   Patient not taking: Reported on 10/9/2019   dexamethasone (DECADRON) 4 mg tablet   No No   Sig: Take 1 tablet (4 mg total) by mouth daily with breakfast   diphenoxylate-atropine (LOMOTIL) 2 5-0 025 mg per tablet  Self No No   Sig: Take 1 tablet by mouth 4 (four) times a day as needed for diarrhea   Patient not taking: Reported on 3/2/2020   fluticasone-vilanterol (BREO ELLIPTA) 100-25 mcg/inh inhaler  Self No No   Sig: Inhale 1 puff daily Rinse mouth after use     folic acid (FOLVITE) 1 mg tablet  Self No No   Sig: Take 1 tablet (1 mg total) by mouth daily   Patient not taking: Reported on 10/9/2019   hydrOXYzine HCL (ATARAX) 25 mg tablet  Self No No   Sig: Take 1 tablet (25 mg total) by mouth every 6 (six) hours as needed for anxiety   insulin glargine (LANTUS) 100 units/mL subcutaneous injection  Self No No   Sig: Patient currently taking 25 units insulin in the morning; patient will need to have this medication adjusted at facility by physician   Patient not taking: Reported on 10/9/2019   insulin lispro (HumaLOG) 100 units/mL injection  Self No No   Sig: Inject 1-5 Units under the skin 3 (three) times a day before meals   Patient not taking: Reported on 10/9/2019   insulin lispro (HumaLOG) 100 units/mL injection  Self No No   Sig: Patient is currently been taking 15 units t i d  With meals while in the hospital; this is secondary to patient being on steroids and dose will need to be adjusted at facility by physician     Patient not taking: Reported on 10/9/2019   levETIRAcetam (KEPPRA) 500 mg tablet   No No   Sig: Take 1 tablet (500 mg total) by mouth every 12 (twelve) hours for 8 doses   Patient not taking: Reported on 1/21/2020   mirtazapine (REMERON) 15 mg tablet   No No   Sig: Take 1 tablet (15 mg total) by mouth daily at bedtime   nicotine (NICODERM CQ) 21 mg/24 hr TD 24 hr patch   No No   Sig: Place 1 patch on the skin daily   Patient not taking: Reported on 3/2/2020   ondansetron (ZOFRAN) 8 mg tablet   No No   Sig: Take 1 tablet (8 mg total) by mouth every 8 (eight) hours as needed for nausea or vomiting   Patient not taking: Reported on 3/2/2020   oxyCODONE (ROXICODONE) 10 MG TABS   No No   Sig: Take 1 tablet (10 mg total) by mouth every 4 (four) hours as needed for moderate painMax Daily Amount: 60 mg   pantoprazole (PROTONIX) 40 mg tablet  Self No No   Sig: Take 1 tablet (40 mg total) by mouth daily in the early morning Take this medication for duration of steroid therapy   Patient not taking: Reported on 10/9/2019   senna (SENOKOT) 8 6 mg   No No   Sig: Take 1 tablet (8 6 mg total) by mouth daily   sodium chloride 1 g tablet  Self No No   Sig: Take 1 g b i d  For 7 days; take 1 g daily for 7 days then discontinue   Patient not taking: Reported on 1/13/2020   temozolomide (TEMODAR) 100 mg capsule   No No   Sig: Take 3 caps by mouth on days 1-5 every 28 days   thiamine 100 MG tablet  Self No No   Sig: Take 1 tablet (100 mg total) by mouth daily   Patient not taking: Reported on 10/9/2019      Facility-Administered Medications: None       Past Medical History:   Diagnosis Date    Cancer [facility])     liver    Dependence on nicotine from cigarettes 9/4/2019    Hypertension     Liver cancer [facility])        Past Surgical History:   Procedure Laterality Date    APPENDECTOMY      BRAIN SURGERY      CRANIOTOMY Left 9/10/2019    Procedure: Image guided left frontal craniotomy for resection of tumor;  Surgeon: Samina Jacob MD;  Location: [facility];  Service: Neurosurgery       Family History   Problem Relation Age of Onset    Breast cancer Mother     Liver cancer Father     Throat cancer Paternal Uncle      I have reviewed and agree with the history as documented  E-Cigarette/Vaping    E-Cigarette Use Never User      E-Cigarette/Vaping Substances    Nicotine No     THC No     CBD No     Flavoring No     Other No     Unknown No      Social History     Tobacco Use    Smoking status: Current Every Day Smoker     Packs/day: 1 50     Years: 40 00     Pack years: 60 00    Smokeless tobacco: Former User     Types: Chew   Substance Use Topics    Alcohol use: Not Currently     Comment: occasional, 1/5 / week in the past    Drug use: Not Currently     Types: Oxycodone     Comment: ran out       Review of Systems   Constitutional: Negative for chills, diaphoresis and fever  HENT: Negative for congestion and sore throat  Respiratory: Negative for cough, shortness of breath, wheezing and stridor  Cardiovascular: Negative for chest pain, palpitations and leg swelling  Gastrointestinal: Positive for abdominal pain and diarrhea  Negative for blood in stool, nausea and vomiting  Genitourinary: Negative for dysuria, frequency and urgency  Musculoskeletal: Negative for neck pain and neck stiffness  Skin: Negative for pallor and rash  Neurological: Negative for dizziness, syncope, weakness, light-headedness and headaches  All other systems reviewed and are negative  Physical Exam  Physical Exam   Constitutional: He is oriented to person, place, and time  He appears well-developed and well-nourished  HENT:   Head: Normocephalic and atraumatic  Eyes: Pupils are equal, round, and reactive to light  Neck: Neck supple  Cardiovascular: Normal rate, regular rhythm, normal heart sounds and intact distal pulses  Pulmonary/Chest: Effort normal and breath sounds normal  No respiratory distress  Abdominal: Soft  Bowel sounds are normal  There is no tenderness  Musculoskeletal: Normal range of motion  He exhibits no edema, tenderness or deformity  Neurological: He is alert and oriented to person, place, and time  Skin: Skin is warm and dry  Capillary refill takes less than 2 seconds  No rash noted  No erythema  No pallor  Vitals reviewed        Vital Signs  ED Triage Vitals [03/18/20 1133]   Temperature Pulse Respirations Blood Pressure SpO2   98 °F (36 7 °C) 86 18 130/85 97 %      Temp Source Heart Rate Source Patient Position - Orthostatic VS BP Location FiO2 (%)   Oral Monitor Lying Right arm --      Pain Score       No Pain           Vitals:    03/18/20 1133 03/18/20 1415   BP: 130/85    Pulse: 86 95   Patient Position - Orthostatic VS: Lying          Visual Acuity      ED Medications  Medications   sodium chloride 0 9 % bolus 1,000 mL (0 mL Intravenous Stopped 3/18/20 1257)   oxyCODONE (ROXICODONE) IR tablet 5 mg (5 mg Oral Given 3/18/20 1148)   iohexol (OMNIPAQUE) 350 MG/ML injection (MULTI-DOSE) 100 mL (100 mL Intravenous Given 3/18/20 1255)   oxyCODONE (ROXICODONE) IR tablet 5 mg (5 mg Oral Given 3/18/20 1341)       Diagnostic Studies  Results Reviewed     Procedure Component Value Units Date/Time    Comprehensive metabolic panel [[id number]]  (Abnormal) Collected:  03/18/20 1156    Lab Status:  Final result Specimen:  Blood from Arm, Left Updated:  03/18/20 1220     Sodium 135 mmol/L      Potassium 4 2 mmol/L      Chloride 100 mmol/L      CO2 28 mmol/L      ANION GAP 7 mmol/L      BUN 26 mg/dL      Creatinine 1 37 mg/dL      Glucose 117 mg/dL      Calcium 8 9 mg/dL      AST 67 U/L      ALT 73 U/L      Alkaline Phosphatase 425 U/L      Total Protein 8 3 g/dL      Albumin 2 4 g/dL      Total Bilirubin 2 10 mg/dL      eGFR 53 ml/min/1 73sq m     Narrative:       [place] guidelines for Chronic Kidney Disease (CKD):     Stage 1 with normal or high GFR (GFR > 90 mL/min/1 73 square meters)    Stage 2 Mild CKD (GFR = 60-89 mL/min/1 73 square meters)    Stage 3A Moderate CKD (GFR = 45-59 mL/min/1 73 square meters)    Stage 3B Moderate CKD (GFR = 30-44 mL/min/1 73 square meters)    Stage 4 Severe CKD (GFR = 15-29 mL/min/1 73 square meters)    Stage 5 End Stage CKD (GFR <15 mL/min/1 73 square meters)  Note: GFR calculation is accurate only with a steady state creatinine    Magnesium [[id number]]  (Normal) Collected:  03/18/20 1156    Lab Status:  Final result Specimen:  Blood from Arm, Left Updated:  03/18/20 1220     Magnesium 2 0 mg/dL     CBC and differential [[id number]]  (Abnormal) Collected:  03/18/20 1156    Lab Status:  Final result Specimen:  Blood from Arm, Left Updated:  03/18/20 1203     WBC 10 08 Thousand/uL      RBC 3 98 Million/uL      Hemoglobin 12 7 g/dL      Hematocrit 39 0 %      MCV 98 fL      MCH 31 9 pg      MCHC 32 6 g/dL      RDW 15 5 %      MPV 11 6 fL      Platelets 276 Thousands/uL      nRBC 0 /100 WBCs      Neutrophils Relative 73 %      Immat GRANS % 1 %      Lymphocytes Relative 11 %      Monocytes Relative 11 % Eosinophils Relative 3 %      Basophils Relative 1 %      Neutrophils Absolute 7 47 Thousands/µL      Immature Grans Absolute 0 06 Thousand/uL      Lymphocytes Absolute 1 12 Thousands/µL      Monocytes Absolute 1 12 Thousand/µL      Eosinophils Absolute 0 26 Thousand/µL      Basophils Absolute 0 05 Thousands/µL     Clostridium difficile toxin by PCR with EIA [[id number]]     Lab Status:  No result Specimen:  Stool                  CT abdomen pelvis with contrast   Final Result by Ramon Andrade MD (03/18 1321)      Massive enlarged cirrhotic liver due to extensive multifocal hepatocellular carcinoma as characterized on 1/29/2020 CT  Small volume of ascites, increased compared to prior  No evidence of acute abnormality in the abdomen or pelvis  Stable pulmonary nodules within the lung bases  Workstation performed: [id number]                    Procedures  Procedures         ED Course  ED Course as of Mar 18 1706   Wed Mar 18, 2020   1329 Patient is feeling better  Requesting another pain pill                                    MDM      Disposition  Final diagnoses:   Diarrhea   Dehydration     Time reflects when diagnosis was documented in both MDM as applicable and the Disposition within this note     Time User Action Codes Description Comment    3/18/2020  2:28 PM Naheed Jon Add [R19 7] Diarrhea     3/18/2020  2:28 PM Naheed Gonzales Add [E86 0] Dehydration       ED Disposition     ED Disposition Condition Date/Time Comment    Discharge Stable Wed Mar 18, 2020  2:28 PM Chloe Toussaint  discharge to home/self care              Follow-up Information     Follow up With Specialties Details Why Contact Info Additional Information    Lidia Isabel DO Family Medicine In 2 days Re-evaluation and recheck kidney function [facility] [place]  [phone]        [address] Emergency Department Emergency Medicine  As needed, If symptoms worsen [address] [place], [address]  [phone]  ED, [address] [place], [place], [address]    Oncologist  Schedule an appointment as soon as possible for a visit in 3 days Please follow-up with oncologist for re-evaluation            Discharge Medication List as of 3/18/2020  2:30 PM      CONTINUE these medications which have NOT CHANGED    Details   aluminum-magnesium hydroxide-simethicone (MYLANTA) 200-200-20 mg/5 mL suspension Take 30 mL by mouth every 6 (six) hours as needed for indigestion or heartburn, Starting Fri 9/13/2019, Print      dexamethasone (DECADRON) 4 mg tablet Take 1 tablet (4 mg total) by mouth daily with breakfast, Starting Mon 3/2/2020, Normal      diphenoxylate-atropine (LOMOTIL) 2 5-0 025 mg per tablet Take 1 tablet by mouth 4 (four) times a day as needed for diarrhea, Starting Fri 2/7/2020, Normal      fluticasone-vilanterol (BREO ELLIPTA) 100-25 mcg/inh inhaler Inhale 1 puff daily Rinse mouth after use , Starting Sat 2/59/7221, Print      folic acid (FOLVITE) 1 mg tablet Take 1 tablet (1 mg total) by mouth daily, Starting Sat 9/14/2019, Print      hydrOXYzine HCL (ATARAX) 25 mg tablet Take 1 tablet (25 mg total) by mouth every 6 (six) hours as needed for anxiety, Starting Fri 9/13/2019, Print      insulin glargine (LANTUS) 100 units/mL subcutaneous injection Patient currently taking 25 units insulin in the morning; patient will need to have this medication adjusted at facility by physician, Print      !! insulin lispro (HumaLOG) 100 units/mL injection Inject 1-5 Units under the skin 3 (three) times a day before meals, Starting Fri 9/13/2019, Print      !! insulin lispro (HumaLOG) 100 units/mL injection Patient is currently been taking 15 units t i d   With meals while in the hospital; this is secondary to patient being on steroids and dose will need to be adjusted at facility by physician , Print      Lenvatinib, 12 MG Daily Dose, (Lenvima, 12 MG Daily Dose,) 3 x 4 MG CPPK Take 12 mg by mouth daily, Starting Thu 2/27/2020, Normal      levETIRAcetam (KEPPRA) 500 mg tablet Take 1 tablet (500 mg total) by mouth every 12 (twelve) hours for 8 doses, Starting Fri 9/13/2019, Until Tue 9/17/2019, Print      mirtazapine (REMERON) 15 mg tablet Take 1 tablet (15 mg total) by mouth daily at bedtime, Starting Mon 3/2/2020, Normal      nicotine (NICODERM CQ) 21 mg/24 hr TD 24 hr patch Place 1 patch on the skin daily, Starting Thu 2/13/2020, Normal      NIFEdipine (ADALAT CC) 60 MG 24 hr tablet Take 1 tablet (60 mg total) by mouth daily, Starting Sat 9/14/2019, Print      ondansetron (ZOFRAN) 8 mg tablet Take 1 tablet (8 mg total) by mouth every 8 (eight) hours as needed for nausea or vomiting, Starting Thu 2/13/2020, Normal      oxyCODONE (ROXICODONE) 10 MG TABS Take 1 tablet (10 mg total) by mouth every 4 (four) hours as needed for moderate painMax Daily Amount: 60 mg, Starting Mon 3/2/2020, Normal      pantoprazole (PROTONIX) 40 mg tablet Take 1 tablet (40 mg total) by mouth daily in the early morning Take this medication for duration of steroid therapy, Starting Sat 9/14/2019, Print      senna (SENOKOT) 8 6 mg Take 1 tablet (8 6 mg total) by mouth daily, Starting Mon 3/2/2020, Normal      sodium chloride 1 g tablet Take 1 g b i d  For 7 days; take 1 g daily for 7 days then discontinue, Print      temozolomide (TEMODAR) 100 mg capsule Take 3 caps by mouth on days 1-5 every 28 days, Normal      thiamine 100 MG tablet Take 1 tablet (100 mg total) by mouth daily, Starting Sat 9/14/2019, Print       !! - Potential duplicate medications found  Please discuss with provider  Outpatient Discharge Orders   Clostridium difficile toxin by PCR with EIA   Standing Status: Future Standing Exp   Date: 03/18/21       PDMP Review       Value Time User    PDMP Reviewed  Yes 3/2/2020  3:19 PM Ronn Severs, [facility] Provider  Electronically Signed by           Elliot Gutierrez MD  03/18/20 3997

## 2020-03-22 DIAGNOSIS — C71.9 GLIOBLASTOMA (HCC): ICD-10-CM

## 2020-03-22 RX ORDER — OXYCODONE HYDROCHLORIDE 10 MG/1
10 TABLET ORAL EVERY 4 HOURS PRN
Qty: 120 TABLET | Refills: 0 | Status: CANCELLED | OUTPATIENT
Start: 2020-03-22

## 2020-03-23 ENCOUNTER — TELEMEDICINE (OUTPATIENT)
Dept: PALLIATIVE MEDICINE | Facility: CLINIC | Age: 70
End: 2020-03-23
Payer: MEDICARE

## 2020-03-23 DIAGNOSIS — K59.1 FUNCTIONAL DIARRHEA: ICD-10-CM

## 2020-03-23 DIAGNOSIS — J44.9 CHRONIC OBSTRUCTIVE PULMONARY DISEASE, UNSPECIFIED COPD TYPE (HCC): ICD-10-CM

## 2020-03-23 DIAGNOSIS — R19.7 DIARRHEA, UNSPECIFIED TYPE: ICD-10-CM

## 2020-03-23 DIAGNOSIS — G89.3 CANCER ASSOCIATED PAIN: ICD-10-CM

## 2020-03-23 DIAGNOSIS — C71.9 GLIOBLASTOMA (HCC): Primary | ICD-10-CM

## 2020-03-23 DIAGNOSIS — C22.0 HEPATOCELLULAR CARCINOMA (HCC): ICD-10-CM

## 2020-03-23 DIAGNOSIS — F41.9 ANXIETY DISORDER, UNSPECIFIED TYPE: ICD-10-CM

## 2020-03-23 PROCEDURE — G2012 BRIEF CHECK IN BY MD/QHP: HCPCS | Performed by: NURSE PRACTITIONER

## 2020-03-23 RX ORDER — DIPHENOXYLATE HYDROCHLORIDE AND ATROPINE SULFATE 2.5; .025 MG/1; MG/1
1 TABLET ORAL 4 TIMES DAILY PRN
Qty: 30 TABLET | Refills: 0 | Status: SHIPPED | OUTPATIENT
Start: 2020-03-23

## 2020-03-23 RX ORDER — LOPERAMIDE HYDROCHLORIDE 2 MG/1
2 CAPSULE ORAL 4 TIMES DAILY PRN
Qty: 60 CAPSULE | Refills: 0 | Status: SHIPPED | OUTPATIENT
Start: 2020-03-23 | End: 2020-07-10 | Stop reason: HOSPADM

## 2020-03-23 RX ORDER — DEXAMETHASONE 4 MG/1
4 TABLET ORAL
Qty: 30 TABLET | Refills: 0 | Status: SHIPPED | OUTPATIENT
Start: 2020-03-23 | End: 2020-04-15 | Stop reason: SDUPTHER

## 2020-03-23 RX ORDER — OXYCODONE HYDROCHLORIDE 10 MG/1
10 TABLET ORAL EVERY 4 HOURS PRN
Qty: 120 TABLET | Refills: 0 | Status: SHIPPED | OUTPATIENT
Start: 2020-03-23 | End: 2020-04-15 | Stop reason: SDUPTHER

## 2020-03-23 NOTE — PROGRESS NOTES
Virtual Brief Visit    Reason for visit is follow up on patient's cancer related pain and symptoms and side effects related to pt's cancer and treatment related side effects    Encounter provider APOLLO Velázquez    Provider located at [address] [place] [address]  [phone]    1  Glioblastoma (HCC)  dexamethasone (DECADRON) 4 mg tablet    oxyCODONE (ROXICODONE) 10 MG TABS   2  Hepatocellular carcinoma (HCC)  diphenoxylate-atropine (LOMOTIL) 2 5-0 025 mg per tablet    dexamethasone (DECADRON) 4 mg tablet   3  Chronic obstructive pulmonary disease, unspecified COPD type ([facility] [address] )     4  Functional diarrhea     5  Diarrhea, unspecified type  diphenoxylate-atropine (LOMOTIL) 2 5-0 025 mg per tablet    loperamide (IMODIUM) 2 mg capsule   6  Cancer associated pain  dexamethasone (DECADRON) 4 mg tablet   7  Anxiety disorder, unspecified type         Recent Visits  Date Type Provider Dept   03/18/20 Telephone Cayla Maharajotoniel Koch [facility] 112   Showing recent visits within past 7 days and meeting all other requirements     Future Appointments  No visits were found meeting these conditions  Showing future appointments within next 150 days and meeting all other requirements        Patient agrees to participate in a virtual check in via telephone or video visit instead of presenting to the office to address urgent/immediate medical needs  Patient is aware this is a billable service  After connecting through telephone, the patient was identified by name and date of birth  Buddydemetrice Sthal  was informed that this was a telemedicine visit and that the visit is being conducted through telephone which may not be secure and therefore might not be HIPAA-compliant  My office door was closed  No one else was in the room  He acknowledged consent and understanding of privacy and security of the virtual check-in visit    I informed the patient that I have reviewed his record in Epic and presented the opportunity for him to ask any questions regarding the visit today  The patient initiated communication and agreed to participate  Subjective  Edson Meraz  is a 81 y o  male with  a history of Hepatitis C and cirrhosis,  metastatic hepatocellular carcinoma diagnosed in 2017 while living in [place] s/p TACE x2 and oral Sorafenib therapy which he self discontinued secondary to side effects  He recently moved to the [facility] to be close to family after suffering loss of his life partner due to cancer  He was diagnosed with glioblastoma of the left frontoparietal lobe and underwent a gross total resection in 9/2019  He completed a coarse of adjuvant chemoradiation therapy in 12/2019 with Temodar  He is currently undergoing treatment for both cancers: Taking Temodar for glioblastoma and Lenvatinib for hepatocellular cancer  He was seen in ED on 3/18/2020 with persistent diarrhea, weight loss  He was dehydrated  Stool studies were ordered, not collected  Telephone visit held today to assess his symptoms  He continues to have diarrhea and endorses significant frustration in this regard  He is not actively taking his chemotherapy pills d/t diarrhea  He has not notified his oncologist that he is not taking his medication  He ran out of imodium and has not been taking any lomotil  He is upset about continued weight loss stating "I look like a bird  I take my shirt off and all you can see is bones"  He is having pain in his back and states "my bones hurt"  He ran out of his dexamethasone and oxycodone  He has been consistently taking his oxycodone 4-5 times a day  He does get relief when he takes it  At this time, he remains opposed to anything stronger to help manage his pain          Past Medical History:   Diagnosis Date    Cancer [facility])     liver    Dependence on nicotine from cigarettes 9/4/2019    Hypertension  Liver cancer [facility])        Past Surgical History:   Procedure Laterality Date    APPENDECTOMY      BRAIN SURGERY      CRANIOTOMY Left 9/10/2019    Procedure: Image guided left frontal craniotomy for resection of tumor;  Surgeon: Luis Daniel Crespo MD;  [facility];  Service: Neurosurgery       Current Outpatient Medications   Medication Sig Dispense Refill    aluminum-magnesium hydroxide-simethicone (MYLANTA) 200-200-20 mg/5 mL suspension Take 30 mL by mouth every 6 (six) hours as needed for indigestion or heartburn (Patient not taking: Reported on 10/9/2019) 355 mL 0    dexamethasone (DECADRON) 4 mg tablet Take 1 tablet (4 mg total) by mouth daily with breakfast 30 tablet 0    diphenoxylate-atropine (LOMOTIL) 2 5-0 025 mg per tablet Take 1 tablet by mouth 4 (four) times a day as needed for diarrhea (Patient not taking: Reported on 3/2/2020) 30 tablet 0    fluticasone-vilanterol (BREO ELLIPTA) 100-25 mcg/inh inhaler Inhale 1 puff daily Rinse mouth after use  1 Inhaler 0    folic acid (FOLVITE) 1 mg tablet Take 1 tablet (1 mg total) by mouth daily (Patient not taking: Reported on 10/9/2019)  0    hydrOXYzine HCL (ATARAX) 25 mg tablet Take 1 tablet (25 mg total) by mouth every 6 (six) hours as needed for anxiety 30 tablet 0    insulin glargine (LANTUS) 100 units/mL subcutaneous injection Patient currently taking 25 units insulin in the morning; patient will need to have this medication adjusted at facility by physician (Patient not taking: Reported on 10/9/2019)  0    insulin lispro (HumaLOG) 100 units/mL injection Inject 1-5 Units under the skin 3 (three) times a day before meals (Patient not taking: Reported on 10/9/2019)  0    insulin lispro (HumaLOG) 100 units/mL injection Patient is currently been taking 15 units t i d  With meals while in the hospital; this is secondary to patient being on steroids and dose will need to be adjusted at facility by physician   (Patient not taking: Reported on 10/9/2019)  0    Lenvatinib, 12 MG Daily Dose, (Lenvima, 12 MG Daily Dose,) 3 x 4 MG CPPK Take 12 mg by mouth daily 30 each 3    levETIRAcetam (KEPPRA) 500 mg tablet Take 1 tablet (500 mg total) by mouth every 12 (twelve) hours for 8 doses (Patient not taking: Reported on 1/21/2020) 8 tablet 0    mirtazapine (REMERON) 15 mg tablet Take 1 tablet (15 mg total) by mouth daily at bedtime 30 tablet 0    nicotine (NICODERM CQ) 21 mg/24 hr TD 24 hr patch Place 1 patch on the skin daily (Patient not taking: Reported on 3/2/2020) 28 patch 3    NIFEdipine (ADALAT CC) 60 MG 24 hr tablet Take 1 tablet (60 mg total) by mouth daily (Patient not taking: Reported on 1/13/2020)  0    ondansetron (ZOFRAN) 8 mg tablet Take 1 tablet (8 mg total) by mouth every 8 (eight) hours as needed for nausea or vomiting (Patient not taking: Reported on 3/2/2020) 30 tablet 3    oxyCODONE (ROXICODONE) 10 MG TABS Take 1 tablet (10 mg total) by mouth every 4 (four) hours as needed for moderate painMax Daily Amount: 60 mg 120 tablet 0    pantoprazole (PROTONIX) 40 mg tablet Take 1 tablet (40 mg total) by mouth daily in the early morning Take this medication for duration of steroid therapy (Patient not taking: Reported on 10/9/2019)  0    senna (SENOKOT) 8 6 mg Take 1 tablet (8 6 mg total) by mouth daily 120 each 0    sodium chloride 1 g tablet Take 1 g b i d  For 7 days; take 1 g daily for 7 days then discontinue (Patient not taking: Reported on 1/13/2020) 21 tablet 0    temozolomide (TEMODAR) 100 mg capsule Take 3 caps by mouth on days 1-5 every 28 days 15 capsule 3    thiamine 100 MG tablet Take 1 tablet (100 mg total) by mouth daily (Patient not taking: Reported on 10/9/2019)  0     No current facility-administered medications for this visit  No Known Allergies    Assessment    Emeterio telephone assessment is completed with Eddy aguilar  ROS pertinent for diarrhea, weight loss, decreased appetite, fatigue       Plan: Diarrhea:  · Instructed pt to immediately report ongoing diarrhea to Dr Megan Alonso  He maybe able to dose reduce his oral chemotherapy to help with this  · Discussed importance of staying hydrated  Encouraged Pediylite, Gatorade, Water  Reviewed BRAT diet and dietary suggestions provided  · Instructed on how to alternate use of Imodium and Lomotil  Cancer related pain:  · Continue Oxycodone as needed  · Dexamethasone 4 mg daily     Poor appetite:  · Mirtazapine   · Dexamethasone     I spent 40 minutes with the patient during this virtual check-in visit addressing symptoms, advising on how to properly take medications, diet instructions and instructed him to notify his oncologist that he has not been taking his oral chemotherapy  I also briefly discussed hospice as an alternative if he is truly not interested in continuing disease directed therapy

## 2020-03-25 DIAGNOSIS — C71.9 GLIOBLASTOMA (HCC): ICD-10-CM

## 2020-03-25 DIAGNOSIS — F41.9 ANXIETY DISORDER, UNSPECIFIED TYPE: ICD-10-CM

## 2020-03-25 DIAGNOSIS — C22.0 HEPATOCELLULAR CARCINOMA (HCC): ICD-10-CM

## 2020-03-25 RX ORDER — MIRTAZAPINE 15 MG/1
TABLET, FILM COATED ORAL
Qty: 30 TABLET | Refills: 0 | Status: SHIPPED | OUTPATIENT
Start: 2020-03-25 | End: 2020-04-15 | Stop reason: SDUPTHER

## 2020-03-26 ENCOUNTER — TELEPHONE (OUTPATIENT)
Dept: PALLIATIVE MEDICINE | Facility: CLINIC | Age: 70
End: 2020-03-26

## 2020-03-30 ENCOUNTER — TELEMEDICINE (OUTPATIENT)
Dept: PALLIATIVE MEDICINE | Age: 70
End: 2020-03-30
Payer: MEDICARE

## 2020-03-30 DIAGNOSIS — C22.0 HEPATOCELLULAR CARCINOMA (HCC): ICD-10-CM

## 2020-03-30 DIAGNOSIS — K59.1 FUNCTIONAL DIARRHEA: ICD-10-CM

## 2020-03-30 DIAGNOSIS — K08.89 TOOTH PAIN: ICD-10-CM

## 2020-03-30 DIAGNOSIS — C71.9 GLIOBLASTOMA (HCC): Primary | ICD-10-CM

## 2020-03-30 DIAGNOSIS — G89.3 CANCER ASSOCIATED PAIN: ICD-10-CM

## 2020-03-30 DIAGNOSIS — F41.9 ANXIETY DISORDER, UNSPECIFIED TYPE: ICD-10-CM

## 2020-03-30 PROCEDURE — 99442 PR PHYS/QHP TELEPHONE EVALUATION 11-20 MIN: CPT | Performed by: NURSE PRACTITIONER

## 2020-03-30 NOTE — PROGRESS NOTES
Outpatient Virtual Visit - Palliative and Supportive Care  Buddy Stahl  81 y o  male [id number]    Intake and Identification:  Reason for visit is for symptom management     Encounter provider Rolo Danielson, located at:  [address]  [address]  [phone]    Recent Visits  Date Type Provider Dept   03/23/20 Tyrell, [address]   Showing recent visits within past 7 days and meeting all other requirements     Today's Visits  Date Type Provider Dept   03/30/20 Tyrell, [address] [place] [address]   Showing today's visits and meeting all other requirements     Future Appointments  No visits were found meeting these conditions  Showing future appointments within next 150 days and meeting all other requirements             Patient agrees to participate in a virtual check in via telephone or video visit instead of presenting to the office to address urgent/immediate medical needs, or to respect quarantine and public health guidelines  Patient was informed this is a billable service  After connecting through telephone, the patient was identified by name and date of birth  Buddy Stahl  was informed that this was a telemedicine visit and that the visit is being conducted through telephone and patient was informed that this is not a secure, HIPAA-complaint platform  he agrees to proceed  which may not be secure and therefore might not be HIPAA-compliant  My office door was closed  No one else was in the room  He acknowledged consent and understanding of privacy and security of the virtual check-in visit  I informed the patient that I have reviewed his record in Epic and presented the opportunity for him to ask any questions regarding the visit today   The patient initiated communication and agreed to participate  Assessment and Plan  Diagnoses for this encounter are:    1  Glioblastoma ([facility] [address] )    2  Hepatocellular carcinoma (HCC)    3  Cancer associated pain    4  Functional diarrhea    5  Tooth pain    6  Anxiety disorder, unspecified type         Medications adjusted this encounter:  Requested Prescriptions      No prescriptions requested or ordered in this encounter     There are no discontinued medications  Chani Serrano was assessed today for symptoms and planning cares related to above illnesses  I have reviewed the patient's controlled substance dispensing history in the Prescription Drug Monitoring Program in compliance with the [facility] regulations before prescribing any controlled substances  If there are questions or concerns, please contact us through our clinic/answering service 24 hours a day, seven days a week  Disposition: Chani Serrano was strongly advised to notify his oncologist that he is not taking his Temodar or Lenvatinib  He is endorsing severe toothache and pain that is so intense he has taken a month supply of oxycodone over the past week  I shared my concerns that he could have a possible abscess and pain of this nature needs to be assessed  If he can not be seen by a dentist he needs to be evaluated in Urgent care/ED  I will not be re-ordering any oxycodone at this time  Ilianacoty Maggie verbalized understanding  APOLLO Miles  [facility] Palliative and Supportive Care  [phone]      Subjective    Carin Lundborg  is a 81 y o  male with  a history of Hepatitis C and cirrhosis,  metastatic hepatocellular carcinoma diagnosed in 2017 while living in [place] s/p TACE x2 and oral Sorafenib therapy which he self discontinued secondary to side effects  He recently moved to the [facility] to be close to family after suffering loss of his life partner due to cancer   He was diagnosed with glioblastoma of the left frontoparietal lobe and underwent a gross total resection in 9/2019  He completed a coarse of adjuvant chemoradiation therapy in 12/2019 with Temodar  He is currently undergoing treatment for both cancers: Taking Temodar for glioblastoma and Lenvatinib for hepatocellular cancer      Telephone visit held today to assess his symptoms  Last week, Ayana Nunez called with symptoms of ongoing diarrhea  He reported that he stopped taking his Temodar and Lenvatinib d/t diarrhea  He was instructed to discuss this with Dr Shreya Bell  Today, he admits that he is still not taking his anti-cancer drugs and he never called Dr Shreya Bell to inform him of this in spite my recommendation to do so  His diarrhea has resolved  Today he is complainig tooth ache that started a few days ago  Pain in his mouth is so severe he has taken all of his oxycodone, a month supply of #120 tablets of oxycodone 10 mg was filled one week ago on 3/23/20   He did not notify this office of his symptoms or increase in pain medication consumption     Past Medical History:   Diagnosis Date    Cancer [facility])     liver    Dependence on nicotine from cigarettes 9/4/2019    Hypertension     Liver cancer [facility])      Past Surgical History:   Procedure Laterality Date    APPENDECTOMY      BRAIN SURGERY      CRANIOTOMY Left 9/10/2019    Procedure: Image guided left frontal craniotomy for resection of tumor;  Surgeon: Josh Guy MD;  [facility];  Service: Neurosurgery     Current Outpatient Medications   Medication Sig Dispense Refill    aluminum-magnesium hydroxide-simethicone (MYLANTA) 200-200-20 mg/5 mL suspension Take 30 mL by mouth every 6 (six) hours as needed for indigestion or heartburn (Patient not taking: Reported on 10/9/2019) 355 mL 0    dexamethasone (DECADRON) 4 mg tablet Take 1 tablet (4 mg total) by mouth daily with breakfast 30 tablet 0    diphenoxylate-atropine (LOMOTIL) 2 5-0 025 mg per tablet Take 1 tablet by mouth 4 (four) times a day as needed for diarrhea 30 tablet 0    fluticasone-vilanterol (BREO ELLIPTA) 100-25 mcg/inh inhaler Inhale 1 puff daily Rinse mouth after use  1 Inhaler 0    folic acid (FOLVITE) 1 mg tablet Take 1 tablet (1 mg total) by mouth daily (Patient not taking: Reported on 10/9/2019)  0    hydrOXYzine HCL (ATARAX) 25 mg tablet Take 1 tablet (25 mg total) by mouth every 6 (six) hours as needed for anxiety 30 tablet 0    insulin glargine (LANTUS) 100 units/mL subcutaneous injection Patient currently taking 25 units insulin in the morning; patient will need to have this medication adjusted at facility by physician (Patient not taking: Reported on 10/9/2019)  0    insulin lispro (HumaLOG) 100 units/mL injection Inject 1-5 Units under the skin 3 (three) times a day before meals (Patient not taking: Reported on 10/9/2019)  0    insulin lispro (HumaLOG) 100 units/mL injection Patient is currently been taking 15 units t i d  With meals while in the hospital; this is secondary to patient being on steroids and dose will need to be adjusted at facility by physician   (Patient not taking: Reported on 10/9/2019)  0    Lenvatinib, 12 MG Daily Dose, (Lenvima, 12 MG Daily Dose,) 3 x 4 MG CPPK Take 12 mg by mouth daily 30 each 3    levETIRAcetam (KEPPRA) 500 mg tablet Take 1 tablet (500 mg total) by mouth every 12 (twelve) hours for 8 doses (Patient not taking: Reported on 1/21/2020) 8 tablet 0    loperamide (IMODIUM) 2 mg capsule Take 1 capsule (2 mg total) by mouth 4 (four) times a day as needed for diarrhea 60 capsule 0    mirtazapine (REMERON) 15 mg tablet TAKE 1 TABLET BY MOUTH DAILY AT BEDTIME 30 tablet 0    nicotine (NICODERM CQ) 21 mg/24 hr TD 24 hr patch Place 1 patch on the skin daily (Patient not taking: Reported on 3/2/2020) 28 patch 3    NIFEdipine (ADALAT CC) 60 MG 24 hr tablet Take 1 tablet (60 mg total) by mouth daily (Patient not taking: Reported on 1/13/2020)  0    ondansetron (ZOFRAN) 8 mg tablet Take 1 tablet (8 mg total) by mouth every 8 (eight) hours as needed for nausea or vomiting (Patient not taking: Reported on 3/2/2020) 30 tablet 3    oxyCODONE (ROXICODONE) 10 MG TABS Take 1 tablet (10 mg total) by mouth every 4 (four) hours as needed for moderate painMax Daily Amount: 60 mg 120 tablet 0    pantoprazole (PROTONIX) 40 mg tablet Take 1 tablet (40 mg total) by mouth daily in the early morning Take this medication for duration of steroid therapy (Patient not taking: Reported on 10/9/2019)  0    senna (SENOKOT) 8 6 mg Take 1 tablet (8 6 mg total) by mouth daily 120 each 0    sodium chloride 1 g tablet Take 1 g b i d  For 7 days; take 1 g daily for 7 days then discontinue (Patient not taking: Reported on 1/13/2020) 21 tablet 0    temozolomide (TEMODAR) 100 mg capsule Take 3 caps by mouth on days 1-5 every 28 days 15 capsule 3    thiamine 100 MG tablet Take 1 tablet (100 mg total) by mouth daily (Patient not taking: Reported on 10/9/2019)  0     No current facility-administered medications for this visit  No Known Allergies    Review of Systems   Constitution: Positive for decreased appetite and malaise/fatigue  HENT:        Tooth ache   Gastrointestinal: Positive for abdominal pain and anorexia  I spent 20+ minutes with the patient during this PHONE visit  Cares and counseling included the following: diagnostic results, impression, and recommendations, risks and benefits of treatment, instructions for disease self management, treatment instructions, follow up requirements, patient and family counseling/involvement in care and compliance with treatment regimen  All of the patient's questions were answered during this discussion

## 2020-04-02 ENCOUNTER — TELEPHONE (OUTPATIENT)
Dept: HEMATOLOGY ONCOLOGY | Facility: CLINIC | Age: 70
End: 2020-04-02

## 2020-04-02 ENCOUNTER — TELEPHONE (OUTPATIENT)
Dept: PALLIATIVE MEDICINE | Facility: HOSPITAL | Age: 70
End: 2020-04-02

## 2020-04-09 ENCOUNTER — TELEPHONE (OUTPATIENT)
Dept: PALLIATIVE MEDICINE | Facility: HOSPITAL | Age: 70
End: 2020-04-09

## 2020-04-14 ENCOUNTER — HOSPITAL ENCOUNTER (OUTPATIENT)
Dept: MRI IMAGING | Facility: HOSPITAL | Age: 70
Discharge: HOME | End: 2020-04-14
Payer: MEDICARE

## 2020-04-14 DIAGNOSIS — C71.9 GLIOBLASTOMA (HCC): ICD-10-CM

## 2020-04-14 PROCEDURE — 70553 MRI BRAIN STEM W/O & W/DYE: CPT

## 2020-04-14 PROCEDURE — A9585 GADOBUTROL INJECTION: HCPCS | Performed by: STUDENT IN AN ORGANIZED HEALTH CARE EDUCATION/TRAINING PROGRAM

## 2020-04-14 RX ADMIN — GADOBUTROL 6 ML: 604.72 INJECTION INTRAVENOUS at 11:53

## 2020-04-15 ENCOUNTER — TELEPHONE (OUTPATIENT)
Dept: HEMATOLOGY ONCOLOGY | Facility: CLINIC | Age: 70
End: 2020-04-15

## 2020-04-15 ENCOUNTER — TELEMEDICINE (OUTPATIENT)
Dept: PALLIATIVE MEDICINE | Facility: CLINIC | Age: 70
End: 2020-04-15
Payer: MEDICARE

## 2020-04-15 ENCOUNTER — TELEPHONE (OUTPATIENT)
Dept: PALLIATIVE MEDICINE | Facility: CLINIC | Age: 70
End: 2020-04-15

## 2020-04-15 DIAGNOSIS — Z71.89 COUNSELING AND COORDINATION OF CARE: Primary | ICD-10-CM

## 2020-04-15 DIAGNOSIS — C71.9 GLIOBLASTOMA (HCC): Primary | ICD-10-CM

## 2020-04-15 DIAGNOSIS — F41.9 ANXIETY DISORDER, UNSPECIFIED TYPE: ICD-10-CM

## 2020-04-15 DIAGNOSIS — C22.0 HEPATOCELLULAR CARCINOMA (HCC): ICD-10-CM

## 2020-04-15 DIAGNOSIS — D38.1 NEOPLASM OF UNCERTAIN BEHAVIOR OF LEFT LOWER LOBE OF LUNG: ICD-10-CM

## 2020-04-15 DIAGNOSIS — G89.3 CANCER ASSOCIATED PAIN: ICD-10-CM

## 2020-04-15 DIAGNOSIS — J44.9 CHRONIC OBSTRUCTIVE PULMONARY DISEASE, UNSPECIFIED COPD TYPE (HCC): ICD-10-CM

## 2020-04-15 PROBLEM — K08.89 TOOTH PAIN: Status: RESOLVED | Noted: 2019-09-04 | Resolved: 2020-04-15

## 2020-04-15 PROCEDURE — 99443 PR PHYS/QHP TELEPHONE EVALUATION 21-30 MIN: CPT | Performed by: NURSE PRACTITIONER

## 2020-04-15 PROCEDURE — 3079F DIAST BP 80-89 MM HG: CPT

## 2020-04-15 PROCEDURE — 3075F SYST BP GE 130 - 139MM HG: CPT

## 2020-04-15 PROCEDURE — NC001 PR NO CHARGE

## 2020-04-15 RX ORDER — AZITHROMYCIN 250 MG/1
TABLET, FILM COATED ORAL
Qty: 6 TABLET | Refills: 0 | Status: SHIPPED | OUTPATIENT
Start: 2020-04-15 | End: 2020-04-19

## 2020-04-15 RX ORDER — DEXAMETHASONE 4 MG/1
4 TABLET ORAL
Qty: 30 TABLET | Refills: 0 | Status: SHIPPED | OUTPATIENT
Start: 2020-04-15 | End: 2020-05-14 | Stop reason: SDUPTHER

## 2020-04-15 RX ORDER — MIRTAZAPINE 15 MG/1
15 TABLET, FILM COATED ORAL
Qty: 30 TABLET | Refills: 0 | Status: SHIPPED | OUTPATIENT
Start: 2020-04-15 | End: 2020-07-10 | Stop reason: HOSPADM

## 2020-04-15 RX ORDER — OXYCODONE HYDROCHLORIDE 10 MG/1
10 TABLET ORAL EVERY 4 HOURS PRN
Qty: 120 TABLET | Refills: 0 | Status: SHIPPED | OUTPATIENT
Start: 2020-04-15 | End: 2020-05-13 | Stop reason: SDUPTHER

## 2020-04-20 ENCOUNTER — CLINICAL SUPPORT (OUTPATIENT)
Dept: RADIATION ONCOLOGY | Facility: CLINIC | Age: 70
End: 2020-04-20
Attending: STUDENT IN AN ORGANIZED HEALTH CARE EDUCATION/TRAINING PROGRAM
Payer: MEDICARE

## 2020-04-20 DIAGNOSIS — C71.9 GLIOBLASTOMA (HCC): Primary | ICD-10-CM

## 2020-04-20 PROCEDURE — 99211 OFF/OP EST MAY X REQ PHY/QHP: CPT | Performed by: STUDENT IN AN ORGANIZED HEALTH CARE EDUCATION/TRAINING PROGRAM

## 2020-04-30 ENCOUNTER — TELEPHONE (OUTPATIENT)
Dept: HEMATOLOGY ONCOLOGY | Facility: CLINIC | Age: 70
End: 2020-04-30

## 2020-05-13 DIAGNOSIS — C71.9 GLIOBLASTOMA (HCC): ICD-10-CM

## 2020-05-13 RX ORDER — OXYCODONE HYDROCHLORIDE 10 MG/1
10 TABLET ORAL EVERY 4 HOURS PRN
Qty: 120 TABLET | Refills: 0 | Status: SHIPPED | OUTPATIENT
Start: 2020-05-13 | End: 2020-06-17 | Stop reason: SDUPTHER

## 2020-05-14 ENCOUNTER — TELEMEDICINE (OUTPATIENT)
Dept: PALLIATIVE MEDICINE | Facility: CLINIC | Age: 70
End: 2020-05-14
Payer: MEDICARE

## 2020-05-14 DIAGNOSIS — G89.3 CANCER ASSOCIATED PAIN: ICD-10-CM

## 2020-05-14 DIAGNOSIS — C71.9 GLIOBLASTOMA (HCC): ICD-10-CM

## 2020-05-14 DIAGNOSIS — C22.0 HEPATOCELLULAR CARCINOMA (HCC): ICD-10-CM

## 2020-05-14 DIAGNOSIS — K59.00 CONSTIPATION, UNSPECIFIED CONSTIPATION TYPE: ICD-10-CM

## 2020-05-14 PROCEDURE — 99443 PR PHYS/QHP TELEPHONE EVALUATION 21-30 MIN: CPT | Performed by: FAMILY MEDICINE

## 2020-05-14 RX ORDER — SENNOSIDES 8.6 MG
1 TABLET ORAL 2 TIMES DAILY PRN
Qty: 60 EACH | Refills: 0 | Status: SHIPPED | OUTPATIENT
Start: 2020-05-14 | End: 2020-07-10 | Stop reason: HOSPADM

## 2020-05-14 RX ORDER — DEXAMETHASONE 4 MG/1
4 TABLET ORAL
Qty: 30 TABLET | Refills: 0 | Status: SHIPPED | OUTPATIENT
Start: 2020-05-14 | End: 2020-07-10 | Stop reason: HOSPADM

## 2020-06-05 ENCOUNTER — TELEPHONE (OUTPATIENT)
Dept: HEMATOLOGY ONCOLOGY | Facility: MEDICAL CENTER | Age: 70
End: 2020-06-05

## 2020-06-05 ENCOUNTER — APPOINTMENT (EMERGENCY)
Dept: CT IMAGING | Facility: HOSPITAL | Age: 70
End: 2020-06-05
Payer: MEDICARE

## 2020-06-05 ENCOUNTER — HOSPITAL ENCOUNTER (EMERGENCY)
Facility: HOSPITAL | Age: 70
Discharge: HOME | End: 2020-06-05
Attending: EMERGENCY MEDICINE | Admitting: EMERGENCY MEDICINE
Payer: MEDICARE

## 2020-06-05 ENCOUNTER — APPOINTMENT (EMERGENCY)
Dept: RADIOLOGY | Facility: HOSPITAL | Age: 70
End: 2020-06-05
Payer: MEDICARE

## 2020-06-05 ENCOUNTER — APPOINTMENT (EMERGENCY)
Dept: NON INVASIVE DIAGNOSTICS | Facility: HOSPITAL | Age: 70
End: 2020-06-05
Payer: MEDICARE

## 2020-06-05 ENCOUNTER — TELEPHONE (OUTPATIENT)
Dept: HEMATOLOGY ONCOLOGY | Facility: CLINIC | Age: 70
End: 2020-06-05

## 2020-06-05 VITALS
SYSTOLIC BLOOD PRESSURE: 161 MMHG | WEIGHT: 130 LBS | HEIGHT: 74 IN | OXYGEN SATURATION: 96 % | DIASTOLIC BLOOD PRESSURE: 78 MMHG | TEMPERATURE: 97.8 F | BODY MASS INDEX: 16.68 KG/M2 | RESPIRATION RATE: 18 BRPM | HEART RATE: 71 BPM

## 2020-06-05 DIAGNOSIS — R53.1 GENERALIZED WEAKNESS: Primary | ICD-10-CM

## 2020-06-05 DIAGNOSIS — C22.0 HEPATOCELLULAR CARCINOMA (HCC): ICD-10-CM

## 2020-06-05 DIAGNOSIS — R60.0 LOCALIZED EDEMA: ICD-10-CM

## 2020-06-05 LAB
ALBUMIN SERPL BCP-MCNC: 1.8 G/DL (ref 3.5–5)
ALP SERPL-CCNC: 550 U/L (ref 46–116)
ALT SERPL W P-5'-P-CCNC: 50 U/L (ref 12–78)
AMMONIA PLAS-SCNC: 37 UMOL/L (ref 11–35)
ANION GAP SERPL CALCULATED.3IONS-SCNC: 9 MMOL/L (ref 4–13)
APTT PPP: 29 SECONDS (ref 23–37)
AST SERPL W P-5'-P-CCNC: 63 U/L (ref 5–45)
ATRIAL RATE: 74 BPM
BASOPHILS # BLD AUTO: 0.08 THOUSANDS/ΜL (ref 0–0.1)
BASOPHILS NFR BLD AUTO: 1 % (ref 0–1)
BILIRUB SERPL-MCNC: 4.3 MG/DL (ref 0.2–1)
BUN SERPL-MCNC: 16 MG/DL (ref 5–25)
CALCIUM SERPL-MCNC: 7.9 MG/DL (ref 8.3–10.1)
CHLORIDE SERPL-SCNC: 97 MMOL/L (ref 100–108)
CO2 SERPL-SCNC: 25 MMOL/L (ref 21–32)
CREAT SERPL-MCNC: 1.01 MG/DL (ref 0.6–1.3)
EOSINOPHIL # BLD AUTO: 0.17 THOUSAND/ΜL (ref 0–0.61)
EOSINOPHIL NFR BLD AUTO: 3 % (ref 0–6)
ERYTHROCYTE [DISTWIDTH] IN BLOOD BY AUTOMATED COUNT: 15.8 % (ref 11.6–15.1)
GFR SERPL CREATININE-BSD FRML MDRD: 77 ML/MIN/1.73SQ M
GLUCOSE SERPL-MCNC: 232 MG/DL (ref 65–140)
HCT VFR BLD AUTO: 33.5 % (ref 36.5–49.3)
HGB BLD-MCNC: 11.4 G/DL (ref 12–17)
IMM GRANULOCYTES # BLD AUTO: 0.03 THOUSAND/UL (ref 0–0.2)
IMM GRANULOCYTES NFR BLD AUTO: 1 % (ref 0–2)
INR PPP: 0.97 (ref 0.84–1.19)
LIPASE SERPL-CCNC: 51 U/L (ref 73–393)
LYMPHOCYTES # BLD AUTO: 0.76 THOUSANDS/ΜL (ref 0.6–4.47)
LYMPHOCYTES NFR BLD AUTO: 11 % (ref 14–44)
MAGNESIUM SERPL-MCNC: 1.9 MG/DL (ref 1.6–2.6)
MCH RBC QN AUTO: 34.8 PG (ref 26.8–34.3)
MCHC RBC AUTO-ENTMCNC: 34 G/DL (ref 31.4–37.4)
MCV RBC AUTO: 102 FL (ref 82–98)
MONOCYTES # BLD AUTO: 0.61 THOUSAND/ΜL (ref 0.17–1.22)
MONOCYTES NFR BLD AUTO: 9 % (ref 4–12)
NEUTROPHILS # BLD AUTO: 5.01 THOUSANDS/ΜL (ref 1.85–7.62)
NEUTS SEG NFR BLD AUTO: 75 % (ref 43–75)
NRBC BLD AUTO-RTO: 0 /100 WBCS
P AXIS: 84 DEGREES
PLATELET # BLD AUTO: 327 THOUSANDS/UL (ref 149–390)
PMV BLD AUTO: 12.2 FL (ref 8.9–12.7)
POTASSIUM SERPL-SCNC: 3.5 MMOL/L (ref 3.5–5.3)
PR INTERVAL: 134 MS
PROT SERPL-MCNC: 6.7 G/DL (ref 6.4–8.2)
PROTHROMBIN TIME: 12.6 SECONDS (ref 11.6–14.5)
QRS AXIS: 76 DEGREES
QRSD INTERVAL: 94 MS
QT INTERVAL: 398 MS
QTC INTERVAL: 441 MS
RBC # BLD AUTO: 3.28 MILLION/UL (ref 3.88–5.62)
SODIUM SERPL-SCNC: 131 MMOL/L (ref 136–145)
T WAVE AXIS: 82 DEGREES
VENTRICULAR RATE: 74 BPM
WBC # BLD AUTO: 6.66 THOUSAND/UL (ref 4.31–10.16)

## 2020-06-05 PROCEDURE — 85730 THROMBOPLASTIN TIME PARTIAL: CPT | Performed by: PHYSICIAN ASSISTANT

## 2020-06-05 PROCEDURE — 99285 EMERGENCY DEPT VISIT HI MDM: CPT | Performed by: PHYSICIAN ASSISTANT

## 2020-06-05 PROCEDURE — 83690 ASSAY OF LIPASE: CPT | Performed by: PHYSICIAN ASSISTANT

## 2020-06-05 PROCEDURE — 93010 ELECTROCARDIOGRAM REPORT: CPT | Performed by: INTERNAL MEDICINE

## 2020-06-05 PROCEDURE — 80053 COMPREHEN METABOLIC PANEL: CPT | Performed by: PHYSICIAN ASSISTANT

## 2020-06-05 PROCEDURE — 71046 X-RAY EXAM CHEST 2 VIEWS: CPT

## 2020-06-05 PROCEDURE — 85025 COMPLETE CBC W/AUTO DIFF WBC: CPT | Performed by: PHYSICIAN ASSISTANT

## 2020-06-05 PROCEDURE — 85610 PROTHROMBIN TIME: CPT | Performed by: PHYSICIAN ASSISTANT

## 2020-06-05 PROCEDURE — 82140 ASSAY OF AMMONIA: CPT | Performed by: PHYSICIAN ASSISTANT

## 2020-06-05 PROCEDURE — 36415 COLL VENOUS BLD VENIPUNCTURE: CPT | Performed by: PHYSICIAN ASSISTANT

## 2020-06-05 PROCEDURE — 93971 EXTREMITY STUDY: CPT

## 2020-06-05 PROCEDURE — 99285 EMERGENCY DEPT VISIT HI MDM: CPT

## 2020-06-05 PROCEDURE — 83735 ASSAY OF MAGNESIUM: CPT | Performed by: PHYSICIAN ASSISTANT

## 2020-06-05 PROCEDURE — 74177 CT ABD & PELVIS W/CONTRAST: CPT

## 2020-06-05 PROCEDURE — 93005 ELECTROCARDIOGRAM TRACING: CPT

## 2020-06-05 RX ORDER — AMOXICILLIN AND CLAVULANATE POTASSIUM 875; 125 MG/1; MG/1
1 TABLET, FILM COATED ORAL EVERY 12 HOURS
Qty: 14 TABLET | Refills: 0 | Status: SHIPPED | OUTPATIENT
Start: 2020-06-05 | End: 2020-06-12

## 2020-06-05 RX ORDER — OXYCODONE HYDROCHLORIDE 5 MG/1
10 TABLET ORAL ONCE
Status: COMPLETED | OUTPATIENT
Start: 2020-06-05 | End: 2020-06-05

## 2020-06-05 RX ADMIN — IOHEXOL 100 ML: 350 INJECTION, SOLUTION INTRAVENOUS at 12:58

## 2020-06-05 RX ADMIN — OXYCODONE HYDROCHLORIDE 10 MG: 5 TABLET ORAL at 11:29

## 2020-06-06 PROCEDURE — 93971 EXTREMITY STUDY: CPT | Performed by: SURGERY

## 2020-06-08 ENCOUNTER — TELEPHONE (OUTPATIENT)
Dept: HEMATOLOGY ONCOLOGY | Facility: MEDICAL CENTER | Age: 70
End: 2020-06-08

## 2020-06-08 ENCOUNTER — TELEPHONE (OUTPATIENT)
Dept: HEMATOLOGY ONCOLOGY | Facility: CLINIC | Age: 70
End: 2020-06-08

## 2020-06-09 ENCOUNTER — TELEMEDICINE (OUTPATIENT)
Dept: PALLIATIVE MEDICINE | Facility: CLINIC | Age: 70
End: 2020-06-09
Payer: MEDICARE

## 2020-06-09 DIAGNOSIS — G93.6 CEREBRAL EDEMA (HCC): ICD-10-CM

## 2020-06-09 DIAGNOSIS — E46 MALNUTRITION RELATED TO CHRONIC DISEASE (HCC): ICD-10-CM

## 2020-06-09 DIAGNOSIS — J44.9 COPD (CHRONIC OBSTRUCTIVE PULMONARY DISEASE) (HCC): ICD-10-CM

## 2020-06-09 DIAGNOSIS — G89.3 CANCER-RELATED PAIN: Chronic | ICD-10-CM

## 2020-06-09 DIAGNOSIS — C71.9 GLIOBLASTOMA (HCC): ICD-10-CM

## 2020-06-09 DIAGNOSIS — F06.4 ANXIETY DISORDER DUE TO KNOWN PHYSIOLOGICAL CONDITION: Primary | ICD-10-CM

## 2020-06-09 DIAGNOSIS — C22.0 HEPATOCELLULAR CARCINOMA (HCC): ICD-10-CM

## 2020-06-09 DIAGNOSIS — Z29.8 SEIZURE PROPHYLAXIS: ICD-10-CM

## 2020-06-09 PROCEDURE — 99215 OFFICE O/P EST HI 40 MIN: CPT | Performed by: FAMILY MEDICINE

## 2020-06-09 RX ORDER — FLUTICASONE FUROATE AND VILANTEROL 100; 25 UG/1; UG/1
1 POWDER RESPIRATORY (INHALATION) DAILY
Qty: 1 INHALER | Refills: 0 | Status: SHIPPED | OUTPATIENT
Start: 2020-06-09

## 2020-06-09 RX ORDER — ALPRAZOLAM 0.25 MG/1
0.25 TABLET ORAL
Qty: 15 TABLET | Refills: 0 | Status: SHIPPED | OUTPATIENT
Start: 2020-06-09

## 2020-06-09 RX ORDER — ALBUTEROL SULFATE 90 UG/1
2 AEROSOL, METERED RESPIRATORY (INHALATION) EVERY 4 HOURS PRN
Qty: 8.5 G | Refills: 1 | Status: SHIPPED | OUTPATIENT
Start: 2020-06-09

## 2020-06-11 ENCOUNTER — TELEPHONE (OUTPATIENT)
Dept: OTHER | Facility: OTHER | Age: 70
End: 2020-06-11

## 2020-06-17 DIAGNOSIS — C71.9 GLIOBLASTOMA (HCC): ICD-10-CM

## 2020-06-17 RX ORDER — OXYCODONE HYDROCHLORIDE 10 MG/1
10 TABLET ORAL EVERY 4 HOURS PRN
Qty: 120 TABLET | Refills: 0 | Status: SHIPPED | OUTPATIENT
Start: 2020-06-17 | End: 2020-07-10 | Stop reason: HOSPADM

## 2020-06-23 ENCOUNTER — TELEMEDICINE (OUTPATIENT)
Dept: PALLIATIVE MEDICINE | Facility: CLINIC | Age: 70
End: 2020-06-23
Payer: MEDICARE

## 2020-06-23 DIAGNOSIS — G89.3 CANCER-RELATED PAIN: Primary | Chronic | ICD-10-CM

## 2020-06-23 PROCEDURE — 99442 PR PHYS/QHP TELEPHONE EVALUATION 11-20 MIN: CPT | Performed by: FAMILY MEDICINE

## 2020-07-08 ENCOUNTER — APPOINTMENT (EMERGENCY)
Dept: RADIOLOGY | Facility: HOSPITAL | Age: 70
DRG: 441 | End: 2020-07-08
Payer: MEDICARE

## 2020-07-08 ENCOUNTER — OFFICE VISIT (OUTPATIENT)
Dept: PALLIATIVE MEDICINE | Facility: CLINIC | Age: 70
End: 2020-07-08

## 2020-07-08 ENCOUNTER — HOSPITAL ENCOUNTER (INPATIENT)
Facility: HOSPITAL | Age: 70
LOS: 2 days | Discharge: HOSPICE | DRG: 441 | End: 2020-07-10
Attending: EMERGENCY MEDICINE | Admitting: INTERNAL MEDICINE
Payer: MEDICARE

## 2020-07-08 ENCOUNTER — APPOINTMENT (EMERGENCY)
Dept: CT IMAGING | Facility: HOSPITAL | Age: 70
DRG: 441 | End: 2020-07-08
Payer: MEDICARE

## 2020-07-08 ENCOUNTER — APPOINTMENT (INPATIENT)
Dept: ULTRASOUND IMAGING | Facility: HOSPITAL | Age: 70
DRG: 441 | End: 2020-07-08
Payer: MEDICARE

## 2020-07-08 DIAGNOSIS — C79.9 METASTATIC CANCER (HCC): ICD-10-CM

## 2020-07-08 DIAGNOSIS — G89.3 CANCER-RELATED PAIN: Chronic | ICD-10-CM

## 2020-07-08 DIAGNOSIS — G93.89 BRAIN MASS: Primary | ICD-10-CM

## 2020-07-08 DIAGNOSIS — R53.1 GENERALIZED WEAKNESS: Primary | ICD-10-CM
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
## 2020-07-08 DIAGNOSIS — R60.0 BILATERAL LOWER EXTREMITY EDEMA: ICD-10-CM

## 2020-07-08 DIAGNOSIS — C22.0 HEPATOCELLULAR CARCINOMA (HCC): ICD-10-CM

## 2020-07-08 DIAGNOSIS — R42 LIGHTHEADEDNESS: ICD-10-CM

## 2020-07-08 DIAGNOSIS — E80.6 HYPERBILIRUBINEMIA: ICD-10-CM

## 2020-07-08 PROBLEM — E43 SEVERE PROTEIN-CALORIE MALNUTRITION (HCC): Status: ACTIVE | Noted: 2020-07-08

## 2020-07-08 LAB
ALBUMIN SERPL BCP-MCNC: 1.5 G/DL (ref 3.5–5)
ALP SERPL-CCNC: 385 U/L (ref 46–116)
ALT SERPL W P-5'-P-CCNC: 45 U/L (ref 12–78)
AMMONIA PLAS-SCNC: 47 UMOL/L (ref 11–35)
ANION GAP SERPL CALCULATED.3IONS-SCNC: 6 MMOL/L (ref 4–13)
AST SERPL W P-5'-P-CCNC: 69 U/L (ref 5–45)
ATRIAL RATE: 69 BPM
BASOPHILS # BLD AUTO: 0.05 THOUSANDS/ΜL (ref 0–0.1)
BASOPHILS NFR BLD AUTO: 1 % (ref 0–1)
BILIRUB DIRECT SERPL-MCNC: 6.15 MG/DL (ref 0–0.2)
BILIRUB SERPL-MCNC: 7.19 MG/DL (ref 0.2–1)
BUN SERPL-MCNC: 18 MG/DL (ref 5–25)
CALCIUM SERPL-MCNC: 8 MG/DL (ref 8.3–10.1)
CHLORIDE SERPL-SCNC: 97 MMOL/L (ref 100–108)
CO2 SERPL-SCNC: 28 MMOL/L (ref 21–32)
CREAT SERPL-MCNC: 1.1 MG/DL (ref 0.6–1.3)
EOSINOPHIL # BLD AUTO: 0.09 THOUSAND/ΜL (ref 0–0.61)
EOSINOPHIL NFR BLD AUTO: 2 % (ref 0–6)
ERYTHROCYTE [DISTWIDTH] IN BLOOD BY AUTOMATED COUNT: 17.2 % (ref 11.6–15.1)
GFR SERPL CREATININE-BSD FRML MDRD: 69 ML/MIN/1.73SQ M
GLUCOSE SERPL-MCNC: 130 MG/DL (ref 65–140)
HCT VFR BLD AUTO: 36.4 % (ref 36.5–49.3)
HGB BLD-MCNC: 12.1 G/DL (ref 12–17)
IMM GRANULOCYTES # BLD AUTO: 0.01 THOUSAND/UL (ref 0–0.2)
IMM GRANULOCYTES NFR BLD AUTO: 0 % (ref 0–2)
INR PPP: 1.12 (ref 0.84–1.19)
LYMPHOCYTES # BLD AUTO: 0.64 THOUSANDS/ΜL (ref 0.6–4.47)
LYMPHOCYTES NFR BLD AUTO: 13 % (ref 14–44)
MCH RBC QN AUTO: 33.8 PG (ref 26.8–34.3)
MCHC RBC AUTO-ENTMCNC: 33.2 G/DL (ref 31.4–37.4)
MCV RBC AUTO: 102 FL (ref 82–98)
MONOCYTES # BLD AUTO: 0.31 THOUSAND/ΜL (ref 0.17–1.22)
MONOCYTES NFR BLD AUTO: 6 % (ref 4–12)
NEUTROPHILS # BLD AUTO: 3.8 THOUSANDS/ΜL (ref 1.85–7.62)
NEUTS SEG NFR BLD AUTO: 78 % (ref 43–75)
NRBC BLD AUTO-RTO: 0 /100 WBCS
P AXIS: 92 DEGREES
PLATELET # BLD AUTO: 350 THOUSANDS/UL (ref 149–390)
PMV BLD AUTO: 12 FL (ref 8.9–12.7)
POTASSIUM SERPL-SCNC: 4.2 MMOL/L (ref 3.5–5.3)
PR INTERVAL: 144 MS
PROT SERPL-MCNC: 6.6 G/DL (ref 6.4–8.2)
PROTHROMBIN TIME: 13.8 SECONDS (ref 11.6–14.5)
QRS AXIS: 77 DEGREES
QRSD INTERVAL: 94 MS
QT INTERVAL: 398 MS
QTC INTERVAL: 423 MS
RBC # BLD AUTO: 3.58 MILLION/UL (ref 3.88–5.62)
SARS-COV-2 RNA RESP QL NAA+PROBE: NEGATIVE
SODIUM SERPL-SCNC: 131 MMOL/L (ref 136–145)
T WAVE AXIS: 77 DEGREES
VENTRICULAR RATE: 68 BPM
WBC # BLD AUTO: 4.9 THOUSAND/UL (ref 4.31–10.16)

## 2020-07-08 PROCEDURE — 85610 PROTHROMBIN TIME: CPT | Performed by: PHYSICIAN ASSISTANT

## 2020-07-08 PROCEDURE — 80076 HEPATIC FUNCTION PANEL: CPT | Performed by: EMERGENCY MEDICINE

## 2020-07-08 PROCEDURE — 87635 SARS-COV-2 COVID-19 AMP PRB: CPT | Performed by: EMERGENCY MEDICINE

## 2020-07-08 PROCEDURE — 36415 COLL VENOUS BLD VENIPUNCTURE: CPT | Performed by: EMERGENCY MEDICINE

## 2020-07-08 PROCEDURE — NC001 PR NO CHARGE: Performed by: FAMILY MEDICINE

## 2020-07-08 PROCEDURE — 71101 X-RAY EXAM UNILAT RIBS/CHEST: CPT

## 2020-07-08 PROCEDURE — 76700 US EXAM ABDOM COMPLETE: CPT

## 2020-07-08 PROCEDURE — 93010 ELECTROCARDIOGRAM REPORT: CPT | Performed by: INTERNAL MEDICINE

## 2020-07-08 PROCEDURE — 82140 ASSAY OF AMMONIA: CPT | Performed by: EMERGENCY MEDICINE

## 2020-07-08 PROCEDURE — 99285 EMERGENCY DEPT VISIT HI MDM: CPT | Performed by: EMERGENCY MEDICINE

## 2020-07-08 PROCEDURE — 99223 1ST HOSP IP/OBS HIGH 75: CPT | Performed by: INTERNAL MEDICINE

## 2020-07-08 PROCEDURE — 85025 COMPLETE CBC W/AUTO DIFF WBC: CPT | Performed by: EMERGENCY MEDICINE

## 2020-07-08 PROCEDURE — 70450 CT HEAD/BRAIN W/O DYE: CPT

## 2020-07-08 PROCEDURE — 99285 EMERGENCY DEPT VISIT HI MDM: CPT

## 2020-07-08 PROCEDURE — 80048 BASIC METABOLIC PNL TOTAL CA: CPT | Performed by: EMERGENCY MEDICINE

## 2020-07-08 PROCEDURE — 93005 ELECTROCARDIOGRAM TRACING: CPT

## 2020-07-08 RX ORDER — ALPRAZOLAM 0.25 MG/1
0.25 TABLET ORAL
Status: DISCONTINUED | OUTPATIENT
Start: 2020-07-08 | End: 2020-07-10 | Stop reason: HOSPADM

## 2020-07-08 RX ORDER — OXYCODONE HYDROCHLORIDE 10 MG/1
20 TABLET ORAL EVERY 4 HOURS PRN
Status: DISCONTINUED | OUTPATIENT
Start: 2020-07-08 | End: 2020-07-09

## 2020-07-08 RX ORDER — OXYCODONE HYDROCHLORIDE 10 MG/1
10 TABLET ORAL EVERY 4 HOURS PRN
Status: DISCONTINUED | OUTPATIENT
Start: 2020-07-08 | End: 2020-07-10 | Stop reason: HOSPADM

## 2020-07-08 RX ORDER — NICOTINE 21 MG/24HR
21 PATCH, TRANSDERMAL 24 HOURS TRANSDERMAL ONCE
Status: COMPLETED | OUTPATIENT
Start: 2020-07-08 | End: 2020-07-09

## 2020-07-08 RX ORDER — OXYCODONE HYDROCHLORIDE 10 MG/1
10 TABLET ORAL ONCE
Status: COMPLETED | OUTPATIENT
Start: 2020-07-08 | End: 2020-07-08

## 2020-07-08 RX ORDER — FLUTICASONE FUROATE AND VILANTEROL 100; 25 UG/1; UG/1
1 POWDER RESPIRATORY (INHALATION) DAILY
Status: DISCONTINUED | OUTPATIENT
Start: 2020-07-08 | End: 2020-07-10 | Stop reason: HOSPADM

## 2020-07-08 RX ORDER — ONDANSETRON 2 MG/ML
4 INJECTION INTRAMUSCULAR; INTRAVENOUS EVERY 6 HOURS PRN
Status: DISCONTINUED | OUTPATIENT
Start: 2020-07-08 | End: 2020-07-10 | Stop reason: HOSPADM

## 2020-07-08 RX ORDER — NICOTINE 21 MG/24HR
1 PATCH, TRANSDERMAL 24 HOURS TRANSDERMAL DAILY
Status: DISCONTINUED | OUTPATIENT
Start: 2020-07-09 | End: 2020-07-10 | Stop reason: HOSPADM

## 2020-07-08 RX ORDER — LIDOCAINE 50 MG/G
2 PATCH TOPICAL ONCE
Status: COMPLETED | OUTPATIENT
Start: 2020-07-08 | End: 2020-07-09

## 2020-07-08 RX ORDER — ALBUTEROL SULFATE 90 UG/1
2 AEROSOL, METERED RESPIRATORY (INHALATION) EVERY 4 HOURS PRN
Status: DISCONTINUED | OUTPATIENT
Start: 2020-07-08 | End: 2020-07-10 | Stop reason: HOSPADM

## 2020-07-08 RX ORDER — HYDROMORPHONE HCL 110MG/55ML
2 PATIENT CONTROLLED ANALGESIA SYRINGE INTRAVENOUS EVERY 6 HOURS PRN
Status: DISCONTINUED | OUTPATIENT
Start: 2020-07-08 | End: 2020-07-09

## 2020-07-08 RX ADMIN — OXYCODONE HYDROCHLORIDE 10 MG: 10 TABLET ORAL at 12:33

## 2020-07-08 RX ADMIN — HYDROMORPHONE HYDROCHLORIDE 2 MG: 2 INJECTION, SOLUTION INTRAMUSCULAR; INTRAVENOUS; SUBCUTANEOUS at 17:44

## 2020-07-08 RX ADMIN — NICOTINE 21 MG: 21 PATCH TRANSDERMAL at 12:34

## 2020-07-08 RX ADMIN — FLUTICASONE FUROATE AND VILANTEROL TRIFENATATE 1 PUFF: 100; 25 POWDER RESPIRATORY (INHALATION) at 16:22

## 2020-07-08 RX ADMIN — LIDOCAINE 2 PATCH: 50 PATCH TOPICAL at 12:45

## 2020-07-08 RX ADMIN — OXYCODONE HYDROCHLORIDE 20 MG: 10 TABLET ORAL at 16:07

## 2020-07-08 RX ADMIN — OXYCODONE HYDROCHLORIDE 20 MG: 10 TABLET ORAL at 20:41

## 2020-07-08 NOTE — PROGRESS NOTES
7/8/2020 11:04 AM -  Pt showed up 30 minutes late to appt, confused and unsteady on his feet  Infusion RN concerned for safety, and we advised eval in ED  This significantly ill gentleman does have a history of brain tumors, and I have no recent imaging to suggest these are stable  I would be very concerned for mass effect, stroke, or non-convulsive seizure  Infusion RN agreed to xport pt to [address] [place] [address] ED for eval       Will enter ADT order

## 2020-07-08 NOTE — ED NOTES
Patient requesting "something light to eat"    Dr Cody Phoenix "give him anything he wants"   [facility] ED tech providing patient with snacks     April M Brittni Zaldivar RN  07/08/20 [id number]

## 2020-07-08 NOTE — ASSESSMENT & PLAN NOTE
Malnutrition Findings:           BMI Findings: Body mass index is 18 94 kg/m²     · BMI noted, in the setting of chronic disease/metastatic cancer   · Nutrition consult   · Encourage PO intake

## 2020-07-08 NOTE — ASSESSMENT & PLAN NOTE
· Patient states that he is not ready for hospice at this time  He reports that he would restart chemotherapy if offered as long as it "would not cause a commotion" (referring to prior diarrheal issues)  Discussed that pending liver work up this may not be an option     · Consult Oncology as patient needs potential chemotherapy options to further decision making as far as goals of care   · Will continue to advance goals of care as patient's hospital course progresses   · Consider involvement of palliative care vs hospice pending course

## 2020-07-08 NOTE — ASSESSMENT & PLAN NOTE
· Present on admission, suspect due to worsening tumor burden, but no recent imaging to review  Has not been on chemotherapy due to side effects  Was on Maciel Rios in the past (starting 2/2020) but had stopped due to side effects  His lesions in his liver had been growing at that time  Would accept chemotherapy if offered again and no side effects would be had     · Admit patient to med/surg under inpatient status   · Consult gastroenterology for further work up/imaging of liver   · Need to see if patient would be able to handle toxicity of chemotherapy and if any interventions could be performed to facilitate this   · Consult oncology for further chemotherapy options   · Check US abdomen   · Trend LFTs

## 2020-07-08 NOTE — ED NOTES
Ambulated patient to bathroom, with 1 assist  Gait was a little unsteady  [address] [facility] wheelchair to get patient back to room  PT back on stretcher with light dimmed and curtain/door closed       April Mumtaz Rogers RN  07/08/20 [id number]

## 2020-07-08 NOTE — H&P
[address] [facility]  H&P- Lupe Garcia  1950, 78 y o  male MRN: [id number]    Unit/Bed#: ED 18 Encounter: [id number]    Primary Care Provider: Luis Bonilla DO   Date and time admitted to hospital: 7/8/2020 11:32 AM        * Hyperbilirubinemia  Assessment & Plan  · Present on admission, suspect due to worsening tumor burden, but no recent imaging to review  Has not been on chemotherapy due to side effects  Was on [place] [facility] in the past (starting 2/2020) but had stopped due to side effects  His lesions in his liver had been growing at that time  Would accept chemotherapy if offered again and no side effects would be had  · Admit patient to med/surg under inpatient status   · Consult gastroenterology for further work up/imaging of liver   · Need to see if patient would be able to handle toxicity of chemotherapy and if any interventions could be performed to facilitate this   · Consult oncology for further chemotherapy options   · Check US abdomen   · Trend LFTs    Hepatocellular carcinoma ([facility] [address] )  Assessment & Plan  · Known history with lung/rib mets  Is not on active chemotherapy at this time  Appears to have worsening T  Bili  [facility] that [facility]  · Consult gastroenterology   · Consult oncology   · Check US abdomen for etiology of worsening bilirubin   · Trend LFTs  · Check INR     Goals of care, counseling/discussion  Assessment & Plan  · Patient states that he is not ready for hospice at this time  He reports that he would restart chemotherapy if offered as long as it "would not cause a commotion" (referring to prior diarrheal issues)  Discussed that pending liver work up this may not be an option     · Consult Oncology as patient needs potential chemotherapy options to further decision making as far as goals of care   · Will continue to advance goals of care as patient's hospital course progresses   · Consider involvement of palliative care vs hospice pending course     Glioblastoma ([facility] [address] )  Assessment & Plan  · Status post resection, no neurologic symptoms at this time, no signs of acute change on CT scan   · No active treatment at this time     COPD (chronic obstructive pulmonary disease) (HCC)  Assessment & Plan  · Mild wheezing at apices, however no acute exacerbation   · Continue Breo, Albuterol PRN    Hyponatremia  Assessment & Plan  · Noted at 131, likely due to chronic liver disease   · Encourage PO intake  · Monitor BMP   · Could consider diuresis, but I do not feel the patient's volume status is bad enough to warrant this without significant side effects     Severe protein-calorie malnutrition (HCC)  Assessment & Plan  Malnutrition Findings:           BMI Findings: Body mass index is 18 94 kg/m²  · BMI noted, in the setting of chronic disease/metastatic cancer   · Nutrition consult   · Encourage PO intake       VTE Prophylaxis: Pharmacologic VTE Prophylaxis contraindicated due to possibly liver coagulopathy   / sequential compression device   Code Status: DNR/DNI  POLST: POLST form is not discussed and not completed at this time  Discussion with family: None at bedside     Anticipated Length of Stay:  Patient will be admitted on an Inpatient basis with an anticipated length of stay of  Greater than 2 midnights  Justification for Hospital Stay: as per above assessment and plan     Total Time for Visit, including Counseling / Coordination of Care: 1 hour  Greater than 50% of this total time spent on direct patient counseling and coordination of care  Chief Complaint:   Leg swelling    History of Present Illness:    Alfie Olsen  is a 78 y o  male with a history of metastatic HCC to rib/lung, GBM status post resection/radiation, HTN, and COPD who presents with leg swelling  Patient reports that this has been worsening over the last few days  He denies pain in the legs   He reports that his abdomen is distended, but this is chronic and does not believe that this has changed recently  He reports pain on the right side of his abdomen and chest wall, but this is chronic as well  He takes OxyIR 10 mg at home for this and is requesting something stronger here  He reports that he did have some falls at home 1 week and 2 weeks ago  He denies hitting head/LOC  Patient does reports that his appetite is poor, but reports that he eats 2 meals per day  He is unable to quantify this further  He denies nausea, vomiting, or diarrhea, but reports that he had diarrhea while on chemotherapy  Denies fevers or chills  Denies cardiac chest pain or difficulty breathing  Patient was sent in from palliative care office for "confusion/seizure", however patient is A+Ox3 and does not appear post-ictal  I do not suspect neurologic source  As per discussion with patient he would consider undergoing chemotherapy if it was offered if there was "no more commotion" referring to his diarrhea that he had prior  I discussed that his worsening liver disease could preclude chemo being offered, but he would like this worked up to see if it could be fixed  He states that he is not ready for hospice just yet, but he realizes the gravity of his condition  Review of Systems:    Review of Systems   Constitutional: Positive for appetite change (Chronically poor )  Negative for chills, diaphoresis, fatigue and fever  HENT: Negative for congestion, rhinorrhea and sore throat  Eyes: Negative for visual disturbance  Respiratory: Negative for cough, chest tightness, shortness of breath and wheezing  Cardiovascular: Negative for chest pain, palpitations and leg swelling  Gastrointestinal: Positive for abdominal distention  Negative for abdominal pain, constipation, diarrhea, nausea and vomiting  Genitourinary: Negative for decreased urine volume and dysuria  Musculoskeletal: Negative for arthralgias and myalgias     Neurological: Negative for dizziness, tremors, syncope, weakness, light-headedness, numbness and headaches  All other systems reviewed and are negative  Past Medical and Surgical History:     Past Medical History:   Diagnosis Date    Cancer [facility])     liver    Dependence on nicotine from cigarettes 9/4/2019    Hypertension     Liver cancer [facility])        Past Surgical History:   Procedure Laterality Date    APPENDECTOMY      BRAIN SURGERY      CRANIOTOMY Left 9/10/2019    Procedure: Image guided left frontal craniotomy for resection of tumor;  Surgeon: Tanisha Chowdhury MD;  Location: [facility];  Service: Neurosurgery       Meds/Allergies:    Prior to Admission medications    Medication Sig Start Date End Date Taking?  Authorizing Provider   albuterol (ProAir HFA) 90 mcg/act inhaler Inhale 2 puffs every 4 (four) hours as needed for wheezing or shortness of breath 6/9/20  Yes Gael Babinski, MD   oxyCODONE (ROXICODONE) 10 MG TABS Take 1 tablet (10 mg total) by mouth every 4 (four) hours as needed for moderate painMax Daily Amount: 60 mg 6/17/20  Yes Gael Babinski, MD   ALPRAZolam Yulisa Starch) 0 25 mg tablet Take 1 tablet (0 25 mg total) by mouth daily at bedtime as needed for sleep  Patient not taking: Reported on 7/8/2020 6/9/20   Gael Babinski, MD   aluminum-magnesium hydroxide-simethicone (MYLANTA) [phone] mg/5 mL suspension Take 30 mL by mouth every 6 (six) hours as needed for indigestion or heartburn  Patient not taking: Reported on 10/9/2019 9/13/19   Ave General, DO   dexamethasone (DECADRON) 4 mg tablet Take 1 tablet (4 mg total) by mouth daily with breakfast  Patient not taking: Reported on 7/8/2020 5/14/20   Gael Babinski, MD   diphenoxylate-atropine (LOMOTIL) 2 5-0 025 mg per tablet Take 1 tablet by mouth 4 (four) times a day as needed for diarrhea  Patient not taking: Reported on 6/5/2020 3/23/20   APOLLO Eastman   fluticasone-vilanterol (BREO ELLIPTA) 100-25 mcg/inh inhaler Inhale 1 puff daily Rinse mouth after use   Patient not taking: Reported on 7/8/2020 6/9/20   Roxanne Hunt MD   Lenvatinib, 12 MG Daily Dose, (Lenvima, 12 MG Daily Dose,) 3 x 4 MG CPPK Take 12 mg by mouth daily  Patient not taking: Reported on 4/20/2020 2/27/20   Vicki Smith DO   loperamide (IMODIUM) 2 mg capsule Take 1 capsule (2 mg total) by mouth 4 (four) times a day as needed for diarrhea  Patient not taking: Reported on 7/8/2020 3/23/20   APOLLO Prince   mirtazapine (REMERON) 15 mg tablet Take 1 tablet (15 mg total) by mouth daily at bedtime  Patient not taking: Reported on 6/5/2020 4/15/20   APOLLO Prince   nicotine (NICODERM CQ) 21 mg/24 hr TD 24 hr patch Place 1 patch on the skin daily  Patient not taking: Reported on 3/2/2020 2/13/20   Chaim Plummer PA-C   ondansetron (ZOFRAN) 8 mg tablet Take 1 tablet (8 mg total) by mouth every 8 (eight) hours as needed for nausea or vomiting  Patient not taking: Reported on 3/2/2020 2/13/20   Chaim Plummer PA-C   senna (SENOKOT) 8 6 mg Take 1 tablet (8 6 mg total) by mouth 2 (two) times a day as needed for constipation  Patient not taking: Reported on 6/5/2020 5/14/20   Roxanne Hunt MD     I have reviewed home medications with patient personally      Allergies: No Known Allergies    Social History:     Marital Status: Legally    Occupation: None  Patient Pre-hospital Living Situation: home  Patient Pre-hospital Level of Mobility: Full  Patient Pre-hospital Diet Restrictions: None  Substance Use History:   Social History     Substance and Sexual Activity   Alcohol Use Not Currently    Comment: occasional, 1/5 / week in the past     Social History     Tobacco Use   Smoking Status Current Every Day Smoker    Packs/day: 1 00    Years: 40 00    Pack years: 40 00   Smokeless Tobacco Current User    Types: Chew     Social History     Substance and Sexual Activity   Drug Use Not Currently    Types: Oxycodone    Comment: ran out       Family History:    Family History   Problem Relation Age of Onset    Breast cancer Mother     Liver cancer Father     Throat cancer Paternal Uncle        Physical Exam:     Vitals:   Blood Pressure: 140/66 (07/08/20 1430)  Pulse: 68 (07/08/20 1430)  Temperature: 98 1 °F (36 7 °C) (07/08/20 1112)  Temp Source: Oral (07/08/20 1112)  Respirations: 18 (07/08/20 1430)  Weight - Scale: 66 9 kg (147 lb 7 8 oz) (07/08/20 1133)  SpO2: 96 % (07/08/20 1430)    Physical Exam   Constitutional: He is oriented to person, place, and time  Vital signs are normal  He appears well-developed  He appears cachectic  Non-toxic appearance  He appears ill (chronic )  No distress  HENT:   Head: Normocephalic and atraumatic  Mouth/Throat: Mucous membranes are dry  Eyes: Pupils are equal, round, and reactive to light  Conjunctivae and EOM are normal  Scleral icterus is present  Pupils are equal    Neck: Neck supple  Cardiovascular: Normal rate, regular rhythm, S1 normal, S2 normal, normal heart sounds and intact distal pulses  Exam reveals no S3 and no S4  No murmur heard  Pulmonary/Chest: Effort normal and breath sounds normal  No accessory muscle usage or stridor  No respiratory distress  He has no decreased breath sounds  He has no wheezes  He has no rhonchi  He has no rales  He exhibits no tenderness  Abdominal: Soft  Bowel sounds are normal  He exhibits distension  He exhibits no mass  There is tenderness in the right upper quadrant  There is rigidity  There is no rebound and no guarding  Neurological: He is alert and oriented to person, place, and time  He displays no tremor  He displays no seizure activity  Skin: Skin is warm and dry  Jaundice               Additional Data:     Lab Results: I have personally reviewed pertinent reports        Results from last 7 days   Lab Units 07/08/20  1240   WBC Thousand/uL 4 90   HEMOGLOBIN g/dL 12 1   HEMATOCRIT % 36 4*   PLATELETS Thousands/uL 350   NEUTROS PCT % 78*   LYMPHS PCT % 13*   MONOS PCT % 6 EOS PCT % 2     Results from last 7 days   Lab Units 07/08/20  1240   SODIUM mmol/L 131*   POTASSIUM mmol/L 4 2   CHLORIDE mmol/L 97*   CO2 mmol/L 28   BUN mg/dL 18   CREATININE mg/dL 1 10   ANION GAP mmol/L 6   CALCIUM mg/dL 8 0*   ALBUMIN g/dL 1 5*   TOTAL BILIRUBIN mg/dL 7 19*   ALK PHOS U/L 385*   ALT U/L 45   AST U/L 69*   GLUCOSE RANDOM mg/dL 130                       Imaging: I have personally reviewed pertinent reports  CT head without contrast   Final Result by Kenyon Dakin, MD (07/08 1408)      No acute intracranial abnormality  Left frontoparietal postsurgical changes status post GBM treatment  Workstation performed: [id number]         XR ribs with pa chest min 3 views RIGHT   Final Result by Hoda Kelley MD (07/08 4589)   No obvious rib fracture  However given the lung metastasis with the left upper rib mass right rib involvement cannot be excluded and CT chest may be obtained  Workstation performed: [id number]             EKG, Pathology, and Other Studies Reviewed on Admission:   · EKG: SR with PVCs 68 BPM  · XR Right Rib: No obvious rib fracture as per my read and discussion with ER MD   · CT head: No acute intracranial abnormality  Left frontoparietal postsurgical changes status post GBM treatment     Allscripts / Epic Records Reviewed: Yes     ** Please Note: This note has been constructed using a voice recognition system   **

## 2020-07-08 NOTE — ASSESSMENT & PLAN NOTE
· Status post resection, no neurologic symptoms at this time, no signs of acute change on CT scan   · No active treatment at this time

## 2020-07-08 NOTE — ED PROCEDURE NOTE
PROCEDURE  ECG 12 Lead Documentation Only  Date/Time: 7/8/2020 1:57 PM  Performed by: Krista Daniel MD  Authorized by: Krista Daniel MD     Indications / Diagnosis:  Weakness- admit  ECG reviewed by me, the ED Provider: yes    Patient location:  ED  Previous ECG:     Previous ECG:  Unavailable    Comparison to cardiac monitor: Yes    Interpretation:     Interpretation: non-specific    Rate:     ECG rate:  68    ECG rate assessment: normal    Rhythm:     Rhythm: sinus rhythm    Ectopy:     Ectopy: PVCs      PVCs:  Infrequent and unifocal  QRS:     QRS axis:  Normal    QRS intervals:  Normal  Conduction:     Conduction: normal    ST segments:     ST segments:  Normal  T waves:     T waves: flattening      Flattening:  I, aVL, V1 and V2  Q waves:     Q waves:  AVL  Other findings:     Other findings: U wave    Comments:      No ecg signs of ischemia/ injury /          Krista Daniel MD  07/08/20 1400

## 2020-07-08 NOTE — ASSESSMENT & PLAN NOTE
· Known history with lung/rib mets  Is not on active chemotherapy at this time  Appears to have worsening T  Bili  States that he was seeing Dr Jevon Ko in [place]  · Consult gastroenterology   · Consult oncology   · Check US abdomen for etiology of worsening bilirubin   · Trend LFTs  · Check INR

## 2020-07-08 NOTE — PLAN OF CARE
Problem: Prexisting or High Potential for Compromised Skin Integrity  Goal: Skin integrity is maintained or improved  Description  INTERVENTIONS:  - Identify patients at risk for skin breakdown  - Assess and monitor skin integrity  - Assess and monitor nutrition and hydration status  - Monitor labs   - Assess for incontinence   - Turn and reposition patient  - Assist with mobility/ambulation  - Relieve pressure over bony prominences  - Avoid friction and shearing  - Provide appropriate hygiene as needed including keeping skin clean and dry  - Evaluate need for skin moisturizer/barrier cream  - Collaborate with interdisciplinary team   - Patient/family teaching  - Consider wound care consult   Outcome: Progressing     Problem: Nutrition/Hydration-ADULT  Goal: Nutrient/Hydration intake appropriate for improving, restoring or maintaining nutritional needs  Description  Monitor and assess patient's nutrition/hydration status for malnutrition  Collaborate with interdisciplinary team and initiate plan and interventions as ordered  Monitor patient's weight and dietary intake as ordered or per policy  Utilize nutrition screening tool and intervene as necessary  Determine patient's food preferences and provide high-protein, high-caloric foods as appropriate       INTERVENTIONS:  - Monitor oral intake, urinary output, labs, and treatment plans  - Assess nutrition and hydration status and recommend course of action  - Evaluate amount of meals eaten  - Assist patient with eating if necessary   - Allow adequate time for meals  - Recommend/ encourage appropriate diets, oral nutritional supplements, and vitamin/mineral supplements  - Order, calculate, and assess calorie counts as needed  - Recommend, monitor, and adjust tube feedings and TPN/PPN based on assessed needs  - Assess need for intravenous fluids  - Provide specific nutrition/hydration education as appropriate  - Include patient/family/caregiver in decisions related to nutrition  Outcome: Progressing     Problem: PAIN - ADULT  Goal: Verbalizes/displays adequate comfort level or baseline comfort level  Description  Interventions:  - Encourage patient to monitor pain and request assistance  - Assess pain using appropriate pain scale  - Administer analgesics based on type and severity of pain and evaluate response  - Implement non-pharmacological measures as appropriate and evaluate response  - Consider cultural and social influences on pain and pain management  - Notify physician/advanced practitioner if interventions unsuccessful or patient reports new pain  Outcome: Progressing     Problem: SAFETY ADULT  Goal: Patient will remain free of falls  Description  INTERVENTIONS:  - Assess patient frequently for physical needs  -  Identify cognitive and physical deficits and behaviors that affect risk of falls    -  [place] fall precautions as indicated by assessment   - Educate patient/family on patient safety including physical limitations  - Instruct patient to call for assistance with activity based on assessment  - Modify environment to reduce risk of injury  - Consider OT/PT consult to assist with strengthening/mobility  Outcome: Progressing  Goal: Maintain or return to baseline ADL function  Description  INTERVENTIONS:  -  Assess patient's ability to carry out ADLs; assess patient's baseline for ADL function and identify physical deficits which impact ability to perform ADLs (bathing, care of mouth/teeth, toileting, grooming, dressing, etc )  - Assess/evaluate cause of self-care deficits   - Assess range of motion  - Assess patient's mobility; develop plan if impaired  - Assess patient's need for assistive devices and provide as appropriate  - Encourage maximum independence but intervene and supervise when necessary  - Involve family in performance of ADLs  - Assess for home care needs following discharge   - Consider OT consult to assist with ADL evaluation and planning for discharge  - Provide patient education as appropriate  Outcome: Progressing  Goal: Maintain or return mobility status to optimal level  Description  INTERVENTIONS:  - Assess patient's baseline mobility status (ambulation, transfers, stairs, etc )    - Identify cognitive and physical deficits and behaviors that affect mobility  - Identify mobility aids required to assist with transfers and/or ambulation (gait belt, sit-to-stand, lift, walker, cane, etc )  - [place] fall precautions as indicated by assessment  - Record patient progress and toleration of activity level on Mobility SBAR; progress patient to next Phase/Stage  - Instruct patient to call for assistance with activity based on assessment  - Consider rehabilitation consult to assist with strengthening/weightbearing, etc   Outcome: Progressing     Problem: DISCHARGE PLANNING  Goal: Discharge to home or other facility with appropriate resources  Description  INTERVENTIONS:  - Identify barriers to discharge w/patient and caregiver  - Arrange for needed discharge resources and transportation as appropriate  - Identify discharge learning needs (meds, wound care, etc )  - Arrange for interpretive services to assist at discharge as needed  - Refer to Case Management Department for coordinating discharge planning if the patient needs post-hospital services based on physician/advanced practitioner order or complex needs related to functional status, cognitive ability, or social support system  Outcome: Progressing     Problem: Knowledge Deficit  Goal: Patient/family/caregiver demonstrates understanding of disease process, treatment plan, medications, and discharge instructions  Description  Complete learning assessment and assess knowledge base    Interventions:  - Provide teaching at level of understanding  - Provide teaching via preferred learning methods  Outcome: Progressing     Problem: NEUROSENSORY - ADULT  Goal: Achieves stable or improved neurological status  Description  INTERVENTIONS  - Monitor and report changes in neurological status  - Monitor vital signs such as temperature, blood pressure, glucose, and any other labs ordered   - Initiate measures to prevent increased intracranial pressure  - Monitor for seizure activity and implement precautions if appropriate      Outcome: Progressing     Problem: MUSCULOSKELETAL - ADULT  Goal: Maintain or return mobility to safest level of function  Description  INTERVENTIONS:  - Assess patient's ability to carry out ADLs; assess patient's baseline for ADL function and identify physical deficits which impact ability to perform ADLs (bathing, care of mouth/teeth, toileting, grooming, dressing, etc )  - Assess/evaluate cause of self-care deficits   - Assess range of motion  - Assess patient's mobility  - Assess patient's need for assistive devices and provide as appropriate  - Encourage maximum independence but intervene and supervise when necessary  - Involve family in performance of ADLs  - Assess for home care needs following discharge   - Consider OT consult to assist with ADL evaluation and planning for discharge  - Provide patient education as appropriate  Outcome: Progressing

## 2020-07-08 NOTE — ASSESSMENT & PLAN NOTE
· Noted at 131, likely due to chronic liver disease   · Encourage PO intake  · Monitor BMP   · Could consider diuresis, but I do not feel the patient's volume status is bad enough to warrant this without significant side effects

## 2020-07-08 NOTE — ED NOTES
Patient asking for light meal, checked with physician and provided a snack to patient     Kekecindi Gutierrez  07/08/20 6819

## 2020-07-09 LAB
ALBUMIN SERPL BCP-MCNC: 1.4 G/DL (ref 3.5–5)
ALP SERPL-CCNC: 352 U/L (ref 46–116)
ALT SERPL W P-5'-P-CCNC: 41 U/L (ref 12–78)
ANION GAP SERPL CALCULATED.3IONS-SCNC: 6 MMOL/L (ref 4–13)
AST SERPL W P-5'-P-CCNC: 59 U/L (ref 5–45)
BASOPHILS # BLD AUTO: 0.07 THOUSANDS/ΜL (ref 0–0.1)
BASOPHILS NFR BLD AUTO: 1 % (ref 0–1)
BILIRUB SERPL-MCNC: 6.27 MG/DL (ref 0.2–1)
BUN SERPL-MCNC: 18 MG/DL (ref 5–25)
CALCIUM SERPL-MCNC: 7.8 MG/DL (ref 8.3–10.1)
CHLORIDE SERPL-SCNC: 100 MMOL/L (ref 100–108)
CO2 SERPL-SCNC: 28 MMOL/L (ref 21–32)
CREAT SERPL-MCNC: 1.09 MG/DL (ref 0.6–1.3)
EOSINOPHIL # BLD AUTO: 0.18 THOUSAND/ΜL (ref 0–0.61)
EOSINOPHIL NFR BLD AUTO: 3 % (ref 0–6)
ERYTHROCYTE [DISTWIDTH] IN BLOOD BY AUTOMATED COUNT: 17.1 % (ref 11.6–15.1)
GFR SERPL CREATININE-BSD FRML MDRD: 69 ML/MIN/1.73SQ M
GLUCOSE SERPL-MCNC: 91 MG/DL (ref 65–140)
HCT VFR BLD AUTO: 32 % (ref 36.5–49.3)
HGB BLD-MCNC: 10.6 G/DL (ref 12–17)
IMM GRANULOCYTES # BLD AUTO: 0.02 THOUSAND/UL (ref 0–0.2)
IMM GRANULOCYTES NFR BLD AUTO: 0 % (ref 0–2)
LYMPHOCYTES # BLD AUTO: 0.58 THOUSANDS/ΜL (ref 0.6–4.47)
LYMPHOCYTES NFR BLD AUTO: 11 % (ref 14–44)
MCH RBC QN AUTO: 34 PG (ref 26.8–34.3)
MCHC RBC AUTO-ENTMCNC: 33.1 G/DL (ref 31.4–37.4)
MCV RBC AUTO: 103 FL (ref 82–98)
MONOCYTES # BLD AUTO: 0.39 THOUSAND/ΜL (ref 0.17–1.22)
MONOCYTES NFR BLD AUTO: 7 % (ref 4–12)
NEUTROPHILS # BLD AUTO: 4 THOUSANDS/ΜL (ref 1.85–7.62)
NEUTS SEG NFR BLD AUTO: 78 % (ref 43–75)
NRBC BLD AUTO-RTO: 0 /100 WBCS
PLATELET # BLD AUTO: 286 THOUSANDS/UL (ref 149–390)
PMV BLD AUTO: 11.4 FL (ref 8.9–12.7)
POTASSIUM SERPL-SCNC: 3.9 MMOL/L (ref 3.5–5.3)
PROT SERPL-MCNC: 5.9 G/DL (ref 6.4–8.2)
RBC # BLD AUTO: 3.12 MILLION/UL (ref 3.88–5.62)
SODIUM SERPL-SCNC: 134 MMOL/L (ref 136–145)
WBC # BLD AUTO: 5.24 THOUSAND/UL (ref 4.31–10.16)

## 2020-07-09 PROCEDURE — 99223 1ST HOSP IP/OBS HIGH 75: CPT | Performed by: NURSE PRACTITIONER

## 2020-07-09 PROCEDURE — 85025 COMPLETE CBC W/AUTO DIFF WBC: CPT | Performed by: PHYSICIAN ASSISTANT

## 2020-07-09 PROCEDURE — 80053 COMPREHEN METABOLIC PANEL: CPT | Performed by: PHYSICIAN ASSISTANT

## 2020-07-09 PROCEDURE — 99232 SBSQ HOSP IP/OBS MODERATE 35: CPT | Performed by: PHYSICIAN ASSISTANT

## 2020-07-09 PROCEDURE — 99223 1ST HOSP IP/OBS HIGH 75: CPT | Performed by: INTERNAL MEDICINE

## 2020-07-09 PROCEDURE — 97116 GAIT TRAINING THERAPY: CPT

## 2020-07-09 PROCEDURE — 97163 PT EVAL HIGH COMPLEX 45 MIN: CPT

## 2020-07-09 RX ORDER — OXYCODONE HYDROCHLORIDE 10 MG/1
20 TABLET ORAL
Status: DISCONTINUED | OUTPATIENT
Start: 2020-07-09 | End: 2020-07-10 | Stop reason: HOSPADM

## 2020-07-09 RX ORDER — HYDROMORPHONE HCL 110MG/55ML
2 PATIENT CONTROLLED ANALGESIA SYRINGE INTRAVENOUS
Status: DISCONTINUED | OUTPATIENT
Start: 2020-07-09 | End: 2020-07-10 | Stop reason: HOSPADM

## 2020-07-09 RX ADMIN — NICOTINE 1 PATCH: 21 PATCH TRANSDERMAL at 09:03

## 2020-07-09 RX ADMIN — HYDROMORPHONE HYDROCHLORIDE 2 MG: 2 INJECTION, SOLUTION INTRAMUSCULAR; INTRAVENOUS; SUBCUTANEOUS at 02:43

## 2020-07-09 RX ADMIN — HYDROMORPHONE HYDROCHLORIDE 2 MG: 2 INJECTION, SOLUTION INTRAMUSCULAR; INTRAVENOUS; SUBCUTANEOUS at 09:04

## 2020-07-09 RX ADMIN — OXYCODONE HYDROCHLORIDE 20 MG: 10 TABLET ORAL at 06:11

## 2020-07-09 RX ADMIN — OXYCODONE HYDROCHLORIDE 20 MG: 10 TABLET ORAL at 22:00

## 2020-07-09 RX ADMIN — OXYCODONE HYDROCHLORIDE 20 MG: 10 TABLET ORAL at 11:30

## 2020-07-09 RX ADMIN — OXYCODONE HYDROCHLORIDE 20 MG: 10 TABLET ORAL at 16:52

## 2020-07-09 RX ADMIN — OXYCODONE HYDROCHLORIDE 20 MG: 10 TABLET ORAL at 01:43

## 2020-07-09 RX ADMIN — HYDROMORPHONE HYDROCHLORIDE 2 MG: 2 INJECTION, SOLUTION INTRAMUSCULAR; INTRAVENOUS; SUBCUTANEOUS at 14:38

## 2020-07-09 RX ADMIN — FLUTICASONE FUROATE AND VILANTEROL TRIFENATATE 1 PUFF: 100; 25 POWDER RESPIRATORY (INHALATION) at 09:02

## 2020-07-09 NOTE — SOCIAL WORK
CM received hospice consult via case management  CM made aware by APOLLO Carcamo that patient would like to discuss hospice and would like to use [facility]  [facility] referral sent per preference  CM made [address] Liaison aware of same  CM will continue to follow

## 2020-07-09 NOTE — PROGRESS NOTES
[facility]  Progress Note - [place]  1950, 78 y o  male MRN: [id number]    Unit/Bed#: LISSETT -01 Encounter: [id number]    Primary Care Provider: Valeria Anderson DO   Date and time admitted to hospital: 7/8/2020 11:32 AM        * Hyperbilirubinemia  Assessment & Plan  · Present on admission, suspect due to worsening tumor burden, but no recent imaging to review  Has not been on chemotherapy due to side effects  Was on Danise Kehr in the past (starting 2/2020) but had stopped due to side effects  His lesions in his liver had been growing at that time  Would accept chemotherapy if offered again and no side effects would be had  T  Bili slightly improved today   · Consult gastroenterology for further work up/imaging of liver   · Need to see if patient would be able to handle toxicity of chemotherapy and if any interventions could be performed to facilitate this   · MRCP ordered tentatively   · Consult oncology for further chemotherapy options   · Trend LFTs    Hepatocellular carcinoma ([facility] [address] )  Assessment & Plan  · Known history with lung/rib mets  Is not on active chemotherapy at this time  Appears to have worsening T  Bili  States that he was seeing Dr Jevon Ko in [facility]  Abdominal US showed occluded CBD, however unclear if this is intrinsic vs extrinsic  INR acceptable   · Consult gastroenterology  · Appreciate input   · MRCP ordered tentatively to assess biliary tree    · Consult oncology   · Trend LFTs     Goals of care, counseling/discussion  Assessment & Plan  · Patient states that he is not ready for hospice at this time  He reports that he would restart chemotherapy if offered as long as it "would not cause a commotion" (referring to prior diarrheal issues)  Discussed that pending liver work up this may not be an option  After our conversation today patient did express that he is "getting tired of testing" and "is not sure that it will make a difference"   I did discuss possible outcomes including the fact that even if he could have chemotherapy there is always a high likelihood for side effects  He appreciated my honesty and we decided that it is clinton to hear from the specialists prior to making further decisions  · Consult Oncology as patient needs potential chemotherapy options to further decision making as far as goals of care   · Will round with GI later on when attending sees patient as per patient's request   · Will continue to advance goals of care as patient's hospital course progresses   · Consider involvement of palliative care vs hospice pending course     Glioblastoma [facility])  Assessment & Plan  · Status post resection, no neurologic symptoms at this time, no signs of acute change on CT scan   · No active treatment at this time     COPD (chronic obstructive pulmonary disease) (HCC)  Assessment & Plan  · No acute exacerbation   · Continue Breo, Albuterol PRN    Hyponatremia  Assessment & Plan  · Noted at 131, likely due to chronic liver disease  Improved to 134    · Encourage PO intake  · Monitor BMP   · Could consider diuresis, but I do not feel the patient's volume status is bad enough to warrant this without significant side effects     Severe protein-calorie malnutrition (HCC)  Assessment & Plan  Malnutrition Findings:   Malnutrition type: Acute illness(in the setting of chronic illness)  Degree of Malnutrition: Other severe protein calorie malnutrition(related to inadequate oral intake, catabolic illness )    BMI Findings: Body mass index is 18 94 kg/m²  · BMI noted, in the setting of chronic disease/metastatic cancer   · Nutrition consult   · Encourage PO intake       VTE Pharmacologic Prophylaxis:   Pharmacologic: Pharmacologic VTE Prophylaxis contraindicated due to possible liver coagulopathy   Mechanical VTE Prophylaxis in Place: No    Patient Centered Rounds: I have performed bedside rounds with nursing staff today      Discussions with Specialists or Other Care Team Provider: Discussed with GI, RN, CM    Education and Discussions with Family / Patient: Discussed with patient, declined update     Time Spent for Care: 30 minutes  More than 50% of total time spent on counseling and coordination of care as described above  Current Length of Stay: 1 day(s)    Current Patient Status: Inpatient   Certification Statement: The patient will continue to require additional inpatient hospital stay due to on going GI/Oncology consultation, possible further aggressive workup vs hospice referral     Discharge Plan: Pending patient's decision regarding goals of care/further treatment options    Code Status: Level 3 - DNAR and DNI      Subjective:   Patient reports that he still has pain but this is tolerable  However, he does express that he feels that his pain medication is not lasting as long as he needs it to  States that when he elevates his legs his swelling goes down, but then returns when they are down  He denies issues drinking  Denies fevers or chills  Objective:     Vitals:   Temp (24hrs), Av 6 °F (36 4 °C), Min:97 3 °F (36 3 °C), Max:98 °F (36 7 °C)    Temp:  [97 3 °F (36 3 °C)-98 °F (36 7 °C)] 97 3 °F (36 3 °C)  HR:  [62-88] 66  Resp:  [16-18] 16  BP: (126-140)/(60-66) 126/60  SpO2:  [94 %-100 %] 95 %  Body mass index is 18 94 kg/m²  Input and Output Summary (last 24 hours): Intake/Output Summary (Last 24 hours) at 2020 1223  Last data filed at 2020 0601  Gross per 24 hour   Intake 1320 ml   Output    Net 1320 ml       Physical Exam:     Physical Exam   Constitutional: He is oriented to person, place, and time  Vital signs are normal  He appears well-developed and well-nourished  Non-toxic appearance  He appears ill (Chronic )  No distress  HENT:   Head: Normocephalic and atraumatic  Mouth/Throat: Mucous membranes are not dry  Eyes: Pupils are equal, round, and reactive to light   Conjunctivae and EOM are normal  Scleral icterus is present  Pupils are equal    Neck: Neck supple  Cardiovascular: Normal rate, regular rhythm, S1 normal, S2 normal, normal heart sounds and intact distal pulses  Exam reveals no S3 and no S4  No murmur heard  Pulmonary/Chest: Effort normal and breath sounds normal  No accessory muscle usage or stridor  No respiratory distress  He has no decreased breath sounds  He has no wheezes  He has no rhonchi  He has no rales  He exhibits no tenderness  Abdominal: Soft  Bowel sounds are normal  He exhibits distension and ascites  He exhibits no mass  There is tenderness in the right upper quadrant  There is rigidity  There is no rebound and no guarding  Neurological: He is alert and oriented to person, place, and time  He is not disoriented  He displays no tremor  He displays no seizure activity  Skin: Skin is warm and dry  Jaundice        Additional Data:     Labs:    Results from last 7 days   Lab Units 07/09/20  0522   WBC Thousand/uL 5 24   HEMOGLOBIN g/dL 10 6*   HEMATOCRIT % 32 0*   PLATELETS Thousands/uL 286   NEUTROS PCT % 78*   LYMPHS PCT % 11*   MONOS PCT % 7   EOS PCT % 3     Results from last 7 days   Lab Units 07/09/20  0522   POTASSIUM mmol/L 3 9   CHLORIDE mmol/L 100   CO2 mmol/L 28   BUN mg/dL 18   CREATININE mg/dL 1 09   CALCIUM mg/dL 7 8*   ALK PHOS U/L 352*   ALT U/L 41   AST U/L 59*     Results from last 7 days   Lab Units 07/08/20  1740   INR  1 12       * I Have Reviewed All Lab Data Listed Above  * Additional Pertinent Lab Tests Reviewed: [facility] Admission Reviewed    Imaging:    Imaging Reports Reviewed Today Include: US abdomen - Marked hepatomegaly  Markedly heterogeneous liver with masslike areas in this patient with known multifocal hepatocellular carcinoma  Ascites  Cholelithiasis   Common bile duct obscured  Imaging Personally Reviewed by Myself Includes:  None    Recent Cultures (last 7 days):           Last 24 Hours Medication List:     Current Facility-Administered Medications:  albuterol 2 puff Inhalation Q4H PRN Gt Segura PA-C   ALPRAZolam 0 25 mg Oral HS PRN Gt Segura PA-C   fluticasone-vilanterol 1 puff Inhalation Daily Gt Segura PA-C   HYDROmorphone 2 mg Intravenous Q3H PRN Gabbie Lin PA-C   nicotine 1 patch Transdermal Daily Gt Segura PA-C   nicotine 21 mg Transdermal Once Ubaldo Vences MD   ondansetron 4 mg Intravenous Q6H PRN Gabbie Lin PA-C   oxyCODONE 10 mg Oral Q4H PRN Gt Segura PA-C   oxyCODONE 20 mg Oral Q3H PRN Gabbie Lin PA-C        Today, Patient Was Seen By: Gabbie Lin PA-C    ** Please Note: Dictation voice to text software may have been used in the creation of this document   **

## 2020-07-09 NOTE — MALNUTRITION/BMI
This medical record reflects one or more clinical indicators suggestive of malnutrition and/or morbid obesity  Malnutrition Findings:   Malnutrition type: Acute illness(in the setting of chronic illness)  Degree of Malnutrition: Other severe protein calorie malnutrition(related to inadequate oral intake, catabolic illness )  Malnutrition Characteristics: Fat loss, Muscle loss, Weight loss(as evidenced by temporal indentation, loss of fat and muscle scapula, clavicle, temples, orbitals; 11% additional weight decrease x 1 year  Currently treated with oral supplementation  )    BMI Findings: Body mass index is 18 94 kg/m²  See Nutrition note dated 7/9/2020 for additional details  Completed nutrition assessment is viewable in the nutrition documentation

## 2020-07-09 NOTE — PLAN OF CARE
Problem: PHYSICAL THERAPY ADULT  Goal: Performs mobility at highest level of function for planned discharge setting  See evaluation for individualized goals  Outcome: Progressing  Note:   Prognosis: Fair  Problem List: Decreased strength, Decreased endurance, Impaired balance, Decreased mobility  Assessment: Pt seen for PT evaluation, after clearance by PERLA Wang  Pt admitted on 7/8/2020 sent from palliative care due to "confusion and seizure" (no post ictal state), c/o B LE edema & abdominal distension, and dx of Hyperbilirubinemia due to worsening tumor burden  Comorbidities affecting pt's functional performance include: metastatic hepatocellular carcinoma, brain mass with hemorrhage, alcohol abuse, transaminitis, cancer related pain, ?seizures, anxiety, COPD and hypertension  Personal factors affecting patient at time of initial evaluation include: lives in multiple story house, stairs to enter home, inability to navigate community distances, limited home support, positive fall history and inability to live alone  Prior to admission, patient was independent with functional mobility without assistive device, independent with ADLS, requiring assist for IADLS and living with housemates whom are unable to assist pt in a multiple level home with 3 steps to enter, and 2 full flights of stairs to reach his room  Pt demonstrates functional impairments identified in objective findings from Gait Speed assessment, indicating pt is at risk of falls and hospital readmission, and is a limited community ambulator  The Barthel Index was used as a functional outcome tool presenting with a score of 85 indicating minimal limitations of functional mobility and ADLS  Pt is at risk of falls due to multiple comorbidities, history of previous falls, impaired balance, acuity of medical illness and anticipated need for assistive device for ambulation   Pt's clinical presentation is currently unstable/unpredictable as seen in patient's presentation of changing level of pain, varying levels of cognitive performance, increased fall risk, new onset of impairment of functional mobility, decreased endurance and new onset of weakness  Pt would benefit from continued PT treatment in order to address above impairments, decrease risk of falls, maximize independence with functional mobility, and ensure safety with mobility for transition to next level of care  Based on pt presentation and impairments, pt would most appropriately benefit from home health PT services upon d/c  Pt was left seated in bedside chair, needs within reach and fall precautions in place, care returned to RN  Barriers to Discharge: Inaccessible home environment(Pt has 2 full flights of stairs in order to reach his room)     PT Discharge Recommendation: Home with skilled therapy          See flowsheet documentation for full assessment

## 2020-07-09 NOTE — NURSING NOTE
Patient was seen in hallway with PCA  asking about going downstairs to use vending machine  Patient educated about inpatient policy: not leaving the floor  However, patient insisted on going downstairs  Control team called, patient went outside and proceeded to pull out a cigarette to smoke  Instructed patient he cannot smoke on hospital grounds  Security came out and reiterated the same  Patient put out cigarette and came back inside  Dr Awais Crespo approached patient, encouraged use of nicotine patch or gum- patient declined  Assisted back into room  Asked patient to empty pockets  I confiscated cigarettes, lighter and blade  Patient is apologetic, understands regulations and is comfortably sitting in chair

## 2020-07-09 NOTE — ASSESSMENT & PLAN NOTE
· Present on admission, suspect due to worsening tumor burden, but no recent imaging to review  Has not been on chemotherapy due to side effects  Was on Simi Ohms in the past (starting 2/2020) but had stopped due to side effects  His lesions in his liver had been growing at that time  Would accept chemotherapy if offered again and no side effects would be had   T  Bili slightly improved today   · Consult gastroenterology for further work up/imaging of liver   · Need to see if patient would be able to handle toxicity of chemotherapy and if any interventions could be performed to facilitate this   · MRCP ordered tentatively   · Consult oncology for further chemotherapy options   · Trend LFTs

## 2020-07-09 NOTE — PHYSICAL THERAPY NOTE
PHYSICAL THERAPY EVALUATION        ([id number])    NAME:  Anjelica Berry  DATE: 07/09/20   Length Of Stay: 1    AGE:   78 y o  Mrn:   [id number]  ADMIT DX:  Hyperbilirubinemia [E80 6]  Lightheadedness [R42]  Hepatocellular carcinoma ([facility] [address] ) [C22 0]  Metastatic cancer ([facility] [address] ) [C79 9]  Weakness [R53 1]  Generalized weakness [R53 1]  Bilateral lower extremity edema [R60 0]  Fall, initial encounter [W19  XXXA]    Past Medical History:   Diagnosis Date    Cancer [facility])     liver    Dependence on nicotine from cigarettes 9/4/2019    Hypertension     Liver cancer [facility])      Past Surgical History:   Procedure Laterality Date    APPENDECTOMY      BRAIN SURGERY      CRANIOTOMY Left 9/10/2019    Procedure: Image guided left frontal craniotomy for resection of tumor;  Surgeon: Leonard Armas MD;  [facility];  Service: Neurosurgery       Performed at least 2 patient identifiers during session: Name and Birthday  PHYSICAL THERAPY EVALUATION :   (Please find full objective findings from PT assessment regarding body systems outlined above)  07/09/20 1410   Note Type   Note type Eval/Treat   Pain Assessment   Pain Assessment Tool 0-10   Pain Score No Pain   Effect of Pain on Daily Activities n/a   Patient's Stated Pain Goal No pain   Home Living   Type of Home House  (room in a house)   Home Layout Multi-level;Stairs to enter with rails;Bed/bath upstairs   Home Equipment   (none)   Additional Comments Pt reports living in room in a house, has multiple housemates but they are unable to assist him as needed  Has 3 ANTIONE with HR then x2 full flights of stairs to reach his room and bathroom      Prior Function   Level of [place] Independent with ADLs and functional mobility   Lives With Other (Comment)  (Housemates)   Receives Help From Friend(s)  (pt pays someone to carry groceries up the stairs)   ADL Assistance Independent   IADLs Independent   Falls in the last 6 months 1 to 4   Comments Utilizes transport [facility] services   Restrictions/Precautions   Weight Bearing Precautions Per Order No   Other Precautions Fall Risk   General   Family/Caregiver Present No   Cognition   Overall Cognitive Status WFL   Arousal/Participation Alert   Orientation Level Oriented to situation;Oriented to time;Oriented to place;Oriented to person   Following Commands Follows all commands and directions without difficulty   RUE Assessment   RUE Assessment WFL   LUE Assessment   LUE Assessment WFL   RLE Assessment   RLE Assessment WFL   Strength RLE   R Hip Flexion 2-/5   R Knee Flexion 3/5   R Knee Extension 2+/5   R Ankle Dorsiflexion 3+/5   LLE Assessment   LLE Assessment WFL   Strength LLE   L Hip Flexion 3+/5   L Knee Flexion 3+/5   L Knee Extension 3+/5   L Ankle Dorsiflexion 3+/5   Coordination   Sensation WFL   Light Touch   RLE Light Touch Grossly intact  (dulled compared to baseline however protective sens intact)   LLE Light Touch Grossly intact  (dulled compared to baseline however protective sens intact)   Sharp/Dull   RLE Sharp/Dull Grossly intact   LLE Sharp/Dull Grossly intact   Bed Mobility   Additional Comments bed mobility not assessed as pt presented seated in bedside chair   Transfers   Sit to Stand 5  Supervision   Additional items Increased time required   Stand to Sit 5  Supervision   Additional items Increased time required   Stand pivot 5  Supervision   Additional items Increased time required   Toilet transfer 5  Supervision   Additional items Increased time required; Other  (GB)   Additional Comments Supervision for safety with all transfers   Ambulation/Elevation   Gait pattern Narrow DOLORES; Decreased foot clearance; Excessively slow   Gait Assistance 5  Supervision   Additional items Verbal cues   Assistive Device None   Distance 18 x2   Stair Management Assistance Not tested   Additional items   (Pt R to complete stair negotiation training on this date)   Endurance Deficit   Endurance Deficit Yes   Endurance Deficit Description Ambulation/mobility tolerance limited due to generalized weakness and fatigue   Activity Tolerance   Activity Tolerance Patient limited by fatigue   Nurse Made Aware yes   Assessment   Prognosis Fair   Problem List Decreased strength;Decreased endurance; Impaired balance;Decreased mobility   Assessment Pt seen for PT evaluation, after clearance by RN Flor Fajardo  Pt admitted on 7/8/2020 sent from palliative care due to "confusion and seizure" (no post ictal state), c/o B LE edema & abdominal distension, and dx of Hyperbilirubinemia due to worsening tumor burden  Comorbidities affecting pt's functional performance include: metastatic hepatocellular carcinoma, brain mass with hemorrhage, alcohol abuse, transaminitis, cancer related pain, ?seizures, anxiety, COPD and hypertension  Personal factors affecting patient at time of initial evaluation include: lives in multiple story house, stairs to enter home, inability to navigate community distances, limited home support, positive fall history and inability to live alone  Prior to admission, patient was independent with functional mobility without assistive device, independent with ADLS, requiring assist for IADLS and living with housemates whom are unable to assist pt in a multiple level home with 3 steps to enter, and 2 full flights of stairs to reach his room  Pt demonstrates functional impairments identified in objective findings from Gait Speed assessment, indicating pt is at risk of falls and hospital readmission, and is a limited community ambulator  The Barthel Index was used as a functional outcome tool presenting with a score of 85 indicating minimal limitations of functional mobility and ADLS  Pt is at risk of falls due to multiple comorbidities, history of previous falls, impaired balance, acuity of medical illness and anticipated need for assistive device for ambulation   Pt's clinical presentation is currently unstable/unpredictable as seen in patient's presentation of changing level of pain, varying levels of cognitive performance, increased fall risk, new onset of impairment of functional mobility, decreased endurance and new onset of weakness  Pt would benefit from continued PT treatment in order to address above impairments, decrease risk of falls, maximize independence with functional mobility, and ensure safety with mobility for transition to next level of care  Based on pt presentation and impairments, pt would most appropriately benefit from home health PT services upon d/c  Pt was left seated in bedside chair, needs within reach and fall precautions in place, care returned to RN  Barriers to Discharge Inaccessible home environment  (Pt has 2 full flights of stairs in order to reach his room)   Goals   Patient Goals "i'm leaving tomorrow, whether they say so or not "    [facility] Expiration Date 07/16/20   Short Term Goal #1 1  Pt will safely and independently ambulate >150ft with LRAD (SPC vs no AD), in order to increase safety and endurance with ambulation  Short Term Goal #2 2  Pt will negotiate 12 stairs with 1 HR assist and S, in order to demonstrate safety with stair negotiation to access his room  3  Pt will demonstrate appropriate energy conservation and task modification techniques in order to increase safety and decrease risk of falls  4  Pt will demonstrate independence in LE HEP in order to improve LE strength and decrease edema  Plan   Treatment/Interventions LE strengthening/ROM; Therapeutic exercise;Gait training;Elevations; Spoke to nursing   PT Frequency 1-2x/wk   Recommendation   PT Discharge Recommendation Home with skilled therapy   Equipment Recommended Cane   Modified Bernard Scale   Modified Bernard Scale 3   Barthel Index   Feeding 10   Bathing 5   Grooming Score 5   Dressing Score 10   Bladder Score 10   Bowels Score 10   Toilet Use Score 10   Transfers (Bed/Chair) Score 10   Mobility (Level Surface) Score 10   Stairs Score 5 Barthel Index Score 85       PT TREATMENT NOTE-  TIME IN: 1350, TIME OUT: 1410  Pt c/o continued B LE pain due ot edema (R>L), however pt is agreeable to participate in mobility training  Pt demonstrates ability to transfer with S, and ambulate one trial of 110ft with no AD and S - pt overall demonstrates mild instability and limited ambulation tolerance/endurance, requires frequent brief standing rest breaks t/o ambulation  Pt refuses to attempt stair negotiation on this date  Pt may benefit from utilization of SPC for ambulation over level surfaces and elevations  Pt demonstrates gait speeds (0 49m/s, 0 52m/s, 0 53m/s) indicating that pt is at a high fall risk and limited community ambulation  Regarding functional mobility, pt primarily limited due to poor endurance and generalized deconditioning  Will continue skilled PT POC as able in the acute care setting to maximize indep and endurance with mobility, and ensure safe d/c to home  Recommend home health PT services once medically cleared for d/c from the acute care setting        Monisha Arrington, PT, DPT  7/9/2020

## 2020-07-09 NOTE — ASSESSMENT & PLAN NOTE
· Noted at 131, likely due to chronic liver disease   Improved to 134    · Encourage PO intake  · Monitor BMP   · Could consider diuresis, but I do not feel the patient's volume status is bad enough to warrant this without significant side effects

## 2020-07-09 NOTE — ASSESSMENT & PLAN NOTE
· Known history with lung/rib mets  Is not on active chemotherapy at this time  Appears to have worsening T  Bili  States that he was seeing Dr Jevon Ko in [place]  Abdominal US showed occluded CBD, however unclear if this is intrinsic vs extrinsic   INR acceptable   · Consult gastroenterology  · Appreciate input   · MRCP ordered tentatively to assess biliary tree    · Consult oncology   · Trend LFTs

## 2020-07-09 NOTE — PLAN OF CARE
Problem: Prexisting or High Potential for Compromised Skin Integrity  Goal: Skin integrity is maintained or improved  Description  INTERVENTIONS:  - Identify patients at risk for skin breakdown  - Assess and monitor skin integrity  - Assess and monitor nutrition and hydration status  - Monitor labs   - Assess for incontinence   - Turn and reposition patient  - Assist with mobility/ambulation  - Relieve pressure over bony prominences  - Avoid friction and shearing  - Provide appropriate hygiene as needed including keeping skin clean and dry  - Evaluate need for skin moisturizer/barrier cream  - Collaborate with interdisciplinary team   - Patient/family teaching  - Consider wound care consult   Outcome: Progressing     Problem: Nutrition/Hydration-ADULT  Goal: Nutrient/Hydration intake appropriate for improving, restoring or maintaining nutritional needs  Description  Monitor and assess patient's nutrition/hydration status for malnutrition  Collaborate with interdisciplinary team and initiate plan and interventions as ordered  Monitor patient's weight and dietary intake as ordered or per policy  Utilize nutrition screening tool and intervene as necessary  Determine patient's food preferences and provide high-protein, high-caloric foods as appropriate       INTERVENTIONS:  - Monitor oral intake, urinary output, labs, and treatment plans  - Assess nutrition and hydration status and recommend course of action  - Evaluate amount of meals eaten  - Assist patient with eating if necessary   - Allow adequate time for meals  - Recommend/ encourage appropriate diets, oral nutritional supplements, and vitamin/mineral supplements  - Order, calculate, and assess calorie counts as needed  - Recommend, monitor, and adjust tube feedings and TPN/PPN based on assessed needs  - Assess need for intravenous fluids  - Provide specific nutrition/hydration education as appropriate  - Include patient/family/caregiver in decisions related to nutrition  Outcome: Progressing     Problem: PAIN - ADULT  Goal: Verbalizes/displays adequate comfort level or baseline comfort level  Description  Interventions:  - Encourage patient to monitor pain and request assistance  - Assess pain using appropriate pain scale  - Administer analgesics based on type and severity of pain and evaluate response  - Implement non-pharmacological measures as appropriate and evaluate response  - Consider cultural and social influences on pain and pain management  - Notify physician/advanced practitioner if interventions unsuccessful or patient reports new pain  Outcome: Progressing     Problem: SAFETY ADULT  Goal: Patient will remain free of falls  Description  INTERVENTIONS:  - Assess patient frequently for physical needs  -  Identify cognitive and physical deficits and behaviors that affect risk of falls    -  [place] fall precautions as indicated by assessment   - Educate patient/family on patient safety including physical limitations  - Instruct patient to call for assistance with activity based on assessment  - Modify environment to reduce risk of injury  - Consider OT/PT consult to assist with strengthening/mobility  Outcome: Progressing  Goal: Maintain or return to baseline ADL function  Description  INTERVENTIONS:  -  Assess patient's ability to carry out ADLs; assess patient's baseline for ADL function and identify physical deficits which impact ability to perform ADLs (bathing, care of mouth/teeth, toileting, grooming, dressing, etc )  - Assess/evaluate cause of self-care deficits   - Assess range of motion  - Assess patient's mobility; develop plan if impaired  - Assess patient's need for assistive devices and provide as appropriate  - Encourage maximum independence but intervene and supervise when necessary  - Involve family in performance of ADLs  - Assess for home care needs following discharge   - Consider OT consult to assist with ADL evaluation and planning for discharge  - Provide patient education as appropriate  Outcome: Progressing  Goal: Maintain or return mobility status to optimal level  Description  INTERVENTIONS:  - Assess patient's baseline mobility status (ambulation, transfers, stairs, etc )    - Identify cognitive and physical deficits and behaviors that affect mobility  - Identify mobility aids required to assist with transfers and/or ambulation (gait belt, sit-to-stand, lift, walker, cane, etc )  - [place] fall precautions as indicated by assessment  - Record patient progress and toleration of activity level on Mobility SBAR; progress patient to next Phase/Stage  - Instruct patient to call for assistance with activity based on assessment  - Consider rehabilitation consult to assist with strengthening/weightbearing, etc   Outcome: Progressing     Problem: DISCHARGE PLANNING  Goal: Discharge to home or other facility with appropriate resources  Description  INTERVENTIONS:  - Identify barriers to discharge w/patient and caregiver  - Arrange for needed discharge resources and transportation as appropriate  - Identify discharge learning needs (meds, wound care, etc )  - Arrange for interpretive services to assist at discharge as needed  - Refer to Case Management Department for coordinating discharge planning if the patient needs post-hospital services based on physician/advanced practitioner order or complex needs related to functional status, cognitive ability, or social support system  Outcome: Progressing     Problem: Knowledge Deficit  Goal: Patient/family/caregiver demonstrates understanding of disease process, treatment plan, medications, and discharge instructions  Description  Complete learning assessment and assess knowledge base    Interventions:  - Provide teaching at level of understanding  - Provide teaching via preferred learning methods  Outcome: Progressing     Problem: NEUROSENSORY - ADULT  Goal: Achieves stable or improved neurological status  Description  INTERVENTIONS  - Monitor and report changes in neurological status  - Monitor vital signs such as temperature, blood pressure, glucose, and any other labs ordered   - Initiate measures to prevent increased intracranial pressure  - Monitor for seizure activity and implement precautions if appropriate      Outcome: Progressing     Problem: MUSCULOSKELETAL - ADULT  Goal: Maintain or return mobility to safest level of function  Description  INTERVENTIONS:  - Assess patient's ability to carry out ADLs; assess patient's baseline for ADL function and identify physical deficits which impact ability to perform ADLs (bathing, care of mouth/teeth, toileting, grooming, dressing, etc )  - Assess/evaluate cause of self-care deficits   - Assess range of motion  - Assess patient's mobility  - Assess patient's need for assistive devices and provide as appropriate  - Encourage maximum independence but intervene and supervise when necessary  - Involve family in performance of ADLs  - Assess for home care needs following discharge   - Consider OT consult to assist with ADL evaluation and planning for discharge  - Provide patient education as appropriate  Outcome: Progressing     Problem: Potential for Falls  Goal: Patient will remain free of falls  Description  INTERVENTIONS:  - Assess patient frequently for physical needs  -  Identify cognitive and physical deficits and behaviors that affect risk of falls    -  [place] fall precautions as indicated by assessment   - Educate patient/family on patient safety including physical limitations  - Instruct patient to call for assistance with activity based on assessment  - Modify environment to reduce risk of injury  - Consider OT/PT consult to assist with strengthening/mobility  Outcome: Progressing

## 2020-07-09 NOTE — CONSULTS
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    Hyponatremia    Steroid-induced hyperglycemia    GERD (gastroesophageal reflux disease)    Drug-induced constipation    Anxiety disorder    Seizure prophylaxis    Tobacco abuse    Glioblastoma (HCC)    Goals of care, counseling/discussion    Cancer-related pain    Functional diarrhea    Hyperbilirubinemia    Severe protein-calorie malnutrition (HCC)   - No acute brain disease at this moment  - Vulnerable adult       Plan:  1  Symptom management - ***   - ***    2  Goals - DNR/DNI 3, medical supports   - Pt does not seem to have good insight into his home life risks and his safety vulnerabilities  - Pt's closest living relative and default PoA is his brother, Bruce Phi -      Code Status: DNR/DNI - Level 3   Decisional apparatus:  Patient is not fully competent on my exam today  If competence is lost, patient's substitute decision maker would default to brother Mono [place] by [place]  -    Advance Directive / Living Will / POLST:  None on file     I have reviewed the patient's controlled substance dispensing history in the Prescription Drug Monitoring Program in compliance with the [facility] regulations before prescribing any controlled substances  We appreciate the invitation to be involved in this patient's care  We will ***  Please do not hesitate to reach our on call provider through our clinic answering service at  should you have acute symptom control concerns      Anna Figueroa MD  Palliative and Supportive Care  Clinic/Answering Service: [phone]  You can find me on [employer]! IDENTIFICATION:  Consults  Physician Requesting Consult: Alice Hawthorne MD  Reason for Consult / Principal Problem: goals, support  Hx and PE limited by: ***    HISTORY OF PRESENT ILLNESS:       Nik Zepeda  is a 78 y o  male who presents with delirium and weakness  He is known to our group through his relationship with our former NP Ms Yonny Lagunas; most recently he has been seen in telehealth by Dr Jerson Navarro  Yesterday, he presented to [facility] for Palliative appt, but was unable to advocate for self nor rise from w/c  Since admission, scans and studies reveal that (on dry CT brain) his h/o GBM has not recurred, nor has his h/o hemorrhage re-bled  Genevieve Pires shows a new and significant obstructive hepatopathy (likely related to multifocal [address] ), with elevated Tbili to 7+ (6+direct) and no significant elevated of AST/ALT  OZOGI shows hepatomegaly without cirrhosis, with multiple masses and generally heterogenous texture  An MRI is in process of brain  A notable macrocytosis is described as well  His nutrition is awful, with an albumin of 1 5 on admission  We have reviewed the case with Ms Yonny Lagunas (now of Med/Onc service line), who clearly articulates that pt has no medical options for treatment of cancer, cl when considering his poor performance status and elevated bili  She has suggested hospice cares would be reasonable, and we are inclined to agree        At the bedside, ***    ROS    Past Medical History:   Diagnosis Date    Cancer [facility])     liver    Dependence on nicotine from cigarettes 9/4/2019    Hypertension     Liver cancer [facility])      Past Surgical History:   Procedure Laterality Date    APPENDECTOMY      BRAIN SURGERY      CRANIOTOMY Left 9/10/2019    Procedure: Image guided left frontal craniotomy for resection of tumor;  Surgeon: Angelica Francis MD; Location: [facility];  Service: Neurosurgery     Social History     Socioeconomic History    Marital status: Legally      Spouse name: Not on file    Number of children: Not on file    Years of education: Not on file    Highest education level: Not on file   Occupational History    Not on file   Social Needs    Financial resource strain: Not on file    Food insecurity:     Worry: Not on file     Inability: Not on file    Transportation needs:     Medical: Not on file     Non-medical: Not on file   Tobacco Use    Smoking status: Current Every Day Smoker     Packs/day: 1 00     Years: 40 00     Pack years: 40 00    Smokeless tobacco: Current User     Types: Chew   Substance and Sexual Activity    Alcohol use: Not Currently     Comment: occasional, 1/5 / week in the past    Drug use: Not Currently     Types: Oxycodone     Comment: ran out    Sexual activity: Not Currently   Lifestyle    Physical activity:     Days per week: Not on file     Minutes per session: Not on file    Stress: Not on file   Relationships    Social connections:     Talks on phone: Not on file     Gets together: Not on file     Attends Synagogue service: Not on file     Active member of club or organization: Not on file     Attends meetings of clubs or organizations: Not on file     Relationship status: Not on file    Intimate partner violence:     Fear of current or ex partner: Not on file     Emotionally abused: Not on file     Physically abused: Not on file     Forced sexual activity: Not on file   Other Topics Concern    Not on file   Social History Narrative    Not on file     Family History   Problem Relation Age of Onset    Breast cancer Mother     Liver cancer Father     Throat cancer Paternal Uncle        MEDICATIONS / ALLERGIES:    current meds:   Current Facility-Administered Medications   Medication Dose Route Frequency    albuterol (PROVENTIL HFA,VENTOLIN HFA) inhaler 2 puff  2 puff Inhalation Q4H PRN  ALPRAZolam (XANAX) tablet 0 25 mg  0 25 mg Oral HS PRN    fluticasone-vilanterol (BREO ELLIPTA) 100-25 mcg/inh inhaler 1 puff  1 puff Inhalation Daily    HYDROmorphone (DILAUDID) injection 2 mg  2 mg Intravenous Q3H PRN    nicotine (NICODERM CQ) 21 mg/24 hr TD 24 hr patch 1 patch  1 patch Transdermal Daily    nicotine (NICODERM CQ) 21 mg/24 hr TD 24 hr patch 21 mg  21 mg Transdermal Once    ondansetron (ZOFRAN) injection 4 mg  4 mg Intravenous Q6H PRN    oxyCODONE (ROXICODONE) immediate release tablet 10 mg  10 mg Oral Q4H PRN    oxyCODONE (ROXICODONE) immediate release tablet 20 mg  20 mg Oral Q3H PRN       No Known Allergies    OBJECTIVE:    Physical Exam  Physical Exam    Lab Results:   I have personally reviewed pertinent labs  , CBC:   Lab Results   Component Value Date    WBC 5 24 07/09/2020    HGB 10 6 (L) 07/09/2020    HCT 32 0 (L) 07/09/2020     (H) 07/09/2020     07/09/2020    MCH 34 0 07/09/2020    MCHC 33 1 07/09/2020    RDW 17 1 (H) 07/09/2020    MPV 11 4 07/09/2020    NRBC 0 07/09/2020   , CMP:   Lab Results   Component Value Date    SODIUM 134 (L) 07/09/2020    K 3 9 07/09/2020     07/09/2020    CO2 28 07/09/2020    BUN 18 07/09/2020    CREATININE 1 09 07/09/2020    CALCIUM 7 8 (L) 07/09/2020    AST 59 (H) 07/09/2020    ALT 41 07/09/2020    ALKPHOS 352 (H) 07/09/2020    EGFR 69 07/09/2020   , BMP:  Lab Results   Component Value Date    SODIUM 134 (L) 07/09/2020    K 3 9 07/09/2020     07/09/2020    CO2 28 07/09/2020    BUN 18 07/09/2020    CREATININE 1 09 07/09/2020    GLUC 91 07/09/2020    CALCIUM 7 8 (L) 07/09/2020    AGAP 6 07/09/2020    EGFR 69 07/09/2020   , PT/PTT:No results found for: PT, PTT   As in HPI    Imaging Studies: reviewed imaging reports as noted in HPI  EKG, Pathology, and Other Studies: none pertinent    Counseling / Coordination of Care    Total floor / unit time spent today ***+ minutes   Greater than 50% of total time was spent with the patient and / or family counseling and / or coordination of care  A description of the counseling / coordination of care: chart review; care coordinatoin with Med/Onc colleagues as noted; attempt at symptom pursuit; consideration of hospice; review and assessment of decisional apparatus and capacity, applicable legal codes and extant documents; ***

## 2020-07-09 NOTE — SOCIAL WORK
LOS 1 day, not a bundle or readmission  CM met with patient to introduce self, explain role, complete CM assessment, and discuss DC planning  Patient resides at [facility] where he rents a room  He rents the room alone  There are 15 other individuals that live between the 2 floors at the building  Patient reports he is friendly with some of the neighbors  Patient reports he shares kitchen and bathroom with the other individuals  Patient reports he functions independent PTA with no use of DME  Hx HHC for about 1-2 visits then patient reports he was told by the agency they could not accommodate his needs  Per patient no Hx STR; however, through chart review patient went to [address] [place] [address] in September of 2019  Patient reports diagnoses of anxiety and depression  Patient is unsure if he is on medications for the MH diagnoses  Reports not seeing a therapist or psychiatrist and no IP stays  Patient denies substance use  No LW or POA, reports brother as his point of contact  PCP is Dr Elenita Cruz, patient has prescription coverage, and prefers using [facility] in [place]  CM will continue to follow

## 2020-07-09 NOTE — ASSESSMENT & PLAN NOTE
Malnutrition Findings:   Malnutrition type: Acute illness(in the setting of chronic illness)  Degree of Malnutrition: Other severe protein calorie malnutrition(related to inadequate oral intake, catabolic illness )    BMI Findings: Body mass index is 18 94 kg/m²     · BMI noted, in the setting of chronic disease/metastatic cancer   · Nutrition consult   · Encourage PO intake

## 2020-07-09 NOTE — ASSESSMENT & PLAN NOTE
· Patient states that he is not ready for hospice at this time  He reports that he would restart chemotherapy if offered as long as it "would not cause a commotion" (referring to prior diarrheal issues)  Discussed that pending liver work up this may not be an option  After our conversation today patient did express that he is "getting tired of testing" and "is not sure that it will make a difference"  I did discuss possible outcomes including the fact that even if he could have chemotherapy there is always a high likelihood for side effects   He appreciated my honesty and we decided that it is cl[place] to hear from the specialists prior to making further decisions  · Consult Oncology as patient needs potential chemotherapy options to further decision making as far as goals of care   · Will round with GI later on when attending sees patient as per patient's request   · Will continue to advance goals of care as patient's hospital course progresses   · Consider involvement of palliative care vs hospice pending course

## 2020-07-09 NOTE — CONSULTS
Consultation - [facility]  Aster Found  78 y o  male MRN: [id number]  Unit/Bed#: W -01 Encounter: [id number]         Reason for Consult / Principal Problem: Hyperbilirubinemia and history of metastatic hepatocellular carcinoma     HPI: Patient is a 78year old male with a past medical history of metastatic hepatocellular carcinoma, GBM status post resection and radiation, HTN and COPD who came to the ED at [address] [place] [address] due to bilateral lower extremity edema that has been worsening over the past few days  Patient also reports abdominal distention which he states is chronic as well as RUQ pain and right-sided chest wall pain which he also reports is normal  Patient denies any nausea, vomiting, diarrhea, hematochezia and melena  Last BM 2 days ago  He reports occasional dark colored stool and pepsi-colored urine  Patient follows with Dr Ricardo Shields from Oncology  Patient was started on [place] earlier this year which was discontinued due to side effects  Patient has been following with palliative care in the outpatient setting since 02/2020  Patient stated on admission he would not be interested in pursuing further chemotherapy as long as there are side effects attached to it  However, he would be open to discuss more about treatment options with oncology  Palliative care has also been consulted for [address] discussion with patient  On admission, patient was noted to be jaundiced and to have ascites and bilateral lower extremity edema  CMP with evidence of Alk-phos elevated at 385  Total bilirubin 7 1 and direct bilirubin 6 1  Ammonia level 47  Right upper quadrant ultrasound showed marked hepatomegaly  Heterogenous liver with masslike areas secondary to hepatocellular carcinoma  Ascites and cholelithiasis  Common bile duct obscured  Prior CT abdomen and pelvis from 06/20 shows hepatomegaly as well as evidence of multiple masses in the liver  No CBD dilation  Gastroenterology has been consulted for further workup and management of hyperbilirubinemia  Review of Systems:    CONSTITUTIONAL: Denies any fever, chills, or rigors  Decreased appetite, and recent weight loss  HEENT: No earache or tinnitus  Denies hearing loss or visual disturbances  CARDIOVASCULAR: No chest pain or palpitations  RESPIRATORY: Denies any cough, hemoptysis, shortness of breath or dyspnea on exertion  GASTROINTESTINAL: As noted in the History of Present Illness  GENITOURINARY: No problems with urination  Denies any hematuria or dysuria  Occasional Pepsi colored urine  NEUROLOGIC: No dizziness or vertigo, denies headaches  MUSCULOSKELETAL: Denies any muscle or joint pain  SKIN: Denies skin rashes or itching  Reports jaundice  ENDOCRINE: Denies excessive thirst  Denies intolerance to heat or cold  PSYCHOSOCIAL: Denies depression or anxiety  Denies any recent memory loss         Historical Information   Past Medical History:   Diagnosis Date    Cancer [facility])     liver    Dependence on nicotine from cigarettes 9/4/2019    Hypertension     Liver cancer [facility])      Past Surgical History:   Procedure Laterality Date    APPENDECTOMY      BRAIN SURGERY      CRANIOTOMY Left 9/10/2019    Procedure: Image guided left frontal craniotomy for resection of tumor;  Surgeon: Ashley Barbosa MD;  Location: [facility];  Service: Neurosurgery     Social History   Social History     Substance and Sexual Activity   Alcohol Use Not Currently    Comment: occasional, 1/5 / week in the past     Social History     Substance and Sexual Activity   Drug Use Not Currently    Types: Oxycodone    Comment: ran out     Social History     Tobacco Use   Smoking Status Current Every Day Smoker    Packs/day: 1 00    Years: 40 00    Pack years: 40 00   Smokeless Tobacco Current User    Types: Chew     Family History   Problem Relation Age of Onset    Breast cancer Mother     Liver cancer Father     Throat cancer Paternal Uncle         Meds/Allergies     Current Facility-Administered Medications   Medication Dose Route Frequency    albuterol (PROVENTIL HFA,VENTOLIN HFA) inhaler 2 puff  2 puff Inhalation Q4H PRN    ALPRAZolam (XANAX) tablet 0 25 mg  0 25 mg Oral HS PRN    fluticasone-vilanterol (BREO ELLIPTA) 100-25 mcg/inh inhaler 1 puff  1 puff Inhalation Daily    HYDROmorphone (DILAUDID) injection 2 mg  2 mg Intravenous Q6H PRN    nicotine (NICODERM CQ) 21 mg/24 hr TD 24 hr patch 1 patch  1 patch Transdermal Daily    nicotine (NICODERM CQ) 21 mg/24 hr TD 24 hr patch 21 mg  21 mg Transdermal Once    ondansetron (ZOFRAN) injection 4 mg  4 mg Intravenous Q6H PRN    oxyCODONE (ROXICODONE) immediate release tablet 10 mg  10 mg Oral Q4H PRN    oxyCODONE (ROXICODONE) immediate release tablet 20 mg  20 mg Oral Q4H PRN       No Known Allergies      Objective     Blood pressure 126/60, pulse 66, temperature (!) 97 3 °F (36 3 °C), resp  rate 16, height 6' 2" (1 88 m), weight 66 9 kg (147 lb 7 8 oz), SpO2 95 %  Intake/Output Summary (Last 24 hours) at 7/9/2020 0935  Last data filed at 7/9/2020 0601  Gross per 24 hour   Intake 1320 ml   Output    Net 1320 ml         PHYSICAL EXAM:      General Appearance:   Alert and oriented x 3  Cooperative, and in no respiratory distress  Patient is frail, cachectic, appears chronically ill  HEENT:   Normocephalic, atraumatic, jaundiced  Evidence of scleral icterus  Neck:  Supple, symmetrical, trachea midline   Lungs:   Clear to auscultation bilaterally; no rales, rhonchi or wheezing; respirations unlabored    Heart[de-identified]   S1 and S2 normal; regular rate and rhythm; no murmur, rub, or gallop  Abdomen:   Soft, distended; normal bowel sounds; right upper quadrant tenderness     Genitalia:   Deferred    Rectal:   Deferred    Extremities:  No cyanosis, clubbing   +3 bilateral lower extremity edema   Pulses:  2+ and symmetric all extremities    Skin:  Jaundiced; skin texture, turgor normal, no rashes or lesions    Lymph nodes:  No palpable cervical or supraclavicular lymphadenopathy        Lab Results:   Results from last 7 days   Lab Units 07/09/20  0522   WBC Thousand/uL 5 24   HEMOGLOBIN g/dL 10 6*   HEMATOCRIT % 32 0*   PLATELETS Thousands/uL 286   NEUTROS PCT % 78*   LYMPHS PCT % 11*   MONOS PCT % 7   EOS PCT % 3     Results from last 7 days   Lab Units 07/09/20  0522   POTASSIUM mmol/L 3 9   CHLORIDE mmol/L 100   CO2 mmol/L 28   BUN mg/dL 18   CREATININE mg/dL 1 09   CALCIUM mg/dL 7 8*   ALK PHOS U/L 352*   ALT U/L 41   AST U/L 59*     Results from last 7 days   Lab Units 07/08/20  1740   INR  1 12           Imaging Studies: I have personally reviewed pertinent imaging studies  Xr Ribs With Pa Chest Min 3 Views Right    Result Date: 7/8/2020  Impression: No obvious rib fracture  However given the lung metastasis with the left upper rib mass right rib involvement cannot be excluded and CT chest may be obtained  Workstation performed: [id number]     Ct Head Without Contrast    Result Date: 7/8/2020  Impression: No acute intracranial abnormality  Left frontoparietal postsurgical changes status post GBM treatment  Workstation performed: [id number]     Us Abdomen Complete    Result Date: 7/8/2020  Impression: Marked hepatomegaly  Markedly heterogeneous liver with masslike areas in this patient with known multifocal hepatocellular carcinoma  Ascites  Cholelithiasis  Common bile duct obscured  Workstation performed: [id number]       ASSESSMENT and PLAN:    1  Hyperbilirubinemia:  Patient has a history of metastatic hepatocellular carcinoma with increased tumor burden and is admitted with jaundice and found to have bilirubin level at 7 1  On physical exam there is evidence of ascites as well as bilateral lower extremity edema  Right upper quadrant ultrasound with evidence of hepatomegaly, heterogeneous liver with masslike areas consistent with multifocal hepatocellular carcinoma  Ascites and cholelithiasis  CBD obscured  There is no imaging evidence of extrinsic or intrinsic obstruction of CBD  The elevated bilirubin level at this time is likely secondary to tumor burden affecting large part of the liver  Another possibility could be obstruction of the common bile duct for which patient needs an MRCP  Plan:  · MRCP had been ordered earlier today  However, patient met with Oncology who stated there is unfortunately no further treatment available at this time  Palliative Care met with patient and patient has decided to pursue hospice care  We evaluated patient after this decision and explained to him the possibility of doing an MRCP to rule out CBD obstruction  We explained at length that it is unlikely that this is the cause of the hyperbilirubinemia however, if an obstruction was found there could be an option of placement of a stent which would be palliative at this point but due to significant tumor burden it might not decrease the bilirubin enough to allow for chemotherapy  Patient stated he would not want chemotherapy due to side effects and has decided to pursue hospice care instead and does not want to follow-up with the MRCP  Order has been canceled  Thank you for allowing us to participate in the care of this pleasant patient  We will follow up with you closely

## 2020-07-09 NOTE — HOSPICE NOTE
Met with patient and reviewed hospice care and services per Medicare Guidelines  He is agreeable  He says he is going home tomorrow "not matter" what  The only DME he will allow me to order is [employer] which I will have delivered on Saturday to assure he is there to let them in  Copies of the consents faxed to the office, copies to patient and ooh DNR to CM

## 2020-07-09 NOTE — CONSULTS
Medical Oncology/Hematology Consult Note  Kg Narvaez , 78 y o , 1950  W /W -01, [id number]  07/09/20    Assessment and Plan:    1  Hepatocellular carcinoma with metastasis in the lungs and left 5th rib  Patient was advised to start treatment with Marijean [place] at his last oncology follow-up in February 2020  He has been noncompliant with this recommendation and has not received any recent treatment for his [address]  Ultrasound of abdomen done on admission shows marked hepatomegaly with masslike areas  His most recent CT of the abdomen/pelvis that was done on June 5, 2020 shows an interval increase in his hepatomegaly especially in the left lobe with multiple hepatic masses, however there was some decrease in the size and number of the hepatic hypodense masses that were seen previously on imaging done in January 2020 that identified progression of his metastatic disease in the lungs and left 5th rib  He now presents with significant hyperbilirubinemia    Given the extent of his disease burden and symptoms it is not unreasonable to consider hospice as a plan of care  This was discussed with the patient today as he is not a candidate for chemotherapy given his elevated T bili  Patient himself has elected to not take the recommended chemotherapy drugs he has been prescribed and after our conversation today stated I am getting tired of all the testing    Hospice was discussed at length and patient was agreeable to hospice referral     2   Hyperbilirubinemia  Secondary to 1  Patient's total bilirubin at admission was 7  19  Patient does not qualify for chemotherapy with a T bili this high  Patient is not interested in pursuing any further aggressive workup  3  Glioblastoma  Patient completed chemo plus radiation in November 2019  At his last oncology office visit in February 2020, it was recommended that he begin consolidative Temodar treatment    He has been noncompliant  CT of head done at admission showed no acute intracranial abnormality and left frontoparietal postsurgical changes status post his GBM treatment  4  Cancer-related pain  Patient has climbing OME and significant cancer related pain  I personally discused patient's symptoms with palliative care physician Dr Rosy Abdul who will address starting a long-acting opioid for improved pain control  His symptoms will also be addressed if he elects to sign onto hospice    Please do not hesitate to contact me if you have any questions or need additional information  Thank you for this consult  Reason for Consultation: Hepatocellular carcinoma, metastatic cancer      Chief complaint:   Chief Complaint   Patient presents with    Weakness - Generalized     PT presents w/increased weakness x 6 days  < appetite, dizzy       History of present illness:   Montrell William  is a 78 y o  male with a history of Hepatitis C and cirrhosis,  metastatic hepatocellular carcinoma diagnosed in 2017 while living in [place] s/p TACE x2 and oral Sorafenib therapy which he self discontinued secondary to side effects  He was diagnosed with glioblastoma of the left frontoparietal lobe and underwent a gross total resection in 9/2019  He completed a coarse of adjuvant radiation and chemoradiation therapy in 12/2019 withTemodar    On CT scan completed on 1/29/20 patient's hepatic tumors have increased in number as well as in size with progression of metastatic in disease in the lungs and the left 5th rib  Repeat imaging done on June 5th showed no change in his metastatic nodules throughout his lung fields, new patchy opacities with nodular interstitial changes in the middle lobe and right lower lobe, interval increase in his hepatomegaly especially in the left lobe with multiple hepatic masses  He was last seen by medical oncology on 02/13/2020  It was recommended that he continue treatment with Temodar for his glioblastoma and [place] [place] for his [address]    Patient was noncompliant with these recommendations and made a conscious decision not to treat his malignancies  He was sent to ED for evaluation of weakness and confusion at the recommendation of his palliative care provider  Workup revealed a significant hyperbilirubinemia with a T bili of 7 19, his liver enzymes were also elevated  Alkaline phosphatase 385, AST 69 ALT 45  His albumin 1 5  Ultrasound of the abdomen revealed "Marked hepatomegaly  Markedly heterogeneous liver with masslike areas in this patient with known multifocal hepatocellular carcinoma  Ascites  Cholelithiasis  Common bile duct obscured"    Medical Oncology was consulted to discuss potential treatment options      Interval history:  Patient seen and examined  He was endorsing abdominal pain and discomfort at time of visit  His abdomen is firm and distended  He denies any nausea or vomiting  Long conversation was held with patient regarding worsening tumor burden in his liver resulting in hyperbilirubinemia, pain  He has not been on chemotherapy since January 2020 and at this point is not a candidate given his high T bili  Patient acknowledges his noncompliance with recommended cancer treatment regimen and lack of follow-up with Oncology  Hospice was introduced as an alternative to consider as he does not seem interested in further pursuing any cancer treatments at this time  Patient was open to speaking with hospice liaison  Hospice referral was sent  Review of Systems   Constitutional: Positive for activity change, fatigue and unexpected weight change  Cardiovascular: Positive for leg swelling  Gastrointestinal: Positive for abdominal distention and abdominal pain  Skin: Positive for color change (Jaundice)  Neurological: Positive for weakness  All other systems reviewed and are negative  All other review of systems were negative      Past medical history:   Past Medical History:   Diagnosis Date    Cancer [facility])     liver    Dependence on nicotine from cigarettes 9/4/2019    Hypertension     Liver cancer [facility])        Past surgical history:   Past Surgical History:   Procedure Laterality Date    APPENDECTOMY      BRAIN SURGERY      CRANIOTOMY Left 9/10/2019    Procedure: Image guided left frontal craniotomy for resection of tumor;  Surgeon: Lele Armendariz MD;  Location: [facility];  Service: Neurosurgery       Allergies: No Known Allergies    Home medications:   Medications Prior to Admission   Medication    albuterol (ProAir HFA) 90 mcg/act inhaler    oxyCODONE (ROXICODONE) 10 MG TABS    ALPRAZolam (XANAX) 0 25 mg tablet    aluminum-magnesium hydroxide-simethicone (MYLANTA) 200-200-20 mg/5 mL suspension    dexamethasone (DECADRON) 4 mg tablet    diphenoxylate-atropine (LOMOTIL) 2 5-0 025 mg per tablet    fluticasone-vilanterol (BREO ELLIPTA) 100-25 mcg/inh inhaler    Lenvatinib, 12 MG Daily Dose, (Lenvima, 12 MG Daily Dose,) 3 x 4 MG CPPK    loperamide (IMODIUM) 2 mg capsule    mirtazapine (REMERON) 15 mg tablet    nicotine (NICODERM CQ) 21 mg/24 hr TD 24 hr patch    ondansetron (ZOFRAN) 8 mg tablet    senna (SENOKOT) 8 6 mg       Hospital medications:   Current Facility-Administered Medications:     albuterol (PROVENTIL HFA,VENTOLIN HFA) inhaler 2 puff, 2 puff, Inhalation, Q4H PRN, Gt Hinojosa PA-C    ALPRAZolam (XANAX) tablet 0 25 mg, 0 25 mg, Oral, HS PRN, Gt Hinojosa PA-C    fluticasone-vilanterol (BREO ELLIPTA) 100-25 mcg/inh inhaler 1 puff, 1 puff, Inhalation, Daily, Gt Hinojosa PA-C, 1 puff at 07/09/20 0902    HYDROmorphone (DILAUDID) injection 2 mg, 2 mg, Intravenous, Q3H PRN, Gabbie Lin PA-C    nicotine (NICODERM CQ) 21 mg/24 hr TD 24 hr patch 1 patch, 1 patch, Transdermal, Daily, Gt Hinojosa PA-C, 1 patch at 07/09/20 0903    nicotine (NICODERM CQ) 21 mg/24 hr TD 24 hr patch 21 mg, 21 mg, Transdermal, Once, Ubaldo Vences MD, 21 mg at 07/08/20 1234    ondansetron [facility] injection 4 mg, 4 mg, Intravenous, Q6H PRN, Jeri Suarez PA-C    oxyCODONE (ROXICODONE) immediate release tablet 10 mg, 10 mg, Oral, Q4H PRN, Gt Hinojosa PA-C    oxyCODONE (ROXICODONE) immediate release tablet 20 mg, 20 mg, Oral, Q3H PRN, Jeri Suarez PA-C    Social history:   Social History     Tobacco Use    Smoking status: Current Every Day Smoker     Packs/day: 1 00     Years: 40 00     Pack years: 40 00    Smokeless tobacco: Current User     Types: Chew   Substance Use Topics    Alcohol use: Not Currently     Comment: occasional, 1/5 / week in the past    Drug use: Not Currently     Types: Oxycodone     Comment: ran out       Family history:   Family History   Problem Relation Age of Onset    Breast cancer Mother     Liver cancer Father     Throat cancer Paternal Uncle        Vitals:  Vitals:    07/09/20 0800   BP:    Pulse:    Resp:    Temp:    SpO2: 95%       Physical Exam   Constitutional: No distress  Alert pleasant, appears chronically ill,  jaundiced and cachectic  In no acute distress   HENT:   Head: Normocephalic and atraumatic  Mouth/Throat: No oropharyngeal exudate  Eyes: EOM are normal  Scleral icterus is present  Cardiovascular: Normal rate and regular rhythm  Pulmonary/Chest: Effort normal  No respiratory distress  Abdominal: He exhibits distension and mass  There is tenderness  Abdomen is firm   Musculoskeletal: He exhibits edema (pitting edema bilateral lower extremity R>L)  Diffuse muscle wasting   Neurological: He is alert  Skin: Skin is warm and dry  He is not diaphoretic  Psychiatric: He has a normal mood and affect   His behavior is normal  Thought content normal        Recent Results (from the past 48 hour(s))   ECG 12 lead    Collection Time: 07/08/20 11:38 AM   Result Value Ref Range    Ventricular Rate 68 BPM    Atrial Rate 69 BPM    MT Interval 144 ms    QRSD Interval 94 ms    QT Interval 398 ms    QTC Interval 423 ms    P Axis 92 degrees QRS Axis 77 degrees    T Wave Axis 77 degrees   CBC and differential    Collection Time: 07/08/20 12:40 PM   Result Value Ref Range    WBC 4 90 4 31 - 10 16 Thousand/uL    RBC 3 58 (L) 3 88 - 5 62 Million/uL    Hemoglobin 12 1 12 0 - 17 0 g/dL    Hematocrit 36 4 (L) 36 5 - 49 3 %     (H) 82 - 98 fL    MCH 33 8 26 8 - 34 3 pg    MCHC 33 2 31 4 - 37 4 g/dL    RDW 17 2 (H) 11 6 - 15 1 %    MPV 12 0 8 9 - 12 7 fL    Platelets 324 506 - 430 Thousands/uL    nRBC 0 /100 WBCs    Neutrophils Relative 78 (H) 43 - 75 %    Immat GRANS % 0 0 - 2 %    Lymphocytes Relative 13 (L) 14 - 44 %    Monocytes Relative 6 4 - 12 %    Eosinophils Relative 2 0 - 6 %    Basophils Relative 1 0 - 1 %    Neutrophils Absolute 3 80 1 85 - 7 62 Thousands/µL    Immature Grans Absolute 0 01 0 00 - 0 20 Thousand/uL    Lymphocytes Absolute 0 64 0 60 - 4 47 Thousands/µL    Monocytes Absolute 0 31 0 17 - 1 22 Thousand/µL    Eosinophils Absolute 0 09 0 00 - 0 61 Thousand/µL    Basophils Absolute 0 05 0 00 - 0 10 Thousands/µL   Basic metabolic panel    Collection Time: 07/08/20 12:40 PM   Result Value Ref Range    Sodium 131 (L) 136 - 145 mmol/L    Potassium 4 2 3 5 - 5 3 mmol/L    Chloride 97 (L) 100 - 108 mmol/L    CO2 28 21 - 32 mmol/L    ANION GAP 6 4 - 13 mmol/L    BUN 18 5 - 25 mg/dL    Creatinine 1 10 0 60 - 1 30 mg/dL    Glucose 130 65 - 140 mg/dL    Calcium 8 0 (L) 8 3 - 10 1 mg/dL    eGFR 69 ml/min/1 73sq m   Hepatic function panel    Collection Time: 07/08/20 12:40 PM   Result Value Ref Range    Total Bilirubin 7 19 (H) 0 20 - 1 00 mg/dL    Bilirubin, Direct 6 15 (H) 0 00 - 0 20 mg/dL    Alkaline Phosphatase 385 (H) 46 - 116 U/L    AST 69 (H) 5 - 45 U/L    ALT 45 12 - 78 U/L    Total Protein 6 6 6 4 - 8 2 g/dL    Albumin 1 5 (L) 3 5 - 5 0 g/dL   Ammonia    Collection Time: 07/08/20 12:40 PM   Result Value Ref Range    Ammonia 47 (H) 11 - 35 umol/L   Novel Coronavirus (Covid-19),PCR [facility]    Collection Time: 07/08/20 12:48 PM   Result Value Ref Range    SARS-CoV-2 Negative Negative   Protime-INR    Collection Time: 07/08/20  5:40 PM   Result Value Ref Range    Protime 13 8 11 6 - 14 5 seconds    INR 1 12 0 84 - 1 19   Comprehensive metabolic panel    Collection Time: 07/09/20  5:22 AM   Result Value Ref Range    Sodium 134 (L) 136 - 145 mmol/L    Potassium 3 9 3 5 - 5 3 mmol/L    Chloride 100 100 - 108 mmol/L    CO2 28 21 - 32 mmol/L    ANION GAP 6 4 - 13 mmol/L    BUN 18 5 - 25 mg/dL    Creatinine 1 09 0 60 - 1 30 mg/dL    Glucose 91 65 - 140 mg/dL    Calcium 7 8 (L) 8 3 - 10 1 mg/dL    AST 59 (H) 5 - 45 U/L    ALT 41 12 - 78 U/L    Alkaline Phosphatase 352 (H) 46 - 116 U/L    Total Protein 5 9 (L) 6 4 - 8 2 g/dL    Albumin 1 4 (L) 3 5 - 5 0 g/dL    Total Bilirubin 6 27 (H) 0 20 - 1 00 mg/dL    eGFR 69 ml/min/1 73sq m   CBC and differential    Collection Time: 07/09/20  5:22 AM   Result Value Ref Range    WBC 5 24 4 31 - 10 16 Thousand/uL    RBC 3 12 (L) 3 88 - 5 62 Million/uL    Hemoglobin 10 6 (L) 12 0 - 17 0 g/dL    Hematocrit 32 0 (L) 36 5 - 49 3 %     (H) 82 - 98 fL    MCH 34 0 26 8 - 34 3 pg    MCHC 33 1 31 4 - 37 4 g/dL    RDW 17 1 (H) 11 6 - 15 1 %    MPV 11 4 8 9 - 12 7 fL    Platelets 749 149 - 718 Thousands/uL    nRBC 0 /100 WBCs    Neutrophils Relative 78 (H) 43 - 75 %    Immat GRANS % 0 0 - 2 %    Lymphocytes Relative 11 (L) 14 - 44 %    Monocytes Relative 7 4 - 12 %    Eosinophils Relative 3 0 - 6 %    Basophils Relative 1 0 - 1 %    Neutrophils Absolute 4 00 1 85 - 7 62 Thousands/µL    Immature Grans Absolute 0 02 0 00 - 0 20 Thousand/uL    Lymphocytes Absolute 0 58 (L) 0 60 - 4 47 Thousands/µL    Monocytes Absolute 0 39 0 17 - 1 22 Thousand/µL    Eosinophils Absolute 0 18 0 00 - 0 61 Thousand/µL    Basophils Absolute 0 07 0 00 - 0 10 Thousands/µL       Imaging Studies: Xr Ribs With Pa Chest Min 3 Views Right    Result Date: 7/8/2020  Narrative: RIGHT RIBS INDICATION:   injury   COMPARISON:  Compared with 6/5/2020 VIEWS:  XR RIBS RIGHT W PA CHEST MIN 3 VIEWS FINDINGS: There is no fracture or pathologic bone lesion  Soft tissues are unremarkable  Partially visualized known left upper lobe mass  Right costophrenic angle soft tissue mass again seen  The visualized right hemithorax is unremarkable  There is no pneumothorax  Impression: No obvious rib fracture  However given the lung metastasis with the left upper rib mass right rib involvement cannot be excluded and CT chest may be obtained  Workstation performed: [id number]     Ct Head Without Contrast    Result Date: 7/8/2020  Narrative: CT BRAIN - WITHOUT CONTRAST INDICATION:   Head trauma, minor (Age => 67y)  COMPARISON:  MRI brain 4/14/2020  CT head 9/4/2019  TECHNIQUE:  CT examination of the brain was performed  In addition to axial images, coronal 2D reformatted images were created and submitted for interpretation  Radiation dose length product (DLP) for this visit:  854 mGy-cm   This examination, like all CT scans performed in the [facility], was performed utilizing techniques to minimize radiation dose exposure, including the use of iterative reconstruction and automated exposure control  IMAGE QUALITY:  Diagnostic  FINDINGS: PARENCHYMA: Left frontoparietal postsurgical changes status post GBM treatment  No intracranial mass, mass effect or midline shift  No CT signs of acute infarction  No acute parenchymal hemorrhage  1 limited assessment for mass recurrence without IV contrast  VENTRICLES AND EXTRA-AXIAL SPACES:  Normal for the patient's age  VISUALIZED ORBITS AND PARANASAL SINUSES:  Unremarkable  CALVARIUM AND EXTRACRANIAL SOFT TISSUES:  Left parietal craniotomy  Impression: No acute intracranial abnormality  Left frontoparietal postsurgical changes status post GBM treatment  Workstation performed: [id number]     Us Abdomen Complete    Result Date: 7/8/2020  Narrative: ABDOMEN ULTRASOUND, COMPLETE INDICATION:   Ascites, Liver disease   COMPARISON: Reference is made to abdominal CT dated 6/5/2020  TECHNIQUE:   Real-time ultrasound of the abdomen was performed with a curvilinear transducer with both volumetric sweeps and still imaging techniques  FINDINGS: PANCREAS:  Obscured by overlying bowel gas  AORTA AND IVC:  Obscured by overlying bowel gas  LIVER: Size:  Enlarged  The liver measures 26 cm in the midclavicular line  Contour:  Surface contour is smooth  Parenchyma: The liver is markedly heterogeneous with masslike areas in this patient with known multifocal hepatocellular carcinoma  No evidence of suspicious mass  Limited imaging of the main portal vein shows it to be patent and hepatopetal  BILIARY: Multiple gallstones are seen in the gallbladder  Apparent gallbladder wall thickening may be secondary to ascites  No sonographic Wick's sign is reported  No intrahepatic biliary dilatation  The common bile duct is obscured by overlying bowel gas  KIDNEY: Right kidney measures 10 1 cm  Within normal limits  Left kidney measures 9 7 cm  Within normal limits  SPLEEN: Measures 11 5 cm  Within normal limits  ASCITES:  Present  Impression: Marked hepatomegaly  Markedly heterogeneous liver with masslike areas in this patient with known multifocal hepatocellular carcinoma  Ascites  Cholelithiasis  Common bile duct obscured  Workstation performed: [id number]     Counseling / Coordination of Care  Total floor / unit time spent today 75 minutes  Greater than 50% of total time was spent with the patient and / or family counseling and / or coordination of care  A description of the counseling / coordination of care:  Review of chart, bedside assessment of patient and symptoms, discussion about prognosis, laboratory studies, treatment options versus hospice  Coordination of care with bedside RN, case management, hospice liaison and palliative care team     APOLLO Merida    Please note:   This report has been generated by voice recognition software system  Therefore, there may be syntax, spelling and/or grammatical errors  Please call if you have any questions

## 2020-07-10 ENCOUNTER — TRANSITIONAL CARE MANAGEMENT (OUTPATIENT)
Dept: FAMILY MEDICINE CLINIC | Facility: CLINIC | Age: 70
End: 2020-07-10

## 2020-07-10 VITALS
SYSTOLIC BLOOD PRESSURE: 121 MMHG | WEIGHT: 147.49 LBS | BODY MASS INDEX: 18.93 KG/M2 | TEMPERATURE: 97.9 F | RESPIRATION RATE: 18 BRPM | DIASTOLIC BLOOD PRESSURE: 54 MMHG | HEART RATE: 68 BPM | HEIGHT: 74 IN | OXYGEN SATURATION: 93 %

## 2020-07-10 LAB
ALBUMIN SERPL BCP-MCNC: 1.3 G/DL (ref 3.5–5)
ALP SERPL-CCNC: 336 U/L (ref 46–116)
ALT SERPL W P-5'-P-CCNC: 41 U/L (ref 12–78)
ANION GAP SERPL CALCULATED.3IONS-SCNC: 6 MMOL/L (ref 4–13)
AST SERPL W P-5'-P-CCNC: 58 U/L (ref 5–45)
BILIRUB SERPL-MCNC: 5.61 MG/DL (ref 0.2–1)
BUN SERPL-MCNC: 18 MG/DL (ref 5–25)
CALCIUM SERPL-MCNC: 7.9 MG/DL (ref 8.3–10.1)
CHLORIDE SERPL-SCNC: 101 MMOL/L (ref 100–108)
CO2 SERPL-SCNC: 28 MMOL/L (ref 21–32)
CREAT SERPL-MCNC: 1.09 MG/DL (ref 0.6–1.3)
ERYTHROCYTE [DISTWIDTH] IN BLOOD BY AUTOMATED COUNT: 17 % (ref 11.6–15.1)
GFR SERPL CREATININE-BSD FRML MDRD: 69 ML/MIN/1.73SQ M
GLUCOSE SERPL-MCNC: 100 MG/DL (ref 65–140)
HCT VFR BLD AUTO: 30.8 % (ref 36.5–49.3)
HGB BLD-MCNC: 10.1 G/DL (ref 12–17)
MCH RBC QN AUTO: 34 PG (ref 26.8–34.3)
MCHC RBC AUTO-ENTMCNC: 32.8 G/DL (ref 31.4–37.4)
MCV RBC AUTO: 104 FL (ref 82–98)
PLATELET # BLD AUTO: 257 THOUSANDS/UL (ref 149–390)
PMV BLD AUTO: 11.3 FL (ref 8.9–12.7)
POTASSIUM SERPL-SCNC: 4.2 MMOL/L (ref 3.5–5.3)
PROT SERPL-MCNC: 5.8 G/DL (ref 6.4–8.2)
RBC # BLD AUTO: 2.97 MILLION/UL (ref 3.88–5.62)
SODIUM SERPL-SCNC: 135 MMOL/L (ref 136–145)
WBC # BLD AUTO: 5.34 THOUSAND/UL (ref 4.31–10.16)

## 2020-07-10 PROCEDURE — 99239 HOSP IP/OBS DSCHRG MGMT >30: CPT | Performed by: PHYSICIAN ASSISTANT

## 2020-07-10 PROCEDURE — 80053 COMPREHEN METABOLIC PANEL: CPT | Performed by: PHYSICIAN ASSISTANT

## 2020-07-10 PROCEDURE — 85027 COMPLETE CBC AUTOMATED: CPT | Performed by: PHYSICIAN ASSISTANT

## 2020-07-10 PROCEDURE — NC001 PR NO CHARGE: Performed by: NURSE PRACTITIONER

## 2020-07-10 RX ORDER — OXYCODONE HYDROCHLORIDE 20 MG/1
20 TABLET ORAL
Qty: 80 TABLET | Refills: 0 | Status: SHIPPED | OUTPATIENT
Start: 2020-07-10 | End: 2020-07-20

## 2020-07-10 RX ORDER — OXYCODONE HYDROCHLORIDE 20 MG/1
20 TABLET ORAL
Qty: 80 TABLET | Refills: 0 | Status: SHIPPED | OUTPATIENT
Start: 2020-07-10 | End: 2020-07-10

## 2020-07-10 RX ADMIN — OXYCODONE HYDROCHLORIDE 20 MG: 10 TABLET ORAL at 07:29

## 2020-07-10 RX ADMIN — FLUTICASONE FUROATE AND VILANTEROL TRIFENATATE 1 PUFF: 100; 25 POWDER RESPIRATORY (INHALATION) at 08:18

## 2020-07-10 RX ADMIN — OXYCODONE HYDROCHLORIDE 20 MG: 10 TABLET ORAL at 10:32

## 2020-07-10 RX ADMIN — OXYCODONE HYDROCHLORIDE 20 MG: 10 TABLET ORAL at 01:00

## 2020-07-10 RX ADMIN — OXYCODONE HYDROCHLORIDE 20 MG: 10 TABLET ORAL at 13:26

## 2020-07-10 RX ADMIN — OXYCODONE HYDROCHLORIDE 20 MG: 10 TABLET ORAL at 04:24

## 2020-07-10 NOTE — SOCIAL WORK
CM s/w patient at bedside to discuss dcp  Patient has signed on the [facility], [facility]  services yesterday and is cleared for d/c home today  Stephanie GAGNON to e-scribe meds to [facility] for delivery prior to d/c  Patient does not have a ride home  CM called [facility] and s/w Ed  Patient set up for a 145pm [facility] transport home today  Nurse Elvira Oscar, patient, Tyesha Gonzalez from hospice, and [facility] aware of D/C  Nurse Elvira Oscar to  patient's medication from [facility] to deliver prior to d/c  Mario Becker from [place] to call patient to collect copay if applicable

## 2020-07-10 NOTE — CONSULTS
Patient signed onto hospice yesterday and will be discharged today  Outpatient provider has requested to manage pain medications  Palliative Medicine consult no longer needed  Please re consult should the situation change

## 2020-07-10 NOTE — ASSESSMENT & PLAN NOTE
Malnutrition Findings:   Malnutrition type: Acute illness(in the setting of chronic illness)  Degree of Malnutrition: Other severe protein calorie malnutrition(related to inadequate oral intake, catabolic illness )    BMI Findings: Body mass index is 18 94 kg/m²     · BMI noted, in the setting of chronic disease/metastatic cancer   · Encourage PO intake

## 2020-07-10 NOTE — PLAN OF CARE
Problem: Prexisting or High Potential for Compromised Skin Integrity  Goal: Skin integrity is maintained or improved  Description  INTERVENTIONS:  - Identify patients at risk for skin breakdown  - Assess and monitor skin integrity  - Assess and monitor nutrition and hydration status  - Monitor labs   - Assess for incontinence   - Turn and reposition patient  - Assist with mobility/ambulation  - Relieve pressure over bony prominences  - Avoid friction and shearing  - Provide appropriate hygiene as needed including keeping skin clean and dry  - Evaluate need for skin moisturizer/barrier cream  - Collaborate with interdisciplinary team   - Patient/family teaching  - Consider wound care consult   Outcome: Progressing     Problem: Nutrition/Hydration-ADULT  Goal: Nutrient/Hydration intake appropriate for improving, restoring or maintaining nutritional needs  Description  Monitor and assess patient's nutrition/hydration status for malnutrition  Collaborate with interdisciplinary team and initiate plan and interventions as ordered  Monitor patient's weight and dietary intake as ordered or per policy  Utilize nutrition screening tool and intervene as necessary  Determine patient's food preferences and provide high-protein, high-caloric foods as appropriate       INTERVENTIONS:  - Monitor oral intake, urinary output, labs, and treatment plans  - Assess nutrition and hydration status and recommend course of action  - Evaluate amount of meals eaten  - Assist patient with eating if necessary   - Allow adequate time for meals  - Recommend/ encourage appropriate diets, oral nutritional supplements, and vitamin/mineral supplements  - Order, calculate, and assess calorie counts as needed  - Recommend, monitor, and adjust tube feedings and TPN/PPN based on assessed needs  - Assess need for intravenous fluids  - Provide specific nutrition/hydration education as appropriate  - Include patient/family/caregiver in decisions related to nutrition  Outcome: Progressing     Problem: PAIN - ADULT  Goal: Verbalizes/displays adequate comfort level or baseline comfort level  Description  Interventions:  - Encourage patient to monitor pain and request assistance  - Assess pain using appropriate pain scale  - Administer analgesics based on type and severity of pain and evaluate response  - Implement non-pharmacological measures as appropriate and evaluate response  - Consider cultural and social influences on pain and pain management  - Notify physician/advanced practitioner if interventions unsuccessful or patient reports new pain  Outcome: Progressing     Problem: SAFETY ADULT  Goal: Patient will remain free of falls  Description  INTERVENTIONS:  - Assess patient frequently for physical needs  -  Identify cognitive and physical deficits and behaviors that affect risk of falls    -  [place] fall precautions as indicated by assessment   - Educate patient/family on patient safety including physical limitations  - Instruct patient to call for assistance with activity based on assessment  - Modify environment to reduce risk of injury  - Consider OT/PT consult to assist with strengthening/mobility  Outcome: Progressing  Goal: Maintain or return to baseline ADL function  Description  INTERVENTIONS:  -  Assess patient's ability to carry out ADLs; assess patient's baseline for ADL function and identify physical deficits which impact ability to perform ADLs (bathing, care of mouth/teeth, toileting, grooming, dressing, etc )  - Assess/evaluate cause of self-care deficits   - Assess range of motion  - Assess patient's mobility; develop plan if impaired  - Assess patient's need for assistive devices and provide as appropriate  - Encourage maximum independence but intervene and supervise when necessary  - Involve family in performance of ADLs  - Assess for home care needs following discharge   - Consider OT consult to assist with ADL evaluation and planning for discharge  - Provide patient education as appropriate  Outcome: Progressing  Goal: Maintain or return mobility status to optimal level  Description  INTERVENTIONS:  - Assess patient's baseline mobility status (ambulation, transfers, stairs, etc )    - Identify cognitive and physical deficits and behaviors that affect mobility  - Identify mobility aids required to assist with transfers and/or ambulation (gait belt, sit-to-stand, lift, walker, cane, etc )  - [place] fall precautions as indicated by assessment  - Record patient progress and toleration of activity level on Mobility SBAR; progress patient to next Phase/Stage  - Instruct patient to call for assistance with activity based on assessment  - Consider rehabilitation consult to assist with strengthening/weightbearing, etc   Outcome: Progressing     Problem: DISCHARGE PLANNING  Goal: Discharge to home or other facility with appropriate resources  Description  INTERVENTIONS:  - Identify barriers to discharge w/patient and caregiver  - Arrange for needed discharge resources and transportation as appropriate  - Identify discharge learning needs (meds, wound care, etc )  - Arrange for interpretive services to assist at discharge as needed  - Refer to Case Management Department for coordinating discharge planning if the patient needs post-hospital services based on physician/advanced practitioner order or complex needs related to functional status, cognitive ability, or social support system  Outcome: Progressing     Problem: Knowledge Deficit  Goal: Patient/family/caregiver demonstrates understanding of disease process, treatment plan, medications, and discharge instructions  Description  Complete learning assessment and assess knowledge base    Interventions:  - Provide teaching at level of understanding  - Provide teaching via preferred learning methods  Outcome: Progressing     Problem: NEUROSENSORY - ADULT  Goal: Achieves stable or improved neurological status  Description  INTERVENTIONS  - Monitor and report changes in neurological status  - Monitor vital signs such as temperature, blood pressure, glucose, and any other labs ordered   - Initiate measures to prevent increased intracranial pressure  - Monitor for seizure activity and implement precautions if appropriate      Outcome: Progressing     Problem: MUSCULOSKELETAL - ADULT  Goal: Maintain or return mobility to safest level of function  Description  INTERVENTIONS:  - Assess patient's ability to carry out ADLs; assess patient's baseline for ADL function and identify physical deficits which impact ability to perform ADLs (bathing, care of mouth/teeth, toileting, grooming, dressing, etc )  - Assess/evaluate cause of self-care deficits   - Assess range of motion  - Assess patient's mobility  - Assess patient's need for assistive devices and provide as appropriate  - Encourage maximum independence but intervene and supervise when necessary  - Involve family in performance of ADLs  - Assess for home care needs following discharge   - Consider OT consult to assist with ADL evaluation and planning for discharge  - Provide patient education as appropriate  Outcome: Progressing     Problem: Potential for Falls  Goal: Patient will remain free of falls  Description  INTERVENTIONS:  - Assess patient frequently for physical needs  -  Identify cognitive and physical deficits and behaviors that affect risk of falls    -  [place] fall precautions as indicated by assessment   - Educate patient/family on patient safety including physical limitations  - Instruct patient to call for assistance with activity based on assessment  - Modify environment to reduce risk of injury  - Consider OT/PT consult to assist with strengthening/mobility  Outcome: Progressing

## 2020-07-10 NOTE — PHYSICAL THERAPY NOTE
Physical Therapy Cancellation Note    PT treatment with focus on gait training with SPC attempted, cleared by RN Evette Harper  Pt refused to participate in session, despite therapist encouragement and education  Of note, pt planned for d/c home on hospice today  Pt denies concerns about completion of functional mobility or negotiation of stairs to enter his home, states he is going to get a SPC from the drug store  Will follow up as appropriate, if pt not discharged today        Judi Sol, PT, DPT

## 2020-07-10 NOTE — DISCHARGE INSTR - AVS FIRST PAGE
Dear Tashia Hinton ,     It was our pleasure to care for you here at [facility]  It is our hope that we were always able to exceed the expected standards for your care during your stay  You were hospitalized due to pain, worsening of known cancer  You were cared for on the [facility] 4th floor by Freedom Fagan PA-C under the service of Cynthia Mckeon MD with the [facility] who covers for your primary care physician (PCP), Stanton Briggs DO, while you were hospitalized  If you have any questions or concerns related to this hospitalization, you may contact us at [phone]  For follow up as well as any medication refills, we recommend that you follow up with your primary care physician  A registered nurse will reach out to you by phone within a few days after your discharge to answer any additional questions that you may have after going home  However, at this time we provide for you here, the most important instructions / recommendations at discharge:     · Notable Medication Adjustments -   · Oxycodone regimen has been adjusted to 30mg every 8 hours as needed  · Testing Required after Discharge -   · none  · Important follow up information -   · Follow up with hospice services and palliative care upon d/c    · Please review this entire after visit summary as additional general instructions including medication list, appointments, activity, diet, any pertinent wound care, and other additional recommendations from your care team that may be provided for you        Sincerely,     Freedom Fagan PA-C

## 2020-07-10 NOTE — HOSPICE NOTE
Please make sure patients has his pain medications filler prior to discharge as he will have no way of getting to a pharmacy once he leaves the hospital

## 2020-07-10 NOTE — SOCIAL WORK
1445 -  made aware by nurse Evette Harper that [facility] never arrived and no calls were received  CM called [facility] and s/w Chaitanya Young who states that because of current pandemic, they have been having issues with rides showing up  Per Chaitanya Young, there is currently a  underway for a transport like [facility] that he will set patient up with for an "ASAP"  time  Nurse Evette Harper to receive a phone call when ride is near    dept will continue to follow

## 2020-07-10 NOTE — ASSESSMENT & PLAN NOTE
· Initially, patient stated that he is not ready for hospice at this time  He reports that he would restart chemotherapy if offered as long as it "would not cause a commotion" (referring to prior diarrheal issues)  Discussed that pending liver work up this may not be an option  After prior conversations during hospitalization, patient did express that he is "getting tired of testing" and "is not sure that it will make a difference"  · Patient seen by GI, Oncology, and hospice  He has elected to sign onto hospice   Met with liaison today, will be d/c home with medications as recommended  · He can continue to f/u with palliative care medicine

## 2020-07-10 NOTE — DISCHARGE SUMMARY
Discharge- Tashia Hinton  1950, 78 y o  male MRN: [id number]    Unit/Bed#: W -01 Encounter: [id number]    Primary Care Provider: Stanton Briggs DO   Date and time admitted to hospital: 7/8/2020 11:32 AM    * Hyperbilirubinemia  Assessment & Plan  · Present on admission, suspect due to worsening tumor burden, but no recent imaging to review  Has not been on chemotherapy due to side effects  Was on Marybel Harris in the past (starting 2/2020) but had stopped due to side effects  His lesions in his liver had been growing at that time  Would accept chemotherapy if offered again and no side effects would be had  T  Bili slightly improved today   · Patient has opted to sign onto home hospice services-- will be d/c home today    Severe protein-calorie malnutrition ([address] )  Assessment & Plan  Malnutrition Findings:   Malnutrition type: Acute illness(in the setting of chronic illness)  Degree of Malnutrition: Other severe protein calorie malnutrition(related to inadequate oral intake, catabolic illness )    BMI Findings: Body mass index is 18 94 kg/m²  · BMI noted, in the setting of chronic disease/metastatic cancer   · Encourage PO intake     Goals of care, counseling/discussion  Assessment & Plan  · Initially, patient stated that he is not ready for hospice at this time  He reports that he would restart chemotherapy if offered as long as it "would not cause a commotion" (referring to prior diarrheal issues)  Discussed that pending liver work up this may not be an option  After prior conversations during hospitalization, patient did express that he is "getting tired of testing" and "is not sure that it will make a difference"  · Patient seen by GI, Oncology, and hospice  He has elected to sign onto hospice   Met with liaison today, will be d/c home with medications as recommended  · He can continue to f/u with palliative care medicine     Glioblastoma [facility])  Assessment & Plan  · Status post resection, no neurologic symptoms at this time, no signs of acute change on CT scan   · No active treatment at this time     Hyponatremia  Assessment & Plan  · Noted at 131, likely due to chronic liver disease  Improved to 134    · Encourage PO intake  · Monitor BMP   · Could consider diuresis, but I do not feel the patient's volume status is bad enough to warrant this without significant side effects     Hepatocellular carcinoma (HCC)  Assessment & Plan  · Known history with lung/rib mets  Is not on active chemotherapy at this time  Appears to have worsening T  Bili  States that he was seeing Dr Jevon Ko in [place]  Abdominal US showed occluded CBD, however unclear if this is intrinsic vs extrinsic  INR acceptable   · Patient has elected to sign onto hospice at this time    COPD (chronic obstructive pulmonary disease) ([facility] [address] )  Assessment & Plan  · No acute exacerbation   · Continue Breo, Albuterol PRN      Discharging Physician / Practitioner: Jhonathan Rashid PA-C  PCP: DO Lory  Admission Date:   Admission Orders (From admission, onward)     Ordered        07/08/20 1446  Inpatient Admission  Once                   Discharge Date: 07/10/20    Resolved Problems  Date Reviewed: 7/10/2020    None          Consultations During Hospital Stay:  · Medical Oncology  · GI    Procedures Performed:   · XR ribs R w PA chest  · CT head wo contrast  · US abdomen    Significant Findings / Test Results:   · XR ribs R w PA chest  · No obvious rib fracture  However given the lung metastasis with the left upper rib mass right rib involvement cannot be excluded and CT chest may be obtained  · CT head wo contrast  · No acute intracranial abnormality  · Left frontoparietal postsurgical changes status post GBM treatment  · US abdomen  · Marked hepatomegaly  Markedly heterogeneous liver with masslike areas in this patient with known multifocal hepatocellular carcinoma  Ascites  Cholelithiasis   Common bile duct obscured  Incidental Findings:   · none    Test Results Pending at Discharge (will require follow up):   · none     Outpatient Tests Requested:  · none    Complications:  none    Reason for Admission: leg swelling; worsening hepatocellular dz    Hospital Course:     Suraj Wu  is a 78 y o  male patient who originally presented to the hospital on 7/8/2020 due to lower extremity swelling  Patient reports he had been worse over the last few days  However, patient also did go to his palliative care appointment prior to the ER, and he was noted to be confused  Imaging obtained in the ER noted worsening metastatic disease, and elevated total bilirubin  Initially, patient was not ready to sign on to hospice  He was evaluated by both Oncology and GI  Oncology evaluated the patient, who noted that he would not be a candidate for chemotherapy given his elevated total bilirubin  Of note, he elected not to take recommended chemotherapy drugs that were prescribed to him  After discussion with Oncology, patient felt more comfortable with hospice  Patient was seen by hospice liaison, and patient completed paperwork the patient was officially signed on hospice prior to discharge  Patient will continue to have his pain management adjusted by palliative care  Upon discharge, they have recommended oxycodone 30 mg by mouth every 3 hours as needed for pain  In the future, patient would likely be a candidate for extended release medications  Please see above list of diagnoses and related plan for additional information  Condition at Discharge: fair     Discharge Day Visit / Exam:     Subjective:  Patient feels okay today  His pain is controlled medications  He is eager to go home    Vitals: Blood Pressure: 121/54 (07/10/20 0718)  Pulse: 68 (07/10/20 0718)  Temperature: 97 9 °F (36 6 °C) (07/10/20 0718)  Temp Source: Oral (07/09/20 2146)  Respirations: 18 (07/10/20 0718)  Height: 6' 2" (188 cm) (07/08/20 1722)  Weight - Scale: 66 9 kg (147 lb 7 8 oz) (07/08/20 1133)  SpO2: 93 % (07/10/20 0730)  Exam:   Physical Exam   Constitutional: He is oriented to person, place, and time  No distress  HENT:   Head: Normocephalic and atraumatic  Eyes: Scleral icterus is present  Cardiovascular: Normal rate and regular rhythm  Exam reveals no gallop and no friction rub  No murmur heard  Pulmonary/Chest: Effort normal and breath sounds normal  No stridor  No respiratory distress  He has no wheezes  Abdominal: He exhibits distension  He exhibits no mass  There is no tenderness  There is no guarding  Musculoskeletal: Normal range of motion  He exhibits edema (b/l LE)  Neurological: He is alert and oriented to person, place, and time  He displays normal reflexes  No cranial nerve deficit  Coordination normal    Skin: Skin is warm and dry  He is not diaphoretic  Psychiatric: He has a normal mood and affect  Discussion with Family:  Family not available    Discharge instructions/Information to patient and family:   See after visit summary for information provided to patient and family  Provisions for Follow-Up Care:  See after visit summary for information related to follow-up care and any pertinent home health orders  Disposition:     Home w/ home hospice    For Discharges to [facility] SNF:   · Not Applicable to this Patient - Not Applicable to this Patient    Planned Readmission: none     Discharge Statement:  I spent 35 minutes discharging the patient  This time was spent on the day of discharge  I had direct contact with the patient on the day of discharge  Greater than 50% of the total time was spent examining patient, answering all patient questions, arranging and discussing plan of care with patient as well as directly providing post-discharge instructions  Additional time then spent on discharge activities      Discharge Medications:  See after visit summary for reconciled discharge medications provided to patient and family        ** Please Note: This note has been constructed using a voice recognition system **

## 2020-07-10 NOTE — ASSESSMENT & PLAN NOTE
· Present on admission, suspect due to worsening tumor burden, but no recent imaging to review  Has not been on chemotherapy due to side effects  Was on Alex in the past (starting 2/2020) but had stopped due to side effects  His lesions in his liver had been growing at that time  Would accept chemotherapy if offered again and no side effects would be had   T  Bili slightly improved today   · Patient has opted to sign onto home hospice services-- will be d/c home today

## 2020-07-10 NOTE — PROGRESS NOTES
The staff from [address] [place] [address] called the floor nurse and stated pt is not in sight  Upon follow up,  staff stated pt had departed independently about 30 min prior  Floor nurse went down to see if pt is just hanging outside, but he was not in sight  Number 1 contact(Migue, Pt's brother) was  called and made aware

## 2020-07-10 NOTE — ASSESSMENT & PLAN NOTE
· Known history with lung/rib mets  Is not on active chemotherapy at this time  Appears to have worsening T  Bili  States that he was seeing Dr eJvon Ko in [place]  Abdominal US showed occluded CBD, however unclear if this is intrinsic vs extrinsic   INR acceptable   · Patient has elected to sign onto hospice at this time

## 2020-07-12 PROBLEM — Z71.89 GOALS OF CARE, COUNSELING/DISCUSSION: Status: RESOLVED | Noted: 2020-01-17 | Resolved: 2020-07-12

## 2020-07-12 PROBLEM — E87.1 HYPONATREMIA: Status: RESOLVED | Noted: 2019-09-12 | Resolved: 2020-07-12

## 2020-07-12 PROBLEM — G93.6 CEREBRAL EDEMA (HCC): Status: RESOLVED | Noted: 2019-09-05 | Resolved: 2020-07-12

## 2020-07-12 PROBLEM — R60.0 EDEMA OF BOTH LEGS: Status: ACTIVE | Noted: 2020-07-12

## 2020-07-12 PROBLEM — E88.09 HYPOALBUMINEMIA: Status: ACTIVE | Noted: 2020-07-12

## 2020-07-12 PROBLEM — I10 ESSENTIAL HYPERTENSION: Status: RESOLVED | Noted: 2019-09-04 | Resolved: 2020-07-12

## 2020-07-12 PROBLEM — K59.1 FUNCTIONAL DIARRHEA: Status: RESOLVED | Noted: 2020-03-23 | Resolved: 2020-07-12

## 2020-07-12 PROBLEM — D72.829 LEUKOCYTOSIS: Status: RESOLVED | Noted: 2019-09-09 | Resolved: 2020-07-12

## 2020-07-12 NOTE — ED PROVIDER NOTES
History  Chief Complaint   Patient presents with    Weakness - Generalized     PT presents w/increased weakness x 6 days  < appetite, dizzy     70 YR MALE- WITH ACTIVE CA- LIVER WITH LUNG METS ? -- NOT UNDERGOING ANY TX- SEEN BY [facility]- RANT OUT OF MEDICATIONS RECENTLY -- C/O GEN WEAKNESS- INCREASED JAUNDICED- INCREASED BLE EDEMA- SEVERAL RECENT FALLS- WITH HEAD- R RIB INJURY -- HAD APPT AT [facility] TODAY SENT DOWN TO THE ER FOR EVAL-- PT HAS CHRONIC COUGH- NOT NEW- NO FEVERS- NO HEMATEMESIS/ NO MELENA/ NO ABD PAIN --       History provided by:  Patient   used: No        Prior to Admission Medications   Prescriptions Last Dose Informant Patient Reported? Taking?    ALPRAZolam (XANAX) 0 25 mg tablet Not Taking at Unknown time  No No   Sig: Take 1 tablet (0 25 mg total) by mouth daily at bedtime as needed for sleep   Patient not taking: Reported on 7/8/2020   Lenvatinib, 12 MG Daily Dose, (Lenvima, 12 MG Daily Dose,) 3 x 4 MG CPPK Not Taking at Unknown time  No No   Sig: Take 12 mg by mouth daily   Patient not taking: Reported on 4/20/2020   albuterol (ProAir HFA) 90 mcg/act inhaler 7/7/2020 at Unknown time  No Yes   Sig: Inhale 2 puffs every 4 (four) hours as needed for wheezing or shortness of breath   aluminum-magnesium hydroxide-simethicone (MYLANTA) 200-200-20 mg/5 mL suspension Not Taking at Unknown time Self No No   Sig: Take 30 mL by mouth every 6 (six) hours as needed for indigestion or heartburn   Patient not taking: Reported on 10/9/2019   dexamethasone (DECADRON) 4 mg tablet Not Taking at Unknown time  No No   Sig: Take 1 tablet (4 mg total) by mouth daily with breakfast   Patient not taking: Reported on 7/8/2020   diphenoxylate-atropine (LOMOTIL) 2 5-0 025 mg per tablet Not Taking at Unknown time  No No   Sig: Take 1 tablet by mouth 4 (four) times a day as needed for diarrhea   Patient not taking: Reported on 6/5/2020   fluticasone-vilanterol (BREO ELLIPTA) 100-25 mcg/inh inhaler Not Taking at Unknown time  No No   Sig: Inhale 1 puff daily Rinse mouth after use     Patient not taking: Reported on 7/8/2020   loperamide (IMODIUM) 2 mg capsule Not Taking at Unknown time  No No   Sig: Take 1 capsule (2 mg total) by mouth 4 (four) times a day as needed for diarrhea   Patient not taking: Reported on 7/8/2020   mirtazapine (REMERON) 15 mg tablet Not Taking at Unknown time  No No   Sig: Take 1 tablet (15 mg total) by mouth daily at bedtime   Patient not taking: Reported on 6/5/2020   nicotine (NICODERM CQ) 21 mg/24 hr TD 24 hr patch Not Taking at Unknown time  No No   Sig: Place 1 patch on the skin daily   Patient not taking: Reported on 3/2/2020   ondansetron (ZOFRAN) 8 mg tablet Not Taking at Unknown time  No No   Sig: Take 1 tablet (8 mg total) by mouth every 8 (eight) hours as needed for nausea or vomiting   Patient not taking: Reported on 3/2/2020   oxyCODONE (ROXICODONE) 10 MG TABS Past Week at Unknown time  No Yes   Sig: Take 1 tablet (10 mg total) by mouth every 4 (four) hours as needed for moderate painMax Daily Amount: 60 mg   senna (SENOKOT) 8 6 mg Not Taking at Unknown time  No No   Sig: Take 1 tablet (8 6 mg total) by mouth 2 (two) times a day as needed for constipation   Patient not taking: Reported on 6/5/2020      Facility-Administered Medications: None       Past Medical History:   Diagnosis Date    Cancer [facility])     liver    Dependence on nicotine from cigarettes 9/4/2019    Hypertension     Liver cancer [facility])        Past Surgical History:   Procedure Laterality Date    APPENDECTOMY      BRAIN SURGERY      CRANIOTOMY Left 9/10/2019    Procedure: Image guided left frontal craniotomy for resection of tumor;  Surgeon: Tanisha Chowdhury MD;  [facility];  Service: Neurosurgery       Family History   Problem Relation Age of Onset    Breast cancer Mother     Liver cancer Father     Throat cancer Paternal Uncle      I have reviewed and agree with the history as documented  E-Cigarette/Vaping    E-Cigarette Use Never User      E-Cigarette/Vaping Substances    Nicotine No     THC No     CBD No     Flavoring No     Other No     Unknown No      Social History     Tobacco Use    Smoking status: Current Every Day Smoker     Packs/day: 1 00     Years: 40 00     Pack years: 40 00    Smokeless tobacco: Current User     Types: Chew   Substance Use Topics    Alcohol use: Not Currently     Comment: occasional, 1/5 / week in the past    Drug use: Not Currently     Types: Oxycodone     Comment: ran out       Review of Systems   Constitutional: Positive for activity change, appetite change and fatigue  Negative for chills, diaphoresis, fever and unexpected weight change  HENT: Negative  Eyes: Negative  Respiratory: Negative  Cardiovascular: Positive for chest pain and leg swelling  Negative for palpitations  Gastrointestinal: Positive for abdominal distention  Negative for abdominal pain, anal bleeding, blood in stool, constipation, diarrhea, nausea, rectal pain and vomiting  Endocrine: Negative  Genitourinary: Negative  Musculoskeletal: Negative  Skin: Negative  Allergic/Immunologic: Negative  Neurological: Positive for headaches  Negative for dizziness, tremors, seizures, syncope, facial asymmetry, speech difficulty, weakness, light-headedness and numbness  Hematological: Negative  Psychiatric/Behavioral: Negative  Physical Exam  Physical Exam   Constitutional: He is oriented to person, place, and time  AVSS-- PULSE OX 95 % ON RA- INTERPRETATION IS NORMAL- NO INTERVENTION - CACHETIC- JAUNDICED   HENT:   Head: Normocephalic and atraumatic  Right Ear: External ear normal    Left Ear: External ear normal    Nose: Nose normal    Mouth/Throat: Oropharynx is clear and moist  No oropharyngeal exudate  NO SCALP TENDERNESS/CONTUSION/ HEMATOMA   Eyes: Pupils are equal, round, and reactive to light   Conjunctivae and EOM are normal  Right eye exhibits no discharge  Left eye exhibits no discharge  Scleral icterus is present  Neck: Normal range of motion  Neck supple  No JVD present  No tracheal deviation present  No thyromegaly present  NO PMT C/T/L/S SPINE   Cardiovascular: Normal rate, regular rhythm, normal heart sounds and intact distal pulses  Exam reveals no gallop and no friction rub  No murmur heard  Pulmonary/Chest: Effort normal  No stridor  No respiratory distress  He has no wheezes  He has no rales  He exhibits no tenderness  MODERATE DECREASED BS-B -- R LOWER CHEST WALL/ RIB TENDERNESS IN ANTERIOR TO MID AXILLARY LINE - NO CREPITUS/ ECCHYMOSIS/PALPABEL DEFROMITY    Abdominal: Bowel sounds are normal  He exhibits distension  He exhibits no mass  There is no tenderness  There is no rebound and no guarding  No hernia  NT- POS ASCITES- POS CAPUT MEDUSA     Musculoskeletal: Normal range of motion  He exhibits edema  He exhibits no tenderness or deformity  2 PLUS BLE PEDAL'/ PRETIBIAL EDEMA- NT- NO ASYM/ ERYTHEMA- EQUAL BILATERAL RADIAL/DP PULSES   Lymphadenopathy:     He has no cervical adenopathy  Neurological: He is alert and oriented to person, place, and time  No cranial nerve deficit or sensory deficit  He exhibits normal muscle tone  Coordination normal    NON FOCAL NEURO EXAM    Skin: Skin is warm  Capillary refill takes less than 2 seconds  No rash noted  No erythema  No pallor  JAUNDICED   Psychiatric: He has a normal mood and affect  His behavior is normal  Judgment and thought content normal    Nursing note and vitals reviewed        Vital Signs  ED Triage Vitals [07/08/20 1112]   Temperature Pulse Respirations Blood Pressure SpO2   98 1 °F (36 7 °C) 74 18 109/54 95 %      Temp Source Heart Rate Source Patient Position - Orthostatic VS BP Location FiO2 (%)   Oral Monitor Sitting Left arm --      Pain Score       8           Vitals:    07/09/20 0729 07/09/20 1522 07/09/20 2146 07/10/20 0718   BP: 126/60 124/58 122/57 121/54   Pulse: 66 75 76 68   Patient Position - Orthostatic VS:  Sitting Lying          Visual Acuity      ED Medications  Medications   oxyCODONE (ROXICODONE) immediate release tablet 10 mg (10 mg Oral Given 7/8/20 1233)   nicotine (NICODERM CQ) 21 mg/24 hr TD 24 hr patch 21 mg (21 mg Transdermal Patch Removed 7/9/20 1147)   lidocaine (LIDODERM) 5 % patch 2 patch (2 patches Topical Patch Removed 7/9/20 0045)       Diagnostic Studies  Results Reviewed     Procedure Component Value Units Date/Time    Novel Coronavirus [facility] [[id number]]  (Normal) Collected:  07/08/20 1248    Lab Status:  Final result Specimen:  Nares from Nose Updated:  07/08/20 1403     SARS-CoV-2 Negative    Narrative: The specimen collection materials, transport medium, and/or testing methodology utilized in the production of these test results have been proven to be reliable in a limited validation with an abbreviated program under the Emergency Utilization Authorization provided by the FDA  Testing reported as "Presumptive positive" will be confirmed with secondary testing with a reference laboratory to ensure result accuracy  Clinical caution and judgement should be used with the interpretation of these results with consideration of the clinical impression and other laboratory testing  Testing reported as "Positive" or "Negative" has been proven to be accurate according to standard laboratory validation requirements  All testing is performed with control materials showing appropriate reactivity at standard intervals        Hepatic function panel [[id number]]  (Abnormal) Collected:  07/08/20 1240    Lab Status:  Final result Specimen:  Blood from Arm, Left Updated:  07/08/20 1313     Total Bilirubin 7 19 mg/dL      Bilirubin, Direct 6 15 mg/dL      Alkaline Phosphatase 385 U/L      AST 69 U/L      ALT 45 U/L      Total Protein 6 6 g/dL      Albumin 1 5 g/dL     Ammonia [[id number]]  (Abnormal) Collected:  07/08/20 1240    Lab Status:  Final result Specimen:  Blood from Arm, Left Updated:  07/08/20 1313     Ammonia 47 umol/L     Basic metabolic panel [[id number]]  (Abnormal) Collected:  07/08/20 1240    Lab Status:  Final result Specimen:  Blood from Arm, Left Updated:  07/08/20 1310     Sodium 131 mmol/L      Potassium 4 2 mmol/L      Chloride 97 mmol/L      CO2 28 mmol/L      ANION GAP 6 mmol/L      BUN 18 mg/dL      Creatinine 1 10 mg/dL      Glucose 130 mg/dL      Calcium 8 0 mg/dL      eGFR 69 ml/min/1 73sq m     Narrative:       [place] guidelines for Chronic Kidney Disease (CKD):     Stage 1 with normal or high GFR (GFR > 90 mL/min/1 73 square meters)    Stage 2 Mild CKD (GFR = 60-89 mL/min/1 73 square meters)    Stage 3A Moderate CKD (GFR = 45-59 mL/min/1 73 square meters)    Stage 3B Moderate CKD (GFR = 30-44 mL/min/1 73 square meters)    Stage 4 Severe CKD (GFR = 15-29 mL/min/1 73 square meters)    Stage 5 End Stage CKD (GFR <15 mL/min/1 73 square meters)  Note: GFR calculation is accurate only with a steady state creatinine    CBC and differential [[id number]]  (Abnormal) Collected:  07/08/20 1240    Lab Status:  Final result Specimen:  Blood from Arm, Left Updated:  07/08/20 1305     WBC 4 90 Thousand/uL      RBC 3 58 Million/uL      Hemoglobin 12 1 g/dL      Hematocrit 36 4 %       fL      MCH 33 8 pg      MCHC 33 2 g/dL      RDW 17 2 %      MPV 12 0 fL      Platelets 650 Thousands/uL      nRBC 0 /100 WBCs      Neutrophils Relative 78 %      Immat GRANS % 0 %      Lymphocytes Relative 13 %      Monocytes Relative 6 %      Eosinophils Relative 2 %      Basophils Relative 1 %      Neutrophils Absolute 3 80 Thousands/µL      Immature Grans Absolute 0 01 Thousand/uL      Lymphocytes Absolute 0 64 Thousands/µL      Monocytes Absolute 0 31 Thousand/µL      Eosinophils Absolute 0 09 Thousand/µL      Basophils Absolute 0 05 Thousands/µL                  US abdomen complete   Final Result by Jimmie Hazel MD (07/08 2010)      Marked hepatomegaly  Markedly heterogeneous liver with masslike areas in this patient with known multifocal hepatocellular carcinoma  Ascites  Cholelithiasis  Common bile duct obscured  Workstation performed: [id number]         CT head without contrast   Final Result by Verona Ziegler MD (07/08 1408)      No acute intracranial abnormality  Left frontoparietal postsurgical changes status post GBM treatment  Workstation performed: [id number]         XR ribs with pa chest min 3 views RIGHT   Final Result by Fe Zamudio MD (07/08 7259)   No obvious rib fracture  However given the lung metastasis with the left upper rib mass right rib involvement cannot be excluded and CT chest may be obtained  Workstation performed: [id number]                    Procedures  Procedures         ED Course  ED Course as of Jul 12 0724 Wed Jul 08, 2020   1354 Cxr - r rib xray - compared to previous 6/5/22--- no sign changes- copd changes- no free/ sq air- no ptx/ rib fx/ pulm contusion seen--  angus and rll masses similar                                                 MDM      Disposition  Final diagnoses:   Generalized weakness   Lightheadedness   Fall, initial encounter   Bilateral lower extremity edema   [facility]ast[facility] cancer [facility] )     Time reflects when diagnosis was documented in both MDM as applicable and the Disposition within this note     Time User Action Codes Description Comment    7/8/2020  2:28 PM Delphine Fuse Add [R53 1] Generalized weakness     7/8/2020  2:28 PM Coit Locket D Add [R42] Lightheadedness     7/8/2020  2:29 PM Delphine Fuse Add [P40  QMHV] Fall, initial encounter     7/8/2020  2:29 PM Coit Locket D Add [R60 0] Bilateral lower extremity edema     7/8/2020  2:29 PM Delphine Fuse Add [C79 9] Metastatic cancer ([facility] )     7/8/2020  3:08 PM Warden García Add [C22 0] Hepatocellular carcinoma ([facility] ) 7/8/2020  3:08 PM Warden García Add [E80 6] Hyperbilirubinemia     7/9/2020  2:28 PM Sahra Crease Add [G89 3] Cancer-related pain     7/9/2020  2:28 PM Sahra Crease Modify [G89 3] Cancer-related pain       ED Disposition     ED Disposition Condition Date/Time Comment    Admit Stable Wed Jul 8, 2020  2:46 PM Case was discussed with dr Renee Neal  and the patient's admission status was agreed to be Admission Status: inpatient status to the service of [facility]           Follow-up Information     Follow up With Specialties Details Why [address], DO Family Medicine Schedule an appointment as soon as possible for a visit  [place] [id number] Eliza Garland MD Palliative Care Follow up  [facility] [address]      [place] [facility]  Follow up  [address] [place] [address]  [phone]            Discharge Medication List as of 7/10/2020 12:57 PM      CONTINUE these medications which have CHANGED    Details   oxyCODONE (ROXICODONE) 20 MG TABS Take 1 tablet (20 mg total) by mouth every 3 (three) hours as needed for severe pain for up to 10 daysMax Daily Amount: 160 mg, Starting Fri 7/10/2020, Until Mon 7/20/2020, Normal         CONTINUE these medications which have NOT CHANGED    Details   albuterol (ProAir HFA) 90 mcg/act inhaler Inhale 2 puffs every 4 (four) hours as needed for wheezing or shortness of breath, Starting Tue 6/9/2020, Normal      ALPRAZolam (XANAX) 0 25 mg tablet Take 1 tablet (0 25 mg total) by mouth daily at bedtime as needed for sleep, Starting Tue 6/9/2020, Normal      aluminum-magnesium hydroxide-simethicone (MYLANTA) 200-200-20 mg/5 mL suspension Take 30 mL by mouth every 6 (six) hours as needed for indigestion or heartburn, Starting Fri 9/13/2019, Print      diphenoxylate-atropine (LOMOTIL) 2 5-0 025 mg per tablet Take 1 tablet by mouth 4 (four) times a day as needed for diarrhea, Starting Mon 3/23/2020, Normal      fluticasone-vilanterol (BREO ELLIPTA) 100-25 mcg/inh inhaler Inhale 1 puff daily Rinse mouth after use , Starting Tue 6/9/2020, Print      nicotine (NICODERM CQ) 21 mg/24 hr TD 24 hr patch Place 1 patch on the skin daily, Starting Thu 2/13/2020, Normal      ondansetron (ZOFRAN) 8 mg tablet Take 1 tablet (8 mg total) by mouth every 8 (eight) hours as needed for nausea or vomiting, Starting Thu 2/13/2020, Normal         STOP taking these medications       dexamethasone (DECADRON) 4 mg tablet Comments:   Reason for Stopping:         Lenvatinib, 12 MG Daily Dose, (Lenvima, 12 MG Daily Dose,) 3 x 4 MG CPPK Comments:   Reason for Stopping:         loperamide (IMODIUM) 2 mg capsule Comments:   Reason for Stopping:         mirtazapine (REMERON) 15 mg tablet Comments:   Reason for Stopping:         senna (SENOKOT) 8 6 mg Comments:   Reason for Stopping:             Outpatient Discharge Orders   Discharge Diet     Activity as tolerated     Call provider for:  persistent nausea or vomiting     Call provider for:  severe uncontrolled pain     No dressing needed       PDMP Review       Value Time User    PDMP Reviewed  Yes 7/11/2020  3:30 PM Piotr Omalley MD          ED Provider  Electronically Signed by           Mannie Kendall MD  07/12/20 9518

## 2020-07-15 ENCOUNTER — LAB REQUISITION (OUTPATIENT)
Dept: LAB | Facility: HOSPITAL | Age: 70
End: 2020-07-15
Payer: MEDICARE

## 2020-07-15 ENCOUNTER — TELEPHONE (OUTPATIENT)
Dept: FAMILY MEDICINE CLINIC | Facility: CLINIC | Age: 70
End: 2020-07-15

## 2020-07-15 DIAGNOSIS — Z11.59 ENCOUNTER FOR SCREENING FOR OTHER VIRAL DISEASES: ICD-10-CM

## 2020-07-15 DIAGNOSIS — Z11.8 ENCOUNTER FOR SCREENING FOR OTHER INFECTIOUS AND PARASITIC DISEASES: ICD-10-CM

## 2020-07-15 LAB — SARS-COV-2 RNA RESP QL NAA+PROBE: NEGATIVE

## 2020-07-15 PROCEDURE — U0003 INFECTIOUS AGENT DETECTION BY NUCLEIC ACID (DNA OR RNA); SEVERE ACUTE RESPIRATORY SYNDROME CORONAVIRUS 2 (SARS-COV-2) (CORONAVIRUS DISEASE [COVID-19]), AMPLIFIED PROBE TECHNIQUE, MAKING USE OF HIGH THROUGHPUT TECHNOLOGIES AS DESCRIBED BY CMS-2020-01-R: HCPCS | Performed by: FAMILY MEDICINE

## 2020-07-20 ENCOUNTER — TELEPHONE (OUTPATIENT)
Dept: HEMATOLOGY ONCOLOGY | Facility: CLINIC | Age: 70
End: 2020-07-20

## 2020-08-31 NOTE — ED NOTES
Patient requesting to use bathroom  Gave patient urinal and asked that he please stand at bedside, however, PT was not able to urinate       April James Berry RN  07/08/20 8042 Yes. Please ask if we need to submit new orders or just resume/subsequent home health orders. Pend please.
